# Patient Record
Sex: MALE
[De-identification: names, ages, dates, MRNs, and addresses within clinical notes are randomized per-mention and may not be internally consistent; named-entity substitution may affect disease eponyms.]

---

## 2021-03-24 ENCOUNTER — HOSPITAL ENCOUNTER (EMERGENCY)
Dept: HOSPITAL 97 - ER | Age: 41
Discharge: HOME | End: 2021-03-24
Payer: SELF-PAY

## 2021-03-24 DIAGNOSIS — L03.115: Primary | ICD-10-CM

## 2021-03-24 DIAGNOSIS — Z79.84: ICD-10-CM

## 2021-03-24 DIAGNOSIS — E11.9: ICD-10-CM

## 2021-03-24 PROCEDURE — 99282 EMERGENCY DEPT VISIT SF MDM: CPT

## 2021-03-24 NOTE — EDPHYS
Physician Documentation                                                                           

 [facility]                                                                 

Name: Isrrael Bernal                                                                               

Age: 40 yrs                                                                                       

Sex: Male                                                                                         

: 1980                                                                                   

MRN: [id number]                                                                                   

Arrival Date: 2021                                                                          

Time: 19:26                                                                                       

Account#: [id number]                                                                            

Bed 20                                                                                            

Private MD:                                                                                       

[facility] Physician Damion Ogden                                                                      

HPI:                                                                                              

                                                                                             

22:27 This 40 yrs old  Male presents to ER via Ambulatory with complaints of Foot     jr8 

      Pain.                                                                                       

22:27 The patient presents with pain, tenderness, erythema. The complaints affect the lateral jr8 

      aspect of right foot. Onset: The symptoms/episode began/occurred gradually, 2 day(s)        

      ago. Modifying factors: The symptoms are alleviated by nothing. the symptoms are            

      aggravated by movement. Associated signs and symptoms: The patient has no apparent          

      associated signs or symptoms. Severity of symptoms: At their worst the symptoms were        

      mild, in the emergency department the symptoms are unchanged. The patient has not           

      experienced similar symptoms in the past. The patient has not recently seen a               

      physician. Patient stated that he bought some new shoes for work. Was rubbing on foot       

      and was painful. Got another pair since then but now has redness, tenderness to right       

      5th digit with extension of redness to dorsum and lateral foot. Known history of            

      diabetes .                                                                                  

                                                                                                  

Historical:                                                                                       

- Allergies:                                                                                      

20:00 No Known Allergies;                                                                     ca1 

- Home Meds:                                                                                      

20:00 Metformin Oral [Active];                                                                ca1 

- PMHx:                                                                                           

20:00 Diabetes - NIDDM;                                                                       ca1 

- PSHx:                                                                                           

20:00 None;                                                                                   ca1 

                                                                                                  

- Immunization history:: Flu vaccine is up to date.                                               

- Social history:: Smoking status: Patient denies any tobacco usage or history of.                

                                                                                                  

                                                                                                  

ROS:                                                                                              

22:27 Eyes: Negative for injury, pain, redness, and discharge, ENT: Negative for injury,      jr8 

      pain, and discharge, Neck: Negative for injury, pain, and swelling, Cardiovascular:         

      Negative for chest pain, palpitations, and edema, Respiratory: Negative for shortness       

      of breath, cough, wheezing, and pleuritic chest pain, Abdomen/GI: Negative for              

      abdominal pain, nausea, vomiting, diarrhea, and constipation, Back: Negative for injury     

      and pain, MS/Extremity: Negative for injury and deformity. Positive for pain, redness,      

      tenderness to right foot Neuro: Negative for headache, weakness, numbness, tingling,        

      and seizure.                                                                                

22:27 Skin: Positive for erythema, of the right foot.                                             

                                                                                                  

Exam:                                                                                             

22:27 Constitutional:  This is a well developed, well nourished patient who is awake, alert,  jr8 

      and in no acute distress. Cardiovascular:  Regular rate and rhythm with a normal S1 and     

      S2.  No gallops, murmurs, or rubs.  Normal PMI, no JVD.  No pulse deficits.                 

      Respiratory:  Lungs have equal breath sounds bilaterally, clear to auscultation and         

      percussion.  No rales, rhonchi or wheezes noted.  No increased work of breathing, no        

      retractions or nasal flaring. Skin:  Warm, dry with normal turgor.  Normal color with       

      no rashes, no lesions, and no evidence of cellulitis. Neuro:  Awake and alert, GCS 15,      

      oriented to person, place, time, and situation.  Cranial nerves II-XII grossly intact.      

      Motor strength 5/5 in all extremities.  Sensory grossly intact.  Cerebellar exam            

      normal.  Normal gait.                                                                       

22:27 Musculoskeletal/extremity: Extremities: grossly normal except: noted in the lateral         

      aspect of right foot: Patient has redness to right 5th digit with extension of erythema     

      to right dorsum and lateral foot. Tender to palpation. No ulcerations or open wounds        

      noted , ROM: intact in all extremities, Circulation is intact in all extremities.           

      Sensation intact.                                                                           

                                                                                                  

Vital Signs:                                                                                      

19:57  / 120 LA; Pulse 107; Resp 16 S; Temp 99.1(TE); Pulse Ox 100% on R/A; Weight      ca1 

      83.91 kg (R); Height 5 ft. 6 in. (167.64 cm) (R); Pain 9/10;                                

20:03  / 109 RA;                                                                        ca1 

22:38  / 98; Pulse 84; Resp 18; Pulse Ox 99% ;                                          ea  

19:57 Body Mass Index 29.86 (83.91 kg, 167.64 cm)                                             ca1 

                                                                                                  

MDM:                                                                                              

22:01 Patient medically screened.                                                             [facility] 

22:27 Data reviewed: vital signs, nurses notes, and as a result, I will discharge patient.    jr8 

      Data interpreted: Pulse oximetry: on room air is 100 %. Interpretation: normal.             

      Counseling: I had a detailed discussion with the patient and/or guardian regarding: the     

      historical points, exam findings, and any diagnostic results supporting the                 

      discharge/admit diagnosis, the need for outpatient follow up, a family practitioner, to     

      return to the emergency department if symptoms worsen or persist or if there are any        

      questions or concerns that arise at home.                                                   

                                                                                                  

Administered Medications:                                                                         

No medications were administered                                                                  

                                                                                                  

                                                                                                  

Disposition:                                                                                      

                                                                                             

07:55 Co-signature as Attending Physician, Damion Ogden MD I agree with the assessment and  lila 

      plan of care.                                                                               

                                                                                                  

Disposition:                                                                                      

21 22:31 Discharged to Home. Impression: Cellulitis of right lower limb.                    

- Condition is Stable.                                                                            

- Discharge Instructions: Cellulitis, Adult.                                                      

- Prescriptions for Bactrim - 160 mg Oral Tablet - take 1 tablet by ORAL route              

  every 12 hours for 10 days; 20 tablet.                                                          

- Medication Reconciliation Form, Thank You Letter, Antibiotic Education, Prescription            

  Opioid Use form.                                                                                

- Follow up: Private Physician; When: 2 - 3 days; Reason: Recheck today's complaints,             

  Continuance of care, Re-evaluation by your physician.                                           

- Problem is new.                                                                                 

- Symptoms have improved.                                                                         

                                                                                                  

                                                                                                  

                                                                                                  

Signatures:                                                                                       

Damion Ogden MD MD cha Roszak, Josh, PA PA   jr8                                                  

Amber Silva RN RN ea Acob, Cheryl, RN RN   ca1                                                  

                                                                                                  

Corrections: (The following items were deleted from the chart)                                    

                                                                                             

22:43 22:31 2021 22:31 Discharged to Home. Impression: Cellulitis of right lower limb.  ea  

      Condition is Stable. Forms are Medication Reconciliation Form, Thank You Letter,            

      Antibiotic Education, Prescription Opioid Use. Follow up: Private Physician; When: 2 -      

      3 days; Reason: Recheck today's complaints, Continuance of care, Re-evaluation by your      

      physician. Problem is new. Symptoms have improved. jr8                                      

                                                                                                  

**************************************************************************************************

## 2021-03-25 VITALS — SYSTOLIC BLOOD PRESSURE: 172 MMHG | OXYGEN SATURATION: 99 % | DIASTOLIC BLOOD PRESSURE: 98 MMHG

## 2021-03-25 VITALS — TEMPERATURE: 99.1 F

## 2021-04-19 NOTE — ER
Nurse's Notes                                                                                     

 [facility]                                                                 

Name: Isrrael Bernal                                                                               

Age: 40 yrs                                                                                       

Sex: Male                                                                                         

: 1980                                                                                   

MRN: [id number]                                                                                   

Arrival Date: 2021                                                                          

Time: 19:26                                                                                       

Account#: [id number]                                                                            

Bed 20                                                                                            

Private MD:                                                                                       

Diagnosis: Cellulitis of right lower limb                                                         

                                                                                                  

Presentation:                                                                                     

                                                                                             

19:57 Chief complaint: Patient states: R pinky toe pain, red and swollen x 1 week. Now it     ca1 

      hurts to walk on it. Had new pair of shoes 2 weeks ago and then when it started. Denies     

      injury to the toe. Coronavirus screen: Client denies travel out of the [place] in the last     

      14 days. At this time, the client does not indicate any symptoms associated with            

      coronavirus-19. Ebola Screen: Patient negative for fever greater than or equal to 101.5     

      degrees Fahrenheit, and additional compatible Ebola Virus Disease symptoms Patient          

      denies exposure to infectious person. Patient denies travel to an Ebola-affected area       

      in the 21 days before illness onset. No symptoms or risks identified at this time.          

      Initial Sepsis Screen: Does the patient meet any 2 criteria? No. Patient's initial          

      sepsis screen is negative. Does the patient have a suspected source of infection? No.       

      Patient's initial sepsis screen is negative. Risk Assessment: Do you want to hurt           

      yourself or someone else? Patient reports no desire to harm self or others. Onset of        

      symptoms was 2021.                                                                

19:57 Method Of Arrival: Ambulatory                                                           ca1 

19:57 Acuity: HEMANT 3                                                                           ca1 

                                                                                                  

Historical:                                                                                       

- Allergies:                                                                                      

20:00 No Known Allergies;                                                                     ca1 

- Home Meds:                                                                                      

20:00 Metformin Oral [Active];                                                                ca1 

- PMHx:                                                                                           

20:00 Diabetes - NIDDM;                                                                       ca1 

- PSHx:                                                                                           

20:00 None;                                                                                   ca1 

                                                                                                  

- Immunization history:: Flu vaccine is up to date.                                               

- Social history:: Smoking status: Patient denies any tobacco usage or history of.                

                                                                                                  

                                                                                                  

Screenin:32 Abuse screen: Denies threats or abuse. Nutritional screening: No deficits noted.        ea  

      Tuberculosis screening: No symptoms or risk factors identified. Fall Risk None              

      identified.                                                                                 

                                                                                                  

Assessment:                                                                                       

22:37 General: Appears in no apparent distress. Behavior is calm, cooperative, appropriate    ea  

      for age. Pain: Complains of pain in lateral aspect of right foot. Neuro: Level of           

      Consciousness is awake, alert, obeys commands, Oriented to person, place, time.             

      Cardiovascular: Patient's skin is warm and dry. Respiratory: Airway is patent               

      Respiratory effort is even, unlabored, Respiratory pattern is regular, symmetrical.         

      Derm: Skin is pink, warm \T\ dry.                                                           

22:42 Reassessment: Patient and/or family updated on plan of care and expected duration. Pain ea  

      level reassessed. Patient is alert, oriented x 3, equal unlabored respirations, skin        

      warm/dry/pink. Discharge instruction given to patient, verbalized the understanding of      

      instruction.                                                                                

                                                                                                  

Vital Signs:                                                                                      

19:57  / 120 LA; Pulse 107; Resp 16 S; Temp 99.1(TE); Pulse Ox 100% on R/A; Weight      ca1 

      83.91 kg (R); Height 5 ft. 6 in. (167.64 cm) (R); Pain 9/10;                                

20:03  / 109 RA;                                                                        ca1 

22:38  / 98; Pulse 84; Resp 18; Pulse Ox 99% ;                                          ea  

19:57 Body Mass Index 29.86 (83.91 kg, 167.64 cm)                                             ca1 

                                                                                                  

ED Course:                                                                                        

19:26 Patient arrived in ED.                                                                  bp1 

20:00 Triage completed.                                                                       ca1 

20:00 Arm band placed on right wrist.                                                         ca1 

21:58 Elio Stovall PA is PHCP.                                                               jr8 

21:58 Damion Ogden MD is Attending Physician.                                             jr8 

22:29 Amber Silva, MATT is Primary Nurse.                                                    ea  

22:32 Patient has correct armband on for positive identification. Bed in low position. Call   ea  

      light in reach. Side rails up X2.                                                           

22:38 No provider procedures requiring assistance completed. Patient did not have IV access   ea  

      during this emergency room visit.                                                           

                                                                                                  

Administered Medications:                                                                         

No medications were administered                                                                  

                                                                                                  

                                                                                                  

Outcome:                                                                                          

22:31 Discharge ordered by MD.                                                                jr8 

22:42 Discharged to home ambulatory.                                                          ea  

22:42 Condition: stable                                                                           

22:42 Discharge instructions given to patient, Instructed on discharge instructions, follow       

      up and referral plans. medication usage, Demonstrated understanding of instructions,        

      follow-up care, medications, Prescriptions given X 1.                                       

22:43 Patient left the ED.                                                                    ea  

                                                                                                  

Signatures:                                                                                       

Eilo Stovall PA PA   jrAmber Mcduffie RN                      RN   Kalyn Dejesus RN                        RN   ca1                                                  

Deysi Roger                           bp1                                                  

                                                                                                  

Corrections: (The following items were deleted from the chart)                                    

20:01 19:57  / 120; Pulse 107bpm; Resp 16bpm; Spontaneous; Pulse Ox 100% RA; Temp 99.1F ca1 

      Temporal; 83.91 kg Reported; Height 5 ft. 6 in. Reported; BMI: 29.8; Pain 9/10; ca1         

20:03 19:57 Chief complaint: Patient states: R pinky toe pain, red and swollen x 1 week. Now  ca1 

      it hurts to walk on it ca1                                                                  

                                                                                                  

**************************************************************************************************
no st elevation, no st depression

## 2022-06-20 ENCOUNTER — HOSPITAL ENCOUNTER (INPATIENT)
Dept: HOSPITAL 97 - ER | Age: 42
LOS: 3 days | Discharge: HOME | DRG: 683 | End: 2022-06-23
Attending: INTERNAL MEDICINE | Admitting: INTERNAL MEDICINE
Payer: SELF-PAY

## 2022-06-20 VITALS — BODY MASS INDEX: 30 KG/M2

## 2022-06-20 DIAGNOSIS — E83.51: ICD-10-CM

## 2022-06-20 DIAGNOSIS — I12.9: ICD-10-CM

## 2022-06-20 DIAGNOSIS — I16.1: ICD-10-CM

## 2022-06-20 DIAGNOSIS — N18.9: ICD-10-CM

## 2022-06-20 DIAGNOSIS — N17.9: Primary | ICD-10-CM

## 2022-06-20 DIAGNOSIS — Z89.411: ICD-10-CM

## 2022-06-20 DIAGNOSIS — Z91.14: ICD-10-CM

## 2022-06-20 DIAGNOSIS — Z20.822: ICD-10-CM

## 2022-06-20 DIAGNOSIS — I67.4: ICD-10-CM

## 2022-06-20 LAB
ALBUMIN SERPL BCP-MCNC: 2.4 G/DL (ref 3.4–5)
ALP SERPL-CCNC: 109 U/L (ref 45–117)
ALT SERPL W P-5'-P-CCNC: 31 U/L (ref 12–78)
AST SERPL W P-5'-P-CCNC: 32 U/L (ref 15–37)
BUN BLD-MCNC: 50 MG/DL (ref 7–18)
GLUCOSE SERPLBLD-MCNC: 159 MG/DL (ref 74–106)
HCT VFR BLD CALC: 36.7 % (ref 39.6–49)
INR BLD: 1
LIPASE SERPL-CCNC: 268 U/L (ref 73–393)
LYMPHOCYTES # SPEC AUTO: 1.8 K/UL (ref 0.7–4.9)
MAGNESIUM SERPL-MCNC: 1.9 MG/DL (ref 1.8–2.4)
MAGNESIUM SERPL-MCNC: 2 MG/DL (ref 1.8–2.4)
MCV RBC: 87.6 FL (ref 80–100)
NT-PROBNP SERPL-MCNC: 2956 PG/ML (ref ?–125)
PMV BLD: 7.8 FL (ref 7.6–11.3)
POTASSIUM SERPL-SCNC: 3.8 MMOL/L (ref 3.5–5.1)
RBC # BLD: 4.19 M/UL (ref 4.33–5.43)
TROPONIN I SERPL HS-MCNC: 355.9 PG/ML (ref ?–58.9)
TSH SERPL DL<=0.05 MIU/L-ACNC: 2.28 UIU/ML (ref 0.36–3.74)

## 2022-06-20 PROCEDURE — 93017 CV STRESS TEST TRACING ONLY: CPT

## 2022-06-20 PROCEDURE — 80061 LIPID PANEL: CPT

## 2022-06-20 PROCEDURE — 82570 ASSAY OF URINE CREATININE: CPT

## 2022-06-20 PROCEDURE — 93306 TTE W/DOPPLER COMPLETE: CPT

## 2022-06-20 PROCEDURE — 96365 THER/PROPH/DIAG IV INF INIT: CPT

## 2022-06-20 PROCEDURE — 76770 US EXAM ABDO BACK WALL COMP: CPT

## 2022-06-20 PROCEDURE — 85025 COMPLETE CBC W/AUTO DIFF WBC: CPT

## 2022-06-20 PROCEDURE — 84156 ASSAY OF PROTEIN URINE: CPT

## 2022-06-20 PROCEDURE — 84443 ASSAY THYROID STIM HORMONE: CPT

## 2022-06-20 PROCEDURE — 83735 ASSAY OF MAGNESIUM: CPT

## 2022-06-20 PROCEDURE — 82947 ASSAY GLUCOSE BLOOD QUANT: CPT

## 2022-06-20 PROCEDURE — 86706 HEP B SURFACE ANTIBODY: CPT

## 2022-06-20 PROCEDURE — 87522 HEPATITIS C REVRS TRNSCRPJ: CPT

## 2022-06-20 PROCEDURE — 78452 HT MUSCLE IMAGE SPECT MULT: CPT

## 2022-06-20 PROCEDURE — 83880 ASSAY OF NATRIURETIC PEPTIDE: CPT

## 2022-06-20 PROCEDURE — 80053 COMPREHEN METABOLIC PANEL: CPT

## 2022-06-20 PROCEDURE — 80048 BASIC METABOLIC PNL TOTAL CA: CPT

## 2022-06-20 PROCEDURE — 86705 HEP B CORE ANTIBODY IGM: CPT

## 2022-06-20 PROCEDURE — 83036 HEMOGLOBIN GLYCOSYLATED A1C: CPT

## 2022-06-20 PROCEDURE — 99285 EMERGENCY DEPT VISIT HI MDM: CPT

## 2022-06-20 PROCEDURE — 71045 X-RAY EXAM CHEST 1 VIEW: CPT

## 2022-06-20 PROCEDURE — 36415 COLL VENOUS BLD VENIPUNCTURE: CPT

## 2022-06-20 PROCEDURE — 85610 PROTHROMBIN TIME: CPT

## 2022-06-20 PROCEDURE — 80076 HEPATIC FUNCTION PANEL: CPT

## 2022-06-20 PROCEDURE — 83690 ASSAY OF LIPASE: CPT

## 2022-06-20 PROCEDURE — 84300 ASSAY OF URINE SODIUM: CPT

## 2022-06-20 PROCEDURE — 74176 CT ABD & PELVIS W/O CONTRAST: CPT

## 2022-06-20 PROCEDURE — 76377 3D RENDER W/INTRP POSTPROCES: CPT

## 2022-06-20 PROCEDURE — 82550 ASSAY OF CK (CPK): CPT

## 2022-06-20 PROCEDURE — 96361 HYDRATE IV INFUSION ADD-ON: CPT

## 2022-06-20 PROCEDURE — 86317 IMMUNOASSAY INFECTIOUS AGENT: CPT

## 2022-06-20 PROCEDURE — 93005 ELECTROCARDIOGRAM TRACING: CPT

## 2022-06-20 PROCEDURE — 84484 ASSAY OF TROPONIN QUANT: CPT

## 2022-06-20 RX ADMIN — HUMAN INSULIN SCH: 100 INJECTION, SOLUTION SUBCUTANEOUS at 16:30

## 2022-06-20 RX ADMIN — HEPARIN SODIUM SCH UNIT: 5000 INJECTION, SOLUTION INTRAVENOUS; SUBCUTANEOUS at 21:42

## 2022-06-20 RX ADMIN — SODIUM CHLORIDE SCH MLS: 0.9 INJECTION, SOLUTION INTRAVENOUS at 17:30

## 2022-06-20 RX ADMIN — HUMAN INSULIN SCH: 100 INJECTION, SOLUTION SUBCUTANEOUS at 21:00

## 2022-06-20 RX ADMIN — Medication SCH: at 21:00

## 2022-06-20 RX ADMIN — ATORVASTATIN CALCIUM SCH MG: 40 TABLET, FILM COATED ORAL at 21:42

## 2022-06-20 RX ADMIN — ASPIRIN SCH: 81 TABLET, COATED ORAL at 17:36

## 2022-06-20 RX ADMIN — FAMOTIDINE SCH: 20 TABLET, FILM COATED ORAL at 21:00

## 2022-06-20 RX ADMIN — CLOPIDOGREL BISULFATE SCH MG: 75 TABLET, FILM COATED ORAL at 17:30

## 2022-06-20 NOTE — EDPHYS
Physician Documentation                                                                           

 [facility]                                                                 

Name: Isrrael Bernal                                                                               

Age: 41 yrs                                                                                       

Sex: Male                                                                                         

: 1980                                                                                   

MRN: [id number]                                                                                   

Arrival Date: 2022                                                                          

Time: 12:33                                                                                       

Account#: [id number]                                                                            

Bed 3                                                                                             

Private MD:                                                                                       

ED Physician Damion Ogden                                                                      

HPI:                                                                                              

                                                                                             

14:29 This 41 yrs old  Male presents to ER via EMS with complaints of Dizziness, High lila 

      Blood Pressure.                                                                             

14:29 The patient presents with dizziness, generalized weakness. Onset: The symptoms/episode  lila 

      began/occurred just prior to arrival. Context: occurred while the patient was working.      

      Modifying factors: The symptoms are alleviated by nothing, the symptoms are aggravated      

      by nothing. Associated signs and symptoms: Pertinent positives: nausea, shortness of        

      breath. Severity of symptoms: At their worst the symptoms were mild moderate in the         

      emergency department the symptoms have improved mildly. Patient's baseline: Neuro:          

      alert and fully oriented. The patient has experienced similar episodes in the past,         

      several times.                                                                              

                                                                                                  

Historical:                                                                                       

- Allergies:                                                                                      

12:37 No Known Allergies;                                                                     jd3 

- Home Meds:                                                                                      

12:37 Metformin Oral [Active];                                                                jd3 

- PMHx:                                                                                           

12:37 Diabetes - NIDDM; Hypertensive disorder;                                                jd3 

- PSHx:                                                                                           

12:37 right foot;                                                                             jd3 

                                                                                                  

- Immunization history:: Adult Immunizations up to date, Client reports having NOT                

  received the Covid vaccine. Flu vaccine is not up to date.                                      

- Social history:: Smoking status: Patient denies any tobacco usage or history of.                

- Family history:: not pertinent.                                                                 

                                                                                                  

                                                                                                  

ROS:                                                                                              

14:29 Constitutional: Negative for fever, chills, and weight loss, Eyes: Negative for injury, lila 

      pain, redness, and discharge, ENT: Negative for injury, pain, and discharge, Neck:          

      Negative for injury, pain, and swelling, Cardiovascular: Negative for chest pain,           

      palpitations, and edema, Respiratory: Negative for shortness of breath, cough,              

      wheezing, and pleuritic chest pain, Abdomen/GI: Negative for abdominal pain, nausea,        

      vomiting, diarrhea, and constipation, Back: Negative for injury and pain, : Negative      

      for injury, bleeding, discharge, and swelling, MS/Extremity: Negative for injury and        

      deformity, Skin: Negative for injury, rash, and discoloration, Psych: Negative for          

      depression, anxiety, suicide ideation, homicidal ideation, and hallucinations,              

      Allergy/Immunology: Negative for hives, rash, and allergies, Endocrine: Negative for        

      neck swelling, polydipsia, polyuria, polyphagia, and marked weight changes,                 

      Hematologic/Lymphatic: Negative for swollen nodes, abnormal bleeding, and unusual           

      bruising.                                                                                   

14:29 Neuro: Positive for dizziness, weakness.                                                    

                                                                                                  

Exam:                                                                                             

14:29 Constitutional:  This is a well developed, well nourished patient who is awake, alert,  lila 

      and in no acute distress. Head/Face:  Normocephalic, atraumatic. Eyes:  Pupils equal        

      round and reactive to light, extra-ocular motions intact.  Lids and lashes normal.          

      Conjunctiva and sclera are non-icteric and not injected.  Cornea within normal limits.      

      Periorbital areas with no swelling, redness, or edema. ENT:  Nares patent. No nasal         

      discharge, no septal abnormalities noted.  Tympanic membranes are normal and external       

      auditory canals are clear.  Oropharynx with no redness, swelling, or masses, exudates,      

      or evidence of obstruction, uvula midline.  Mucous membranes moist. Neck:  Trachea          

      midline, no thyromegaly or masses palpated, and no cervical lymphadenopathy.  Supple,       

      full range of motion without nuchal rigidity, or vertebral point tenderness.  No            

      Meningismus. Chest/axilla:  Normal chest wall appearance and motion.  Nontender with no     

      deformity.  No lesions are appreciated. Cardiovascular:  Regular rate and rhythm with a     

      normal S1 and S2.  No gallops, murmurs, or rubs.  Normal PMI, no JVD.  No pulse             

      deficits. Respiratory:  Lungs have equal breath sounds bilaterally, clear to                

      auscultation and percussion.  No rales, rhonchi or wheezes noted.  No increased work of     

      breathing, no retractions or nasal flaring. Abdomen/GI:  Soft, non-tender, with normal      

      bowel sounds.  No distension or tympany.  No guarding or rebound.  No evidence of           

      tenderness throughout. Back:  No spinal tenderness.  No costovertebral tenderness.          

      Full range of motion. Male :  Normal genitalia with no discharge or lesions. Skin:        

      Warm, dry with normal turgor.  Normal color with no rashes, no lesions, and no evidence     

      of cellulitis. MS/ Extremity:  Pulses equal, no cyanosis.  Neurovascular intact.  Full,     

      normal range of motion. Neuro:  Awake and alert, GCS 15, oriented to person, place,         

      time, and situation.  Cranial nerves II-XII grossly intact.  Motor strength 5/5 in all      

      extremities.  Sensory grossly intact.  Cerebellar exam normal.  Normal gait. Psych:         

      Awake, alert, with orientation to person, place and time.  Behavior, mood, and affect       

      are within normal limits.                                                                   

14:36 ECG was reviewed by the Attending Physician.                                            [facility] 

                                                                                                  

Vital Signs:                                                                                      

12:38  / 113; Pulse 101; Resp 17 S; Temp 98.0(O); Pulse Ox 98% on R/A; Weight 77.11 kg  jd3 

      (R); Height 5 ft. 6 in. (167.64 cm) (R); Pain 3/10;                                         

13:31  / 107; Pulse 83; Resp 16 S; Pulse Ox 99% on R/A;                                 jd3 

14:30  / 117; Pulse 85; Resp 13; Pulse Ox 100% ;                                        bp  

15:30  / 108; Pulse 87; Resp 19; Pulse Ox 99% ;                                         bp  

16:05  / 101; Pulse 69; Resp 18 S; Pulse Ox 99% on R/A;                                 jd3 

17:32  / 105; Pulse 65; Resp 18 S; Pulse Ox 100% on R/A;                                jd3 

12:38 Body Mass Index 27.44 (77.11 kg, 167.64 cm)                                             jd3 

13:31 provider notified of high blood pressure                                                jd3 

                                                                                                  

MDM:                                                                                              

12:34 Patient medically screened.                                                             lila 

14:31 Differential diagnosis: cardiac arrhythmia, generalized weakness, hypovolemia,          lila 

      idiopathic dizziness. Data reviewed: vital signs, nurses notes, lab test result(s),         

      EKG, radiologic studies, CT scan, plain films. Data interpreted: Cardiac monitor: rate      

      is 83 beats/min, rhythm is regular, Pulse oximetry: on room air is 99 %. Test               

      interpretation: by ED physician or midlevel provider: ECG, plain radiologic studies.        

      Counseling: I had a detailed discussion with the patient and/or guardian regarding: the     

      historical points, exam findings, and any diagnostic results supporting the                 

      discharge/admit diagnosis, lab results, radiology results, the need for further work-up     

      and treatment in the hospital.                                                              

                                                                                                  

                                                                                             

12:35 Order name: Basic Metabolic Panel; Complete Time: 14:16                                 lila 

                                                                                             

12:35 Order name: CBC with Diff; Complete Time: 14:16                                         [facility] 

                                                                                             

12:35 Order name: LFT's; Complete Time: 14:16                                                 [facility] 

                                                                                             

12:35 Order name: Magnesium; Complete Time: 14:16                                             [facility] 

                                                                                             

12:35 Order name: NT PRO-BNP; Complete Time: 14:16                                            [facility] 

                                                                                             

12:35 Order name: PT-INR; Complete Time: 14:16                                                [facility] 

                                                                                             

12:35 Order name: Troponin HS; Complete Time: 14:16                                           [facility] 

                                                                                             

12:35 Order name: Lipase; Complete Time: 14:16                                                [facility] 

                                                                                             

14:30 Order name: COVID-19 SARS RT PCR (Document "Date of Onset" if Symptomatic); Complete    bd  

      Time: 17:                                                                                             

14:52 Order name: Creatine Phosphokinase; Complete Time: 17:25                                [facility]

                                                                                             

14:52 Order name: Thyroid Stimulating Hormone; Complete Time: 17:25                           [facility]

                                                                                             

14:52 Order name: UR CREAT                                                                    [facility]

                                                                                             

14:52 Order name: UR SODIUM                                                                   [facility]

                                                                                             

14:52 Order name: CBC with Automated Diff                                                     [facility]

                                                                                             

14:52 Order name: CBC with Automated Diff                                                     [facility]

                                                                                             

14:52 Order name: Comprehensive Metabolic Panel                                               [facility]

                                                                                             

14:52 Order name: Comprehensive Metabolic Panel                                               ED[facility]

                                                                                             

14:52 Order name: Comprehensive Metabolic Panel                                               ED[facility]

                                                                                             

14:52 Order name: Comprehensive Metabolic Panel                                               [facility]

                                                                                             

14:52 Order name: Lipid Profile                                                               [facility]

                                                                                             

14:52 Order name: Lipid Profile                                                               EDMS

                                                                                             

14:52 Order name: Magnesium                                                                   [facility]

                                                                                             

14:52 Order name: Magnesium; Complete Time: 17:25                                             [facility]

                                                                                             

14:52 Order name: Magnesium                                                                   [facility]

                                                                                             

14:52 Order name: Magnesium                                                                   EDMS

                                                                                             

14:54 Order name: Hemoglobin A1c                                                              EDMS

                                                                                             

14:54 Order name: Hepatitis B Core IgM Antibody                                               EDMS

                                                                                             

14:54 Order name: Hepatitis B Surface Ab,Quant                                                EDMS

                                                                                             

14:54 Order name: Hepatitis B Surface Antibody                                                EDMS

                                                                                             

14:54 Order name: Hepatitis C RNA, Quant (PCR)                                                [facility]

                                                                                             

12:35 Order name: XRAY Chest (1 view); Complete Time: 14:16                                   [facility] 

                                                                                             

12:35 Order name: EKG; Complete Time: 12:36                                                   [facility] 

                                                                                             

12:35 Order name: Cardiac monitoring; Complete Time: 12:44                                    lila 

                                                                                             

12:35 Order name: EKG - Nurse/Tech; Complete Time: 12:44                                      lila 

                                                                                             

12:35 Order name: IV Saline Lock; Complete Time: 12:44                                        lila 

                                                                                             

12:35 Order name: Labs collected and sent; Complete Time: 12:44                               lila 

                                                                                             

12:35 Order name: O2 Per Protocol; Complete Time: 12:44                                       lila 

                                                                                             

12:35 Order name: O2 Sat Monitoring; Complete Time: 12:44                                     [facility] 

                                                                                             

13:51 Order name: Stone Protocol CT; Complete Time: 17:25                                     bd  

                                                                                             

14:52 Order name: Renal Ultrasound-Complete; Complete Time: 17:25                             [facility]

                                                                                             

14:54 Order name: Troponin High Sensitivity                                                   EDMS

                                                                                             

14:54 Order name: Troponin High Sensitivity; Complete Time: 17:25                             [facility]

                                                                                             

14:54 Order name: Troponin High Sensitivity                                                   EDMS

                                                                                             

15:07 Order name: CONS Physician Consult                                                      EDMS

                                                                                             

19:30 Order name: Miscellaneous Test Lab                                                      EDMS

                                                                                                  

EC:36 Rate is 95 beats/min. Rhythm is regular. QRS Axis is Normal. ID interval is normal. QRS lila 

      interval is normal. QT interval is normal. No Q waves. T waves are Normal. No ST            

      changes noted. Clinical impression: NSR w/ Non-specific ST/T Changes and No evidence of     

      ischemia. Interpreted by me. Reviewed by me.                                                

                                                                                                  

Administered Medications:                                                                         

12:44 Drug: Aspirin 81 mg Route: PO;                                                          bp  

13:40 Follow up: Response: No adverse reaction                                                jd3 

12:44 Drug: NS 0.9% 1000 ml Route: IV; Rate: 1 bolus; Site: left antecubital;                 bp  

13:40 Follow up: Response: No adverse reaction; IV Status: Completed infusion                 jd3 

14:37 Drug: Norvasc (amlodipine) 10 mg Route: PO;                                             jd3 

15:30 Follow up: Response: No adverse reaction                                                jd3 

14:37 Drug: Lopressor (metoprolol TARTRATE) 50 mg Route: PO;                                  jd3 

15:30 Follow up: Response: No adverse reaction                                                jd3 

15:40 Drug: Labetalol 20 mg Route: IV; Rate: bolus; Infused Over: 2 mins; Site: left          bp  

      antecubital;                                                                                

16:40 Follow up: Response: No adverse reaction; IV Status: Completed infusion                 jd3 

                                                                                                  

                                                                                                  

Disposition Summary:                                                                              

22 14:34                                                                                    

Hospitalization Ordered                                                                           

      Hospitalization Status: Observation                                                     lila 

      Provider: Justen Cody cha 

      Condition: Stable                                                                       lila 

      Problem: new                                                                            lila 

      Symptoms: have improved                                                                 lila 

      Bed/Room Type: Standard                                                                 lila 

      Location: Intensive Care Unit(22 18:32)                                           bd  

      Room Assignment: 2-(22 18:32)                                                     bd  

      Diagnosis                                                                                   

        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 

      chronic kidney disease, or end stage renal disease                                          

        - Dizziness and giddiness                                                             lila 

        - Type 2 diabetes mellitus with hyperglycemia                                         lila 

      Forms:                                                                                      

        - Medication Reconciliation Form                                                      lila 

        - SBAR form                                                                           lila 

Signatures:                                                                                       

Dispatcher [employer]                           EDAshwini Galloway Corey, MD MD cha Davies, Jonathon, RN                    RN   jWilliam Nogueira RN                      RN   bp                                                   

                                                                                                  

Corrections: (The following items were deleted from the chart)                                    

18:32 14:34 Telemetry/MedSurg (Inpatient) lila                                                 bd  

18:32 14:34 lila                                                                               bd  

                                                                                                  

**************************************************************************************************

## 2022-06-20 NOTE — RAD REPORT
EXAM DESCRIPTION:  US - Renal Ultrasound-Complete - 6/20/2022 3:50 pm

 

CLINICAL HISTORY:  ARF

 

COMPARISON:  [facility] Protocol dated 6/20/2022

 

FINDINGS:  Both kidneys are normal in size, shape and echotexture.

 

The right kidney measures 9.7 cm. No hydronephrosis, focal mass or perinephric fluid.

 

The left kidney measures 10.4 cm. No hydronephrosis, focal mass or perinephric fluid.

 

The urinary bladder is incompletely distended without gross abnormality seen.

 

IMPRESSION:  No evidence of hydronephrosis or other significant abnormality.

## 2022-06-20 NOTE — RAD REPORT
EXAM DESCRIPTION:  RAD - Chest Single View - 6/20/2022 1:06 pm

 

CLINICAL HISTORY:  CHEST PAIN

 

COMPARISON:  No comparisons

 

FINDINGS:  Lines: None.

Lungs: No evidence of edema or pneumonia.

Pleural: No significant pleural effusions or pneumothorax.

Cardiac: The heart size is within normal limits.

Bones: No acute fractures.

Other:

 

IMPRESSION:  No acute cardiopulmonary disease.

## 2022-06-20 NOTE — XMS REPORT
Continuity of Care Document

                            Created on:2022



Patient:GIULIANO LANDERS

Sex:Male

:1980

External Reference #:[id number]





Demographics







                          Address                   [address] [address]

 

                          Home Phone                [phone]

 

                          Work Phone                [phone]

 

                          Email Address             NONE

 

                          Preferred Language        Unknown

 

                          Marital Status            Unknown

 

                          Sikhism Affiliation     Unknown

 

                          Race                      Unknown

 

                          Additional Race(s)        Unavailable

 

                          Ethnic Group              Unknown









Author







                          Organization              [facility]

t

 

                          Address                   [address]



                                                    [place] [address]

 

                          Phone                     [phone]









Care Team Providers







                    Name                Role                Phone

 

                    SONG                Attending Clinician Unavailable

 

                    Doctor Unassigned,  Name Attending Clinician Unavailable

 

                    Kemar               Attending Clinician [phone]

 

                    DEBBIE Nova MD       Attending Clinician [phone]

 

                    Olesya HUYNH          Attending Clinician [phone]

 

                    Gaurav JADE           Attending Clinician [phone]

 

                    Magdiel ESTRADA,  E       Attending Clinician [phone]

 

                    Olesya HUYNH          Admitting Clinician [phone]









Payers







           Payer Name Policy Type Policy Number Effective Date Expiration Date S

ource







Problems







       Condition Condition Condition Status Onset  Resolution Last   Treating Co

mments 

Source



       Name   Details Category        Date   Date   Treatment Clinician        



                                                 Date                 

 

       Type 2 Type 2 Disease Active                              [facility]



       diabetes diabetes               4-10                               ity of



       mellitus mellitus               00:00:                             [place]



       without without               00                                 Medical



       complicati complicati                                                  Br

anch



       on,    on,                                                     



       without without                                                  



       long-term long-term                                                  



       current current                                                  



       use of use of                                                  



       insulin insulin                                                  

 

       Essential Essential Disease Active                              Uni

vers



       hypertensi hypertensi               4-10                               it

y of



       on     on                   00:00:                             [address]



                                                                      Branch

 

       ANY (acute ANY (acute Disease Active                              U

nivers



       kidney kidney               4-10                               ity of



       injury) injury)               00:00:                             [address]



                                                                      Branch

 

       Elevated Elevated Disease Active                              Unive

rs



       brain  brain                4-10                               ity of



       natriureti natriureti               00:00:                             [facility]

[place]



       c peptide c peptide               00                                 Medi

hussain



       (BNP)  (BNP)                                                   Branch



       level  level                                                   

 

       Hypertensi Hypertensi Disease Active                              U

nivers



       ve urgency ve urgency               4-10                               it

y of



                                   00:00:                             [address]



                                                                      Branch

 

       Preop  Preop  Disease Active                              [facility]



       cardiovasc cardiovasc               4-10                               it

y of



       ular exam ular exam               00:00:                             [place]

[place]



                                                                    [facility]



                                                                      Branch

 

       Osteomyeli Osteomyeli Disease Active                              U

franca



       tis    tis                  4-09                               ity of



                                   00:00:                             [place]



                                                                    Medical



                                                                      Branch

 

       Septic Septic Disease Active                              [facility]



       arthritis arthritis               5-31                               ity 

of



                                   00:00:                             [place]



                                   00                                 Medical



                                                                      Branch

 

       Obesity Obesity Disease Active                              [facility]



       (BMI   (BMI                 5-31                               ity of



       30-39.9) 30-39.9)               00:00:                             [place]



                                   00                                 Medical



                                                                      Branch

 

       Cellulitis Cellulitis Disease Active                              U

nivers



                                   5-31                               ity of



                                   00:00:                             [place]



                                   00                                 Medical



                                                                      [place]







Allergies, Adverse Reactions, Alerts







       Allergy Allergy Status Severity Reaction(s) Onset  Inactive Treating Comm

ents 

Source



       Name   Type                        Date   Date   Clinician        

 

       NO KNOWN Drug   Active                                           [facility]



       ALLERGIE Class                                                   ity of



       S                                                              [facility]







Social History







           Social Habit Start Date Stop Date  Quantity   Comments   Source

 

           Exposure to                       Not sure              [facility]



           SARS-CoV-2                                             [facility]



           (event)                                                [place]

 

           Tobacco use and 2021 Never used            Universit

y of



           exposure   00:00:00   00:00:00                         [facility]

 

           Alcohol intake 2021 Current drinker of            Un

iversity of



                      00:00:00   00:00:00   alcohol (finding)            [facility]

edical



                                                                  [place]

 

           Alcohol Comment 2019 occasional drinks            Un

iversity of



                      00:00:00   00:00:00                         [facility]

 

           Sex Assigned At 1980                       [facility]it

y of



           Birth      00:00:00   00:00:00                         [facility]









                Smoking Status  Start Date      Stop Date       Source

 

                Former smoker   2021 00:00:00 2021 00:00:00 [facility]

ty of [facility]







Medications







       Ordered Filled Start  Stop   Current Ordering Indication Dosage Frequency

 Signature

                    Comments            Components          Source



     Medication Medication Date Date Medication? Clinician                (SIG) 

          



     Name Name                                                   

 

     amLODIPine      2021- No        [id number] 10mg      Take 1          

 [facility]



     10 mg      4-15 05-16                          tablet by           ity of



     tablet      00:00: 04:59                          mouth           [place]



               00   :00                           daily for           Medical



                                                  30 days.           Branch

 

     glipiZIDE 5      2021- No        [id number] 5mg       Take 1         

  [facility]



     mg tablet      4-15 05-16                          tablet by           ity 

of



               00:00: 04:59                          mouth           [place]



               00   :00                           daily for           Medical



                                                  30 days.           Branch

 

     amLODIPine      2021- No        [id number] 10mg      Take 1          

 Univers



     10 mg      4-15 05-16                          tablet by           ity of



     tablet      00:00: 04:59                          mouth           [place]



               00   :00                           daily for           Medical



                                                  30 days.           Branch

 

     glipiZIDE 5      2021- No        [id number] 5mg       Take 1         

  [facility]



     mg tablet      4-15 05-16                          tablet by           ity 

of



               00:00: 04:59                          mouth           [place]



               00   :00                           daily for           Medical



                                                  30 days.           Branch

 

     aspirin 81            Yes            81mg      Take 81 mg           U

nivers



     mg chewable      4-14                               by mouth           ity 

of



     tablet      23:26:                               daily.           [address]

 

     metFORMIN            Yes            500mg      Take 500           Uni

vers



     500 mg      4-14                               mg by           ity of



     tablet      23:26:                               mouth 2           [place]



               16                                 (two)           [facility]



                                                  daily with           



                                                  meals.           

 

     aspirin 81            Yes            81mg      Take 81 mg           U

nivers



     mg chewable      4-14                               by mouth           ity 

of



     tablet      23:26:                               daily.           [address]

 

     metFORMIN            Yes            500mg      Take 500           Uni

vers



     500 mg      4-14                               mg by           ity of



     tablet      23:26:                               mouth 2           [place]



               16                                 (two)           [facility]



                                                  daily with           



                                                  meals.           

 

     aspirin 81            Yes            81mg      Take 81 mg           U

nivers



     mg chewable      4-14                               by mouth           ity 

of



     tablet      23:26:                               daily.           [address]

 

     metFORMIN            Yes            500mg      Take 500           Uni

vers



     500 mg      4-14                               mg by           ity of



     tablet      23:26:                               mouth 2           [place]



               16                                 (two)           [facility]



                                                  daily with           



                                                  meals.           

 

     doxycycline            Yes            100mg      100 mg,           Un

anna



     hyclate      4-14                               Oral,           ity of



     (Vibramycin      23:00:                               Q12HA2,           [place]

as



     ) capsule      00                                 First dose           Medi

hussain



     100 mg                                         on Wed           [place]



                                                  21 at           



                                                  1800,           



                                                  Until           



                                                  Discontinu           



                                                  ed,            



                                                  ASAP<br>Re           



                                                  ason for           



                                                  Anti-Infec           



                                                  tive:           



                                                  Documented           



                                                  Infection<           



                                                  br>Documen           



                                                  ronnie            



                                                  Infection           



                                                  Site: Skin           



                                                  / Soft           



                                                  Tissue<br&           



                                                  gt;Duratio           



                                                  n of           



                                                  Therapy:           



                                                  Other (see           



                                                  Comments)           

 

     vancomycin            Yes            1250mg      1,250 mg,           

Univers



     1250 mg in      4-14                               IV             ity of



      mL      02:00:                               Infusion,           [place]

as



     RTU IV      00                                 Q24H ABX,           Medical



     Piggyback                                         First dose           [facility]

ch



     1,250 mg                                         on 21 at           



                                                  2100,           



                                                  Until           



                                                  Discontinu           



                                                  ed<br>Reas           



                                                  on for           



                                                  Anti-Infec           



                                                  tive:           



                                                  Documented           



                                                  Infection<           



                                                  br>Documen           



                                                  ronnie            



                                                  Infection           



                                                  Site: Skin           



                                                  / Soft           



                                                  Tissue<br>           



                                                  Duration           



                                                  of             



                                                  Therapy:           



                                                  Other (see           



                                                  Comments)           

 

     vancomycin      2021- No             1250mg      1,250 mg,          

 [facility]



     1250 mg in      -14                          IV             ity of



      mL      02:00: 17:16                          Infusion,           Te

[facility]



     RTU IV      00   :39                           Q24H ABX,           Medical



     Piggyback                                         First dose           [facility]

ch



     1,250 mg                                         on 21 at           



                                                  2100,           



                                                  Until           



                                                  Discontinu           



                                                  ed<br>Reas           



                                                  on for           



                                                  Anti-Infec           



                                                  tive:           



                                                  Documented           



                                                  Infection<           



                                                  br>Documen           



                                                  ronnie            



                                                  Infection           



                                                  Site: Skin           



                                                  / Soft           



                                                  Tissue<br>           



                                                  Duration           



                                                  of             



                                                  Therapy:           



                                                  Other (see           



                                                  Comments)           

 

     atorvastati      2021- No        [id number] 20mg      Take 1         

  [facility]



     n 20 mg      -15                          tablet by           ity of



     tablet      00:00: 04:59                          mouth at           [place]



               00   :00                           bedtime           Medical



                                                  for 30           Branch



                                                  days.           

 

     carvediloL      2021- No        [id number] 12.5mg      Take 1        

   [facility]



     12.5 mg      -15                          tablet by           ity of



     tablet      00:00: 04:59                          mouth 2           [place]



               00   :00                           (two)           Medical



                                                  times           Branch



                                                  daily with           



                                                  meals for           



                                                  30 days.           

 

     ferrous      2021- No        [id number] 325mg      Take 1           U

nivers



     sulfate 325      -15                          tablet by           it

y of



     mg (65 mg      00:00: 04:59                          mouth 2           [place]

[place]



     iron)      00   :00                           (two)           Medical



     tablet                                         times           Branch



                                                  daily for           



                                                  30 days.           

 

     atorvastati      2021- No        [id number] 20mg      Take 1         

  [facility]



     n 20 mg      -15                          tablet by           ity of



     tablet      00:00: 04:59                          mouth at           [place]



               00   :00                           bedtime           Medical



                                                  for 30           Branch



                                                  days.           

 

     carvediloL      2021- No        [id number] 12.5mg      Take 1        

   [facility]



     12.5 mg      4-14 05-15                          tablet by           ity of



     tablet      00:00: 04:59                          mouth 2           [place]



               00   :00                           (two)           Medical



                                                  times           Branch



                                                  daily with           



                                                  meals for           



                                                  30 days.           

 

     ferrous      2021- No        [id number] 325mg      Take 1           U

nivers



     sulfate 325      4-14 05-15                          tablet by           it

y of



     mg (65 mg      00:00: 04:59                          mouth 2           [place]

[place]



     iron)      00   :00                           (two)           Medical



     tablet                                         times           Branch



                                                  daily for           



                                                  30 days.           

 

     lactobacill      2021- No        [id number] 1{tbl}      Take 1       

    Univers



     us        -25                          tablet by           ity of



     acidophilus      00:00: 04:59                          mouth 2           Te

[facility]



     25 million      00   :00                           (two)           Medical



     cell -100                                         times           Branch



     mg captab                                         daily for           



                                                  10 days.           

 

     lactobacill      2021- No        [id number] 1{tbl}      Take 1       

    [facility]



     us        -25                          tablet by           ity of



     acidophilus      00:00: 04:59                          mouth 2           Te

x[facility]



     25 million      00   :00                           (two)           Medical



     cell -100                                         times           Branch



     mg captab                                         daily for           



                                                  10 days.           

 

     doxycycline      2021- No        [id number] 100mg      Take 1        

   Univers



     hyclate 100      -20                          capsule by           i

ty of



     mg capsule      00:00: 04:59                          mouth           [place]



               00   :00                           every 12           Medical



                                                  (twelve)           Branch



                                                  hours for           



                                                  5 days.           

 

     doxycycline      2021- No        [id number] 100mg      Take 1        

   Univers



     hyclate 100      -20                          capsule by           i

ty of



     mg capsule      00:00: 04:59                          mouth           [place]



               00   :00                           every 12           Medical



                                                  (twelve)           Branch



                                                  hours for           



                                                  5 days.           

 

     sulfamethox      2021- No        [id number] 1{tbl}      Take 1       

    [facility]



     azole-trime                                tablet by           it

y of



     thoprim      00:00: 00:00                          mouth 2           [place]



     800-160 mg      00   :00                           (two)           Medical



     per tablet                                         times           [facility]



                                                  daily for           



                                                  5 days.           

 

     HYDROcodone            Yes            1{tbl}      1 tablet,          

 [facility]



     -acetaminop                                     Oral,           ity of



     hen (NORCO)      17:56:                               Q6HPRN,           [place]

as



      mg      59                                 Starting           Medica

l



     tablet 1                                         Tue            [facility]



     tablet                                         21 at           



                                                  1256,           



                                                  Until           



                                                  Discontinu           



                                                  ed,            



                                                  Routine,           



                                                  Pain           



                                                  (scale           



                                                  4-6), pain           

 

     HYDROcodone      -0      Yes            1{tbl}      1 tablet,          

 [facility]



     -acetaminop      4-13                               Oral,           ity of



     hen (NORCO)      17:56:                               Q6HPRN,           [place]

as



      mg      59                                 Starting           [facility]

l



     tablet 1                                         Tue            [facility]



     tablet                                         21 at           



                                                  1256,           



                                                  Until           



                                                  Discontinu           



                                                  ed,            



                                                  Routine,           



                                                  Pain           



                                                  (scale           



                                                  4-6), pain           

 

     amLODIPine      -0      Yes            10mg      10 mg,           [facility]

rs



     (NORVASC)      4-13                               Oral,           ity of



     tablet 10      13:30:                               DAILY,           [place]



     mg        00                                 First dose           Medical



                                                  (after           [facility]



                                                  last           



                                                  modificati           



                                                  on) on 21 at           



                                                  0830,           



                                                  Until           



                                                  Discontinu           



                                                  ed,            



                                                  Routine           

 

     amLODIPine      -0      Yes            10mg      10 mg,           Unive

rs



     (NORVASC)      4-13                               Oral,           ity of



     tablet 10      13:30:                               DAILY,           [place]



     mg        00                                 First dose           Medical



                                                  (after           [facility]



                                                  last           



                                                  modificati           



                                                  on) on 21 at           



                                                  0830,           



                                                  Until           



                                                  Discontinu           



                                                  ed,            



                                                  Routine           

 

     metFORMIN      -0      Yes            500mg      Take 500           Uni

vers



     500 mg      4-12                               mg by           ity of



     tablet      16:58:                               mouth 2           [place]



               30                                 (two)           Medical



                                                  times           [facility]



                                                  daily with           



                                                  meals.           

 

     metFORMIN      -0      Yes            500mg      Take 500           Un[facility]

vers



     500 mg      4-12                               mg by           ity of



     tablet      16:58:                               mouth 2           [place]



               30                                 (two)           Medical



                                                  times           [facility]



                                                  daily with           



                                                  meals.           

 

     bupivacaine      2021-0 2021- No                       PRN,           Un[facility]

rs



     (preserv       04-12                          Starting           ity of



     free) 0.5%      16:06: 16:58                          [facility]



     (SENSORCAIN      00   :30                           21 at           Me

dical



     E MPF) 0.5                                         1106,           [facility]



     % (5 mg/mL)                                         Intra-op           



     10 mL,                                                        



     lidocaine                                                        



     1% (PF)                                                        



     (XYLOCAINE)                                                        



     10 mL                                                        

 

     glipiZIDE      -0      Yes            5mg       5 mg,           [facility]



     (GLUCOTROL)      4-12                               Oral,           ity of



     tablet 5 mg      14:00:                               DAILY,           [place]

[facility]



               00                                 First dose           Medical



                                                  (after           [facility]



                                                  last           



                                                  modificati           



                                                  on) on 21 at           



                                                  0900,           



                                                  Until           



                                                  Discontinu           



                                                  ed,            



                                                  Routine           

 

     glipiZIDE      -0      Yes            5mg       5 mg,           [facility]



     (GLUCOTROL)      4-12                               Oral,           ity of



     tablet 5 mg      14:00:                               DAILY,           [place]

[facility]



               00                                 First dose           Medical



                                                  (after           [facility]



                                                  last           



                                                  modificati           



                                                  on) on 21 at           



                                                  0900,           



                                                  Until           



                                                  Discontinu           



                                                  ed,            



                                                  Routine           

 

     glipiZIDE      -0      Yes            5mg       5 mg,           [facility]



     (GLUCOTROL)      4-12                               Oral,           ity of



     tablet 5 mg      14:00:                               DAILY,           [place]

[facility]



               00                                 First dose           Medical



                                                  (after           [facility]



                                                  last           



                                                  modificati           



                                                  on) on 21 at           



                                                  0900,           



                                                  Until           



                                                  Discontinu           



                                                  ed,            



                                                  Routine           

 

     NaCl 0.45%      -0      Yes            1000mL      at 100           Uni

vers



     (1/2NS) IV      4-11                               mL/hr,           ity of



     infusion      23:45:                               1,000 mL,           [place]

[facility]



     1,000 mL      00                                 IV             Medical



                                                  Infusion,           [facility]



                                                  CONTINUOUS           



                                                  , Starting           



                                                  Sun            



                                                  21 at           



                                                  1845,           



                                                  Until           



                                                  Discontinu           



                                                  ed,            



                                                  Routine           

 

     NaCl 0.45%      1-0      Yes            1000mL      at 100           Uni

vers



     (1/2NS) IV      4-11                               mL/hr,           ity of



     infusion      23:45:                               1,000 mL,           [place]

[facility]



     1,000 mL      00                                 IV             Medical



                                                  Infusion,           [facility]



                                                  CONTINUOUS           



                                                  , Starting           



                                                  Sun            



                                                  21 at           



                                                  1845,           



                                                  Until           



                                                  Discontinu           



                                                  ed,            



                                                  Routine           

 

     NaCl 0.45%      2021-0      Yes            1000mL      at 100           Uni

vers



     (1/2NS) IV      4-11                               mL/hr,           ity of



     infusion      23:45:                               1,000 mL,           [place]

[facility]



     1,000 mL      00                                 IV             Medical



                                                  Infusion,           [facility]



                                                  CONTINUOUS           



                                                  , Starting           



                                                  Sun            



                                                  21 at           



                                                  1845,           



                                                  Until           



                                                  Discontinu           



                                                  ed,            



                                                  Routine           

 

     hydralAZINE      -0      Yes            10mg      10 mg,           Univ

ers



     (APRESOLINE      4-11                               Slow IV           ity o

f



     ) injection      22:15:                               Push,           [place]



     10 mg      07                                 Q4HPRN,           Medical



                                                  Starting           [facility]            



                                                  21 at           



                                                  1715,           



                                                  Until           



                                                  Discontinu           



                                                  ed, STAT,           



                                                  DBP=>100;           



                                                  SBP=>180<b           



                                                  r>Indicati           



                                                  on:            



                                                  Hypertensi           



                                                  ve             



                                                  Emergency           

 

     hydralAZINE      -0      Yes            10mg      10 mg,           Univ

ers



     (APRESOLINE      4-11                               Slow IV           ity o

f



     ) injection      22:15:                               Push,           [place]



     10 mg      07                                 Q4HPRN,           Medical



                                                  Starting           [facility]            



                                                  21 at           



                                                  1715,           



                                                  Until           



                                                  Discontinu           



                                                  ed, STAT,           



                                                  DBP=>100;           



                                                  SBP=>180<b           



                                                  r>Indicati           



                                                  on:            



                                                  Hypertensi           



                                                  ve             



                                                  Emergency           

 

     hydralAZINE      -0      Yes            10mg      10 mg,           Univ

ers



     (APRESOLINE                                     Slow IV           ity o

f



     ) injection      22:15:                               Push,           [place]



     10 mg      07                                 Q4HPRN,           Medical



                                                  Starting           [facility]



                                                  Sun            



                                                  21 at           



                                                  1715,           



                                                  Until           



                                                  Discontinu           



                                                  ed, STAT,           



                                                  DBP=>100;           



                                                  SBP=>180<b           



                                                  r>Indicati           



                                                  on:            



                                                  Hypertensi           



                                                  ve             



                                                  Emergency           

 

     carvediloL      -0      Yes            12.5mg      12.5 mg,           U

nivers



     (COREG)      4-11                               Oral, BID           ity of



     tablet 12.5      22:15:                               MEALS,           [place]

[facility]



     mg        00                                 First dose           Medical



                                                  (after           [facility]



                                                  last           



                                                  modificati           



                                                  on) on Sun           



                                                  21 at           



                                                  1715,           



                                                  Until           



                                                  Discontinu           



                                                  ed,            



                                                  Routine           

 

     carvediloL      0      Yes            12.5mg      12.5 mg,           U

nivers



     (COREG)      4-11                               Oral, BID           ity of



     tablet 12.5      22:15:                               MEALS,           [place]

[facility]



     mg        00                                 First dose           Medical



                                                  (after           [facility]



                                                  last           



                                                  modificati           



                                                  on) on Sun           



                                                  21 at           



                                                  1715,           



                                                  Until           



                                                  Discontinu           



                                                  ed,            



                                                  Routine           

 

     carvediloL      -0      Yes            12.5mg      12.5 mg,           U

nivers



     (COREG)      4-11                               Oral, BID           ity of



     tablet 12.5      22:15:                               [place]ALS,           [place]

[facility]



     mg        00                                 First dose           Medical



                                                  (after           [facility]



                                                  last           



                                                  modificati           



                                                  on) on Sun           



                                                  21 at           



                                                  1715,           



                                                  Until           



                                                  Discontinu           



                                                  ed,            



                                                  Routine           

 

     calcitrioL      0      Yes            .5ug      0.5 mcg,           Uni

vers



     (ROCALTROL)                                     Oral,           ity of



     capsule 0.5      14:00:                               DAILY,           [place]

[facility]



     mcg       00                                 First dose           Medical



                                                  on Sun           [facility]



                                                  21 at           



                                                  0900,           



                                                  Until           



                                                  Discontinu           



                                                  ed,            



                                                  Routine           

 

     calcitrioL      0      Yes            .5ug      0.5 mcg,           Uni

vers



     (ROCALTROL)      11                               Oral,           ity of



     capsule 0.5      14:00:                               DAILY,           [place]

[facility]



     mcg       00                                 First dose           Medical



                                                  on Sun           [facility]



                                                  21 at           



                                                  0900,           



                                                  Until           



                                                  Discontinu           



                                                  ed,            



                                                  Routine           

 

     calcitrioL      -0      Yes            .5ug      0.5 mcg,           Uni

vers



     (ROCALTROL)      4-11                               Oral,           ity of



     capsule 0.5      14:00:                               DAILY,           [place]

[facility]



     mcg                                        First dose           [facility]



                                                  [facility]



                                                  21 at           



                                                  0900,           



                                                  Until           



                                                  Discontinu           



                                                  ed,            



                                                  Routine           

 

     ferrous            Yes            325mg      325 mg,           Univer

s



     sulfate                                     Oral, BID,           ity of



     tablet 325      13:00:                               First dose           [facility]

[facility]



     mg                                         on [facility]



                                                  21 at           [facility]anch



                                                  0800,           



                                                  Until           



                                                  Discontinu           



                                                  ed,            



                                                  Routine           

 

     ferrous            Yes            325mg      325 mg,           Univer

s



     sulfate                                     Oral, BID,           ity of



     tablet 325      13:00:                               First dose           T

[facility]



     mg        00                                 on [facility]



                                                  21 at           Branch



                                                  0800,           



                                                  Until           



                                                  Discontinu           



                                                  ed,            



                                                  Routine           

 

     ferrous            Yes            325mg      325 mg,           Univer

s



     sulfate                                     Oral, BID,           ity of



     tablet 325      13:00:                               First dose           T

exas



     mg        00                                 on [facility]



                                                  21 at           Branch



                                                  0800,           



                                                  Until           



                                                  Discontinu           



                                                  ed,            



                                                  Routine           

 

     glipiZIDE      2021- No             2.5mg      2.5 mg,           Uni

vers



     (GLUCOTROL)       04-12                          Oral, QAM           it

y of



     tablet 2.5      13:00: 12:50                          WITH           [place]



     mg        00   :36                           BREAKFAST,           Medical



                                                  First dose           [facility]



                                                  on Sun           



                                                  21 at           



                                                  0800,           



                                                  Until           



                                                  Discontinu           



                                                  ed,            



                                                  Routine           

 

     insulin NPH            Yes            3U        3 Units,           Un

anna



     and regular                                     Subcutaneo           it

y of



     human 70-30      12:30:                               , [place], [place]



     (HUMULIN      00                                 First dose           Medic

al



     70-30 U-100                                         [facility]



     INSULIN)                                         21 at           



     100 unit/mL                                         0730,           



     (70-30)                                         Until           



     injection 3                                         Discontinu           



     Units                                         ed,            



                                                  Routine           

 

     insulin NPH            Yes            3U        3 Units,           Un

anna



     and regular                                     Subcutaneo           it

y of



     human 70-30      12:30:                               , [place], [place]



     (HUMULIN      00                                 First dose           Medic

al



     70-30 U-100                                         [facility]



     INSULIN)                                         21 at           



     100 unit/mL                                         0730,           



     (70-30)                                         Until           



     injection 3                                         Discontinu           



     Units                                         ed,            



                                                  Routine           

 

     insulin NPH            Yes            3U        3 Units,           Un

anna



     and regular                                     Subcutaneo           it

y of



     human 70-30      12:30:                               us, BIDAC,           

[place]



     (HUMULIN      00                                 First dose           Medic

al



     70-30 U-100                                         on [facility]



     INSULIN)                                         21 at           



     100 unit/mL                                         0730,           



     (70-30)                                         Until           



     injection 3                                         Discontinu           



     Units                                         ed,            



                                                  Routine           

 

     magnesium      2021-0      Yes            400mg      400 mg,           Univ

ers



     oxide      4-11                               Oral, BID,           ity of



     (MAG-OX      11:30:                               First dose           [place]

[place]



     400) tablet      00                                 on Sun           [facility]

l



     400 mg                                         21 at           [place]



                                                  0630,           



                                                  Until           



                                                  Discontinu           



                                                  ed,            



                                                  Routine           

 

     magnesium      -0      Yes            400mg      400 mg,           Univ

ers



     oxide      4-11                               Oral, BID,           ity of



     (MAG-OX      11:30:                               First dose           [place]

[place]



     400) tablet      00                                 on Sun           [facility]

l



     400 mg                                         21 at           [place]



                                                  0630,           



                                                  Until           



                                                  Discontinu           



                                                  ed,            



                                                  Routine           

 

     magnesium      -0      Yes            400mg      400 mg,           Univ

ers



     oxide      4-11                               Oral, BID,           ity of



     (MAG-OX      11:30:                               First dose           [place]

[place]



     400) tablet      00                                 on Sun           [facility]

l



     400 mg                                         21 at           [facility]



                                                  0630,           



                                                  Until           



                                                  Discontinu           



                                                  ed,            



                                                  Routine           

 

     atorvastati      -0      Yes            20mg      20 mg,           Univ

ers



     n (LIPITOR)      4-11                               Oral, QHS,           it

y of



     tablet 20      02:00:                               First dose           [place]

[place]



     mg        00                                 on [facility]



                                                  4/10/21 at           [facility]



                                                  2100,           



                                                  Until           



                                                  Discontinu           



                                                  ed,            



                                                  Routine           

 

     atorvastati      -0      Yes            20mg      20 mg,           Univ

ers



     n (LIPITOR)      4-11                               Oral, QHS,           it

y of



     tablet 20      02:00:                               First dose           [facility]

[place]



     mg        00                                 on [facility]



                                                  4/10/21 at           [facility]



                                                  2100,           



                                                  Until           



                                                  Discontinu           



                                                  ed,            



                                                  Routine           

 

     vancomycin      -0      Yes            1000mg      1,000 mg,           

[facility]



     (VANCOCIN)      4-11                               IV             ity of



     1,000 mg in      02:00:                               [place]



     NaCl 0.9%      00                                 Q24H,           Medical



     (NS) 250 mL                                         First dose           Br

anch



     VIAL-MATE                                         (after           



     IV                                           last           



     piggyback                                         modificati           



                                                  on) on Sat           



                                                  4/10/21 at           



                                                  2100,           



                                                  Until           



                                                  Discontinu           



                                                  ed, 250           



                                                  mL<br>Reas           



                                                  on for           



                                                  Anti-Infec           



                                                  tive:           



                                                  Documented           



                                                  Infection<           



                                                  br>Documen           



                                                  ronnie            



                                                  Infection           



                                                  Site: Skin           



                                                  / Soft           



                                                  Tissue<br>           



                                                  Duration           



                                                  of             



                                                  Therapy:           



                                                  Other (see           



                                                  Comments)           

 

     atorvastati            Yes            20mg      20 mg,           Univ

ers



     n (LIPITOR)      4-11                               Oral, QHS,           it

y of



     tablet 20      02:00:                               First dose           [facility]

[place]



     mg        00                                 on [facility]



                                                  4/10/21 at           [facility]



                                                  2100,           



                                                  Until           



                                                  Discontinu           



                                                  ed,            



                                                  Routine           

 

     vancomycin      -0 - No             1000mg      1,000 mg,          

 [facility]



     (VANCOCIN)       04-13                          IV             ity of



     1,000 mg in      02:00: 13:00                          [place]



     NaCl 0.9%      00   :56                           Q24H,           Medical



     (NS) 250 mL                                         First dose           Br

anch



     VIAL-MATE                                         (after           



     IV                                           last           



     piggyback                                         modificati           



                                                  on) on Sat           



                                                  4/10/21 at           



                                                  2100,           



                                                  Until           



                                                  Discontinu           



                                                  ed, 250           



                                                  mL<br>Reas           



                                                  on for           



                                                  Anti-Infec           



                                                  tive:           



                                                  Documented           



                                                  Infection<           



                                                  br>Documen           



                                                  ronnie            



                                                  Infection           



                                                  Site: Skin           



                                                  / Soft           



                                                  Tissue<br>           



                                                  Duration           



                                                  of             



                                                  Therapy:           



                                                  Other (see           



                                                  Comments)           

 

     NaCl 0.9%      2021- No             1000mL      at 100           Uni

vers



     (NS) IV      4-10 04-11                          mL/hr, IV           ity of



     infusion      18:15: 22:38                          Infusion,           [place]

as



     1,000 mL      00   :44                           CONTINUOUS           Medic

al



                                                  , Starting           [facility]



                                                  Sat            



                                                  4/10/21 at           



                                                  1315,           



                                                  Until Sun           



                                                  21 at           



                                                  1738,           



                                                  Routine           

 

     morpHINE            Yes            2mg       2 mg, Slow           Uni

vers



     injection 2      4-10                               IV Push,           ity 

of



     mg        17:07:                               Q4HPRN,           [place]



               55                                 Starting           [facility]



                                                  4/10/21 at           



                                                  1207,           



                                                  Until           



                                                  Discontinu           



                                                  ed,            



                                                  Routine,           



                                                  Pain           



                                                  (scale           



                                                  7-10)           

 

     morpHINE      -0      Yes            2mg       2 mg, Slow           Uni

vers



     injection 2      4-10                               IV Push,           ity 

of



     mg        17:07:                               Q4HPRN,           [place]



               55                                 Starting           [facility]



                                                  4/10/21 at           



                                                  1207,           



                                                  Until           



                                                  Discontinu           



                                                  ed,            



                                                  Routine,           



                                                  Pain           



                                                  (scale           



                                                  7-10)           

 

     morpHINE      -0      Yes            2mg       2 mg, Slow           Uni

vers



     injection 2      4-10                               IV Push,           ity 

of



     mg        17:07:                               Q4HPRN,           [place]



               55                                 Starting           [facility]



                                                  4/10/21 at           



                                                  1207,           



                                                  Until           



                                                  Discontinu           



                                                  ed,            



                                                  Routine,           



                                                  Pain           



                                                  (scale           



                                                  7-10)           

 

     HYDROcodone      -0      Yes            1{tbl}      1 tablet,          

 [facility]



     -acetaminop      4-10                               Oral,           ity of



     hen (NORCO      17:07:                               Q4HPRN,           [place]

[place]



     5) 5-325 mg      24                                 Starting           Medi

[place]



     tablet 1                                         Sat            [facility]



     tablet                                         4/10/21 at           



                                                  1207,           



                                                  Until           



                                                  Discontinu           



                                                  ed,            



                                                  Routine,           



                                                  Pain           



                                                  (scale           



                                                  4-6)           

 

     HYDROcodone      -0 202- No             1{tbl}      1 tablet,         

  [facility]



     -acetaminop      4-10 -                          Oral,           ity of



     hen (NORCO      17:07: 17:58                          Q4HPRN,           [place]

as



     5) 5-325 mg      24   :40                           Starting           Medi

hussain



     tablet 1                                         Sat            [facility]



     tablet                                         4/10/21 at           



                                                  1207,           



                                                  Until Tue           



                                                  21 at           



                                                  1258,           



                                                  Routine,           



                                                  Pain           



                                                  (scale           



                                                  4-6)           

 

     acetaminoph      -0      Yes            650mg      650 mg,           Un

anna



     en        4-10                               Oral,           ity of



     (TYLENOL)      17:07:                               Q4HPRN,           [place]



     tablet 650      07                                 Starting           Medic

al



     mg                                           Sat            [facility]



                                                  4/10/21 at           



                                                  1207,           



                                                  Until           



                                                  Discontinu           



                                                  ed,            



                                                  Routine,           



                                                  Pain           



                                                  (scale           



                                                  1-3)           

 

     acetaminoph      -0      Yes            650mg      650 mg,           Un

anna



     en        4-10                               Oral,           ity of



     (TYLENOL)      17:07:                               Q4HPRN,           [place]



     tablet 650      07                                 Starting           Medic

al



     mg                                           Sat            [facility]



                                                  4/10/21 at           



                                                  1207,           



                                                  Until           



                                                  Discontinu           



                                                  ed,            



                                                  Routine,           



                                                  Pain           



                                                  (scale           



                                                  1-3)           

 

     acetaminoph      -0      Yes            650mg      650 mg,           Un

anna



     en        4-10                               Oral,           ity of



     (TYLENOL)      17:07:                               Q4HPRN,           [place]



     tablet 650      07                                 Starting           Medic

al



     mg                                           Sat            [facility]



                                                  4/10/21 at           



                                                  1207,           



                                                  Until           



                                                  Discontinu           



                                                  ed,            



                                                  Routine,           



                                                  Pain           



                                                  (scale           



                                                  1-3)           

 

     bupivacaine      -0 - No                       PRN,           Unive

rs



     (preserv      4-10 04-12                          Starting           ity of



     free) 0.5%      15:40: 16:58                          Sat            [place]



     (SENSORCAIN      00   :30                           4/10/21 at           Me

dical



     E MPF) 0.5                                         1040,           [facility]



     % (5 mg/mL)                                         Intra-op           



     7.5 mL,                                                        



     lidocaine                                                        



     1% (PF)                                                        



     (XYLOCAINE)                                                        



     7.5 mL                                                        

 

     enoxaparin      -0      Yes            30mg      30 mg,           Unive

rs



     (LOVENOX)      4-10                               Subcutaneo           ity 

of



     injection      14:00:                               us, DAILY,           [facility]

[facility]



     30 mg      00                                 First dose           Medical



                                                  on Sat           [facility]



                                                  4/10/21 at           



                                                  0900,           



                                                  Until           



                                                  Discontinu           



                                                  ed,            



                                                  Routine           

 

     enoxaparin      -0      Yes            30mg      30 mg,           Unive

rs



     (LOVENOX)      4-10                               Subcutaneo           ity 

of



     injection      14:00:                               us, DAILY,           [facility]

[place]



     30 mg      00                                 First dose           Medical



                                                  on Sat           [facility]



                                                  4/10/21 at           



                                                  0900,           



                                                  Until           



                                                  Discontinu           



                                                  ed,            



                                                  Routine           

 

     enoxaparin      -0      Yes            30mg      30 mg,           Un[facility]

rs



     (LOVENOX)      4-10                               Subcutaneo           ity 

of



     injection      14:00:                               us, DAILY,           [facility]

[place]



     30 mg      00                                 First dose           Medical



                                                  on Sat           [facility]



                                                  4/10/21 at           



                                                  0900,           



                                                  Until           



                                                  Discontinu           



                                                  ed,            



                                                  Routine           

 

     NaCl 0.9%      2021- No             1000mL      at 100           Uni

vers



     (NS) IV      410 04-10                          mL/hr, IV           ity of



     infusion      13:15: 17:06                          Infusion,           [facility]

as



     1,000 mL      00   :58                           CONTINUOUS           Medic

al



                                                  , Starting           [facility]



                                                  Sat            



                                                  4/10/21 at           



                                                  0815,           



                                                  Until Sat           



                                                  4/10/21 at           



                                                  1206,           



                                                  Routine           

 

     docusate      -0      Yes            100mg      100 mg,           Un[facility]

rs



     (COLACE)      4-10                               Oral, BID,           ity o

f



     capsule 100      13:00:                               First dose           

[place]



     mg        00                                 on [facility]



                                                  4/10/21 at           Branch



                                                  0800,           



                                                  Until           



                                                  Discontinu           



                                                  ed,            



                                                  Routine           

 

     docusate      -0      Yes            100mg      100 mg,           Unive

rs



     (COLACE)      4-10                               Oral, BID,           ity o

f



     capsule 100      13:00:                               First dose           

[place]



     mg        00                                 on [facility]



                                                  4/10/21 at           Branch



                                                  0800,           



                                                  Until           



                                                  Discontinu           



                                                  ed,            



                                                  Routine           

 

     docusate      -0      Yes            100mg      100 mg,           Unive

rs



     (COLACE)      4-10                               Oral, BID,           ity o

f



     capsule 100      13:00:                               First dose           

[place]



     mg        00                                 on [facility]



                                                  4/10/21 at           Branch



                                                  0800,           



                                                  Until           



                                                  Discontinu           



                                                  ed,            



                                                  Routine           

 

     traMADoL      -0 - No             50mg      50 mg,           Univer

s



     (ULTRAM)      4-10 04-10                          Oral,           ity of



     tablet 50      10:30: 09:25                          ONCE, 1           [place]

[place]



     mg        00   :00                           dose, Sat           Medical



                                                  4/10/21 at           [facility]



                                                  0530,           



                                                  Routine           

 

     piperacilli       No             2.25g      2.25 g, IV         

  [facility]



     n-tazobacta      4-10 04-12                          Piggyback,           i

ty of



     m (ZOSYN)      07:30: 16:13                          Q8H ABX,           [facility]

as



     2.25 g in      00   :42                           First dose           Medi

hussain



     NaCl 0.9%                                         (after           [facility]



     (NS) 100 mL                                         last           



     MINI-BAG                                         reorder)           



                                                  on Sat           



                                                  4/10/21 at           



                                                  0230,           



                                                  Until           



                                                  Discontinu           



                                                  ed, 100           



                                                  mL<br>Reas           



                                                  on for           



                                                  Anti-Infec           



                                                  tive:           



                                                  Documented           



                                                  Infection<           



                                                  br>Documen           



                                                  ronnie            



                                                  Infection           



                                                  Site: Skin           



                                                  / Soft           



                                                  Tissue<br>           



                                                  Duration           



                                                  of             



                                                  Therapy:           



                                                  Other (see           



                                                  Comments)           

 

     Sliding            Yes                      Subcutaneo           Univ

ers



     Scale      4-10                               us, TID           ity of



     Insulin -      06:45:                               MEALS,           [place]



     Lispro      00                                 First dose           Medical



     (HumaLOG) +                                         on Sat           [facility]



     Fsbg                                         4/10/21 at           



     Testing                                         0145,           



                                                  Until           



                                                  Discontinu           



                                                  ed,            



                                                  Routine           

 

     aspirin            Yes            81mg      81 mg,           [facility]



     chewable      4-10                               Oral, QAM           ity of



     tablet 81      06:45:                               WITH           [place]



     mg        00                                 BREAKFAST,           Medical



                                                  First dose           [facility]



                                                  on Sat           



                                                  4/10/21 at           



                                                  0145,           



                                                  Until           



                                                  Discontinu           



                                                  ed,            



                                                  Routine           

 

     lactobacill            Yes            1{tbl}      1 tablet,          

 [facility]



     us        4-10                               Oral, BID,           ity of



     acidophilus      06:45:                               First dose           

[place]



     (ACIDOPHILL      00                                 on Sat           Medica

l



     US) 25                                         4/10/21 at           [facility]anch



     million                                         0145,           



     cell -100                                         Until           



     mg captab 1                                         Discontinu           



     tablet                                         ed,            



                                                  Routine           

 

     Sliding            Yes                      Subcutaneo           Univ

ers



     Scale      4-10                               us, TID           ity of



     Insulin -      06:45:                               MEALS,           [place]



     Lispro      00                                 First dose           Medical



     (HumaLOG) +                                         on Sat           [facility]



     Fsbg                                         4/10/21 at           



     Testing                                         0145,           



                                                  Until           



                                                  Discontinu           



                                                  ed,            



                                                  Routine           

 

     aspirin      -0      Yes            81mg      81 mg,           [facility]



     chewable      4-10                               Oral, QAM           ity of



     tablet 81      06:45:                               WITH           [place]



     mg        00                                 BREAKFAST,           Medical



                                                  First dose           [facility]



                                                  on Sat           



                                                  4/10/21 at           



                                                  0145,           



                                                  Until           



                                                  Discontinu           



                                                  ed,            



                                                  Routine           

 

     lactobacill            Yes            1{tbl}      1 tablet,          

 [facility]



     us        4-10                               Oral, BID,           ity of



     acidophilus      06:45:                               First dose           

[place]



     (ACIDOPHILL      00                                 on Sat           Med[facility]

l



     ) 25                                         4/10/21 at           [facility]



     million                                         0145,           



     cell -100                                         Until           



     mg captab 1                                         Discontinu           



     tablet                                         ed,            



                                                  Routine           

 

     amLODIPine            Yes            5mg       5 mg,           Univer

s



     (NORVASC)      4-10                               Oral,           ity of



     tablet 5 mg      06:45:                               DAILY,           [place]

[place]



               00                                 First dose           Medical



                                                  on [facility]



                                                  4/10/21 at           



                                                  0145,           



                                                  Until           



                                                  Discontinu           



                                                  ed,            



                                                  Routine           

 

     Sliding            Yes                      Subcutaneo           Univ

ers



     Scale      4-10                               us, TID           ity of



     Insulin -      06:45:                               MEALS,           [place]



     Lispro      00                                 First dose           Medical



     (HumaLOG) +                                         on [facility]



     Fsbg                                         4/10/21 at           



     Testing                                         0145,           



                                                  Until           



                                                  Discontinu           



                                                  ed,            



                                                  Routine           

 

     aspirin            Yes            81mg      81 mg,           [facility]



     chewable      4-10                               Oral, QAM           ity of



     tablet 81      06:45:                               WITH           [place]



     mg        00                                 BREAKFAST,           Medical



                                                  First dose           [facility]



                                                  on Sat           



                                                  4/10/21 at           



                                                  0145,           



                                                  Until           



                                                  Discontinu           



                                                  ed,            



                                                  Routine           

 

     lactobacill            Yes            1{tbl}      1 tablet,          

 [facility]



     us        4-10                               Oral, BID,           ity of



     acidophilus      06:45:                               First dose           

[place]



     (ACIDOPHILL      00                                 on [facility]) 25                                         4/10/21 at           Branch



     million                                         0145,           



     cell -100                                         Until           



     mg captab 1                                         Discontinu           



     tablet                                         ed,            



                                                  Routine           

 

     amLODIPine      2021- No             5mg       5 mg,           Unive

rs



     (NORVASC)      4-10 04-13                          Oral,           ity of



     tablet 5 mg      06:45: 13:16                          DAILY,           [facility]

as



               00   :33                           First dose           Medical



                                                  on [facility]



                                                  4/10/21 at           



                                                  0145,           



                                                  Until           



                                                  Discontinu           



                                                  ed,            



                                                  Routine           

 

     carvediloL      2021- No             6.25mg      6.25 mg,           

[facility]



     (COREG)      4-10 04-11                          Oral, BID           ity of



     tablet 6.25      06:45: 22:14                          MEALS,           [facility]

as



     mg        00   :43                           First dose           Medical



                                                  on [facility]



                                                  4/10/21 at           



                                                  0145,           



                                                  Until           



                                                  Discontinu           



                                                  ed,            



                                                  Routine           

 

     NaCl 0.9%      2021- No             1000mL      at 125           Uni

vers



     (NS) IV      4-10 04-10                          mL/hr, IV           ity of



     infusion      06:45: 13:10                          Infusion,           [facility]

as



     1,000 mL      00   :52                           CONTINUOUS           Medic

al



                                                  , Starting           [facility]



                                                  Sat            



                                                  4/10/21 at           



                                                  0145,           



                                                  Until Sat           



                                                  4/10/21 at           



                                                  0810,           



                                                  Routine           

 

     aspirin 81      1-0      Yes            81mg      Take 81 mg           U

nivers



     mg chewable      4-10                               by mouth           ity 

of



     tablet      06:38:                               daily.           [address]

 

     aspirin 81      1-0      Yes            81mg      Take 81 mg           U

nivers



     mg chewable      4-10                               by mouth           ity 

of



     tablet      06:38:                               daily.           [address]

 

     ondansetron      1-0      Yes            4mg       4 mg, Slow           

[facility]



     (ZOFRAN      4-10                               IV Push,           ity of



     (PF))      06:37:                               Q6HPRN,           [place]



     injection 4      47                                 Starting           Medi

hussain



     mg                                           Sat            [facility]



                                                  4/10/21 at           



                                                  0137,           



                                                  Until           



                                                  Discontinu           



                                                  ed,            



                                                  Routine,           



                                                  Nausea and           



                                                  Vomiting           



                                                  (N/V)           

 

     ondansetron      1-0      Yes            4mg       4 mg, Slow           

[facility]



     (ZOFRAN      4-10                               IV Push,           ity of



     (PF))      06:37:                               Q6HPRN,           [place]



     injection 4      47                                 Starting           Medi

hussain



     mg                                           Sat            [facility]



                                                  4/10/21 at           



                                                  0137,           



                                                  Until           



                                                  Discontinu           



                                                  ed,            



                                                  Routine,           



                                                  Nausea and           



                                                  Vomiting           



                                                  (N/V)           

 

     ondansetron      1-0      Yes            4mg       4 mg, Slow           

[facility]



     (ZOFRAN      4-10                               IV Push,           ity of



     (PF))      06:37:                               Q6HPRN,           [place]



     injection 4      47                                 Starting           Medi

hussain



     mg                                           Sat            [facility]



                                                  4/10/21 at           



                                                  0137,           



                                                  Until           



                                                  Discontinu           



                                                  ed,            



                                                  Routine,           



                                                  Nausea and           



                                                  Vomiting           



                                                  (N/V)           

 

     glucagon      -0      Yes            1mg       1 mg,           [facility]



     (GLUCAGEN      4-10                               Intramuscu           ity 

of



     DIAGNOSTIC      06:35:                               lar, PRN,           [facility]

[place]



     KIT)      31                                 Starting           Medical



     injection 1                                         Sat            [facility]anch



     mg                                           4/10/21 at           



                                                  0135,           



                                                  Until           



                                                  Discontinu           



                                                  ed, ASAP,           



                                                  Blood           



                                                  Glucose           



                                                  &lt; or =           



                                                  70 mg/dL           



                                                  and            



                                                  patient is           



                                                  unable to           



                                                  swallow or           



                                                  has mental           



                                                  changes.           

 

     dextrose 50      1-0      Yes            25mL      25 mL,           Univ

ers



     % in water      4-10                               Slow IV           ity of



     (D50W)      06:35:                               Push, PRN,           [place]



     injection      31                                 Starting           Medica

l



     25 mL                                         Sat            [facility]



                                                  4/10/21 at           



                                                  0135,           



                                                  Until           



                                                  Discontinu           



                                                  ed, ASAP,           



                                                  Blood           



                                                  Glucose           



                                                  &lt; or =           



                                                  70 mg/dL           



                                                  and            



                                                  patient is           



                                                  unable to           



                                                  swallow or           



                                                  has mental           



                                                  status           



                                                  changes.           

 

     glucagon            Yes            1mg       1 mg,           [facility]



     (GLUCAGEN      4-10                               Intramuscu           ity 

of



     DIAGNOSTIC      06:35:                               lar, PRN,           [facility]

[place]



     KIT)      31                                 Starting           Medical



     injection 1                                         Sat            [facility]



     mg                                           4/10/21 at           



                                                  0135,           



                                                  Until           



                                                  Discontinu           



                                                  ed, ASAP,           



                                                  Blood           



                                                  Glucose           



                                                  &lt; or =           



                                                  70 mg/dL           



                                                  and            



                                                  patient is           



                                                  unable to           



                                                  swallow or           



                                                  has mental           



                                                  changes.           

 

     dextrose 50            Yes            25mL      25 mL,           [facility]

ers



     % in water      4-10                               Slow IV           ity of



     (D50W)      06:35:                               Push, PRN,           [place]



     injection      31                                 Starting           Med[facility]

l



     25 mL                                         Sat            [facility]



                                                  4/10/21 at           



                                                  0135,           



                                                  Until           



                                                  Discontinu           



                                                  ed, ASAP,           



                                                  Blood           



                                                  Glucose           



                                                  &lt; or =           



                                                  70 mg/dL           



                                                  and            



                                                  patient is           



                                                  unable to           



                                                  swallow or           



                                                  has mental           



                                                  status           



                                                  changes.           

 

     glucagon            Yes            1mg       1 mg,           [facility]



     (GLUCAGEN      4-10                               Intramuscu           ity 

of



     DIAGNOSTIC      06:35:                               lar, PRN,           [facility]

[place]



     KIT)      31                                 Starting           Medical



     injection 1                                         Sat            [facility]



     mg                                           4/10/21 at           



                                                  0135,           



                                                  Until           



                                                  Discontinu           



                                                  ed, ASAP,           



                                                  Blood           



                                                  Glucose           



                                                  &lt; or =           



                                                  70 mg/dL           



                                                  and            



                                                  patient is           



                                                  unable to           



                                                  swallow or           



                                                  has mental           



                                                  changes.           

 

     dextrose 50      0      Yes            25mL      25 mL,           [facility]

ers



     % in water      4-10                               Slow IV           ity of



     (D50W)      06:35:                               Push, PRN,           [place]



     injection      31                                 Starting           [facility]

l



     25 mL                                         Sat            [facility]



                                                  4/10/21 at           



                                                  0135,           



                                                  Until           



                                                  Discontinu           



                                                  ed, ASAP,           



                                                  Blood           



                                                  Glucose           



                                                  &lt; or =           



                                                  70 mg/dL           



                                                  and            



                                                  patient is           



                                                  unable to           



                                                  swallow or           



                                                  has mental           



                                                  status           



                                                  changes.           

 

     FENTanyl PF      2021- No             50ug      50 mcg,           [facility]

anna



     (SUBLIMAZE      4-10 04-10                          Slow IV           ity o

f



     (PF))      02:45: 01:51                          Push,           [place]



     injection      00   :00                           ONCE, 1           Medical



     50 mcg                                         dose, Fri           [facility]



                                                  21 at           



                                                  2145,           



                                                  Routine           

 

     piperacilli      -2021- No             3.375g      3.375 g,          

 [facility]



     n-tazobacta      4-10 04-10                          IV             ity of



     m (ZOSYN)      02:45: 02:21                          Piggyback,           [facility]

[place]



     3.375 g in      00   :00                           ONCE, 1           [facility]

l



     NaCl 0.9%                                         dose, Fri           [facility]

h



     (NS) 100 mL                                         21 at           



     MINI-BAG                                         2145, 100           



                                                  mL<br>Reas           



                                                  on for           



                                                  Anti-Infec           



                                                  tive:           



                                                  Documented           



                                                  Infection<           



                                                  br>Documen           



                                                  ronnie            



                                                  Infection           



                                                  Site: Skin           



                                                  / Soft           



                                                  Tissue<br>           



                                                  Duration           



                                                  of             



                                                  Therapy:           



                                                  Other (see           



                                                  Comments)           

 

     vancomycin      2021- No             1000mg      1,000 mg,          

 [facility]



     (VANCOCIN)      4-10 04-10                          IV             ity of



     1,000 mg in      02:45: 06:33                          [place], [place]



     NaCl 0.9%      00   :16                           Q12H ABX,           Medic

al



     (NS) 250 mL                                         First dose           Br

anch



     VIAL-MATE                                         on Fri           



     IV                                           21 at           



     piggyback                                         2145,           



                                                  Until           



                                                  Discontinu           



                                                  ed, 250           



                                                  mL<br&gt;R           



                                                  carolann for           



                                                  Anti-Infec           



                                                  tive:           



                                                  Documented           



                                                  Infection<           



                                                  br>Documen           



                                                  ronnie            



                                                  Infection           



                                                  Site: Skin           



                                                  / Soft           



                                                  Tissue<br>           



                                                  Duration           



                                                  of             



                                                  Therapy:           



                                                  Other (see           



                                                  Comments)           

 

     lisinopril            Yes       [id number] 10mg      Take 1         

  [facility]



     10 mg      6-05                [id number]           tablet by           ity of



     tablet      00:00:                               mouth           [place]



               00                                 daily.           Medical



                                                                 [facility]

 

     lisinopril            Yes       [id number] 10mg      Take 1         

  [facility]



     10 mg      6-05                [id number]           tablet by           ity of



     tablet      00:00:                               mouth           [place]



               00                                 daily.           Medical



                                                                 [facility]

 

     lisinopril      2021- No        [id number] 10mg      Take 1        

   [facility]



     10 mg      6-05 -14           [id number]           tablet by           ity of



     tablet      00:00: 00:00                          mouth           [place]



               00   :00                           daily.           Medical



                                                                 [facility]

 

     Insulin            Yes       [id number]           Use as           U

nivers



     Syringe-Nee      6-04                [id number]           directed           it

y of



     dle U-100      00:00:                                              [place]



     0.3 mL 30      00                                                Medical



     gauge x                                                        [facility]



      Syrg                                                        

 

     acetaminoph      -      Yes       [id number] 1{tbl}      Take 1         

  [facility]



     en-codeine      6-04                               tablet by           ity 

of



     (TYLENOL-CO      00:00:                               mouth           [place]



     DEINE #3)      00                                 every 4           Medical



     300-30 mg                                         (four)           [facility]



     tablet                                         hours as           



                                                  needed for           



                                                  Pain           



                                                  (scale           



                                                  4-6).           

 

     acetaminoph            Yes       [id number] 1{tbl}      Take 1         

  [facility]



     en-codeine      6-04                               tablet by           ity 

of



     (TYLENOL-CO      00:00:                               mouth           [place]



     DEINE #3)      00                                 every 4           Medical



     300-30 mg                                         (four)           [facility]



     tablet                                         hours as           



                                                  needed for           



                                                  Pain           



                                                  (scale           



                                                  4-6).           

 

     Insulin            Yes       [id number]           Use as           U

nivers



     Syringe-Nee      -                [id number]           directed           it

y of



     dle U-100      00:00:                                              [place]



     0.3 mL 30      00                                                Medical



     gauge x                                                        [facility]



     5/16 Syrg                                                        

 

     acetaminoph            Yes       [id number] 1{tbl}      Take 1         

  [facility]



     en-codeine      6-04                               tablet by           ity 

of



     (TYLENOL-CO      00:00:                               mouth           [place]



     DEINE #3)      00                                 every 4           Medical



     300-30 mg                                         (four)           [facility]



     tablet                                         hours as           



                                                  needed for           



                                                  Pain           



                                                  (scale           



                                                  4-6).           

 

     acetaminoph            Yes       [id number] 1{tbl}      Take 1         

  [facility]



     en-codeine      6-04                               tablet by           ity 

of



     (TYLENOL-CO      00:00:                               mouth           [place]



     DEINE #3)      00                                 every 4           Medical



     300-30 mg                                         (four)           [facility]



     tablet                                         hours as           



                                                  needed for           



                                                  Pain           



                                                  (scale           



                                                  4-6).           

 

     Insulin            Yes       [id number]           Use as           U

nivers



     Syringe-Nee                      [id number]           directed           it

y of



     dle U-100      00:00:                                              [place]



     0.3 mL 30      00                                                Medical



     gauge x                                                        [facility]



     5/16 Syrg                                                        

 

     acetaminoph            Yes       [id number] 1{tbl}      Take 1         

  [facility]



     en-codeine      6-04                               tablet by           ity 

of



     (TYLENOL-CO      00:00:                               mouth           [place]



     DEINE #3)      00                                 every 4           Medical



     300-30 mg                                         (four)           [facility]



     tablet                                         hours as           



                                                  needed for           



                                                  Pain           



                                                  (scale           



                                                  4-6).           

 

     acetaminoph            Yes       [id number] 1{tbl}      Take 1         

  [facility]



     en-codeine      6-04                               tablet by           ity 

of



     (TYLENOL-CO      00:00:                               mouth           [place]



     DEINE #3)      00                                 every 4           Medical



     300-30 mg                                         (four)           [facility]



     tablet                                         hours as           



                                                  needed for           



                                                  Pain           



                                                  (scale           



                                                  4-6).           

 

     Insulin            Yes       [id number]           Use as           U

nivers



     Syringe-Nee      -                [id number]           directed           it

y of



     dle U-100      00:00:                                              [place]



     0.3 mL 30      00                                                Medical



     gauge x                                                        [facility]



     5/16 Syrg                                                        

 

     acetaminoph            Yes       [id number] 1{tbl}      Take 1         

  [facility]



     en-codeine      6-04                               tablet by           ity 

of



     (TYLENOL-CO      00:00:                               mouth           [place]



     DEINE #3)      00                                 every 4           Medical



     300-30 mg                                         (four)           [facility]



     tablet                                         hours as           



                                                  needed for           



                                                  Pain           



                                                  (scale           



                                                  4-6).           

 

     acetaminoph            Yes       [id number] 1{tbl}      Take 1         

  [facility]



     en-codeine      6-04                               tablet by           ity 

of



     (TYLENOL-CO      00:00:                               mouth           [place]



     DEINE #3)      00                                 every 4           Medical



     300-30 mg                                         (four)           [facility]



     tablet                                         hours as           



                                                  needed for           



                                                  Pain           



                                                  (scale           



                                                  4-6).           

 

     Insulin            Yes       [id number]           Use as           U

nivers



     Syringe-Nee                      [id number]           directed           it

y of



     dle U-100      00:00:                                              [place]



     0.3 mL 30      00                                                Medical



     gauge x                                                        [facility]



     5/16 Syrg                                                        

 

     acetaminoph            Yes       [id number] 1{tbl}      Take 1         

  [facility]



     en-codeine      6-04                               tablet by           ity 

of



     (TYLENOL-CO      00:00:                               mouth           [place]



     DEINE #3)      00                                 every 4           Medical



     300-30 mg                                         (four)           [facility]



     tablet                                         hours as           



                                                  needed for           



                                                  Pain           



                                                  (scale           



                                                  4-6).           

 

     acetaminoph            Yes       [id number] 1{tbl}      Take 1         

  [facility]



     en-codeine      6-04                               tablet by           ity 

of



     (TYLENOL-CO      00:00:                               mouth           [place]



     DEINE #3)      00                                 every 4           Medical



     300-30 mg                                         (four)           [facility]



     tablet                                         hours as           



                                                  needed for           



                                                  Pain           



                                                  (scale           



                                                  4-6).           







Immunizations







           Ordered    Filled Immunization Date       Status     Comments   [facility]

e



           Immunization Name Name                                        

 

           Pneumococcal            2019 Completed             [place] [facility]

f



           Polysaccharide,            00:00:00                         [place] [facility]

ical



           PPSV23 (PNEUMOVAX)                                             Br[facility]

 

           Pneumococcal            2019 Completed             [place] [facility]

f



           Polysaccharide,            00:00:00                         [place] [facility]

ical



           PPSV23 (PNEUMOVAX)                                             [facility]

 

           Pneumococcal            2019 Completed             [place] [facility]

f



           Polysaccharide,            00:00:00                         [place] [facility]

ical



           PPSV23 (PNEUMOVAX)                                             [facility]

 

           Pneumococcal            2019 Completed             [place] [facility]

f



           Polysaccharide,            00:00:00                         [place] [facility]

ical



           PPSV23 (PNEUMOVAX)                                             [facility]

 

           Pneumococcal            2019 Completed             [facility]

[facility]



           Polysaccharide,            00:00:00                         [place] [facility]

ical



           PPSV23 (PNEUMOVAX)                                             Br[facility]







Vital Signs







             Vital Name   Observation Time Observation Value Comments     Source

 

             Systolic blood 2021 21:30:00 144 mm[Hg]                Univer

sity of



             pressure                                            [facility]



                                                                 [place]

 

             Diastolic blood 2021 21:30:00 88 mm[Hg]                 Unive

rsity of



             pressure                                            [facility]

 

             Heart rate   2021 21:30:00 68 /min                   Universi

ty of



                                                                 [facility]



                                                                 Branch

 

             Body temperature 2021 21:30:00 36.39 Kavitha                 Univ

ersity of



                                                                 [facility]



                                                                 Branch

 

             Respiratory rate 2021 21:30:00 18 /min                   Univ

ersity of



                                                                 [facility]



                                                                 Branch

 

             Oxygen saturation in 2021 21:30:00 96 /min                   

[facility]



             Arterial blood by                                        [facility]

hussain



             Pulse oximetry                                        Branch

 

             Body weight  2021-04-10 09:44:00 78.971 kg                 Universi

ty of



                                                                 [facility]



                                                                 Branch

 

             BMI          2021-04-10 09:44:00 28.10 kg/m2               Universi

ty of



                                                                 [facility]



                                                                 Branch

 

             Body height  2021-04-10 04:54:00 167.6 cm                  Universi

ty of



                                                                 [facility]



                                                                 Branch

 

             Systolic blood 2021 17:28:00 177 mm[Hg]                Univer

sity of



             pressure                                            [facility]



                                                                 Branch

 

             Diastolic blood 2021 17:28:00 99 mm[Hg]                 Unive

rsity of



             pressure                                            [facility]



                                                                 Branch

 

             Heart rate   2021 17:28:00 73 /min                   Universi

ty of



                                                                 [facility]



                                                                 Branch

 

             Body temperature 2021 17:28:00 36.61 Kavitha                 Univ

ersity of



                                                                 [facility]



                                                                 Branch

 

             Respiratory rate 2021 17:28:00 18 /min                   Univ

ersity of



                                                                 [facility]



                                                                 Branch

 

             Oxygen saturation in 2021 17:28:00 98 /min                   

[facility]



             Arterial blood by                                        [facility]

[facility]uss[facility]



             Pulse oximetry                                        Branch

 

             Body weight  2021-04-10 09:44:00 78.971 kg                 Universi

ty of



                                                                 [facility]



                                                                 Branch

 

             BMI          2021-04-10 09:44:00 28.10 kg/m2               Universi

ty of



                                                                 [facility]



                                                                 Branch

 

             Body height  2021-04-10 04:54:00 167.6 cm                  Universi

ty of



                                                                 [facility]



                                                                 Branch

 

             Systolic blood 2021-04-10 16:14:00 165 mm[Hg]                Univer

sity of



             pressure                                            [facility]

 

             Diastolic blood 2021-04-10 16:14:00 97 mm[Hg]                 Unive

rsity of



             [facility]

 

             Heart rate   2021-04-10 16:14:00 97 /min                   [facility]

 

             Body temperature 2021-04-10 16:14:00 36.94 Kavitha                 [facility]

 

             Respiratory rate 2021-04-10 16:14:00 15 /min                   [facility]

 

             Oxygen saturation in 2021-04-10 16:14:00 100 /min                  

[facility]



             Arterial blood by                                        [facility]

hussain



             Pulse oximetry                                        [place]

 

             Body weight  2021-04-10 09:44:00 78.971 kg                 [facility]

 

             BMI          2021-04-10 09:44:00 28.10 kg/m2               [facility]

 

             Body height  2021-04-10 04:54:00 167.6 cm                  [facility]







Procedures







                Procedure       Date / Time     Performing Clinician Source



                                Performed                       

 

                AUTHORIZATION FOR RELEASE 2021 05:01:00 Doctor Unassigned,

 [facility]



                OF [facility]                          [place]         [facility]

 

                POCT GLUCOSE (AUTOMATED) 2021 21:51:00 Edd Nova Un

iversity of 

[facility]

 

                POCT GLUCOSE (AUTOMATED) 2021 16:23:00 Edd Nova Un

iversity of 

[facility]

 

                POCT GLUCOSE (AUTOMATED) 2021 12:34:00 Edd Nova Un

iversity of 

[facility]

 

                COMP. METABOLIC PANEL 2021 08:41:00 Heriberto Dacosta  [facility]



                ([id number])                                         [facility]

 

                CBC WITH DIFF   2021 08:41:00 Juliane Lucas [place] o

f [facility]

 

                POCT GLUCOSE (AUTOMATED) 2021 00:38:00 Edd Nova Un

iversity of 

[facility]

 

                POCT GLUCOSE (AUTOMATED) 2021 21:08:00 Edd Nova Un

iversity of 

[facility]

 

                POCT GLUCOSE (AUTOMATED) 2021 16:39:00 Edd Nova Un

iversity of 

[facility]

 

                POCT GLUCOSE (AUTOMATED) 2021 16:39:00 Edd Nova Un

iversity of 

[facility]

 

                TRANSTHORACIC ECHO (TTE) 2021 13:25:00 Elder Cuevas   [facility]

 

                POCT GLUCOSE (AUTOMATED) 2021 12:32:00 Edd Nova Un

iversity of 

[facility]

 

                POCT GLUCOSE (AUTOMATED) 2021 12:32:00 Edd Nova Un

iversity of 

[facility]

 

                COMP. METABOLIC PANEL 2021 08:30:00 Olesya jeanie  [facility]



                ([id number])                                         [facility]

 

                CBC WITH DIFF   2021 08:30:00 Olesya jeanie  [facility]

 

                COMP. METABOLIC PANEL 2021 08:30:00 Heriberto Dacosta  [facility]



                ([id number])                                         [facility]

 

                CBC WITH DIFF   2021 08:30:00 Olesya jeanie  [facility]

 

                POCT GLUCOSE (AUTOMATED) 2021 08:11:00 Edd Nova Un

iversity of 

[facility]

 

                POCT GLUCOSE (AUTOMATED) 2021 08:11:00 Edd Nova Un

iversity of 

[facility]

 

                VANCOMYCIN TROUGH 2021 01:34:00 Olesya [facility]

 

                VANCOMYCIN TROUGH 2021 01:34:00 Heriberto Dacosta  [facility]

 

                POCT GLUCOSE (AUTOMATED) 2021 21:40:00 Edd Nova Un

iversity of 

[facility]

 

                POCT GLUCOSE (AUTOMATED) 2021 21:40:00 Edd Nova Un

iversity of 

[facility]

 

                POCT GLUCOSE (AUTOMATED) 2021 17:28:00 Edd Nova Un

iversity of 

[facility]

 

                POCT GLUCOSE (AUTOMATED) 2021 17:28:00 Edd Nova Un

iversity of 

[facility]



                                                                [facility]

 

                POCT GLUCOSE (AUTOMATED) 2021 17:28:00 Edd Nova Un

iversity of 

[facility]



                                                                [facility]

 

                CLOSURE SURGICAL WOUND 2021 15:36:00 [facility]

[facility]



                LOWER EXTREMITY                                 [facility]

 

                INCISION AND DRAINAGE 2021 15:36:00 Skip Veloz [facility]

[facility]ity [facility] [place]



                LOWER EXTREMITY                                 [facility]

 

                CLOSURE SURGICAL WOUND 2021 15:36:00 [facility]

[facility] [place]



                LOWER EXTREMITY                                 [facility]

 

                INCISION AND DRAINAGE 2021 15:36:00 Skip Veloz [facility]



                LOWER EXTREMITY                                 Medical Branch

 

                US RETROPERITONEAL 2021 13:43:05 Olesya jeanie  [facility]



                COMPLETE                                        Medical Branch

 

                US RETROPERITONEAL 2021 13:43:05 Olesya [facility]



                COMPLETE                                        Medical Branch

 

                US RETROPERITONEAL 2021 13:43:05 Olesya [facility]                                        Medical Branch

 

                POCT GLUCOSE (AUTOMATED) 2021 12:55:00 Edd Nova Un

iversity of 

[facility]

 

                POCT GLUCOSE (AUTOMATED) 2021 12:55:00 Edd Nova Un

iversity of 

[place]



                                                                [facility]

 

                POCT GLUCOSE (AUTOMATED) 2021 12:55:00 Edd Nova Un

iversity of 

[place]



                                                                [facility]

 

                POCT GLUCOSE (AUTOMATED) 2021 08:26:00 Edd Nova Un

iversity of 

[place]



                                                                [facility]

 

                POCT GLUCOSE (AUTOMATED) 2021 08:26:00 Edd Nova Un

iversity of 

[place]



                                                                [facility]

 

                POCT GLUCOSE (AUTOMATED) 2021 08:26:00 Edd Nova Un

iversity of 

[place]



                                                                [facility]

 

                COMP. METABOLIC PANEL 2021 08:21:00 Heriberto Dacosta  [facility]



                ([id number])                                         Medical Branch

 

                CBC WITH DIFF   2021 08:21:00 Heriberto Dacosta  [facility]

 

                COMP. METABOLIC PANEL 2021 08:21:00 Olesya jeanie  [facility]



                ([id number])                                         Medical Branch

 

                CBC WITH DIFF   2021 08:21:00 Olesya [facility]

 

                COMP. METABOLIC PANEL 2021 08:21:00 Heriberto Dacosta  [facility]



                ([id number])                                         Medical Branch

 

                CBC WITH DIFF   2021 08:21:00 Olesya [facility]

 

                POCT GLUCOSE (AUTOMATED) 2021 21:30:00 Edd Nova Un

iversity of 

[place]



                                                                [facility]

 

                POCT GLUCOSE (AUTOMATED) 2021 21:30:00 Edd Nova Un

iversity of 

[place]



                                                                [facility]

 

                POCT GLUCOSE (AUTOMATED) 2021 21:30:00 Edd Nova Un

iversity of 

[place]



                                                                [facility]

 

                POCT GLUCOSE (AUTOMATED) 2021 16:26:00 Edd Nova Un

iversity of 

[place]



                                                                [facility]

 

                POCT GLUCOSE (AUTOMATED) 2021 16:26:00 Edd Nova Un

iversity of 

[place]



                                                                [facility]

 

                POCT GLUCOSE (AUTOMATED) 2021 16:26:00 Edd Nova Un

iversity of 

[place]



                                                                [facility]

 

                POCT GLUCOSE (AUTOMATED) 2021 12:32:00 Edd Nova Un

iversity of 

[place]



                                                                [facility]

 

                POCT GLUCOSE (AUTOMATED) 2021 12:32:00 Edd Nova Un

iversity of 

[place]



                                                                [facility]

 

                POCT GLUCOSE (AUTOMATED) 2021 12:32:00 Edd Nova Un

iversity of 

[place]



                                                                [facility]

 

                POCT GLUCOSE (AUTOMATED) 2021 08:55:00 Edd Nova Un

iversity of 

[place]



                                                                [facility]

 

                POCT GLUCOSE (AUTOMATED) 2021 08:55:00 Edd Nova Un

iversity of 

[place]



                                                                [facility]

 

                POCT GLUCOSE (AUTOMATED) 2021 08:55:00 Edd Nova

[facility]

 

                PHOSPHORUS      2021 08:51:00 Olesya [facility]

 

                CREATINE KINASE 2021 08:51:00 Olesya [facility]

 

                URIC ACID       2021 08:51:00 Olesya [facility]

 

                MAGNESIUM       2021 08:51:00 Olesya [facility]

 

                COMP. METABOLIC PANEL 2021 08:51:00 Olesya [facility]



                ([id number])                                         [facility]

 

                CBC WITH DIFF   2021 08:51:00 Olesya [facility]

 

                N-TERMINAL PRO-BNP 2021 08:51:00 [facility]

 

                PHOSPHORUS      2021 08:51:00 Olesya [facility]

 

                CREATINE KINASE 2021 08:51:00 Olesya [facility]

 

                URIC ACID       2021 08:51:00 Olesya [facility]

 

                MAGNESIUM       2021 08:51:00 Olesya [facility]

 

                COMP. METABOLIC PANEL 2021 08:51:00 Olesya jeanie  [facility]



                ([id number])                                         [facility]

 

                CBC WITH DIFF   2021 08:51:00 Olesya [facility]

 

                N-TERMINAL PRO-BNP 2021 08:51:00 [facility]

 

                PHOSPHORUS      2021 08:51:00 Olesya [facility]

 

                CREATINE KINASE 2021 08:51:00 Olesya [facility]

 

                URIC ACID       2021 08:51:00 Olesya [facility]

 

                MAGNESIUM       2021 08:51:00 Olesya [facility]

 

                COMP. METABOLIC PANEL 2021 08:51:00 Heriberto Dacosta  [facility]



                ([id number])                                         Medical Branch

 

                CBC WITH DIFF   2021 08:51:00 Heriberto Dacosta  [facility]

 

                N-TERMINAL PRO-BNP 2021 08:51:00 Heriberto Dacosta  [facility]

 

                POCT GLUCOSE (AUTOMATED) 2021-04-10 21:39:00 Edd Nova Un

iversity of 

[facility]

 

                POCT GLUCOSE (AUTOMATED) 2021-04-10 21:39:00 Edd Nova Un

iversity of 

[facility]

 

                POCT GLUCOSE (AUTOMATED) 2021-04-10 21:39:00 Edd Nova Un

iversity of 

[facility]

 

                POCT GLUCOSE (AUTOMATED) 2021-04-10 17:03:00 Edd Nova Un

iversity of 

[facility]

 

                POCT GLUCOSE (AUTOMATED) 2021-04-10 17:03:00 Edd Nova Un

iversity of 

[facility]

 

                POCT GLUCOSE (AUTOMATED) 2021-04-10 17:03:00 Edd Nova Un

iversity of 

[facility]

 

                ASPIRATE OR ABSCESS 2021-04-10 15:58:00 Skip Veloz [facility]



                CULTURE(AEROBIC/ANAEROBIC)                                 [facility]

 

                AFB CULTURE     2021-04-10 15:58:00 Skip Veloz [facility]

 

                FUNGUS (ROUTINE) CULTURE 2021-04-10 15:58:00 Skip Veloz Uni

versity of [facility]

 

                ASPIRATE OR ABSCESS 2021-04-10 15:58:00 [facility]

[facility]



                CULTURE(AEROBIC/ANAEROBIC)                                 [facility]

 

                AFB CULTURE     2021-04-10 15:58:00 Skip Veloz [facility]

 

                FUNGUS (ROUTINE) CULTURE 2021-04-10 15:58:00 Skip Veloz Uni

versity of [facility]

 

                ASPIRATE OR ABSCESS 2021-04-10 15:58:00 [facility]

[facility]



                CULTURE(AEROBIC/ANAEROBIC)                                 [facility]

 

                AFB CULTURE     2021-04-10 15:58:00 Skip Veloz [facility]

 

                FUNGUS (ROUTINE) CULTURE 2021-04-10 15:58:00 Skip Veloz [facility]

 

                SURGICAL PATHOLOGY EXAM 2021-04-10 15:56:00 Skip Veloz [facility]

 

                TOE AMPUTATION  2021-04-10 15:18:00 Skip Veloz [facility]

 

                TOE AMPUTATION  2021-04-10 15:18:00 Skip Veloz [facility]

 

                URINALYSIS      2021-04-10 14:10:00 Heriberto Dacosta  [facility]

 

                SODIUM, URINE RANDOM 2021-04-10 14:10:00 Heriberto Dacosta  [facility]

 

                PROTEIN CREAT RATIO URINE 2021-04-10 14:10:00 Heriberto Dacosta

iversity of [facility]

 

                URINALYSIS      2021-04-10 14:10:00 Heriberto Dacosta  [facility]

 

                SODIUM, URINE RANDOM 2021-04-10 14:10:00 Heriberto Dacosta  [facility]

 

                PROTEIN CREAT RATIO URINE 2021-04-10 14:10:00 Heriberto Dacosta  Un

iversity of [facility]

 

                URINALYSIS      2021-04-10 14:10:00 Heriberto Dacosta  [facility]

 

                SODIUM, URINE RANDOM 2021-04-10 14:10:00 Heriberto Dacosta  [facility]

 

                PROTEIN CREAT RATIO URINE 2021-04-10 14:10:00 Heriberto Dacosta

iversity of [facility]

 

                POCT GLUCOSE (AUTOMATED) 2021-04-10 13:03:00 Edd Nova Un

iversity [facility]

 

                POCT GLUCOSE (AUTOMATED) 2021-04-10 13:03:00 Edd Nova Un

iversity [facility]

 

                POCT GLUCOSE (AUTOMATED) 2021-04-10 13:03:00 Edd Nova Un

[facility]

 

                HB ECG ROUTINE & RHYTHM 2021-04-10 09:07:57 Heriberto Dacosta  [facility]

 

                HB ECG ROUTINE & RHYTHM 2021-04-10 09:07:57 Olesya [facility]

 

                HB ECG ROUTINE & RHYTHM 2021-04-10 09:07:57 [facility]

 

                PHOSPHORUS      2021-04-10 08:45:00 Olesya [facility]

 

                CREATINE KINASE 2021-04-10 08:45:00 Olesya [facility]

 

                URIC ACID       2021-04-10 08:45:00 Olesya [facility]

 

                MAGNESIUM       2021-04-10 08:45:00 Olesya [facility]

 

                FERRITIN SERUM  2021-04-10 08:45:00 Olesya [facility]

 

                VITAMIN B12, LEVEL 2021-04-10 08:45:00 [facility]

 

                C-REACTIVE PROTEIN 2021-04-10 08:45:00 [facility]

 

                THYROID STIMULATING 2021-04-10 08:45:00 Olesya jeanie  [facility]



                HORMONE                                         [facility]

 

                COMP. METABOLIC PANEL 2021-04-10 08:45:00 Heriberto Dacosta  [facility]



                ([id number])                                         Medical [place]

 

                LIPID PANEL ([id number])(TOTAL 2021-04-10 08:45:00 Heriberto Dacosta  [facility]



                [facility]



                TRIGLYCERIDES, HDL)                                 

 

                IRON PANEL      2021-04-10 08:45:00 Olesya [facility]

 

                CBC WITH DIFF   2021-04-10 08:45:00 Olesya [facility]

 

                PROTHROMBIN TIME / INR 2021-04-10 08:45:00 Olesya jeanie  [facility]

 

                N-TERMINAL PRO-BNP 2021-04-10 08:45:00 [facility]

 

                LACTIC ACID WHOLE BLOOD 2021-04-10 08:45:00 Olesya [facility]

 

                PROCALCITONIN   2021-04-10 08:45:00 Olesya [facility]

 

                PHOSPHORUS      2021-04-10 08:45:00 Olesya [facility]

 

                CREATINE KINASE 2021-04-10 08:45:00 Olesya [facility]

 

                URIC ACID       2021-04-10 08:45:00 Olesya [facility]

 

                MAGNESIUM       2021-04-10 08:45:00 Olesya [facility]

 

                FERRITIN SERUM  2021-04-10 08:45:00 Olesya [facility]

 

                VITAMIN B12, LEVEL 2021-04-10 08:45:00 [facility]

 

                C-REACTIVE PROTEIN 2021-04-10 08:45:00 [facility]

 

                THYROID STIMULATING 2021-04-10 08:45:00 Olesya jeanie  [facility]



                HORMONE                                         [facility]

 

                COMP. METABOLIC PANEL 2021-04-10 08:45:00 Heriberto Dacosta  [facility]



                ([id number])                                         [facility] [place]

 

                LIPID PANEL ([id number])(TOTAL 2021-04-10 08:45:00 Heriberto Dacosta  [facility]



                [facility]



                TRIGLYCERIDES, HDL)                                 

 

                IRON PANEL      2021-04-10 08:45:00 Olesya [facility]

 

                CBC WITH DIFF   2021-04-10 08:45:00 Olesya [facility] [facility]

 

                PROTHROMBIN TIME / INR 2021-04-10 08:45:00 Heriberto Dacosta  [facility]

 

                N-TERMINAL PRO-BNP 2021-04-10 08:45:00 Olesya jeanie  [facility]

 

                VITAMIN D, 25-OH 2021-04-10 08:45:00 Olesya [facility]

 

                LACTIC ACID WHOLE BLOOD 2021-04-10 08:45:00 Heriberto Dacosta  [facility]

 

                PROCALCITONIN   2021-04-10 08:45:00 [facility]ya [facility]

 

                PHOSPHORUS      2021-04-10 08:45:00 [facility]

 

                CREATINE KINASE 2021-04-10 08:45:00 [facility]

 

                URIC ACID       2021-04-10 08:45:00 Olesya jeanie  [facility]

 

                MAGNESIUM       2021-04-10 08:45:00 [facility]ya [facility]

 

                FERRITIN SERUM  2021-04-10 08:45:00 [facility]

 

                VITAMIN B12, LEVEL 2021-04-10 08:45:00 Olesya jeanie  [facility]

 

                C-REACTIVE PROTEIN 2021-04-10 08:45:00 Olesya jeanie  [facility]

 

                THYROID STIMULATING 2021-04-10 08:45:00 Heriberto Dacosta  [facility]



                HORMONE                                         [facility]

 

                COMP. METABOLIC PANEL 2021-04-10 08:45:00 Heriberto Dacosta  [facility]



                ([id number])                                         Medical Branch

 

                LIPID PANEL ([id number])(TOTAL 2021-04-10 08:45:00 Heriberto Dacosta  [facility]



                CHOLESTEROL,                                    [facility]



                TRIGLYCERIDES, HDL)                                 

 

                IRON PANEL      2021-04-10 08:45:00 Olesya jeanie  [facility]

 

                CBC WITH DIFF   2021-04-10 08:45:00 [facility]

 

                PROTHROMBIN TIME / INR 2021-04-10 08:45:00 Heriberto Dacosta  [facility]

 

                N-TERMINAL PRO-BNP 2021-04-10 08:45:00 Heriberto Dacosta  [facility]

 

                VITAMIN D, 25-OH 2021-04-10 08:45:00 Olesya jeanie  [facility]

 

                LACTIC ACID WHOLE BLOOD 2021-04-10 08:45:00 Heriberto Dacosta  [facility]

 

                PROCALCITONIN   2021-04-10 08:45:00 Olesya jeanie  [facility]

 

                SEDIMENTATION RATE 2021-04-10 08:38:00 [facility]

 

                SEDIMENTATION RATE 2021-04-10 08:38:00 [facility]

 

                SEDIMENTATION RATE 2021-04-10 08:38:00 Heriberto Dacosta  [facility]

 

                CT FOOT RIGHT WO CONTRAST 2021-04-10 07:09:41 [facility]osta  [facility]

 

                [facility] FOOT RIGHT WO CONTRAST 2021-04-10 07:09:41 Heriberto Dacosta  [facility]

 

                CT FOOT RIGHT WO CONTRAST 2021-04-10 07:09:41 Heriberto Dacosta  [facility]

 

                XR CHEST 1 VW   2021-04-10 07:09:23 Heriberto Dacosta  [facility]

 

                XR CHEST 1 VW   2021-04-10 07:09:23 Olesya jeanie  [facility]

 

                XR CHEST 1 VW   2021-04-10 07:09:23 Olesya jeanie  [facility]

 

                XR FOOT  3+ VW RIGHT 2021-04-10 01:44:18 Edd [facility]

 

                XR FOOT  3+ VW RIGHT 2021-04-10 01:44:18 Edd [facility]

 

                XR FOOT  3+ VW RIGHT 2021-04-10 01:44:18 Edd [facility]

 

                BLOOD CULTURE SCREEN 2021-04-10 01:37:00 Edd [facility]

 

                COMP. METABOLIC PANEL 2021-04-10 01:37:00 Edd Nova [facility]



                ([id number])                                         [facility]

 

                CBC WITH DIFF   2021-04-10 01:37:00 Edd Nova [facility]

 

                GLYCOSYLATED HEMOGLOBIN 2021-04-10 01:37:00 Heriberto Dacosta  [facility]

[facility] [place]



                (A1C)                                           [facility]

 

                EXTRA TUBE LT. BLUE 2021-04-10 01:37:00 Edd Nova [facility]

 

                EXTRA TUBE LT. GREEN 2021-04-10 01:37:00 Edd [facility]

 

                COVID-19 (ID NOW RAPID 2021-04-10 01:37:00 Edd Nova [facility]



                TESTING)                                        Medical Branch

 

                BLOOD CULTURE SCREEN 2021-04-10 01:37:00 Edd Nova [facility]

 

                COMP. METABOLIC PANEL 2021-04-10 01:37:00 Edd Nova [facility]



                ([id number])                                         Medical Branch

 

                CBC WITH DIFF   2021-04-10 01:37:00 Edd Nova [facility]

 

                GLYCOSYLATED HEMOGLOBIN 2021-04-10 01:37:00 Heriberto Dacosta  Univ

[facility] [place]



                (A1C)                                           Medical Branch

 

                EXTRA TUBE LT. BLUE 2021-04-10 01:37:00 Edd [facility]

 

                EXTRA TUBE LT. GREEN 2021-04-10 01:37:00 Edd Nova [facility]

 

                COVID-19 (ID NOW RAPID 2021-04-10 01:37:00 Edd Nova [facility]



                TESTING)                                        Medical Branch

 

                BLOOD CULTURE SCREEN 2021-04-10 01:37:00 Edd Nova [facility]

 

                COMP. METABOLIC PANEL 2021-04-10 01:37:00 Edd Nova [facility]



                ([id number])                                         Medical Branch

 

                CBC WITH DIFF   2021-04-10 01:37:00 Edd Nova [facility]

 

                GLYCOSYLATED HEMOGLOBIN 2021-04-10 01:37:00 Heriberto Dacosta  Univ

[facility] [place]



                (A1C)                                           [facility] [place]

 

                EXTRA TUBE LT. BLUE 2021-04-10 01:37:00 Edd [facility]

 

                EXTRA TUBE LT. GREEN 2021-04-10 01:37:00 Edd Nova [facility]

 

                COVID-19 (ID NOW RAPID 2021-04-10 01:37:00 Edd Nova [facility]



                TESTING)                                        Medical Branch

 

                BLOOD CULTURE SCREEN 2021-04-10 01:22:00 Edd Nova [facility]

 

                BLOOD CULTURE SCREEN 2021-04-10 01:22:00 Edd Nova [facility]

 

                BLOOD CULTURE SCREEN 2021-04-10 01:22:00 Edd Nova [facility]

 

                NOTICE OF PRIVACY 2021-04-10 00:41:02 Doctor Unassfelix, [facility]                       [place]         [facility] [place]

 

                NOTICE OF PRIVACY 2021-04-10 00:41:02 Doctor Unassigned, [facility]                       [place]         [facility] [place]

 

                NOTICE OF PRIVACY 2021-04-10 00:41:02 Doctor Unassigned, [facility]                       [place]         Medical Branch

 

                CONSENT/REFUSAL FOR 2021-04-10 00:40:11 Doctor Champ, [facility]

[facility]



                DIAGNOSIS AND TREATMENT                 [place]         Medical 

Branch

 

                CONSENT/REFUSAL FOR 2021-04-10 00:40:11 Doctor Unassigned, [facility]

[facility]



                DIAGNOSIS AND TREATMENT                 [place]         Medical 

Branch

 

                CONSENT/REFUSAL FOR 2021-04-10 00:40:11 Doctor [facility]assfelix, [facility]

[facility]



                DIAGNOSIS AND TREATMENT                 [place]         [facility] 

[place]







Encounters







        Start   End     Encounter Admission Attending Care    Care    Encounter 

Source



        Date/Time Date/Time Type    Type    Clinicians Facility Department ID   

   

 

        2021 Outpatient R       CYNTHIA   [facility]    [id number]

831 Univers



        19:00:00 19:00:00                 DMITRI                          [facility]

 

        2021 Orders          Doctor HOWARD    [id number]

04 Univers



        00:00:00 00:00:00 Only            Unassigned, LUIS   [id number]       

  ity of



                                        [place] [facility] [id number]         [place]

as



                                                        [phone]         Medi

hussain



                                                        009             Branch

 

        2021-04-15 2021-04-15 Transition         Suzan Reinoso  [id number]

[id number] Univers



        00:00:00 00:00:00 of Care         Belem Coleman   [id number]        

 ity of



                                                [place]   [id number]         [place]

[place]



                                                        [phone]         Medi

hussain



                                                        403             Branch

 

        2021 [facility]         Edd Nova [facility]    1.2.840.

114 [id number] 

Univers



        19:49:00 18:26:00 Encounter         Heriberto Dacosta Shon [id number] 

        ity of



                                        [place] [id number]       

  [facility]  [phone]         Medi

hussain



                                                        081             Branch

 

        2021 Surgery         [facility]    [id number]

60 Univers



        10:50:00 12:42:00                 Skip Menendez [id number]         

ity of



                                                [place] [id number]         [place]

s



                                                Surgical [phone]         [facility]  020             Branch

 

        2021-04-10 2021-04-10 Surgery         [facility]    [id number]

06 Univers



        10:10:00 11:17:00                 Skip FRIEND Shon 350.1.13.10         

ity of



                                                [place] [id number]         [place]

s



                                                Surgical [phone]         [facility]  020             Branch

 

        2021 Emergency X               [facility]    ERT     [id number]

78 Univers



        19:43:00 19:43:00                                                 [facility]







Results







           Test Description Test Time  Test Comments Results    Result Comments 

Source









                    POCT GLUCOSE (AUTOMATED) 2021 22:18:06 









                      Test Item  Value      Reference Range Interpretation Comme

nts









             POCT GLU (test code = 7159813454) 108 mg/dL                  

      

 

             Lab Interpretation (test code = 02114-2) Normal                    

             



[facility]





             Test Item    Value        Reference Range Interpretation Comments

 

             POCT GLU (test code = 7527841904) 93 mg/dL                   

      

 

             Lab Interpretation (test code = Normal                             

    



             99311-2)                                            



[facility] [id number] 14:39:00





             Test Item    Value        Reference Range Interpretation Comments

 

             Case Report (test code Surgical Pathology ? ?                      

     



             = [id number]) ? ? ? ? ? ? ? ? ? ? ? ?                           



                          ? ?Case: [id number] ? ?                           



                          ? ? ? ? ? ? ? ? ? ? ? ?                           



                          ? ? Authorizing                           



                          Provider: ?Skip Veloz Jr., DPM ?                           



                          Collected: ? ? ? ? ?                           



                          04/10/2021 1056 ? ? ? ?                           



                          ? ?Ordering Location: ?                           



                          ? [facility]                           



                          ? ? ?Received: ? ? ? ?                           



                          ? ?04/10/2021 1131 ? ?                           



                          ? ? ? ? ? ? ? ? ? ? ? ?                           



                          ? ? ? Surgical Center ?                           



                          ? ? ? ? ? ? ? ? ? ? ? ?                           



                          ? ? ? ? ? ? ? ? ? ? ? ?                           



                          ? ? ? ? ?Pathologist: ?                           



                          ? ? ? ? Monica, Jennyfer, MD                           



                          PHD ? ? ? ? ? ? ? ? ? ?                           



                          ? ? ? ? ? ? ? ? ? ? ? ?                           



                          ? ? ? ? ? ?Specimens: ?                           



                          A) - TOE, FIFTH                           



                          METATARSAL/TOE, RIGHT ?                           



                          ? ? ? ? ? ? ? ? ? ? ? ?                           



                          ? ? ? ? ? ? ? ? ? ? ? ?                           



                          ? ? ? ? ? B) - BONE,                           



                          FIFTH METATARSAL BONE                           



                          BIOPSY, RIGHT ? ? ? ? ?                           



                          ? ? ? ? ? ? ? ? ? ? ? ?                           



                          ? ?                                    

 

             Final Diagnosis (test o3chqIUjVPOnp8bsSTNhaST                      

     



             code = [id number]) uZzEwMzNcZnRuYmpcdWMxIH                         

  



                          tccnRmMVxlcGljOTQwMlxhb                           



                          oZcQIThaICjJ7BpsnyoTTvc                           



                          KO2eSA0hsJsydXVmfOAjAOD                           



                          zVfWpa8fkw863xKYdm3wnHY                           



                          CUjklvtCm9xWieL87al8G9C                           



                          hsgQ50gsDHiSZJ8QNJiSNWd                           



                          iALsFTPbTML3OCFgtTJxY3k                           



                          fXQKvFF3xaxotZLotSIiaET                           



                          YqoBU7ZBQokXMfS2IsNMRjT                           



                          RrqWYZyzfd7CiQtFr1scQLe                           



                          eTcyMFxwYXJkXHBsYWluXGZ                           



                          jCuIoaADuKDXtRKFZZ1qCWH                           



                          BSSUdIVCBGSUZUSCBNRVRBV                           



                          ZYEU5WPBCGHDPIXA2CdVRAT                           



                          CLEQPTITC993IYMqtqGpCSH                           



                          cJANHA8MCXKJZD3UVP68HVJ                           



                          cERGwBRHwRMVmkN8JAWtuNS                           



                          R0NYTPSL1QPMTFDX09uhIJm                           



                          ICAgICAtIEFSVElDVUxBUiB                           



                          TVVJGQUNFIFdJVEggQUNVVE                           



                          UcR2HOGJ7UCUTLWWMYAyDYF                           



                          CDGSWFKP3NEZ9MmH9yIXNlH                           



                          HNGXTaZCGV3UAVBLRlQvXHV                           



                          hciAgICAgICBUSVNTVUVccG                           



                          OzXSWkDRUxAYEMCP6eCH5FA                           



                          GAPMzYkFCrIA1HRIS5MSbaZ                           



                          VsCQKFPTXFPKD4HXO7PpDFU                           



                          tsemlSXOxOi2xWq4WRLreDm                           



                          lHSFQgRklGVEggTUVUQVRBU                           



                          lNBTCwgQklPUFNZOlxwYXIg                           



                          IZBfWG2zSe8wPOEDLRCPIF5                           



                          TVEVPTVlFTElUSVMgSURFTl                           



                          RJRklFRFxwYXJccGFyIEFyd                           



                          8kdRVH9da1cdmmhNR8oYBWU                           



                          RFZzksprPEG0t6oatUVlDWE                           



                          zlGKkPaWbLUUcBMRid0lmFM                           



                          VmbGFuZzEwMzNcZnRuYmpcd                           



                          BMnVOLyXzQjq1uhi194xXXc                           



                          w2rkFGFeQkD6wPReYEYnfTd                           



                          eojt4xYaxKgBvUBVxa4jnwr                           



                          BcZmNoYXJzZXQwIEFyaWFsO                           



                          727EIJgUQzir5zkd8ZcPNNd                           



                          eCSyh9G5ETZMMNqzIoVwT41                           



                          9p0skd6yjjzWuuKN3MSEjOF                           



                          P9JVndmzEujzH3UJlbtZVjW                           



                          bC6MTxkxsMvCQtfthYyhtFv                           



                          Nht1YBTnJ340SGR3fCzmz4h                           



                          jHUE6XOIqOXZtPcoqOm3ukQ                           



                          QxK763LRDiZSDHUHZnqYy3X                           



                          QEfjuRpcuVexLJJe034N099                           



                          j8yoSAQezhQuzWyZadnao4r                           



                          iK816RRHdbVYrzqJxLfEuUH                           



                          CzcUKzuYB5VJIySC4czxulL                           



                          QhdUBlqDDEgfdS0IIRybUXp                           



                          G1AiQUZjWS3amlmlGWP8QLj                           



                          qCUDvHLS1NcNpPSRkv1Paxs                           



                          i1WuZmhf7kfq89YMQ4h9Moq                           



                          JxhZVK0VWH6RuOePl3jkVUn                           



                          NNBjQQ8vUeEwpRRpSDWldz4                           



                          5eTejMYsfffIudZ7wKgTiOP                           



                          YyfAZcSKFzSQ1wvGJbMWJip                           



                          B9cmxhxPVBvByZyuudxCYLr                           



                          dVukwpOpWm1caEmpWJM8UMz                           



                          mW8zqpG3sXqW0EQsnH2qytW                           



                          0kOLo1DFhuqQB0JWFhdG8oW                           



                          S0ixrxzw0idHUqbHGffSWSn                           



                          meN7wpJ3JYFtcHDvJ1ZqlX4                           



                          lTOGzDO1pvsley5dfWAM8RW                           



                          qnZRBtHIJ7JhSlRFZwb0Xil                           



                          bq8AxPyc6VufDIgMEtcV38a                           



                          w477DCJnfyVtR6jayTNqnxa                           



                          maOFjwiaoVIrfptX7UJGyTO                           



                          BsYWluXGYxXGZzMjBcbGFuZ                           



                          zEwMzNcaGljaFxmMVxkYmNo                           



                          JNBzMFatS1seRyPkS4TwMHO                           



                          iNvOjtACuCIwbaSQ8MDGwGE                           



                          Mna27xgRj8IPIyrhxxj5PcR                           



                          VBatLIhjNXavS5jsyNcg7hg                           



                          WYTlKBQmZKRnG6YmJMK1qKP                           



                          bTTVsuAXcsTT5BZ4amrLjFM                           



                          1hZGUgYnkgcmVzaWRlbnRzL                           



                          UNnRErve5pvYE4kLQPoxMny                           



                          oP8jlQN6ZUWjm5bitPRhnHN                           



                          ow4yut1TqanPnEGmmWYJhCV                           



                          vgHOWoPSDsZA1uNXLfvRMei                           



                          iEtk4L7EducfGZznomdBtdp                           



                          zmH9QTjehskdAAMsYNjhT8f                           



                          gWtZxHXGzeDqdVxzcy3ByXP                           



                          YyXGZzMjhccGFyfX0=                           

 

             Clinical Information RIGHT FIFTH DIGIT                           



             (test code = OSTEOMYELITIS WITH FOOT                           



             [id number])  ABSCESS                                

 

             Gross Description b3rthWZtWFLtbBFEVGXiVgw                          

 



             (test code = mxoTmZRHzdPJdJ4JmqjtnUJ                           



             [id number])  ytQG9gEI1eqXwuhFSgvDJuE                           



                          N6TAESkApNmRMWkkMHgozHn                           



                          UwFvJGSnvJKhhBY1ASDjVD1                           



                          hgeckFOsoVLlsABVnaeJ5NA                           



                          HmqZLmY3PhXWVdFR6vxlwcC                           



                          TE6AHbxrL8busMSJwfpFa5y                           



                          dHRibHtcZjFcZmNoYXJzZXQ                           



                          xZAPjcLxrVAOxRAt1jO0RSz                           



                          yuNKK4EBLQZvnnLPCbKL8Bs                           



                          0jhIKCvcPJhAZM2KJbarPWe                           



                          JQLnQAQjMId2HCCxMZzqhZT                           



                          sCP5mpTxfKvwjaIvar6NebR                           



                          BcXGlkIDUxMDAyIFxcZGIgI                           



                          Y4FLdQrLEz4XnA6AuIgULy4                           



                          VCg9MH6HQvXkPXWsXAu0QsF                           



                          8QUDqRZn7ARjtPJ3PQSRuEX                           



                          V7IEj6RBXmZJC3WVEbFOb5A                           



                          DIgXFxmIEFyaWFsIFxcZnMg                           



                          OZKbMHyleLLyTY0wwHipvCI                           



                          pblxmczIwXHBhciANClxwbG                           



                          FpblxlcGljTmVzdERvYzEgD                           



                          QpcbHRycGFyXGxpbjBccmlu                           



                          MCANClxsdHJjaFxmczIyIFN                           



                          wZWNpbWVuIEEgaXMgcmVjZW                           



                          m5ANBoxR5zNa6wvHXaoL8te                           



                          YJwKNxwSJC6rLGsTBRhYGJi                           



                          DIRmWV54V6UdshIsDSkgZAv                           



                          aslFfEkLoXYDwtOK2bAKaDY                           



                          DoaPLpy0RiA4MoUNvepmtrg                           



                          SWzVNShqkBeJ46ho0ibhACf                           



                          g4UiBDJwoEF2uRDrqUsxrZV                           



                          bRM3qMHcaVl8kRZgyUC31AK                           



                          PxCWIrmBUgyaElX8XkOFIeX                           



                          LCumVS7kSHedML1TUYpvkEb                           



                          nIvtwTCdF3MhnNRmu2cmfS0                           



                          nPYVmOZIxp6ppOTzcPECmqz                           



                          0leO3yRYIxBCRbx4U0pNLlX                           



                          DddeLseNIM4IS5fvHClxC03                           



                          FRY9xOskt7CfBSBosaEodU0                           



                          pFEwlEPBuP2q5BFvpCDQiD4                           



                          Zlq50cPVC2kzAwFUOqRKthW                           



                          QP6BO9giAJwhB54IFPgr6N5                           



                          NU8pQHIwcfUzGVqnLlIysBZ                           



                          wAhLpjWGgXahcC94vFXJqHK                           



                          VxNPY3vDMsbRlzFENovaPxq                           



                          25icFwaXXKuUP76QQFax6he                           



                          BHYkbaRwN2Glk1QxuBNrgrP                           



                          jdSBsxeK9jKLer8lnvtGmyy                           



                          Qrz09hsZC6fICajTVuqwZbN                           



                          XX7rY5qBT9pewlxxfOcbuAd                           



                          cWMge0IxJBDeFE14SGMdFYZ                           



                          xk37zaJseOUUoqtUiVOfeMU                           



                          N5vFV4uQZ6RPQmx0IdGoUdV                           



                          I5pNLwzGCD0rJYzqLYzWGDw                           



                          isTkGpA6jXTfVo6pKHKvhdA                           



                          btEncrs27ARwok9coQG9xNE                           



                          Izbg8wRERHWIHsYFJeihFwe                           



                          Mr6JZLzFGA0vS1ggjLrbeDz                           



                          w6DdiOs5bRGeXUtfDCVsFAE                           



                          kOGZwe6vbm0tllzchHZGvQC                           



                          vtyGIwG7A0bA1jFL7pPEOgF                           



                          BSmXxoxJEVjKKdhiBHwNR6F                           



                          X5OinGdkndXgf3FkQhfhTPY                           



                          jPIxWGZvbS7vmmlMhsmMrz7                           



                          9izZO5yUXnzSHhkiUhSUI8j                           



                          I5xNM1ktjikoyhbLX32tWLa                           



                          qHawECAsCWRyY5NxbEVzJH3                           



                          ZUXI7XNInjdYcRVcmSEB7eC                           



                          K0cHZ8EGSbl0PtDaBrZXdjX                           



                          L36mUZdzDvqRXTuJFBqA8Mw                           



                          OCHjpLxcl1smVgIzCNQtnPT                           



                          rLxgfNVOfa05njZQcOF0XBW                           



                          U0UKQscSZax7BjgRE6rTFpC                           



                          WGmm1MgHSAyS8Cjb72qo8Fe                           



                          IUhgbXHtTRBnxEvdu8kyYmU                           



                          gYOGidJFsTnumVFQtx98PKs                           



                          xwbGFpblxlcGljTmVzdERvY                           



                          yShwEkvaU99KMImfRMjTLA1                           



                          NL6qYSIcacnwLERiIHCxWRJ                           



                          4DRlxnZ28vITnPKWbDLTauB                           



                          WmfO6Vg6vtKQIqzZKhEGH3B                           



                          FxcaWQgNTEwMDIgXFxkYiAg                           



                          T9SPSRIfSVuhYDr9XmFvZPa                           



                          5HZxcO2TGRBUkEZY4VCy0WJ                           



                          FcMxP2HWj5LBDFKg8dWLW4N                           



                          DZ6CMA2YnD8XVD0ZUUdSSRc                           



                          MiBcXGYgQXJpYWwgXFxmcyA                           



                          xBZEoRBVgEOziooN9HZBdXc                           



                          VjpFZqUM6TDLXhEPlkPQGwp                           



                          NVZPWC3MQ6yDFYFPhtohNWq                           



                          JQLgrYwtADgwbM6oYR1EUBi                           



                          4ugOxOOVzXtJrZ1KdY9hySO                           



                          4gQiBpcyByZWNpZXZlZCBpb                           



                          qIlo5KaGZwhmwKrVMUfeCDe                           



                          IHdpdGggdGhlIHBhdGllbnQ                           



                          dlePaNY3mVQPOHBOkcA6wYC                           



                          FoJgLzgPK9yDLuSBWhgQJmr                           



                          7PwZUIrurTbNuxqqZR2TOKc                           



                          vBxykYNtYJ5yEPZhtqEix5S                           



                          hPW7vKFNqz1cjS6ijSXHwyg                           



                          1ijB4wSDNjpbEkExQgY26hy                           



                          xShVKZcZrV6RYHeTpL9PVOm                           



                          MiBjbSkuIFRoZSBzcGVjaW1                           



                          mtuRcojJtlSx9ESQwYMT8sH                           



                          WjlVktMQTpUlnjgTR0KQAaM                           



                          hSdrwGqt9LukLu2uHQhNMzn                           



                          BGAffA0otQ2oYaMaONHjpHw                           



                          ye8ggSqDrZNRidGWiSyqgSS                           



                          Kei29cIKMcwbFNQsekNRTiJ                           



                          GoCYQS0FJTiqyNznMbgFBAR                           



                          MQOcXGIpC4O3EZOkxCmkLLJ                           



                          dVCyBdIiwFLVPM5khfSYbRN                           



                          asRWHEXHnKU8VAEZ9OEIWcH                           



                          KcrJKQyaHWLRKK7ZJ8zGOrb                           



                          qJOzjvfoTGMaK0GxL5ZdfmV                           



                          jzTHmSTShoiRdt1uyBMG4NN                           



                          IzfLErhPEzKzTrSxytDWW1J                           



                          Pjdr3soVJM1BVYslHGknTWk                           



                          GUtfKcIxAfhbZGAjO3BmM8B                           



                          iopD7BFu2                              

 

             Embedded Images (test                                        



             code = 6646145426)                                        



[facility] GLUCOSE (AUTOMATED)2021 12:40:09





             Test Item    Value        Reference Range Interpretation Comments

 

             POCT GLU (test code = 1574709866) 179 mg/dL           H      

      

 

             Lab Interpretation (test code = Abnormal                           

    



             20416-3)                                            



[facility] METABOLIC PANEL (20631)2021 
09:53:25





             Test Item    Value        Reference Range Interpretation Comments

 

             NA (test code = 137 mmol/L   135-145                   



             1105865271)                                         

 

             K (test code = 3.9 mmol/L   3.5-5.0                   



             6645920940)                                         

 

             CL (test code = 107 mmol/L                       



             8103496759)                                         

 

             CO2 TOTAL (test code = 24 mmol/L    23-31                     



             8464635844)                                         

 

             AGAP (test code =              2-16                      



             8801103452)                                         

 

             BUN (test code = 25 mg/dL     7-23         H            



             4763188997)                                         

 

             GLUCOSE (test code = 161 mg/dL           H            



             6102371086)                                         

 

             CREATININE (test code = 2.03 mg/dL   0.60-1.25    H            



             3921593195)                                         

 

             TOTAL BILI (test code = 0.3 mg/dL    0.1-1.1                   



             4506602299)                                         

 

             CALCIUM (test code = 8.4 mg/dL    8.6-10.6     L            



             3026737879)                                         

 

             T PROTEIN (test code = 5.7 g/dL     6.3-8.2      L            



             1284721814)                                         

 

             ALBUMIN (test code = 2.6 g/dL     3.5-5.0      L            



             2320262517)                                         

 

             ALK PHOS (test code = 74 U/L                           



             1198930603)                                         

 

             ALTv (test code = 23 U/L       5-50                      



             1742-6)                                             

 

             AST(SGOT) (test code = 38 U/L       13-40                     



             6056096708)                                         

 

             eGFR (test code =              mL/min/1.73m2              



             1662388821)                                         

 

             ELIJAH (test code = ELIJAH) Association of                           



                          Glomerular Filtration                           



                          Rate (GFR) and Staging                           



                          of Kidney Disease*                           



                          +---------------------                           



                          --+-------------------                           



                          --+-------------------                           



                          ------+| GFR                           



                          (mL/min/1.73 m2) ?|                           



                          With Kidney Damage ?|                           



                          ?Without Kidney                           



                          Damage+---------------                           



                          --------+-------------                           



                          --------+-------------                           



                          ------------+| ?>90 ?                           



                          ? ? ? ? ? ? ? ?|                           



                          ?Stage one ? ? ? ? ?|                           



                          ? Normal ? ? ? ? ? ? ?                           



                          ?+--------------------                           



                          ---+------------------                           



                          ---+------------------                           



                          -------+| ?60-89 ? ? ?                           



                          ? ? ? ? ?| ?Stage two                           



                          ? ? ? ? ?| ? Decreased                           



                          GFR ? ? ? ?                            



                          +---------------------                           



                          --+-------------------                           



                          --+-------------------                           



                          ------+| ?30-59 ? ? ?                           



                          ? ? ? ? ?| ?Stage                           



                          three ? ? ? ?| ? Stage                           



                          three ? ? ? ? ?                           



                          +---------------------                           



                          --+-------------------                           



                          --+-------------------                           



                          ------+| ?15-29 ? ? ?                           



                          ? ? ? ? ?| ?Stage four                           



                          ? ? ? ? | ? Stage four                           



                          ? ? ? ? ?                              



                          ?+--------------------                           



                          ---+------------------                           



                          ---+------------------                           



                          -------+| ?<15 (or                           



                          dialysis) ? ?| ?Stage                           



                          five ? ? ? ? | ? Stage                           



                          five ? ? ? ? ?                           



                          ?+--------------------                           



                          ---+------------------                           



                          ---+------------------                           



                          -------+ *Each stage                           



                          assumes the associated                           



                          GFR level has been in                           



                          effect for at least                           



                          three months. ?Stages                           



                          1 to 5, with or                           



                          without kidney                           



                          disease, indicate                           



                          chronic kidney                           



                          disease. Notes:                           



                          Determination of                           



                          stages one and two                           



                          (with eGFR                             



                          >59mL/min/1.73 m2)                           



                          requires estimation of                           



                          kidney damage for at                           



                          least three months as                           



                          defined by structural                           



                          or functional                           



                          abnormalities of the                           



                          kidney, manifested by                           



                          either:Pathological                           



                          abnormalities or                           



                          Markers of kidney                           



                          damage (including                           



                          abnormalities in the                           



                          composition of the                           



                          blood or urine or                           



                          abnormalities in                           



                          imaging tests).                           

 

             Lab Interpretation Abnormal                               



             (test code = 55058-1)                                        



[facility] WITH [id number] 09:10:57





             Test Item    Value        Reference Range Interpretation Comments

 

             WBC (test code =              See_Comment                [Automated



             6690-2)                                             message] The sy

stem



                                                                 which generated



                                                                 this result



                                                                 transmitted



                                                                 reference range

:



                                                                 4.20 - 10.70



                                                                 10*3/?L. The



                                                                 reference range

 was



                                                                 not used to



                                                                 interpret this



                                                                 result as



                                                                 normal/abnormal

.

 

             RBC (test code =              See_Comment  L             [Automated



             789-8)                                              message] The sy

stem



                                                                 which generated



                                                                 this result



                                                                 transmitted



                                                                 reference range

:



                                                                 4.26 - 5.52



                                                                 10*6/?L. The



                                                                 reference range

 was



                                                                 not used to



                                                                 interpret this



                                                                 result as



                                                                 normal/abnormal

.

 

             HGB (test code = 10.8 g/dL    12.2-16.4    L            



             718-7)                                              

 

             HCT (test code = 31.8 %       38.4-49.3    L            



             4544-3)                                             

 

             MCV (test code = 85.0 fL      81.7-95.6                 



             787-2)                                              

 

             MCH (test code = 28.9 pg      26.1-32.7                 



             785-6)                                              

 

             MCHC (test code = 34.0 g/dL    31.2-35.0                 



             786-4)                                              

 

             RDW-SD (test code = 36.2 fL      38.5-51.6    L            



             44606-8)                                            

 

             RDW-CV (test code = 11.7 %       12.1-15.4    L            



             788-0)                                              

 

             PLT (test code =              See_Comment  H             [Automated



             777-3)                                              message] The sy

stem



                                                                 which generated



                                                                 this result



                                                                 transmitted



                                                                 reference range

:



                                                                 150 - 328 10*3/

?L.



                                                                 The reference r

pa



                                                                 was not used to



                                                                 interpret this



                                                                 result as



                                                                 normal/abnormal

.

 

             MPV (test code = 9.7 fL       9.8-13.0     L            



             59013-3)                                            

 

             NRBC/100 WBC (test              See_Comment                [Automat

ed



             code = 9076240750)                                        message] 

The system



                                                                 which generated



                                                                 this result



                                                                 transmitted



                                                                 reference range

:



                                                                 0.0 - 10.0 /100



                                                                 WBCs. The refer

ence



                                                                 range was not u

sed



                                                                 to interpret th

is



                                                                 result as



                                                                 normal/abnormal

.

 

             NRBC x10^3 (test code <0.01        See_Comment                [Auto

mated



             = [id number])                                        message] The s

ystem



                                                                 which generated



                                                                 this result



                                                                 transmitted



                                                                 reference range

:



                                                                 10*3/?L. The



                                                                 reference range

 was



                                                                 not used to



                                                                 interpret this



                                                                 result as



                                                                 normal/abnormal

.

 

             GRAN MAT (NEUT) % 68.8 %                                 



             (test code = 770-8)                                        

 

             IMM GRAN % (test code 0.50 %                                 



             = 0931466770)                                        

 

             LYMPH % (test code = 17.8 %                                 



             736-9)                                              

 

             MONO % (test code = 7.2 %                                  



             5905-5)                                             

 

             EOS % (test code = 5.0 %                                  



             713-8)                                              

 

             BASO % (test code = 0.7 %                                  



             706-2)                                              

 

             GRAN MAT x10^3(ANC) 7.09 10*3/uL 1.99-6.95    H            



             (test code =                                        



             3449900374)                                         

 

             IMM GRAN x10^3 (test 0.05 10*3/uL 0.00-0.06                 



             code = 7929698552)                                        

 

             LYMPH x10^3 (test code 1.83 10*3/uL 1.09-3.23                 



             = 731-0)                                            

 

             MONO x10^3 (test code 0.74 10*3/uL 0.36-1.02                 



             = 742-7)                                            

 

             EOS x10^3 (test code = 0.51 10*3/uL 0.06-0.53                 



             711-2)                                              

 

             BASO x10^3 (test code 0.07 10*3/uL 0.01-0.09                 



             = 704-7)                                            

 

             Lab Interpretation Abnormal                               



             (test code = 41521-8)                                        



[facility] GLUCOSE (AUTOMATED)2021 01:41:59





             Test Item    Value        Reference Range Interpretation Comments

 

             POCT GLU (test code = 1751471464) 101 mg/dL                  

      

 

             Lab Interpretation (test code = Normal                             

    



             63242-7)                                            



[facility] GLUCOSE (AUTOMATED)2021 21:10:22





             Test Item    Value        Reference Range Interpretation Comments

 

             POCT GLU (test code = 4651611454) 100 mg/dL                  

      

 

             Lab Interpretation (test code = Normal                             

    



             29413-1)                                            



[facility] GLUCOSE (AUTOMATED)2021 16:46:17





             Test Item    Value        Reference Range Interpretation Comments

 

             POCT GLU (test code = 8094861544) 164 mg/dL           H      

      

 

             Lab Interpretation (test code = Abnormal                           

    



             28874-6)                                            



[facility] GLUCOSE (AUTOMATED)2021 16:46:17





             Test Item    Value        Reference Range Interpretation Comments

 

             POCT GLU (test code = 3373979353) 164 mg/dL           H      

      

 

             Lab Interpretation (test code = Abnormal                           

    



             91351-8)                                            



[facility] 14:19:34





             Test Item    Value        Reference Range Interpretation Comments

 

             Aspirate or Abscess No aerobic/anaerobic                           



             Culture (test code = organisms isolated                           



             35893-6)                                            

 

             Gram stain (test code No PMNs or Mononuclear                       

    



             = 664-3)     cells observed                           



[facility]
2021 14:19:34





             Test Item    Value        Reference Range Interpretation Comments

 

             Aspirate or Abscess No aerobic/anaerobic                           



             Culture (test code = organisms isolated                           



             85939-2)                                            

 

             Gram stain (test code No PMNs or Mononuclear                       

    



             = 664-3)     cells observed                           



[facility] GLUCOSE (AUTOMATED)2021 12:59:29





             Test Item    Value        Reference Range Interpretation Comments

 

             POCT GLU (test code = 0293821626) 162 mg/dL           H      

      

 

             Lab Interpretation (test code = Abnormal                           

    



             39775-3)                                            



[facility] GLUCOSE (AUTOMATED)2021 12:59:29





             Test Item    Value        Reference Range Interpretation Comments

 

             POCT GLU (test code = 9166379326) 162 mg/dL           H      

      

 

             Lab Interpretation (test code = Abnormal                           

    



             56713-8)                                            



[facility] METABOLIC PANEL ([id number])2021 
09:57:17





             Test Item    Value        Reference Range Interpretation Comments

 

             NA (test code = 137 mmol/L   135-145                   



             5899042645)                                         

 

             K (test code = 3.9 mmol/L   3.5-5.0                   



             3776074136)                                         

 

             CL (test code = 109 mmol/L          H            



             0090154537)                                         

 

             CO2 TOTAL (test code = 24 mmol/L    23-31                     



             1660993140)                                         

 

             AGAP (test code =              2-16                      



             8025555590)                                         

 

             BUN (test code = 23 mg/dL     7-23                      



             1936934032)                                         

 

             GLUCOSE (test code = 162 mg/dL           H            



             5999259599)                                         

 

             CREATININE (test code = 1.98 mg/dL   0.60-1.25    H            



             9948793659)                                         

 

             TOTAL BILI (test code = 0.3 mg/dL    0.1-1.1                   



             0393830116)                                         

 

             CALCIUM (test code = 8.0 mg/dL    8.6-10.6     L            



             6341194413)                                         

 

             T PROTEIN (test code = 5.3 g/dL     6.3-8.2      L            



             4084894002)                                         

 

             ALBUMIN (test code = 2.4 g/dL     3.5-5.0      L            



             0573857141)                                         

 

             ALK PHOS (test code = 63 U/L                           



             6553755046)                                         

 

             ALTv (test code = 10 U/L       5-50                      



             1742-6)                                             

 

             AST(SGOT) (test code = 19 U/L       13-40                     



             7951434340)                                         

 

             eGFR (test code =              mL/min/1.73m2              



             4640597951)                                         

 

             ELIJAH (test code = ELIJAH) Association of                           



                          Glomerular Filtration                           



                          Rate (GFR) and Staging                           



                          of Kidney Disease*                           



                          +---------------------                           



                          --+-------------------                           



                          --+-------------------                           



                          ------+| GFR                           



                          (mL/min/1.73 m2) ?|                           



                          With Kidney Damage ?|                           



                          ?Without Kidney                           



                          Damage+---------------                           



                          --------+-------------                           



                          --------+-------------                           



                          ------------+| ?>90 ?                           



                          ? ? ? ? ? ? ? ?|                           



                          ?Stage one ? ? ? ? ?|                           



                          ? Normal ? ? ? ? ? ? ?                           



                          ?+--------------------                           



                          ---+------------------                           



                          ---+------------------                           



                          -------+| ?60-89 ? ? ?                           



                          ? ? ? ? ?| ?Stage two                           



                          ? ? ? ? ?| ? Decreased                           



                          GFR ? ? ? ?                            



                          +---------------------                           



                          --+-------------------                           



                          --+-------------------                           



                          ------+| ?30-59 ? ? ?                           



                          ? ? ? ? ?| ?Stage                           



                          three ? ? ? ?| ? Stage                           



                          three ? ? ? ? ?                           



                          +---------------------                           



                          --+-------------------                           



                          --+-------------------                           



                          ------+| ?15-29 ? ? ?                           



                          ? ? ? ? ?| ?Stage four                           



                          ? ? ? ? | ? Stage four                           



                          ? ? ? ? ?                              



                          ?+--------------------                           



                          ---+------------------                           



                          ---+------------------                           



                          -------+| ?<15 (or                           



                          dialysis) ? ?| ?Stage                           



                          five ? ? ? ? | ? Stage                           



                          five ? ? ? ? ?                           



                          ?+--------------------                           



                          ---+------------------                           



                          ---+------------------                           



                          -------+ *Each stage                           



                          assumes the associated                           



                          GFR level has been in                           



                          effect for at least                           



                          three months. ?Stages                           



                          1 to 5, with or                           



                          without kidney                           



                          disease, indicate                           



                          chronic kidney                           



                          disease. Notes:                           



                          Determination of                           



                          stages one and two                           



                          (with eGFR                             



                          >59mL/min/1.73 m2)                           



                          requires estimation of                           



                          kidney damage for at                           



                          least three months as                           



                          defined by structural                           



                          or functional                           



                          abnormalities of the                           



                          kidney, manifested by                           



                          either:Pathological                           



                          abnormalities or                           



                          Markers of kidney                           



                          damage (including                           



                          abnormalities in the                           



                          composition of the                           



                          blood or urine or                           



                          abnormalities in                           



                          imaging tests).                           

 

             Lab Interpretation Abnormal                               



             (test code = 31748-1)                                        



[facility]. METABOLIC PANEL ([id number])2021 
09:57:17





             Test Item    Value        Reference Range Interpretation Comments

 

             NA (test code = 137 mmol/L   135-145                   



             1108976591)                                         

 

             K (test code = 3.9 mmol/L   3.5-5.0                   



             8774219326)                                         

 

             CL (test code = 109 mmol/L          H            



             1401872624)                                         

 

             CO2 TOTAL (test code = 24 mmol/L    23-31                     



             6909449942)                                         

 

             AGAP (test code =              2-16                      



             2707229949)                                         

 

             BUN (test code = 23 mg/dL     7-23                      



             6735224080)                                         

 

             GLUCOSE (test code = 162 mg/dL           H            



             6735625129)                                         

 

             CREATININE (test code = 1.98 mg/dL   0.60-1.25    H            



             6398643850)                                         

 

             TOTAL BILI (test code = 0.3 mg/dL    0.1-1.1                   



             8272253467)                                         

 

             CALCIUM (test code = 8.0 mg/dL    8.6-10.6     L            



             5444041698)                                         

 

             T PROTEIN (test code = 5.3 g/dL     6.3-8.2      L            



             6402055623)                                         

 

             ALBUMIN (test code = 2.4 g/dL     3.5-5.0      L            



             0369283743)                                         

 

             ALK PHOS (test code = 63 U/L                           



             9441977155)                                         

 

             ALTv (test code = 10 U/L       5-50                      



             1742-6)                                             

 

             AST(SGOT) (test code = 19 U/L       13-40                     



             6658736532)                                         

 

             eGFR (test code =              mL/min/1.73m2              



             4591991624)                                         

 

             ELIJAH (test code = ELIJAH) Association of                           



                          Glomerular Filtration                           



                          Rate (GFR) and Staging                           



                          of Kidney Disease*                           



                          +---------------------                           



                          --+-------------------                           



                          --+-------------------                           



                          ------+| GFR                           



                          (mL/min/1.73 m2) ?|                           



                          With Kidney Damage ?|                           



                          ?Without Kidney                           



                          Damage+---------------                           



                          --------+-------------                           



                          --------+-------------                           



                          ------------+| ?>90 ?                           



                          ? ? ? ? ? ? ? ?|                           



                          ?Stage one ? ? ? ? ?|                           



                          ? Normal ? ? ? ? ? ? ?                           



                          ?+--------------------                           



                          ---+------------------                           



                          ---+------------------                           



                          -------+| ?60-89 ? ? ?                           



                          ? ? ? ? ?| ?Stage two                           



                          ? ? ? ? ?| ? Decreased                           



                          GFR ? ? ? ?                            



                          +---------------------                           



                          --+-------------------                           



                          --+-------------------                           



                          ------+| ?30-59 ? ? ?                           



                          ? ? ? ? ?| ?Stage                           



                          three ? ? ? ?| ? Stage                           



                          three ? ? ? ? ?                           



                          +---------------------                           



                          --+-------------------                           



                          --+-------------------                           



                          ------+| ?15-29 ? ? ?                           



                          ? ? ? ? ?| ?Stage four                           



                          ? ? ? ? | ? Stage four                           



                          ? ? ? ? ?                              



                          ?+--------------------                           



                          ---+------------------                           



                          ---+------------------                           



                          -------+| ?<15 (or                           



                          dialysis) ? ?| ?Stage                           



                          five ? ? ? ? | ? Stage                           



                          five ? ? ? ? ?                           



                          ?+--------------------                           



                          ---+------------------                           



                          ---+------------------                           



                          -------+ *Each stage                           



                          assumes the associated                           



                          GFR level has been in                           



                          effect for at least                           



                          three months. ?Stages                           



                          1 to 5, with or                           



                          without kidney                           



                          disease, indicate                           



                          chronic kidney                           



                          disease. Notes:                           



                          Determination of                           



                          stages one and two                           



                          (with eGFR                             



                          >59mL/min/1.73 m2)                           



                          requires estimation of                           



                          kidney damage for at                           



                          least three months as                           



                          defined by structural                           



                          or functional                           



                          abnormalities of the                           



                          kidney, manifested by                           



                          either:Pathological                           



                          abnormalities or                           



                          Markers of kidney                           



                          damage (including                           



                          abnormalities in the                           



                          composition of the                           



                          blood or urine or                           



                          abnormalities in                           



                          imaging tests).                           

 

             Lab Interpretation Abnormal                               



             (test code = 98163-5)                                        



[facility] WITH [id number] 09:20:14





             Test Item    Value        Reference Range Interpretation Comments

 

             WBC (test code =              See_Comment  H             [Automated



             3090-2)                                             message] The sy

stem



                                                                 which generated



                                                                 this result



                                                                 transmitted



                                                                 reference range

:



                                                                 4.20 - 10.70



                                                                 10*3/?L. The



                                                                 reference range

 was



                                                                 not used to



                                                                 interpret this



                                                                 result as



                                                                 normal/abnormal

.

 

             RBC (test code =              See_Comment  L             [Automated



             449-8)                                              message] The sy

stem



                                                                 which generated



                                                                 this result



                                                                 transmitted



                                                                 reference range

:



                                                                 4.26 - 5.52



                                                                 10*6/?L. The



                                                                 reference range

 was



                                                                 not used to



                                                                 interpret this



                                                                 result as



                                                                 normal/abnormal

.

 

             HGB (test code = 10.4 g/dL    12.2-16.4    L            



             718-7)                                              

 

             HCT (test code = 31.2 %       38.4-49.3    L            



             4544-3)                                             

 

             MCV (test code = 86.7 fL      81.7-95.6                 



             787-2)                                              

 

             MCH (test code = 28.9 pg      26.1-32.7                 



             785-6)                                              

 

             MCHC (test code = 33.3 g/dL    31.2-35.0                 



             786-4)                                              

 

             RDW-SD (test code = 37.5 fL      38.5-51.6    L            



             62199-6)                                            

 

             RDW-CV (test code = 11.9 %       12.1-15.4    L            



             788-0)                                              

 

             PLT (test code =              See_Comment  H             [Automated



             777-3)                                              message] The sy

stem



                                                                 which generated



                                                                 this result



                                                                 transmitted



                                                                 reference range

:



                                                                 150 - 328 10*3/

?L.



                                                                 The reference r

pa



                                                                 was not used to



                                                                 interpret this



                                                                 result as



                                                                 normal/abnormal

.

 

             MPV (test code = 9.8 fL       9.8-13.0                  



             97630-3)                                            

 

             NRBC/100 WBC (test              See_Comment                [Automat

ed



             code = 4783723932)                                        message] 

The system



                                                                 which generated



                                                                 this result



                                                                 transmitted



                                                                 reference range

:



                                                                 0.0 - 10.0 /100



                                                                 WBCs. The refer

ence



                                                                 range was not u

sed



                                                                 to interpret th

is



                                                                 result as



                                                                 normal/abnormal

.

 

             NRBC x10^3 (test code <0.01        See_Comment                [Auto

mated



             = 7802787060)                                        message] The s

ystem



                                                                 which generated



                                                                 this result



                                                                 transmitted



                                                                 reference range

:



                                                                 10*3/?L. The



                                                                 reference range

 was



                                                                 not used to



                                                                 interpret this



                                                                 result as



                                                                 normal/abnormal

.

 

             GRAN MAT (NEUT) % 69.2 %                                 



             (test code = 770-8)                                        

 

             IMM GRAN % (test code 0.20 %                                 



             = 3681201458)                                        

 

             LYMPH % (test code = 18.2 %                                 



             736-9)                                              

 

             MONO % (test code = 8.3 %                                  



             5905-5)                                             

 

             EOS % (test code = 3.4 %                                  



             713-8)                                              

 

             BASO % (test code = 0.7 %                                  



             706-2)                                              

 

             GRAN MAT x10^3(ANC) 7.42 10*3/uL 1.99-6.95    H            



             (test code =                                        



             8419331402)                                         

 

             IMM GRAN x10^3 (test <0.03        0.00-0.06                 



             code = 0650242639)                                        

 

             LYMPH x10^3 (test code 1.95 10*3/uL 1.09-3.23                 



             = 731-0)                                            

 

             MONO x10^3 (test code 0.89 10*3/uL 0.36-1.02                 



             = 742-7)                                            

 

             EOS x10^3 (test code = 0.36 10*3/uL 0.06-0.53                 



             711-2)                                              

 

             BASO x10^3 (test code 0.07 10*3/uL 0.01-0.09                 



             = 704-7)                                            

 

             Lab Interpretation Abnormal                               



             (test code = 88868-4)                                        



[facility] WITH [id number] 09:20:14





             Test Item    Value        Reference Range Interpretation Comments

 

             WBC (test code =              See_Comment  H             [Automated



             6690-2)                                             message] The sy

stem



                                                                 which generated



                                                                 this result



                                                                 transmitted



                                                                 reference range

:



                                                                 4.20 - 10.70



                                                                 10*3/?L. The



                                                                 reference range

 was



                                                                 not used to



                                                                 interpret this



                                                                 result as



                                                                 normal/abnormal

.

 

             RBC (test code =              See_Comment  L             [Automated



             789-8)                                              message] The sy

stem



                                                                 which generated



                                                                 this result



                                                                 transmitted



                                                                 reference range

:



                                                                 4.26 - 5.52



                                                                 10*6/?L. The



                                                                 reference range

 was



                                                                 not used to



                                                                 interpret this



                                                                 result as



                                                                 normal/abnormal

.

 

             HGB (test code = 10.4 g/dL    12.2-16.4    L            



             718-7)                                              

 

             HCT (test code = 31.2 %       38.4-49.3    L            



             4544-3)                                             

 

             MCV (test code = 86.7 fL      81.7-95.6                 



             787-2)                                              

 

             MCH (test code = 28.9 pg      26.1-32.7                 



             785-6)                                              

 

             MCHC (test code = 33.3 g/dL    31.2-35.0                 



             786-4)                                              

 

             RDW-SD (test code = 37.5 fL      38.5-51.6    L            



             56109-7)                                            

 

             RDW-CV (test code = 11.9 %       12.1-15.4    L            



             788-0)                                              

 

             PLT (test code =              See_Comment  H             [Automated



             777-3)                                              message] The sy

stem



                                                                 which generated



                                                                 this result



                                                                 transmitted



                                                                 reference range

:



                                                                 150 - 328 10*3/

?L.



                                                                 The reference r

pa



                                                                 was not used to



                                                                 interpret this



                                                                 result as



                                                                 normal/abnormal

.

 

             MPV (test code = 9.8 fL       9.8-13.0                  



             77590-9)                                            

 

             NRBC/100 WBC (test              See_Comment                [Automat

ed



             code = 2335530943)                                        message] 

The system



                                                                 which generated



                                                                 this result



                                                                 transmitted



                                                                 reference range

:



                                                                 0.0 - 10.0 /100



                                                                 WBCs. The refer

ence



                                                                 range was not u

sed



                                                                 to interpret th

is



                                                                 result as



                                                                 normal/abnormal

.

 

             NRBC x10^3 (test code <0.01        See_Comment                [Auto

mated



             = 7008807322)                                        message] The s

ystem



                                                                 which generated



                                                                 this result



                                                                 transmitted



                                                                 reference range

:



                                                                 10*3/?L. The



                                                                 reference range

 was



                                                                 not used to



                                                                 interpret this



                                                                 result as



                                                                 normal/abnormal

.

 

             GRAN MAT (NEUT) % 69.2 %                                 



             (test code = 770-8)                                        

 

             IMM GRAN % (test code 0.20 %                                 



             = 4202384737)                                        

 

             LYMPH % (test code = 18.2 %                                 



             736-9)                                              

 

             MONO % (test code = 8.3 %                                  



             5905-5)                                             

 

             EOS % (test code = 3.4 %                                  



             713-8)                                              

 

             BASO % (test code = 0.7 %                                  



             706-2)                                              

 

             GRAN MAT x10^3(ANC) 7.42 10*3/uL 1.99-6.95    H            



             (test code =                                        



             1858955840)                                         

 

             IMM GRAN x10^3 (test <0.03        0.00-0.06                 



             code = 7858870656)                                        

 

             LYMPH x10^3 (test code 1.95 10*3/uL 1.09-3.23                 



             = 731-0)                                            

 

             MONO x10^3 (test code 0.89 10*3/uL 0.36-1.02                 



             = 742-7)                                            

 

             EOS x10^3 (test code = 0.36 10*3/uL 0.06-0.53                 



             711-2)                                              

 

             BASO x10^3 (test code 0.07 10*3/uL 0.01-0.09                 



             = 704-7)                                            

 

             Lab Interpretation Abnormal                               



             (test code = 35867-3)                                        



[facility] GLUCOSE (AUTOMATED)2021 08:17:07





             Test Item    Value        Reference Range Interpretation Comments

 

             POCT GLU (test code = 7326514121) 187 mg/dL           H      

      

 

             Lab Interpretation (test code = Abnormal                           

    



             69262-7)                                            



[facility] GLUCOSE (AUTOMATED)2021 08:17:07





             Test Item    Value        Reference Range Interpretation Comments

 

             POCT GLU (test code = 4641943048) 187 mg/dL           H      

      

 

             Lab Interpretation (test code = Abnormal                           

    



             09926-8)                                            



[facility] immediately 
prior to the 2100 dose, but, no more than 60 minutes [id number] 03:04:05





             Test Item    Value        Reference Range Interpretation Comments

 

             VANCO TROUGH (test code 8.8 ug/mL    10.0-20.0    L            



             = 4893453970)                                        

 

             ELIJAH (test code = ELIJAH) Toxic Range: ? ? ?                           



                          ?>20 ug/mL 15-20                           



                          ug/mL is recommended                           



                          for severe infection                           



                          or when Vancomycin                           



                          J LUIS is greater than                           



                          or equal to 2.                           

 

             Lab Interpretation (test Abnormal                               



             code = 09765-5)                                        



[facility] Draw immediately 
prior to the 2100 dose, but, no more than 60 minutes [id number] 03:04:05





             Test Item    Value        Reference Range Interpretation Comments

 

             VANCO TROUGH (test code 8.8 ug/mL    10.0-20.0    L            



             = 6250442979)                                        

 

             ELIJAH (test code = ELIJAH) Toxic Range: ? ? ?                           



                          ?>20 ug/mL 15-20                           



                          ug/mL is recommended                           



                          for severe infection                           



                          or when Vancomycin                           



                          J LUIS is greater than                           



                          or equal to 2.                           

 

             Lab Interpretation (test Abnormal                               



             code = 24785-4)                                        



[facility] GLUCOSE (AUTOMATED)2021 21:55:02





             Test Item    Value        Reference Range Interpretation Comments

 

             POCT GLU (test code = 8800647837) 218 mg/dL           H      

      

 

             Lab Interpretation (test code = Abnormal                           

    



             94098-5)                                            



[facility] GLUCOSE (AUTOMATED)2021 21:55:02





             Test Item    Value        Reference Range Interpretation Comments

 

             POCT GLU (test code = 8985574352) 218 mg/dL           H      

      

 

             Lab Interpretation (test code = Abnormal                           

    



             93606-2)                                            



[id number] 21:08:00





             Test Item    Value        Reference Range Interpretation Comments

 

             VIT D 25OH (test code = <13          25-80        L            



             82508-1)                                            

 

             ELIJAH (test code = ELIJAH) Deficiency: <20                           



                          ng/mLInsufficiency:                           



                          20-24 ng/mLOptimal:                           



                          25-80 ng/mL                            

 

             Lab Interpretation (test Abnormal                               



             code = 64380-9)                                        



[id number] 21:08:00





             Test Item    Value        Reference Range Interpretation Comments

 

             VIT D 25OH (test code = <13          25-80        L            



             76994-4)                                            

 

             ELIJAH (test code = ELIJAH) Deficiency: <20                           



                          ng/mLInsufficiency:                           



                          20-24 ng/mLOptimal:                           



                          25-80 ng/mL                            

 

             Lab Interpretation (test Abnormal                               



             code = 78669-5)                                        



[facility] GLUCOSE (AUTOMATED)2021 17:35:51





             Test Item    Value        Reference Range Interpretation Comments

 

             POCT GLU (test code = 2233705781) 161 mg/dL           H      

      

 

             Lab Interpretation (test code = Abnormal                           

    



             55450-8)                                            



[facility] GLUCOSE (AUTOMATED)2021 17:35:51





             Test Item    Value        Reference Range Interpretation Comments

 

             POCT GLU (test code = 2394655836) 161 mg/dL           H      

      

 

             Lab Interpretation (test code = Abnormal                           

    



             80088-7)                                            



[facility] GLUCOSE (AUTOMATED)2021 17:35:51





             Test Item    Value        Reference Range Interpretation Comments

 

             POCT GLU (test code = 3342370547) 161 mg/dL           H      

      

 

             Lab Interpretation (test code = Abnormal                           

    



             97019-9)                                            



[facility] GLUCOSE (AUTOMATED)2021 17:19:43





             Test Item    Value        Reference Range Interpretation Comments

 

             POCT GLU (test code = 1829588054) 174 mg/dL           H      

      

 

             Lab Interpretation (test code = Abnormal                           

    



             33317-0)                                            



[facility] GLUCOSE (AUTOMATED)2021 17:19:43





             Test Item    Value        Reference Range Interpretation Comments

 

             POCT GLU (test code = 2481082874) 174 mg/dL           H      

      

 

             Lab Interpretation (test code = Abnormal                           

    



             54390-8)                                            



[facility] GLUCOSE (AUTOMATED)2021 17:19:43





             Test Item    Value        Reference Range Interpretation Comments

 

             POCT GLU (test code = 8529363206) 174 mg/dL           H      

      

 

             Lab Interpretation (test code = Abnormal                           

    



             41613-7)                                            



[facility] [id number] 14:06:58
Unremarkable sonographic appearance of the kidneys. No hydronephrosis 
ornephrolithiasis seen. Hepatomegaly.  Preliminary Report Dictated by Resident: 
Jorge Luis Elizabeth. I, Eriberto Evans MD., have reviewed this study and 
agree with theabove report.EXAM: US RETROPERITONEAL COMPLETE HISTORY: 40 years-
old Male with ANY. TECHNIQUE: Survey ultrasound of the kidneys and bladder was 
performed withgrayscale and selected color Doppler imaging. Representative 
images wereobtained for the record. COMPARISON: Ultrasound kidneys 2019. 
FINDINGS:  RIGHT KIDNEY:Size: The right kidney is normal in size, and measures 
11.2 x 5.9 x 5.6.Parenchyma: The renal parenchyma exhibits normal cortical 
echogenicity andthickness. No focal solid or cystic renal lesion is 
detected.Collecting System: No hydronephrosis is seen. LEFT KIDNEY:Size: The 
left kidney is normal in size, and measures 1.1 x 6.0 x 5.0.Parenchyma: The 
renal parenchyma exhibits normal cortical echogenicity andthickness. No focal 
solid or cystic renal lesion is detected.Collecting System: No hydronephrosis is
seen. Bladder: The urinary bladder isunremarkable.  OTHER: The liver is enlarged
measuring 18.4 cm in length. [facility], Radiant Results Inft User - 2021  9:08 
AM CDTEXAM: US RETROPERITONEAL COMPLETEHISTORY: 40 years-old Male with ANY.SAVANNAH
HNIQUE: Survey ultrasound of the kidneys and bladder was performed withgrayscale
and selected color Doppler imaging. Representative images wereobtained for the 
record.COMPARISON: Ultrasound kidneys 2019.FINDINGS: RIGHT KIDNEY:Size: The
right kidney is normal in size, and measures 11.2 x 5.9 x 5.6.Parenchyma: The 
renal parenchyma exhibits normal cortical echogenicity andthickness. No focal 
solid or cystic renal lesion is detected.Collecting System: No hydronephrosis is
seen.LEFT KIDNEY:Size: Theleft kidney is normal in size, and measures 1.1 x 6.0 
x 5.0.Parenchyma: The renal parenchyma exhibits normal cortical echogenicity 
andthickness. No focal solid or cystic renal lesion is detected.Collecting 
System: No hydronephrosis is seen.Bladder: The urinary bladder is unremarkable. 
OTHER: The liveris enlarged measuring 18.4 cm in length.IMPRESSIONUnremarkable 
sonographic appearance of the kidneys. No hydronephrosis ornephrolithiasis 
seen.Hepatomegaly.Preliminary Report Dictated by Resident: Jorge Luis Elizabeth.I, 
Eriberto Martinez MD., have reviewed this study and agree with theabove 
report.[facility] RETROPERITONEAL [id number] 
14:06:58 Unremarkable sonographic appearance of the kidneys. No hydronephrosis 
ornephrolithiasis seen. Hepatomegaly.  Preliminary Report Dictated by Resident: 
Jorge Luis Elizabeth. Eriberto BURNS MD., have reviewed this study and 
agree with theabove report.EXAM: US RETROPERITONEAL COMPLETE HISTORY: 40 years-
old Male with ANY. TECHNIQUE: Survey ultrasound of the kidneys and bladder was 
performed withgrayscale and selected color Doppler imaging. Representative 
images wereobtained for the record. COMPARISON: Ultrasound kidneys 2019. 
FINDINGS:  RIGHT KIDNEY:Size: The right kidney is normal in size, and measures 
11.2 x 5.9 x 5.6.Parenchyma: The renal parenchyma exhibits normal cortical 
echogenicity andthickness. No focal solid or cystic renal lesion is 
detected.Collecting System: No hydronephrosis is seen. LEFT KIDNEY:Size: The 
left kidney is normal in size, and measures 1.1 x 6.0 x 5.0.Parenchyma: The 
renal parenchyma exhibits normal cortical echogenicity andthickness. No focal 
solid or cystic renal lesion is detected.Collecting System: No hydronephrosis is
seen. Bladder: The urinary bladder isunremarkable.  OTHER: The liver is enlarged
measuring 18.4 cm in length. [facility], Radiant Results Inft User - 2021  9:08 
AM CDTEXAM: US RETROPERITONEAL COMPLETEHISTORY: 40 years-old Male with ANY.SAVANNAH
HNIQUE: Survey ultrasound of the kidneys and bladder was performed withgrayscale
and selected color Doppler imaging. Representative images wereobtained for the 
record.COMPARISON: Ultrasound kidneys 2019.FINDINGS: RIGHT KIDNEY:Size: The
right kidney is normal in size, and measures 11.2 x 5.9 x 5.6.Parenchyma: The 
renal parenchyma exhibits normal cortical echogenicity andthickness. No focal 
solid or cystic renal lesion is detected.Collecting System: No hydronephrosis is
seen.LEFT KIDNEY:Size: Theleft kidney is normal in size, and measures 1.1 x 6.0 
x 5.0.Parenchyma: The renal parenchyma exhibits normal cortical echogenicity 
andthickness. No focal solid or cystic renal lesion is detected.Collecting 
System: No hydronephrosis is seen.Bladder: The urinary bladder is unremarkable. 
OTHER: The liveris enlarged measuring 18.4 cm in length.IMPRESSIONUnremarkable 
sonographic appearance of the kidneys. No hydronephrosis ornephrolithiasis 
seen.Hepatomegaly.Preliminary Report Dictated by Resident: Eriberto Cooper MD., have reviewed this study and agree with theabove 
report.[facility] RETROPERITONEAL [id number] 
14:06:58 Unremarkable sonographic appearance of the kidneys. No hydronephrosis 
ornephrolithiasis seen. Hepatomegaly.  Preliminary Report Dictated by Resident: 
Eriberto Contreras MD., have reviewed this study and 
agree with theabove report.EXAM: US RETROPERITONEAL COMPLETE HISTORY: 40 years-
old Male with ANY. TECHNIQUE: Survey ultrasound of the kidneys and bladder was 
performed withgrayscale and selected color Doppler imaging. Representative 
images wereobtained for the record. COMPARISON: Ultrasound kidneys 2019. 
FINDINGS:  RIGHT KIDNEY:Size: The right kidney is normal in size, and measures 
11.2 x 5.9 x 5.6.Parenchyma: The renal parenchyma exhibits normal cortical 
echogenicity andthickness. No focal solid or cystic renal lesion is 
detected.Collecting System: No hydronephrosis is seen. LEFT KIDNEY:Size: The 
left kidney is normal in size, and measures 1.1 x 6.0 x 5.0.Parenchyma: The 
renal parenchyma exhibits normal cortical echogenicity andthickness. No focal 
solid or cystic renal lesion is detected.Collecting System: No hydronephrosis is
seen. Bladder: The urinary bladder isunremarkable.  OTHER: The liver is enlarged
measuring 18.4 cm in length. [facility], Radiant Results Inft User - 2021  9:08 
AM CDTEXAM: US RETROPERITONEAL COMPLETEHISTORY: 40 years-old Male with ANY.SAVANNAH
HNIQUE: Survey ultrasound of the kidneys and bladder was performed withgrayscale
and selected color Doppler imaging. Representative images wereobtained for the 
record.COMPARISON: Ultrasound kidneys 2019.FINDINGS: RIGHT KIDNEY:Size: The
right kidney is normal in size, and measures 11.2 x 5.9 x 5.6.Parenchyma: The 
renal parenchyma exhibits normal cortical echogenicity andthickness. No focal 
solid or cystic renal lesion is detected.Collecting System: No hydronephrosis is
seen.LEFT KIDNEY:Size: Theleft kidney is normal in size, and measures 1.1 x 6.0 
x 5.0.Parenchyma: The renal parenchyma exhibits normal cortical echogenicity 
andthickness. No focal solid or cystic renal lesion is detected.Collecting 
System: No hydronephrosis is seen.Bladder: The urinary bladder is unremarkable. 
OTHER: The liveris enlarged measuring 18.4 cm in length.IMPRESSIONUnremarkable 
sonographic appearance of the kidneys. No hydronephrosis ornephrolithiasis 
seen.Hepatomegaly.Preliminary Report Dictated by Resident: Jorge Luis Elizabeth.I, 
Eriberto Martinez MD., have reviewed this study and agree with theabove 
report.[facility] GLUCOSE (AUTOMATED)2021 
11:07:45





             Test Item    Value        Reference Range Interpretation Comments

 

             POCT GLU (test code = 4462199409) 175 mg/dL           H      

      

 

             Lab Interpretation (test code = Abnormal                           

    



             52606-3)                                            



[facility] GLUCOSE (AUTOMATED)2021 11:07:45





             Test Item    Value        Reference Range Interpretation Comments

 

             POCT GLU (test code = 9560441999) 175 mg/dL           H      

      

 

             Lab Interpretation (test code = Abnormal                           

    



             72700-9)                                            



[facility] GLUCOSE (AUTOMATED)2021 11:07:45





             Test Item    Value        Reference Range Interpretation Comments

 

             POCT GLU (test code = 1115500772) 175 mg/dL           H      

      

 

             Lab Interpretation (test code = Abnormal                           

    



             78777-9)                                            



[facility] WITH [id number] 11:00:52





             Test Item    Value        Reference Range Interpretation Comments

 

             WBC (test code =              See_Comment                [Automated



             6290-2)                                             message] The sy

stem



                                                                 which generated



                                                                 this result



                                                                 transmitted



                                                                 reference range

:



                                                                 4.20 - 10.70



                                                                 10*3/?L. The



                                                                 reference range

 was



                                                                 not used to



                                                                 interpret this



                                                                 result as



                                                                 normal/abnormal

.

 

             RBC (test code =              See_Comment  L             [Automated



             209-8)                                              message] The sy

stem



                                                                 which generated



                                                                 this result



                                                                 transmitted



                                                                 reference range

:



                                                                 4.26 - 5.52



                                                                 10*6/?L. The



                                                                 reference range

 was



                                                                 not used to



                                                                 interpret this



                                                                 result as



                                                                 normal/abnormal

.

 

             HGB (test code = 10.8 g/dL    12.2-16.4    L            



             718-7)                                              

 

             HCT (test code = 32.9 %       38.4-49.3    L            



             4544-3)                                             

 

             MCV (test code = 86.8 fL      81.7-95.6                 



             787-2)                                              

 

             MCH (test code = 28.5 pg      26.1-32.7                 



             785-6)                                              

 

             MCHC (test code = 32.8 g/dL    31.2-35.0                 



             786-4)                                              

 

             RDW-SD (test code = 37.6 fL      38.5-51.6    L            



             70526-8)                                            

 

             RDW-CV (test code = 11.9 %       12.1-15.4    L            



             788-0)                                              

 

             PLT (test code =              See_Comment  H             [Automated



             777-3)                                              message] The sy

stem



                                                                 which generated



                                                                 this result



                                                                 transmitted



                                                                 reference range

:



                                                                 150 - 328 10*3/

?L.



                                                                 The reference r

pa



                                                                 was not used to



                                                                 interpret this



                                                                 result as



                                                                 normal/abnormal

.

 

             MPV (test code = 10.0 fL      9.8-13.0                  



             80379-3)                                            

 

             NRBC/100 WBC (test              See_Comment                [Automat

ed



             code = 7228429402)                                        message] 

The system



                                                                 which generated



                                                                 this result



                                                                 transmitted



                                                                 reference range

:



                                                                 0.0 - 10.0 /100



                                                                 WBCs. The refer

ence



                                                                 range was not u

sed



                                                                 to interpret th

is



                                                                 result as



                                                                 normal/abnormal

.

 

             NRBC x10^3 (test code <0.01        See_Comment                [Auto

mated



             = [id number])                                        message] The s

ystem



                                                                 which generated



                                                                 this result



                                                                 transmitted



                                                                 reference range

:



                                                                 10*3/?L. The



                                                                 reference range

 was



                                                                 not used to



                                                                 interpret this



                                                                 result as



                                                                 normal/abnormal

.

 

             GRAN MAT (NEUT) % 66.7 %                                 



             (test code = 770-8)                                        

 

             IMM GRAN % (test code 0.40 %                                 



             = [id number])                                        

 

             LYMPH % (test code = 21.0 %                                 



             736-9)                                              

 

             MONO % (test code = 7.7 %                                  



             5905-5)                                             

 

             EOS % (test code = 3.4 %                                  



             713-8)                                              

 

             BASO % (test code = 0.8 %                                  



             706-2)                                              

 

             GRAN MAT x10^3(ANC) 7.10 10*3/uL 1.99-6.95    H            



             (test code =                                        



             9779265697)                                         

 

             IMM GRAN x10^3 (test 0.04 10*3/uL 0.00-0.06                 



             code = 9969411774)                                        

 

             LYMPH x10^3 (test code 2.23 10*3/uL 1.09-3.23                 



             = 731-0)                                            

 

             MONO x10^3 (test code 0.82 10*3/uL 0.36-1.02                 



             = 742-7)                                            

 

             EOS x10^3 (test code = 0.36 10*3/uL 0.06-0.53                 



             711-2)                                              

 

             BASO x10^3 (test code 0.09 10*3/uL 0.01-0.09                 



             = 704-7)                                            

 

             Lab Interpretation Abnormal                               



             (test code = 66969-6)                                        



[facility] WITH [id number] 11:00:52





             Test Item    Value        Reference Range Interpretation Comments

 

             WBC (test code =              See_Comment                [Automated



             6690-2)                                             message] The sy

stem



                                                                 which generated



                                                                 this result



                                                                 transmitted



                                                                 reference range

:



                                                                 4.20 - 10.70



                                                                 10*3/?L. The



                                                                 reference range

 was



                                                                 not used to



                                                                 interpret this



                                                                 result as



                                                                 normal/abnormal

.

 

             RBC (test code =              See_Comment  L             [Automated



             789-8)                                              message] The sy

stem



                                                                 which generated



                                                                 this result



                                                                 transmitted



                                                                 reference range

:



                                                                 4.26 - 5.52



                                                                 10*6/?L. The



                                                                 reference range

 was



                                                                 not used to



                                                                 interpret this



                                                                 result as



                                                                 normal/abnormal

.

 

             HGB (test code = 10.8 g/dL    12.2-16.4    L            



             718-7)                                              

 

             HCT (test code = 32.9 %       38.4-49.3    L            



             4544-3)                                             

 

             MCV (test code = 86.8 fL      81.7-95.6                 



             787-2)                                              

 

             MCH (test code = 28.5 pg      26.1-32.7                 



             785-6)                                              

 

             MCHC (test code = 32.8 g/dL    31.2-35.0                 



             786-4)                                              

 

             RDW-SD (test code = 37.6 fL      38.5-51.6    L            



             77239-2)                                            

 

             RDW-CV (test code = 11.9 %       12.1-15.4    L            



             788-0)                                              

 

             PLT (test code =              See_Comment  H             [Automated



             777-3)                                              message] The sy

stem



                                                                 which generated



                                                                 this result



                                                                 transmitted



                                                                 reference range

:



                                                                 150 - 328 10*3/

?L.



                                                                 The reference r

pa



                                                                 was not used to



                                                                 interpret this



                                                                 result as



                                                                 normal/abnormal

.

 

             MPV (test code = 10.0 fL      9.8-13.0                  



             12400-7)                                            

 

             NRBC/100 WBC (test              See_Comment                [Automat

ed



             code = 6506024148)                                        message] 

The system



                                                                 which generated



                                                                 this result



                                                                 transmitted



                                                                 reference range

:



                                                                 0.0 - 10.0 /100



                                                                 WBCs. The refer

ence



                                                                 range was not u

sed



                                                                 to interpret th

is



                                                                 result as



                                                                 normal/abnormal

.

 

             NRBC x10^3 (test code <0.01        See_Comment                [Auto

mated



             = [id number])                                        message] The s

ystem



                                                                 which generated



                                                                 this result



                                                                 transmitted



                                                                 reference range

:



                                                                 10*3/?L. The



                                                                 reference range

 was



                                                                 not used to



                                                                 interpret this



                                                                 result as



                                                                 normal/abnormal

.

 

             GRAN MAT (NEUT) % 66.7 %                                 



             (test code = 770-8)                                        

 

             IMM GRAN % (test code 0.40 %                                 



             = [id number])                                        

 

             LYMPH % (test code = 21.0 %                                 



             736-9)                                              

 

             MONO % (test code = 7.7 %                                  



             5905-5)                                             

 

             EOS % (test code = 3.4 %                                  



             713-8)                                              

 

             BASO % (test code = 0.8 %                                  



             706-2)                                              

 

             GRAN MAT x10^3(ANC) 7.10 10*3/uL 1.99-6.95    H            



             (test code =                                        



             2421004207)                                         

 

             IMM GRAN x10^3 (test 0.04 10*3/uL 0.00-0.06                 



             code = 4896006619)                                        

 

             LYMPH x10^3 (test code 2.23 10*3/uL 1.09-3.23                 



             = 731-0)                                            

 

             MONO x10^3 (test code 0.82 10*3/uL 0.36-1.02                 



             = 742-7)                                            

 

             EOS x10^3 (test code = 0.36 10*3/uL 0.06-0.53                 



             711-2)                                              

 

             BASO x10^3 (test code 0.09 10*3/uL 0.01-0.09                 



             = 704-7)                                            

 

             Lab Interpretation Abnormal                               



             (test code = 07575-8)                                        



[facility] WITH [id number] 11:00:52





             Test Item    Value        Reference Range Interpretation Comments

 

             WBC (test code =              See_Comment                [Automated



             6690-2)                                             message] The sy

stem



                                                                 which generated



                                                                 this result



                                                                 transmitted



                                                                 reference range

:



                                                                 4.20 - 10.70



                                                                 10*3/?L. The



                                                                 reference range

 was



                                                                 not used to



                                                                 interpret this



                                                                 result as



                                                                 normal/abnormal

.

 

             RBC (test code =              See_Comment  L             [Automated



             789-8)                                              message] The sy

stem



                                                                 which generated



                                                                 this result



                                                                 transmitted



                                                                 reference range

:



                                                                 4.26 - 5.52



                                                                 10*6/?L. The



                                                                 reference range

 was



                                                                 not used to



                                                                 interpret this



                                                                 result as



                                                                 normal/abnormal

.

 

             HGB (test code = 10.8 g/dL    12.2-16.4    L            



             718-7)                                              

 

             HCT (test code = 32.9 %       38.4-49.3    L            



             4544-3)                                             

 

             MCV (test code = 86.8 fL      81.7-95.6                 



             787-2)                                              

 

             MCH (test code = 28.5 pg      26.1-32.7                 



             785-6)                                              

 

             MCHC (test code = 32.8 g/dL    31.2-35.0                 



             786-4)                                              

 

             RDW-SD (test code = 37.6 fL      38.5-51.6    L            



             35697-2)                                            

 

             RDW-CV (test code = 11.9 %       12.1-15.4    L            



             788-0)                                              

 

             PLT (test code =              See_Comment  H             [Automated



             777-3)                                              message] The sy

stem



                                                                 which generated



                                                                 this result



                                                                 transmitted



                                                                 reference range

:



                                                                 150 - 328 10*3/

?L.



                                                                 The reference r

pa



                                                                 was not used to



                                                                 interpret this



                                                                 result as



                                                                 normal/abnormal

.

 

             MPV (test code = 10.0 fL      9.8-13.0                  



             99261-3)                                            

 

             NRBC/100 WBC (test              See_Comment                [Automat

ed



             code = 9083253245)                                        message] 

The system



                                                                 which generated



                                                                 this result



                                                                 transmitted



                                                                 reference range

:



                                                                 0.0 - 10.0 /100



                                                                 WBCs. The refer

ence



                                                                 range was not u

sed



                                                                 to interpret th

is



                                                                 result as



                                                                 normal/abnormal

.

 

             NRBC x10^3 (test code <0.01        See_Comment                [Auto

mated



             = [id number])                                        message] The s

ystem



                                                                 which generated



                                                                 this result



                                                                 transmitted



                                                                 reference range

:



                                                                 10*3/?L. The



                                                                 reference range

 was



                                                                 not used to



                                                                 interpret this



                                                                 result as



                                                                 normal/abnormal

.

 

             GRAN MAT (NEUT) % 66.7 %                                 



             (test code = 770-8)                                        

 

             IMM GRAN % (test code 0.40 %                                 



             = 5770287649)                                        

 

             LYMPH % (test code = 21.0 %                                 



             736-9)                                              

 

             MONO % (test code = 7.7 %                                  



             5905-5)                                             

 

             EOS % (test code = 3.4 %                                  



             713-8)                                              

 

             BASO % (test code = 0.8 %                                  



             706-2)                                              

 

             GRAN MAT x10^3(ANC) 7.10 10*3/uL 1.99-6.95    H            



             (test code =                                        



             7662809346)                                         

 

             IMM GRAN x10^3 (test 0.04 10*3/uL 0.00-0.06                 



             code = 4186133542)                                        

 

             LYMPH x10^3 (test code 2.23 10*3/uL 1.09-3.23                 



             = 731-0)                                            

 

             MONO x10^3 (test code 0.82 10*3/uL 0.36-1.02                 



             = 742-7)                                            

 

             EOS x10^3 (test code = 0.36 10*3/uL 0.06-0.53                 



             711-2)                                              

 

             BASO x10^3 (test code 0.09 10*3/uL 0.01-0.09                 



             = 704-7)                                            

 

             Lab Interpretation Abnormal                               



             (test code = 02353-0)                                        



[facility]. METABOLIC PANEL ([id number])2021 
09:48:42





             Test Item    Value        Reference Range Interpretation Comments

 

             NA (test code = 137 mmol/L   135-145                   



             2798768903)                                         

 

             K (test code = 4.0 mmol/L   3.5-5.0                   



             2808513253)                                         

 

             CL (test code = 111 mmol/L          H            



             1345299498)                                         

 

             CO2 TOTAL (test code = 22 mmol/L    23-31        L            



             8925781484)                                         

 

             AGAP (test code =              2-16                      



             5071892778)                                         

 

             BUN (test code = 29 mg/dL     7-23         H            



             5433946904)                                         

 

             GLUCOSE (test code = 174 mg/dL           H            



             9235918711)                                         

 

             CREATININE (test code = 2.22 mg/dL   0.60-1.25    H            



             9083960873)                                         

 

             TOTAL BILI (test code = 0.4 mg/dL    0.1-1.1                   



             4983235353)                                         

 

             CALCIUM (test code = 8.0 mg/dL    8.6-10.6     L            



             5299121073)                                         

 

             T PROTEIN (test code = 5.3 g/dL     6.3-8.2      L            



             5912522831)                                         

 

             ALBUMIN (test code = 2.4 g/dL     3.5-5.0      L            



             0053862389)                                         

 

             ALK PHOS (test code = 59 U/L                           



             9523472343)                                         

 

             ALTv (test code = 11 U/L       5-50                      



             1742-6)                                             

 

             AST(SGOT) (test code = 20 U/L       13-40                     



             7387890553)                                         

 

             eGFR (test code =              mL/min/1.73m2              



             7476389283)                                         

 

             ELIJAH (test code = ELIJAH) Association of                           



                          Glomerular Filtration                           



                          Rate (GFR) and Staging                           



                          of Kidney Disease*                           



                          +---------------------                           



                          --+-------------------                           



                          --+-------------------                           



                          ------+| GFR                           



                          (mL/min/1.73 m2) ?|                           



                          With Kidney Damage ?|                           



                          ?Without Kidney                           



                          Damage+---------------                           



                          --------+-------------                           



                          --------+-------------                           



                          ------------+| ?>90 ?                           



                          ? ? ? ? ? ? ? ?|                           



                          ?Stage one ? ? ? ? ?|                           



                          ? Normal ? ? ? ? ? ? ?                           



                          ?+--------------------                           



                          ---+------------------                           



                          ---+------------------                           



                          -------+| ?60-89 ? ? ?                           



                          ? ? ? ? ?| ?Stage two                           



                          ? ? ? ? ?| ? Decreased                           



                          GFR ? ? ? ?                            



                          +---------------------                           



                          --+-------------------                           



                          --+-------------------                           



                          ------+| ?30-59 ? ? ?                           



                          ? ? ? ? ?| ?Stage                           



                          three ? ? ? ?| ? Stage                           



                          three ? ? ? ? ?                           



                          +---------------------                           



                          --+-------------------                           



                          --+-------------------                           



                          ------+| ?15-29 ? ? ?                           



                          ? ? ? ? ?| ?Stage four                           



                          ? ? ? ? | ? Stage four                           



                          ? ? ? ? ?                              



                          ?+--------------------                           



                          ---+------------------                           



                          ---+------------------                           



                          -------+| ?<15 (or                           



                          dialysis) ? ?| ?Stage                           



                          five ? ? ? ? | ? Stage                           



                          five ? ? ? ? ?                           



                          ?+--------------------                           



                          ---+------------------                           



                          ---+------------------                           



                          -------+ *Each stage                           



                          assumes the associated                           



                          GFR level has been in                           



                          effect for at least                           



                          three months. ?Stages                           



                          1 to 5, with or                           



                          without kidney                           



                          disease, indicate                           



                          chronic kidney                           



                          disease. Notes:                           



                          Determination of                           



                          stages one and two                           



                          (with eGFR                             



                          >59mL/min/1.73 m2)                           



                          requires estimation of                           



                          kidney damage for at                           



                          least three months as                           



                          defined by structural                           



                          or functional                           



                          abnormalities of the                           



                          kidney, manifested by                           



                          either:Pathological                           



                          abnormalities or                           



                          Markers of kidney                           



                          damage (including                           



                          abnormalities in the                           



                          composition of the                           



                          blood or urine or                           



                          abnormalities in                           



                          imaging tests).                           

 

             Lab Interpretation Abnormal                               



             (test code = 34931-9)                                        



[facility]. METABOLIC PANEL ([id number])2021 
09:48:42





             Test Item    Value        Reference Range Interpretation Comments

 

             NA (test code = 137 mmol/L   135-145                   



             1833941629)                                         

 

             K (test code = 4.0 mmol/L   3.5-5.0                   



             0953897543)                                         

 

             CL (test code = 111 mmol/L          H            



             3713467380)                                         

 

             CO2 TOTAL (test code = 22 mmol/L    23-31        L            



             3953491912)                                         

 

             AGAP (test code =              2-16                      



             4305958066)                                         

 

             BUN (test code = 29 mg/dL     7-23         H            



             1525848763)                                         

 

             GLUCOSE (test code = 174 mg/dL           H            



             5132604222)                                         

 

             CREATININE (test code = 2.22 mg/dL   0.60-1.25    H            



             0815720935)                                         

 

             TOTAL BILI (test code = 0.4 mg/dL    0.1-1.1                   



             9724885846)                                         

 

             CALCIUM (test code = 8.0 mg/dL    8.6-10.6     L            



             7358213197)                                         

 

             T PROTEIN (test code = 5.3 g/dL     6.3-8.2      L            



             7860688372)                                         

 

             ALBUMIN (test code = 2.4 g/dL     3.5-5.0      L            



             1427632374)                                         

 

             ALK PHOS (test code = 59 U/L                           



             1610738586)                                         

 

             ALTv (test code = 11 U/L       5-50                      



             1742-6)                                             

 

             AST(SGOT) (test code = 20 U/L       13-40                     



             5351486900)                                         

 

             eGFR (test code =              mL/min/1.73m2              



             5077249115)                                         

 

             ELIJAH (test code = ELIJAH) Association of                           



                          Glomerular Filtration                           



                          Rate (GFR) and Staging                           



                          of Kidney Disease*                           



                          +---------------------                           



                          --+-------------------                           



                          --+-------------------                           



                          ------+| GFR                           



                          (mL/min/1.73 m2) ?|                           



                          With Kidney Damage ?|                           



                          ?Without Kidney                           



                          Damage+---------------                           



                          --------+-------------                           



                          --------+-------------                           



                          ------------+| ?>90 ?                           



                          ? ? ? ? ? ? ? ?|                           



                          ?Stage one ? ? ? ? ?|                           



                          ? Normal ? ? ? ? ? ? ?                           



                          ?+--------------------                           



                          ---+------------------                           



                          ---+------------------                           



                          -------+| ?60-89 ? ? ?                           



                          ? ? ? ? ?| ?Stage two                           



                          ? ? ? ? ?| ? Decreased                           



                          GFR ? ? ? ?                            



                          +---------------------                           



                          --+-------------------                           



                          --+-------------------                           



                          ------+| ?30-59 ? ? ?                           



                          ? ? ? ? ?| ?Stage                           



                          three ? ? ? ?| ? Stage                           



                          three ? ? ? ? ?                           



                          +---------------------                           



                          --+-------------------                           



                          --+-------------------                           



                          ------+| ?15-29 ? ? ?                           



                          ? ? ? ? ?| ?Stage four                           



                          ? ? ? ? | ? Stage four                           



                          ? ? ? ? ?                              



                          ?+--------------------                           



                          ---+------------------                           



                          ---+------------------                           



                          -------+| ?<15 (or                           



                          dialysis) ? ?| ?Stage                           



                          five ? ? ? ? | ? Stage                           



                          five ? ? ? ? ?                           



                          ?+--------------------                           



                          ---+------------------                           



                          ---+------------------                           



                          -------+ *Each stage                           



                          assumes the associated                           



                          GFR level has been in                           



                          effect for at least                           



                          three months. ?Stages                           



                          1 to 5, with or                           



                          without kidney                           



                          disease, indicate                           



                          chronic kidney                           



                          disease. Notes:                           



                          Determination of                           



                          stages one and two                           



                          (with eGFR                             



                          >59mL/min/1.73 m2)                           



                          requires estimation of                           



                          kidney damage for at                           



                          least three months as                           



                          defined by structural                           



                          or functional                           



                          abnormalities of the                           



                          kidney, manifested by                           



                          either:Pathological                           



                          abnormalities or                           



                          Markers of kidney                           



                          damage (including                           



                          abnormalities in the                           



                          composition of the                           



                          blood or urine or                           



                          abnormalities in                           



                          imaging tests).                           

 

             Lab Interpretation Abnormal                               



             (test code = 43071-9)                                        



[facility]. METABOLIC PANEL ([id number])2021 
09:48:42





             Test Item    Value        Reference Range Interpretation Comments

 

             NA (test code = 137 mmol/L   135-145                   



             0749419222)                                         

 

             K (test code = 4.0 mmol/L   3.5-5.0                   



             7319476486)                                         

 

             CL (test code = 111 mmol/L          H            



             4825120848)                                         

 

             CO2 TOTAL (test code = 22 mmol/L    23-31        L            



             1860410651)                                         

 

             AGAP (test code =              2-16                      



             8845827466)                                         

 

             BUN (test code = 29 mg/dL     7-23         H            



             5102872351)                                         

 

             GLUCOSE (test code = 174 mg/dL           H            



             7024210296)                                         

 

             CREATININE (test code = 2.22 mg/dL   0.60-1.25    H            



             1168503029)                                         

 

             TOTAL BILI (test code = 0.4 mg/dL    0.1-1.1                   



             4692885710)                                         

 

             CALCIUM (test code = 8.0 mg/dL    8.6-10.6     L            



             7070480180)                                         

 

             T PROTEIN (test code = 5.3 g/dL     6.3-8.2      L            



             5044329576)                                         

 

             ALBUMIN (test code = 2.4 g/dL     3.5-5.0      L            



             5667685344)                                         

 

             ALK PHOS (test code = 59 U/L                           



             4616161190)                                         

 

             ALTv (test code = 11 U/L       5-50                      



             1742-6)                                             

 

             AST(SGOT) (test code = 20 U/L       13-40                     



             3038148137)                                         

 

             eGFR (test code =              mL/min/1.73m2              



             8825058263)                                         

 

             ELIJAH (test code = ELIJAH) Association of                           



                          Glomerular Filtration                           



                          Rate (GFR) and Staging                           



                          of Kidney Disease*                           



                          +---------------------                           



                          --+-------------------                           



                          --+-------------------                           



                          ------+| GFR                           



                          (mL/min/1.73 m2) ?|                           



                          With Kidney Damage ?|                           



                          ?Without Kidney                           



                          Damage+---------------                           



                          --------+-------------                           



                          --------+-------------                           



                          ------------+| ?>90 ?                           



                          ? ? ? ? ? ? ? ?|                           



                          ?Stage one ? ? ? ? ?|                           



                          ? Normal ? ? ? ? ? ? ?                           



                          ?+--------------------                           



                          ---+------------------                           



                          ---+------------------                           



                          -------+| ?60-89 ? ? ?                           



                          ? ? ? ? ?| ?Stage two                           



                          ? ? ? ? ?| ? Decreased                           



                          GFR ? ? ? ?                            



                          +---------------------                           



                          --+-------------------                           



                          --+-------------------                           



                          ------+| ?30-59 ? ? ?                           



                          ? ? ? ? ?| ?Stage                           



                          three ? ? ? ?| ? Stage                           



                          three ? ? ? ? ?                           



                          +---------------------                           



                          --+-------------------                           



                          --+-------------------                           



                          ------+| ?15-29 ? ? ?                           



                          ? ? ? ? ?| ?Stage four                           



                          ? ? ? ? | ? Stage four                           



                          ? ? ? ? ?                              



                          ?+--------------------                           



                          ---+------------------                           



                          ---+------------------                           



                          -------+| ?<15 (or                           



                          dialysis) ? ?| ?Stage                           



                          five ? ? ? ? | ? Stage                           



                          five ? ? ? ? ?                           



                          ?+--------------------                           



                          ---+------------------                           



                          ---+------------------                           



                          -------+ *Each stage                           



                          assumes the associated                           



                          GFR level has been in                           



                          effect for at least                           



                          three months. ?Stages                           



                          1 to 5, with or                           



                          without kidney                           



                          disease, indicate                           



                          chronic kidney                           



                          disease. Notes:                           



                          Determination of                           



                          stages one and two                           



                          (with eGFR                             



                          >59mL/min/1.73 m2)                           



                          requires estimation of                           



                          kidney damage for at                           



                          least three months as                           



                          defined by structural                           



                          or functional                           



                          abnormalities of the                           



                          kidney, manifested by                           



                          either:Pathological                           



                          abnormalities or                           



                          Markers of kidney                           



                          damage (including                           



                          abnormalities in the                           



                          composition of the                           



                          blood or urine or                           



                          abnormalities in                           



                          imaging tests).                           

 

             Lab Interpretation Abnormal                               



             (test code = 26316-3)                                        



[facility] GLUCOSE (AUTOMATED)2021 21:54:23





             Test Item    Value        Reference Range Interpretation Comments

 

             POCT GLU (test code = 0751119951) 204 mg/dL           H      

      

 

             Lab Interpretation (test code = Abnormal                           

    



             31610-8)                                            



[facility] GLUCOSE (AUTOMATED)2021 21:54:23





             Test Item    Value        Reference Range Interpretation Comments

 

             POCT GLU (test code = 0937878586) 204 mg/dL           H      

      

 

             Lab Interpretation (test code = Abnormal                           

    



             89364-5)                                            



[facility] GLUCOSE (AUTOMATED)2021 21:54:23





             Test Item    Value        Reference Range Interpretation Comments

 

             POCT GLU (test code = 4982796420) 204 mg/dL           H      

      

 

             Lab Interpretation (test code = Abnormal                           

    



             28245-0)                                            



[facility] GLUCOSE (AUTOMATED)2021 17:00:50





             Test Item    Value        Reference Range Interpretation Comments

 

             POCT GLU (test code = 5758354063) 222 mg/dL           H      

      

 

             Lab Interpretation (test code = Abnormal                           

    



             01195-6)                                            



[facility] GLUCOSE (AUTOMATED)2021 17:00:50





             Test Item    Value        Reference Range Interpretation Comments

 

             POCT GLU (test code = 2326781409) 222 mg/dL           H      

      

 

             Lab Interpretation (test code = Abnormal                           

    



             89735-3)                                            



[facility] GLUCOSE (AUTOMATED)2021 17:00:50





             Test Item    Value        Reference Range Interpretation Comments

 

             POCT GLU (test code = 4303261936) 222 mg/dL           H      

      

 

             Lab Interpretation (test code = Abnormal                           

    



             55128-0)                                            



[facility] GLUCOSE (AUTOMATED)2021 12:56:35





             Test Item    Value        Reference Range Interpretation Comments

 

             POCT GLU (test code = 0918828554) 182 mg/dL           H      

      

 

             Lab Interpretation (test code = Abnormal                           

    



             99656-8)                                            



[facility] GLUCOSE (AUTOMATED)2021 12:56:35





             Test Item    Value        Reference Range Interpretation Comments

 

             POCT GLU (test code = 1897975319) 182 mg/dL           H      

      

 

             Lab Interpretation (test code = Abnormal                           

    



             21336-8)                                            



[facility] GLUCOSE (AUTOMATED)2021 12:56:35





             Test Item    Value        Reference Range Interpretation Comments

 

             POCT GLU (test code = 5671260974) 182 mg/dL           H      

      

 

             Lab Interpretation (test code = Abnormal                           

    



             63570-7)                                            



[id number]





             Test Item    Value        Reference Range Interpretation Comments

 

             NT-proBNP (test code 495 pg/mL    See_Comment  H             [Autom

ated



             = 5089326673)                                        message] The



                                                                 system which



                                                                 generated this



                                                                 result



                                                                 transmitted



                                                                 reference range

:



                                                                 <=125. The



                                                                 reference range



                                                                 was not used to



                                                                 interpret this



                                                                 result as



                                                                 normal/abnormal

.

 

             ELIJAH (test code = ELIJAH) Biotin has been                           



                          reported to                            



                          cause a negative                           



                          bias, interpret                           



                          results relative                           



                          to patient's use                           



                          of biotin.                             

 

             Lab Interpretation Abnormal                               



             (test code = 24871-6)                                        



[id number]52





             Test Item    Value        Reference Range Interpretation Comments

 

             NT-proBNP (test code 495 pg/mL    See_Comment  H             [Autom

ated



             = 5698145344)                                        message] The



                                                                 system which



                                                                 generated this



                                                                 result



                                                                 transmitted



                                                                 reference range

:



                                                                 <=125. The



                                                                 reference range



                                                                 was not used to



                                                                 interpret this



                                                                 result as



                                                                 normal/abnormal

.

 

             ELIJAH (test code = ELIJAH) Biotin has been                           



                          reported to                            



                          cause a negative                           



                          bias, interpret                           



                          results relative                           



                          to patient's use                           



                          of biotin.                             

 

             Lab Interpretation Abnormal                               



             (test code = 17790-7)                                        



[id number]:53:52





             Test Item    Value        Reference Range Interpretation Comments

 

             NT-proBNP (test code 495 pg/mL    See_Comment  H             [Autom

ated



             = 4263481233)                                        message] The



                                                                 system which



                                                                 generated this



                                                                 result



                                                                 transmitted



                                                                 reference range

:



                                                                 <=125. The



                                                                 reference range



                                                                 was not used to



                                                                 interpret this



                                                                 result as



                                                                 normal/abnormal

.

 

             ELIJAH (test code = ELIJAH) Biotin has been                           



                          reported to                            



                          cause a negative                           



                          bias, interpret                           



                          results relative                           



                          to patient's use                           



                          of biotin.                             

 

             Lab Interpretation Abnormal                               



             (test code = 41471-7)                                        



[facility]





             Test Item    Value        Reference Range Interpretation Comments

 

             MAGNESIUM (test code = 0969328743) 1.8 mg/dL    1.7-2.4            

       

 

             Lab Interpretation (test code = Normal                             

    



             45698-8)                                            



[facility]





             Test Item    Value        Reference Range Interpretation Comments

 

             MAGNESIUM (test code = 5974508488) 1.8 mg/dL    1.7-2.4            

       

 

             Lab Interpretation (test code = Normal                             

    



             33320-7)                                            



[facility]





             Test Item    Value        Reference Range Interpretation Comments

 

             MAGNESIUM (test code = 1265344424) 1.8 mg/dL    1.7-2.4            

       

 

             Lab Interpretation (test code = Normal                             

    



             31616-9)                                            



[facility] 
[facility]:48:31





             Test Item    Value        Reference Range Interpretation Comments

 

             NA (test code = 140 mmol/L   135-145                   



             5423112909)                                         

 

             K (test code = 4.1 mmol/L   3.5-5.0                   



             3578922138)                                         

 

             CL (test code = 113 mmol/L          H            



             2263586698)                                         

 

             CO2 TOTAL (test code = 24 mmol/L    23-31                     



             3283221354)                                         

 

             AGAP (test code =              2-16                      



             5045022664)                                         

 

             BUN (test code = 34 mg/dL     7-23         H            



             1250662578)                                         

 

             GLUCOSE (test code = 209 mg/dL           H            



             6792075570)                                         

 

             CREATININE (test code = 2.54 mg/dL   0.60-1.25    H            



             5745323600)                                         

 

             TOTAL BILI (test code = 0.3 mg/dL    0.1-1.1                   



             9782694446)                                         

 

             CALCIUM (test code = 8.0 mg/dL    8.6-10.6     L            



             6560134693)                                         

 

             T PROTEIN (test code = 6.1 g/dL     6.3-8.2      L            



             8996153860)                                         

 

             ALBUMIN (test code = 2.9 g/dL     3.5-5.0      L            



             7311331590)                                         

 

             ALK PHOS (test code = 67 U/L                           



             9834570802)                                         

 

             ALTv (test code = 14 U/L       5-50                      



             1742-6)                                             

 

             AST(SGOT) (test code = 22 U/L       13-40                     



             9898571071)                                         

 

             eGFR (test code =              mL/min/1.73m2              



             6642297350)                                         

 

             ELIJAH (test code = ELIJAH) Association of                           



                          Glomerular Filtration                           



                          Rate (GFR) and Staging                           



                          of Kidney Disease*                           



                          +---------------------                           



                          --+-------------------                           



                          --+-------------------                           



                          ------+| GFR                           



                          (mL/min/1.73 m2) ?|                           



                          With Kidney Damage ?|                           



                          ?Without Kidney                           



                          Damage+---------------                           



                          --------+-------------                           



                          --------+-------------                           



                          ------------+| ?>90 ?                           



                          ? ? ? ? ? ? ? ?|                           



                          ?Stage one ? ? ? ? ?|                           



                          ? Normal ? ? ? ? ? ? ?                           



                          ?+--------------------                           



                          ---+------------------                           



                          ---+------------------                           



                          -------+| ?60-89 ? ? ?                           



                          ? ? ? ? ?| ?Stage two                           



                          ? ? ? ? ?| ? Decreased                           



                          GFR ? ? ? ?                            



                          +---------------------                           



                          --+-------------------                           



                          --+-------------------                           



                          ------+| ?30-59 ? ? ?                           



                          ? ? ? ? ?| ?Stage                           



                          three ? ? ? ?| ? Stage                           



                          three ? ? ? ? ?                           



                          +---------------------                           



                          --+-------------------                           



                          --+-------------------                           



                          ------+| ?15-29 ? ? ?                           



                          ? ? ? ? ?| ?Stage four                           



                          ? ? ? ? | ? Stage four                           



                          ? ? ? ? ?                              



                          ?+--------------------                           



                          ---+------------------                           



                          ---+------------------                           



                          -------+| ?<15 (or                           



                          dialysis) ? ?| ?Stage                           



                          five ? ? ? ? | ? Stage                           



                          five ? ? ? ? ?                           



                          ?+--------------------                           



                          ---+------------------                           



                          ---+------------------                           



                          -------+ *Each stage                           



                          assumes the associated                           



                          GFR level has been in                           



                          effect for at least                           



                          three months. ?Stages                           



                          1 to 5, with or                           



                          without kidney                           



                          disease, indicate                           



                          chronic kidney                           



                          disease. Notes:                           



                          Determination of                           



                          stages one and two                           



                          (with eGFR                             



                          >59mL/min/1.73 m2)                           



                          requires estimation of                           



                          kidney damage for at                           



                          least three months as                           



                          defined by structural                           



                          or functional                           



                          abnormalities of the                           



                          kidney, manifested by                           



                          either:Pathological                           



                          abnormalities or                           



                          Markers of kidney                           



                          damage (including                           



                          abnormalities in the                           



                          composition of the                           



                          blood or urine or                           



                          abnormalities in                           



                          imaging tests).                           

 

             Lab Interpretation Abnormal                               



             (test code = 37463-5)                                        



[facility]. METABOLIC PANEL ([id number] 
09:48:31





             Test Item    Value        Reference Range Interpretation Comments

 

             NA (test code = 140 mmol/L   135-145                   



             [id number])                                         

 

             K (test code = 4.1 mmol/L   3.5-5.0                   



             6143849043)                                         

 

             CL (test code = 113 mmol/L          H            



             1275538906)                                         

 

             CO2 TOTAL (test code = 24 mmol/L    23-31                     



             2232079167)                                         

 

             AGAP (test code =              2-16                      



             9946776774)                                         

 

             BUN (test code = 34 mg/dL     7-23         H            



             7328877529)                                         

 

             GLUCOSE (test code = 209 mg/dL           H            



             3267458820)                                         

 

             CREATININE (test code = 2.54 mg/dL   0.60-1.25    H            



             1965451674)                                         

 

             TOTAL BILI (test code = 0.3 mg/dL    0.1-1.1                   



             0692135600)                                         

 

             CALCIUM (test code = 8.0 mg/dL    8.6-10.6     L            



             5232456167)                                         

 

             T PROTEIN (test code = 6.1 g/dL     6.3-8.2      L            



             1924404589)                                         

 

             ALBUMIN (test code = 2.9 g/dL     3.5-5.0      L            



             4571552131)                                         

 

             ALK PHOS (test code = 67 U/L                           



             1863929196)                                         

 

             ALTv (test code = 14 U/L       5-50                      



             1742-6)                                             

 

             AST(SGOT) (test code = 22 U/L       13-40                     



             4586214839)                                         

 

             eGFR (test code =              mL/min/1.73m2              



             9462890225)                                         

 

             ELIJAH (test code = ELIJAH) Association of                           



                          Glomerular Filtration                           



                          Rate (GFR) and Staging                           



                          of Kidney Disease*                           



                          +---------------------                           



                          --+-------------------                           



                          --+-------------------                           



                          ------+| GFR                           



                          (mL/min/1.73 m2) ?|                           



                          With Kidney Damage ?|                           



                          ?Without Kidney                           



                          Damage+---------------                           



                          --------+-------------                           



                          --------+-------------                           



                          ------------+| ?>90 ?                           



                          ? ? ? ? ? ? ? ?|                           



                          ?Stage one ? ? ? ? ?|                           



                          ? Normal ? ? ? ? ? ? ?                           



                          ?+--------------------                           



                          ---+------------------                           



                          ---+------------------                           



                          -------+| ?60-89 ? ? ?                           



                          ? ? ? ? ?| ?Stage two                           



                          ? ? ? ? ?| ? Decreased                           



                          GFR ? ? ? ?                            



                          +---------------------                           



                          --+-------------------                           



                          --+-------------------                           



                          ------+| ?30-59 ? ? ?                           



                          ? ? ? ? ?| ?Stage                           



                          three ? ? ? ?| ? Stage                           



                          three ? ? ? ? ?                           



                          +---------------------                           



                          --+-------------------                           



                          --+-------------------                           



                          ------+| ?15-29 ? ? ?                           



                          ? ? ? ? ?| ?Stage four                           



                          ? ? ? ? | ? Stage four                           



                          ? ? ? ? ?                              



                          ?+--------------------                           



                          ---+------------------                           



                          ---+------------------                           



                          -------+| ?<15 (or                           



                          dialysis) ? ?| ?Stage                           



                          five ? ? ? ? | ? Stage                           



                          five ? ? ? ? ?                           



                          ?+--------------------                           



                          ---+------------------                           



                          ---+------------------                           



                          -------+ *Each stage                           



                          assumes the associated                           



                          GFR level has been in                           



                          effect for at least                           



                          three months. ?Stages                           



                          1 to 5, with or                           



                          without kidney                           



                          disease, indicate                           



                          chronic kidney                           



                          disease. Notes:                           



                          Determination of                           



                          stages one and two                           



                          (with eGFR                             



                          >59mL/min/1.73 m2)                           



                          requires estimation of                           



                          kidney damage for at                           



                          least three months as                           



                          defined by structural                           



                          or functional                           



                          abnormalities of the                           



                          kidney, manifested by                           



                          either:Pathological                           



                          abnormalities or                           



                          Markers of kidney                           



                          damage (including                           



                          abnormalities in the                           



                          composition of the                           



                          blood or urine or                           



                          abnormalities in                           



                          imaging tests).                           

 

             Lab Interpretation Abnormal                               



             (test code = 33566-4)                                        



[facility]. METABOLIC PANEL ([id number])2021 
09:48:31





             Test Item    Value        Reference Range Interpretation Comments

 

             NA (test code = 140 mmol/L   135-145                   



             5135698697)                                         

 

             K (test code = 4.1 mmol/L   3.5-5.0                   



             3656112738)                                         

 

             CL (test code = 113 mmol/L          H            



             5714375049)                                         

 

             CO2 TOTAL (test code = 24 mmol/L    23-31                     



             3992614882)                                         

 

             AGAP (test code =              2-16                      



             2886215663)                                         

 

             BUN (test code = 34 mg/dL     7-23         H            



             9654055457)                                         

 

             GLUCOSE (test code = 209 mg/dL           H            



             4334407698)                                         

 

             CREATININE (test code = 2.54 mg/dL   0.60-1.25    H            



             8045954558)                                         

 

             TOTAL BILI (test code = 0.3 mg/dL    0.1-1.1                   



             7892443732)                                         

 

             CALCIUM (test code = 8.0 mg/dL    8.6-10.6     L            



             5851066858)                                         

 

             T PROTEIN (test code = 6.1 g/dL     6.3-8.2      L            



             9695719398)                                         

 

             ALBUMIN (test code = 2.9 g/dL     3.5-5.0      L            



             8923090495)                                         

 

             ALK PHOS (test code = 67 U/L                           



             5417646862)                                         

 

             ALTv (test code = 14 U/L       5-50                      



             1742-6)                                             

 

             AST(SGOT) (test code = 22 U/L       13-40                     



             3334338199)                                         

 

             eGFR (test code =              mL/min/1.73m2              



             6834335967)                                         

 

             ELIJAH (test code = ELIJAH) Association of                           



                          Glomerular Filtration                           



                          Rate (GFR) and Staging                           



                          of Kidney Disease*                           



                          +---------------------                           



                          --+-------------------                           



                          --+-------------------                           



                          ------+| GFR                           



                          (mL/min/1.73 m2) ?|                           



                          With Kidney Damage ?|                           



                          ?Without Kidney                           



                          Damage+---------------                           



                          --------+-------------                           



                          --------+-------------                           



                          ------------+| ?>90 ?                           



                          ? ? ? ? ? ? ? ?|                           



                          ?Stage one ? ? ? ? ?|                           



                          ? Normal ? ? ? ? ? ? ?                           



                          ?+--------------------                           



                          ---+------------------                           



                          ---+------------------                           



                          -------+| ?60-89 ? ? ?                           



                          ? ? ? ? ?| ?Stage two                           



                          ? ? ? ? ?| ? Decreased                           



                          GFR ? ? ? ?                            



                          +---------------------                           



                          --+-------------------                           



                          --+-------------------                           



                          ------+| ?30-59 ? ? ?                           



                          ? ? ? ? ?| ?Stage                           



                          three ? ? ? ?| ? Stage                           



                          three ? ? ? ? ?                           



                          +---------------------                           



                          --+-------------------                           



                          --+-------------------                           



                          ------+| ?15-29 ? ? ?                           



                          ? ? ? ? ?| ?Stage four                           



                          ? ? ? ? | ? Stage four                           



                          ? ? ? ? ?                              



                          ?+--------------------                           



                          ---+------------------                           



                          ---+------------------                           



                          -------+| ?<15 (or                           



                          dialysis) ? ?| ?Stage                           



                          five ? ? ? ? | ? Stage                           



                          five ? ? ? ? ?                           



                          ?+--------------------                           



                          ---+------------------                           



                          ---+------------------                           



                          -------+ *Each stage                           



                          assumes the associated                           



                          GFR level has been in                           



                          effect for at least                           



                          three months. ?Stages                           



                          1 to 5, with or                           



                          without kidney                           



                          disease, indicate                           



                          chronic kidney                           



                          disease. Notes:                           



                          Determination of                           



                          stages one and two                           



                          (with eGFR                             



                          >59mL/min/1.73 m2)                           



                          requires estimation of                           



                          kidney damage for at                           



                          least three months as                           



                          defined by structural                           



                          or functional                           



                          abnormalities of the                           



                          kidney, manifested by                           



                          either:Pathological                           



                          abnormalities or                           



                          Markers of kidney                           



                          damage (including                           



                          abnormalities in the                           



                          composition of the                           



                          blood or urine or                           



                          abnormalities in                           



                          imaging tests).                           

 

             Lab Interpretation Abnormal                               



             (test code = 70828-4)                                        



[facility]





             Test Item    Value        Reference Range Interpretation Comments

 

             PHOSPHORUS (test code = 6242980999) 4.5 mg/dL    2.5-5.0           

        

 

             Lab Interpretation (test code = Normal                             

    



             58566-5)                                            



[facility]





             Test Item    Value        Reference Range Interpretation Comments

 

             PHOSPHORUS (test code = 6228258583) 4.5 mg/dL    2.5-5.0           

        

 

             Lab Interpretation (test code = Normal                             

    



             88774-6)                                            



[facility]





             Test Item    Value        Reference Range Interpretation Comments

 

             PHOSPHORUS (test code = 1178229163) 4.5 mg/dL    2.5-5.0           

        

 

             Lab Interpretation (test code = Normal                             

    



             14988-4)                                            



[id number] 09[facility]47[facility]51





             Test Item    Value        Reference Range Interpretation Comments

 

             URIC ACID (test code = 3555159613) 5.6 mg/dL    3.6-8.0            

       

 

             Lab Interpretation (test code = Normal                             

    



             96274-3)                                            



[id number] 09[facility]47:51





             Test Item    Value        Reference Range Interpretation Comments

 

             URIC ACID (test code = 2773997301) 5.6 mg/dL    3.6-8.0            

       

 

             Lab Interpretation (test code = Normal                             

    



             89640-5)                                            



[id number] 09:47:51





             Test Item    Value        Reference Range Interpretation Comments

 

             URIC ACID (test code = 1083047312) 5.6 mg/dL    3.6-8.0            

       

 

             Lab Interpretation (test code = Normal                             

    



             42159-3)                                            



[id number] 09[facility]47:50





             Test Item    Value        Reference Range Interpretation Comments

 

             CK (test code = 7080166132) 121 U/L                          

 

             Lab Interpretation (test code = Normal                             

    



             57707-7)                                            



[id number] 09:47:50





             Test Item    Value        Reference Range Interpretation Comments

 

             CK (test code = 6533205030) 121 U/L                          

 

             Lab Interpretation (test code = Normal                             

    



             91193-7)                                            



[id number] 09:47:50





             Test Item    Value        Reference Range Interpretation Comments

 

             CK (test code = 0298246188) 121 U/L                          

 

             Lab Interpretation (test code = Normal                             

    



             22026-4)                                            



[facility] WITH [id number] 09:30:30





             Test Item    Value        Reference Range Interpretation Comments

 

             WBC (test code =              See_Comment  H             [Automated



             6690-2)                                             message] The sy

stem



                                                                 which generated



                                                                 this result



                                                                 transmitted



                                                                 reference range

:



                                                                 4.20 - 10.70



                                                                 10*3/?L. The



                                                                 reference range

 was



                                                                 not used to



                                                                 interpret this



                                                                 result as



                                                                 normal/abnormal

.

 

             RBC (test code =              See_Comment  L             [Automated



             789-8)                                              message] The sy

stem



                                                                 which generated



                                                                 this result



                                                                 transmitted



                                                                 reference range

:



                                                                 4.26 - 5.52



                                                                 10*6/?L. The



                                                                 reference range

 was



                                                                 not used to



                                                                 interpret this



                                                                 result as



                                                                 normal/abnormal

.

 

             HGB (test code = 10.5 g/dL    12.2-16.4    L            



             718-7)                                              

 

             HCT (test code = 31.6 %       38.4-49.3    L            



             4544-3)                                             

 

             MCV (test code = 88.0 fL      81.7-95.6                 



             787-2)                                              

 

             MCH (test code = 29.2 pg      26.1-32.7                 



             785-6)                                              

 

             MCHC (test code = 33.2 g/dL    31.2-35.0                 



             786-4)                                              

 

             RDW-SD (test code = 38.5 fL      38.5-51.6                 



             25556-4)                                            

 

             RDW-CV (test code = 11.9 %       12.1-15.4    L            



             788-0)                                              

 

             PLT (test code =              See_Comment  H             [Automated



             777-3)                                              message] The sy

stem



                                                                 which generated



                                                                 this result



                                                                 transmitted



                                                                 reference range

:



                                                                 150 - 328 10*3/

?L.



                                                                 The reference r

pa



                                                                 was not used to



                                                                 interpret this



                                                                 result as



                                                                 normal/abnormal

.

 

             MPV (test code = 9.8 fL       9.8-13.0                  



             32108-8)                                            

 

             NRBC/100 WBC (test              See_Comment                [Automat

ed



             code = [id number])                                        message] 

The system



                                                                 which generated



                                                                 this result



                                                                 transmitted



                                                                 reference range

:



                                                                 0.0 - 10.0 /100



                                                                 WBCs. The refer

ence



                                                                 range was not u

sed



                                                                 to interpret th

is



                                                                 result as



                                                                 normal/abnormal

.

 

             NRBC x10^3 (test code <0.01        See_Comment                [Auto

mated



             = [id number])                                        message] The s

ystem



                                                                 which generated



                                                                 this result



                                                                 transmitted



                                                                 reference range

:



                                                                 10*3/?L. The



                                                                 reference range

 was



                                                                 not used to



                                                                 interpret this



                                                                 result as



                                                                 normal/abnormal

.

 

             GRAN MAT (NEUT) % 71.0 %                                 



             (test code = 770-8)                                        

 

             IMM GRAN % (test code 0.40 %                                 



             = [id number])                                        

 

             LYMPH % (test code = 17.4 %                                 



             736-9)                                              

 

             MONO % (test code = 7.9 %                                  



             5905-5)                                             

 

             EOS % (test code = 2.7 %                                  



             713-8)                                              

 

             BASO % (test code = 0.6 %                                  



             706-2)                                              

 

             GRAN MAT x10^3(ANC) 8.76 10*3/uL 1.99-6.95    H            



             (test code =                                        



             2809925969)                                         

 

             IMM GRAN x10^3 (test 0.05 10*3/uL 0.00-0.06                 



             code = 8432279638)                                        

 

             LYMPH x10^3 (test code 2.15 10*3/uL 1.09-3.23                 



             = 731-0)                                            

 

             MONO x10^3 (test code 0.97 10*3/uL 0.36-1.02                 



             = 742-7)                                            

 

             EOS x10^3 (test code = 0.33 10*3/uL 0.06-0.53                 



             711-2)                                              

 

             BASO x10^3 (test code 0.07 10*3/uL 0.01-0.09                 



             = 704-7)                                            

 

             Lab Interpretation Abnormal                               



             (test code = 16547-8)                                        



[facility] WITH [id number] 09:30:30





             Test Item    Value        Reference Range Interpretation Comments

 

             WBC (test code =              See_Comment  H             [Automated



             6690-2)                                             message] The sy

stem



                                                                 which generated



                                                                 this result



                                                                 transmitted



                                                                 reference range

:



                                                                 4.20 - 10.70



                                                                 10*3/?L. The



                                                                 reference range

 was



                                                                 not used to



                                                                 interpret this



                                                                 result as



                                                                 normal/abnormal

.

 

             RBC (test code =              See_Comment  L             [Automated



             789-8)                                              message] The sy

stem



                                                                 which generated



                                                                 this result



                                                                 transmitted



                                                                 reference range

:



                                                                 4.26 - 5.52



                                                                 10*6/?L. The



                                                                 reference range

 was



                                                                 not used to



                                                                 interpret this



                                                                 result as



                                                                 normal/abnormal

.

 

             HGB (test code = 10.5 g/dL    12.2-16.4    L            



             718-7)                                              

 

             HCT (test code = 31.6 %       38.4-49.3    L            



             4544-3)                                             

 

             MCV (test code = 88.0 fL      81.7-95.6                 



             787-2)                                              

 

             MCH (test code = 29.2 pg      26.1-32.7                 



             785-6)                                              

 

             MCHC (test code = 33.2 g/dL    31.2-35.0                 



             786-4)                                              

 

             RDW-SD (test code = 38.5 fL      38.5-51.6                 



             41333-7)                                            

 

             RDW-CV (test code = 11.9 %       12.1-15.4    L            



             788-0)                                              

 

             PLT (test code =              See_Comment  H             [Automated



             777-3)                                              message] The sy

stem



                                                                 which generated



                                                                 this result



                                                                 transmitted



                                                                 reference range

:



                                                                 150 - 328 10*3/

?L.



                                                                 The reference r

pa



                                                                 was not used to



                                                                 interpret this



                                                                 result as



                                                                 normal/abnormal

.

 

             MPV (test code = 9.8 fL       9.8-13.0                  



             38635-6)                                            

 

             NRBC/100 WBC (test              See_Comment                [Automat

ed



             code = 2507105240)                                        message] 

The system



                                                                 which generated



                                                                 this result



                                                                 transmitted



                                                                 reference range

:



                                                                 0.0 - 10.0 /100



                                                                 WBCs. The refer

ence



                                                                 range was not u

sed



                                                                 to interpret th

is



                                                                 result as



                                                                 normal/abnormal

.

 

             NRBC x10^3 (test code <0.01        See_Comment                [Auto

mated



             = 4535831205)                                        message] The s

ystem



                                                                 which generated



                                                                 this result



                                                                 transmitted



                                                                 reference range

:



                                                                 10*3/?L. The



                                                                 reference range

 was



                                                                 not used to



                                                                 interpret this



                                                                 result as



                                                                 normal/abnormal

.

 

             GRAN MAT (NEUT) % 71.0 %                                 



             (test code = 770-8)                                        

 

             IMM GRAN % (test code 0.40 %                                 



             = 4199258178)                                        

 

             LYMPH % (test code = 17.4 %                                 



             736-9)                                              

 

             MONO % (test code = 7.9 %                                  



             5905-5)                                             

 

             EOS % (test code = 2.7 %                                  



             713-8)                                              

 

             BASO % (test code = 0.6 %                                  



             706-2)                                              

 

             GRAN MAT x10^3(ANC) 8.76 10*3/uL 1.99-6.95    H            



             (test code =                                        



             0497239923)                                         

 

             IMM GRAN x10^3 (test 0.05 10*3/uL 0.00-0.06                 



             code = 9736614702)                                        

 

             LYMPH x10^3 (test code 2.15 10*3/uL 1.09-3.23                 



             = 731-0)                                            

 

             MONO x10^3 (test code 0.97 10*3/uL 0.36-1.02                 



             = 742-7)                                            

 

             EOS x10^3 (test code = 0.33 10*3/uL 0.06-0.53                 



             711-2)                                              

 

             BASO x10^3 (test code 0.07 10*3/uL 0.01-0.09                 



             = 704-7)                                            

 

             Lab Interpretation Abnormal                               



             (test code = 61196-4)                                        



[facility] WITH [id number] 09:30:30





             Test Item    Value        Reference Range Interpretation Comments

 

             WBC (test code =              See_Comment  H             [Automated



             6690-2)                                             message] The sy

stem



                                                                 which generated



                                                                 this result



                                                                 transmitted



                                                                 reference range

:



                                                                 4.20 - 10.70



                                                                 10*3/?L. The



                                                                 reference range

 was



                                                                 not used to



                                                                 interpret this



                                                                 result as



                                                                 normal/abnormal

.

 

             RBC (test code =              See_Comment  L             [Automated



             789-8)                                              message] The sy

stem



                                                                 which generated



                                                                 this result



                                                                 transmitted



                                                                 reference range

:



                                                                 4.26 - 5.52



                                                                 10*6/?L. The



                                                                 reference range

 was



                                                                 not used to



                                                                 interpret this



                                                                 result as



                                                                 normal/abnormal

.

 

             HGB (test code = 10.5 g/dL    12.2-16.4    L            



             718-7)                                              

 

             HCT (test code = 31.6 %       38.4-49.3    L            



             4544-3)                                             

 

             MCV (test code = 88.0 fL      81.7-95.6                 



             787-2)                                              

 

             MCH (test code = 29.2 pg      26.1-32.7                 



             785-6)                                              

 

             MCHC (test code = 33.2 g/dL    31.2-35.0                 



             786-4)                                              

 

             RDW-SD (test code = 38.5 fL      38.5-51.6                 



             61565-7)                                            

 

             RDW-CV (test code = 11.9 %       12.1-15.4    L            



             788-0)                                              

 

             PLT (test code =              See_Comment  H             [Automated



             777-3)                                              message] The sy

stem



                                                                 which generated



                                                                 this result



                                                                 transmitted



                                                                 reference range

:



                                                                 150 - 328 10*3/

?L.



                                                                 The reference r

pa



                                                                 was not used to



                                                                 interpret this



                                                                 result as



                                                                 normal/abnormal

.

 

             MPV (test code = 9.8 fL       9.8-13.0                  



             87363-6)                                            

 

             NRBC/100 WBC (test              See_Comment                [Automat

ed



             code = 2553901761)                                        message] 

The system



                                                                 which generated



                                                                 this result



                                                                 transmitted



                                                                 reference range

:



                                                                 0.0 - 10.0 /100



                                                                 WBCs. The refer

ence



                                                                 range was not u

sed



                                                                 to interpret th

is



                                                                 result as



                                                                 normal/abnormal

.

 

             NRBC x10^3 (test code <0.01        See_Comment                [Auto

mated



             = [id number])                                        message] The s

ystem



                                                                 which generated



                                                                 this result



                                                                 transmitted



                                                                 reference range

:



                                                                 10*3/?L. The



                                                                 reference range

 was



                                                                 not used to



                                                                 interpret this



                                                                 result as



                                                                 normal/abnormal

.

 

             GRAN MAT (NEUT) % 71.0 %                                 



             (test code = 770-8)                                        

 

             IMM GRAN % (test code 0.40 %                                 



             = 6085936089)                                        

 

             LYMPH % (test code = 17.4 %                                 



             736-9)                                              

 

             MONO % (test code = 7.9 %                                  



             5905-5)                                             

 

             EOS % (test code = 2.7 %                                  



             713-8)                                              

 

             BASO % (test code = 0.6 %                                  



             706-2)                                              

 

             GRAN MAT x10^3(ANC) 8.76 10*3/uL 1.99-6.95    H            



             (test code =                                        



             5313231124)                                         

 

             IMM GRAN x10^3 (test 0.05 10*3/uL 0.00-0.06                 



             code = 5250744898)                                        

 

             LYMPH x10^3 (test code 2.15 10*3/uL 1.09-3.23                 



             = 731-0)                                            

 

             MONO x10^3 (test code 0.97 10*3/uL 0.36-1.02                 



             = 742-7)                                            

 

             EOS x10^3 (test code = 0.33 10*3/uL 0.06-0.53                 



             711-2)                                              

 

             BASO x10^3 (test code 0.07 10*3/uL 0.01-0.09                 



             = 704-7)                                            

 

             Lab Interpretation Abnormal                               



             (test code = 02723-8)                                        



[facility] GLUCOSE (AUTOMATED)2021 08:58:39





             Test Item    Value        Reference Range Interpretation Comments

 

             POCT GLU (test code = 6759660893) 203 mg/dL           H      

      

 

             Lab Interpretation (test code = Abnormal                           

    



             13765-2)                                            



[facility] GLUCOSE (AUTOMATED)2021 08:58:39





             Test Item    Value        Reference Range Interpretation Comments

 

             POCT GLU (test code = 5697857430) 203 mg/dL           H      

      

 

             Lab Interpretation (test code = Abnormal                           

    



             22621-3)                                            



[facility] GLUCOSE (AUTOMATED)2021 08:58:39





             Test Item    Value        Reference Range Interpretation Comments

 

             POCT GLU (test code = 9078107281) 203 mg/dL           H      

      

 

             Lab Interpretation (test code = Abnormal                           

    



             22591-9)                                            



[facility] GLUCOSE (AUTOMATED)2021-04-10 21:41:59





             Test Item    Value        Reference Range Interpretation Comments

 

             POCT GLU (test code = 7945647710) 228 mg/dL           H      

      

 

             Lab Interpretation (test code = Abnormal                           

    



             48087-9)                                            



[facility] GLUCOSE (AUTOMATED)2021-04-10 21:41:59





             Test Item    Value        Reference Range Interpretation Comments

 

             POCT GLU (test code = 4515482997) 228 mg/dL           H      

      

 

             Lab Interpretation (test code = Abnormal                           

    



             72314-8)                                            



[facility] GLUCOSE (AUTOMATED)2021-04-10 21:41:59





             Test Item    Value        Reference Range Interpretation Comments

 

             POCT GLU (test code = 1572323571) 228 mg/dL           H      

      

 

             Lab Interpretation (test code = Abnormal                           

    



             34383-8)                                            



[facility]





             Test Item    Value        Reference Range Interpretation Comments

 

             VIT B12 (test code = 686 pg/mL    240-930                   



             1211204269)                                         

 

             ELIJAH (test code = ELIJAH) Biotin has been                           



                          reported to cause a                           



                          positive bias,                           



                          interpret results                           



                          relative to                            



                          patient's use of                           



                          biotin.                                

 

             Lab Interpretation (test Normal                                 



             code = 76777-4)                                        



[facility]





             Test Item    Value        Reference Range Interpretation Comments

 

             VIT B12 (test code = 686 pg/mL    240-930                   



             4740072343)                                         

 

             ELIJAH (test code = ELIJAH) Biotin has been                           



                          reported to cause a                           



                          positive bias,                           



                          interpret results                           



                          relative to                            



                          patient's use of                           



                          biotin.                                

 

             Lab Interpretation (test Normal                                 



             code = 42188-2)                                        



[facility]58





             Test Item    Value        Reference Range Interpretation Comments

 

             VIT B12 (test code = 686 pg/mL    240-930                   



             5448165070)                                         

 

             ELIJAH (test code = ELIJAH) Biotin has been                           



                          reported to cause a                           



                          positive bias,                           



                          interpret results                           



                          relative to                            



                          patient's use of                           



                          biotin.                                

 

             Lab Interpretation (test Normal                                 



             code = 98538-5)                                        



[facility]





             Test Item    Value        Reference Range Interpretation Comments

 

             CRP (test code = 8667106230) 7.6 mg/dL    <0.8         H           

 

 

             Lab Interpretation (test code = Abnormal                           

    



             63366-1)                                            



[facility]:36





             Test Item    Value        Reference Range Interpretation Comments

 

             CRP (test code = 5240002297) 7.6 mg/dL    <0.8         H           

 

 

             Lab Interpretation (test code = Abnormal                           

    



             19850-8)                                            



[facility]41:36





             Test Item    Value        Reference Range Interpretation Comments

 

             CRP (test code = 2961919842) 7.6 mg/dL    <0.8         H           

 

 

             Lab Interpretation (test code = Abnormal                           

    



             84323-9)                                            



[facility] GLUCOSE (AUTOMATED)2021-04-10 17:10:56





             Test Item    Value        Reference Range Interpretation Comments

 

             POCT GLU (test code = 3961441237) 164 mg/dL           H      

      

 

             Lab Interpretation (test code = Abnormal                           

    



             82202-2)                                            



[facility] GLUCOSE (AUTOMATED)2021-04-10 17:10:56





             Test Item    Value        Reference Range Interpretation Comments

 

             POCT GLU (test code = 5500999289) 164 mg/dL           H      

      

 

             Lab Interpretation (test code = Abnormal                           

    



             91369-2)                                            



[facility] GLUCOSE (AUTOMATED)2021-04-10 17:10:56





             Test Item    Value        Reference Range Interpretation Comments

 

             POCT GLU (test code = 7671347332) 164 mg/dL           H      

      

 

             Lab Interpretation (test code = Abnormal                           

    



             72307-8)                                            



[facility]





             Test Item    Value        Reference Range Interpretation Comments

 

             Procalcitonin (test 0.09 ng/mL   <0.07        H            



             code = 9323298113)                                        

 

             ELIJAH (test code = ELIJAH) INTERPRETATION OF                           



                          PROCALCITONIN RESULTS IN                           



                          ADULTS >= 18 YEARS OF                           



                          AGE Initiation and                           



                          discontinuation of                           



                          antibiotics on patients                           



                          with suspected or                           



                          confirmed Lower                           



                          Respiratory Tract                           



                          Infection in Adults >=                           



                          18 years of age.                           



                          +--------------+--------                           



                          --------+---------------                           



                          +-----------------------                           



                          -----+|Procalcitonin                           



                          |Interpretation                           



                          ?|Antibiotic ? ?                           



                          |Considerations ? ? ? ?                           



                          ? ? ? |ng/mL ? ? ? ? | ?                           



                          ? ? ? ? ? ?                            



                          ?|recommendation | ? ? ?                           



                          ? ? ? ? ? ? ? ? ? ? ?                           



                          +--------------+--------                           



                          --------+---------------                           



                          +-----------------------                           



                          -----+| <0.1 ? ? ? ? |                           



                          Bacterial ? ? ?|                           



                          Strongly ? ? ?| ? ? ? ?                           



                          ? ? ? ? ? ? ? ? ? ? | ?                           



                          ? ? ? ? ? ?| infection                           



                          very | discouraged ? |                           



                          Overruling: ? ? ? ? ? ?                           



                          ? ? | ? ? ? ? ? ? ?|                           



                          unlikely ? ? ? | ? ? ? ?                           



                          ? ? ? | ? Clinically                           



                          unstable ? ? ?                           



                          +--------------+--------                           



                          --------+---------------                           



                          + ? High risk for                           



                          adverse ? ? | <0.25 ? ?                           



                          ? ?| Bacterial ? ? ?|                           



                          Discouraged ? | ?                           



                          outcome ? ? ? ? ? ? ? ?                           



                          ? | ? ? ? ? ? ? ?|                           



                          infection ? ? ?| ? ? ? ?                           



                          ? ? ? | ? SEE IMPORTANT                           



                          NOTE ? ? ? ?| ? ? ? ? ?                           



                          ? ?| unlikely ? ? ? | ?                           



                          ? ? ? ? ? ? | ? ? ? ? ?                           



                          ? ? ? ? ? ? ? ? ?                           



                          +--------------+--------                           



                          --------+---------------                           



                          +-----------------------                           



                          -----+| >=0.25 ? ? ? |                           



                          Bacterial ? ? ?|                           



                          Encouraged ? ?| ? ? ? ?                           



                          ? ? ? ? ? ? ? ? ? ? | ?                           



                          ? ? ? ? ? ?| infection ?                           



                          ? ?| ? ? ? ? ? ? ? | ? ?                           



                          ? ? ? ? ? ? ? ? ? ? ? ?                           



                          | ? ? ? ? ? ? ?| likely                           



                          ? ? ? ? | ? ? ? ? ? ? ?                           



                          | Consider treatment                           



                          failure                                



                          ?+--------------+-------                           



                          ---------+--------------                           



                          -+ if levels does not                           



                          decrease | >0.5 ? ? ? ?                           



                          | Bacterial ? ? ?|                           



                          Strongly ? ? ?|                           



                          appropriately ? ? ? ? ?                           



                          ? ? | ? ? ? ? ? ? ?|                           



                          infection very |                           



                          encouraged ? ?| ? ? ? ?                           



                          ? ? ? ? ? ? ? ? ? ? | ?                           



                          ? ? ? ? ? ?| likely ? ?                           



                          ? ? | ? ? ? ? ? ? ? | ?                           



                          ? ? ? ? ? ? ? ? ? ? ? ?                           



                          ?                                      



                          +--------------+--------                           



                          --------+---------------                           



                          +-----------------------                           



                          -----+ Discontinuation                           



                          of antibiotics in                           



                          high-acuity patients                           



                          with suspected or                           



                          confirmed sepsis in                           



                          Adults >= 18 years of                           



                          age.                                   



                          +--------------+--------                           



                          --------+---------------                           



                          +-----------------------                           



                          -----+|Procalcitonin                           



                          |Interpretation                           



                          ?|Antibiotic ? ?                           



                          |Considerations ? ? ? ?                           



                          ? ? ? |ng/mL ? ? ? ? | ?                           



                          ? ? ? ? ? ?                            



                          ?|recommendation | ? ? ?                           



                          ? ? ? ? ? ? ? ? ? ? ?                           



                          +--------------+--------                           



                          --------+---------------                           



                          +-----------------------                           



                          -----+| <0.25 ? ? ? ?|                           



                          Bacterial ? ? ?|                           



                          Strongly ? ? ?| ? ? ? ?                           



                          ? ? ? ? ? ? ? ? ? ? | ?                           



                          ? ? ? ? ? ?| infection                           



                          very | discouraged ? |                           



                          Overruling: ? ? ? ? ? ?                           



                          ? ? | ? ? ? ? ? ? ?|                           



                          unlikely ? ? ? | ? ? ? ?                           



                          ? ? ? | ? Clinically                           



                          unstable ? ? ?                           



                          +--------------+--------                           



                          --------+---------------                           



                          + ? High risk for                           



                          adverse ? ? | <0.5 or                           



                          drop | Bacterial ? ? ?|                           



                          Discouraged ? | ?                           



                          outcome ? ? ? ? ? ? ? ?                           



                          ? | >80% from ? ?|                           



                          infection ? ? ?| ? ? ? ?                           



                          ? ? ? | ? SEE IMPORTANT                           



                          NOTE ? ? ? ?| highest                           



                          PCT ?| unlikely ? ? ? |                           



                          ? ? ? ? ? ? ? | ? ? ? ?                           



                          ? ? ? ? ? ? ? ? ? ? |                           



                          level ? ? ? ?| ? ? ? ? ?                           



                          ? ? ?| ? ? ? ? ? ? ? | ?                           



                          ? ? ? ? ? ? ? ? ? ? ? ?                           



                          ?                                      



                          +--------------+--------                           



                          --------+---------------                           



                          +-----------------------                           



                          -----+| >=0.5 ? ? ? ?|                           



                          Bacterial ? ? ?|                           



                          Encouraged ? ?| ? ? ? ?                           



                          ? ? ? ? ? ? ? ? ? ? | ?                           



                          ? ? ? ? ? ?| infection ?                           



                          ? ?| ? ? ? ? ? ? ? | ? ?                           



                          ? ? ? ? ? ? ? ? ? ? ? ?                           



                          | ? ? ? ? ? ? ?| likely                           



                          ? ? ? ? | ? ? ? ? ? ? ?                           



                          | Consider treatment                           



                          failure                                



                          ?+--------------+-------                           



                          ---------+--------------                           



                          -+ if levels does not                           



                          decrease | >1.0 ? ? ? ?                           



                          | Bacterial ? ? ?|                           



                          Strongly ? ? ?|                           



                          appropriately ? ? ? ? ?                           



                          ? ? | ? ? ? ? ? ? ?|                           



                          infection very |                           



                          encouraged ? ?| ? ? ? ?                           



                          ? ? ? ? ? ? ? ? ? ? | ?                           



                          ? ? ? ? ? ?| likely ? ?                           



                          ? ? | ? ? ? ? ? ? ? | ?                           



                          ? ? ? ? ? ? ? ? ? ? ? ?                           



                          ?                                      



                          +--------------+--------                           



                          --------+---------------                           



                          +-----------------------                           



                          -----+ Percentage of                           



                          drop of Procalcitonin                           



                          calculation for                           



                          Discontinuation of                           



                          antibiotics in                           



                          high-acuity patients                           



                          with suspected or                           



                          confirmed sepsis in                           



                          Adults >= 18 years of                           



                          age.  ? ? ? ? ? ? ? ? ?                           



                          ? ? Procalcitonin                           



                          highest{}-Procalcitonin                           



                          current{}Delta                           



                          Procalcitonin =                           



                          ________________________                           



                          _______________________                           



                          x100%  ? ? ? ? ? ? ? ? ?                           



                          ? ? ? ? ? ? ?                           



                          Procalcitonin current {}                           



                          IMPORTANT NOTE:                           



                          Procalcitonin may be                           



                          elevated without                           



                          bacterial infection by                           



                          physiologic stress                           



                          related to trauma,                           



                          burns, chronic dialysis,                           



                          metastatic cancer,                           



                          surgery in the past                           



                          seven days, malaria,                           



                          some fungal infections,                           



                          and some forms of                           



                          vasculitis. The                           



                          interpretation algorithm                           



                          may not apply to                           



                          patients with                           



                          immunosuppression                           



                          (equivalent of >10 mg of                           



                          prednisone daily), HIV                           



                          with CD4 cell count <                           



                          350 cells/mm3, active                           



                          malignancy on systemic                           



                          chemotherapy, solid                           



                          organ transplant or                           



                          hematopoietic stem cell                           



                          transplantation, or                           



                          hospital acquired                           



                          pneumonia. Additionally,                           



                          some clinical trials of                           



                          procalcitonin have                           



                          excluded patients with                           



                          shock requiring                           



                          vasopressor use, acute                           



                          respiratory failure                           



                          requiring mechanical                           



                          ventilation, or those                           



                          with known lung                           



                          abscess/empyema. For                           



                          further information                           



                          please refer                           



                          to:[email or web address]                           



                          [email or web address]                           



                          [email or web address]                           

 

             Lab Interpretation Abnormal                               



             (test code = 94914-9)                                        



[facility]





             Test Item    Value        Reference Range Interpretation Comments

 

             Procalcitonin (test 0.09 ng/mL   <0.07        H            



             code = [id number])                                        

 

             ELIJAH (test code = ELIJAH) INTERPRETATION OF                           



                          PROCALCITONIN RESULTS IN                           



                          ADULTS >= 18 YEARS OF                           



                          AGE Initiation and                           



                          discontinuation of                           



                          antibiotics on patients                           



                          with suspected or                           



                          confirmed Lower                           



                          Respiratory Tract                           



                          Infection in Adults >=                           



                          18 years of age.                           



                          +--------------+--------                           



                          --------+---------------                           



                          +-----------------------                           



                          -----+|Procalcitonin                           



                          |Interpretation                           



                          ?|Antibiotic ? ?                           



                          |Considerations ? ? ? ?                           



                          ? ? ? |ng/mL ? ? ? ? | ?                           



                          ? ? ? ? ? ?                            



                          ?|recommendation | ? ? ?                           



                          ? ? ? ? ? ? ? ? ? ? ?                           



                          +--------------+--------                           



                          --------+---------------                           



                          +-----------------------                           



                          -----+| <0.1 ? ? ? ? |                           



                          Bacterial ? ? ?|                           



                          Strongly ? ? ?| ? ? ? ?                           



                          ? ? ? ? ? ? ? ? ? ? | ?                           



                          ? ? ? ? ? ?| infection                           



                          very | discouraged ? |                           



                          Overruling: ? ? ? ? ? ?                           



                          ? ? | ? ? ? ? ? ? ?|                           



                          unlikely ? ? ? | ? ? ? ?                           



                          ? ? ? | ? Clinically                           



                          unstable ? ? ?                           



                          +--------------+--------                           



                          --------+---------------                           



                          + ? High risk for                           



                          adverse ? ? | <0.25 ? ?                           



                          ? ?| Bacterial ? ? ?|                           



                          Discouraged ? | ?                           



                          outcome ? ? ? ? ? ? ? ?                           



                          ? | ? ? ? ? ? ? ?|                           



                          infection ? ? ?| ? ? ? ?                           



                          ? ? ? | ? SEE IMPORTANT                           



                          NOTE ? ? ? ?| ? ? ? ? ?                           



                          ? ?| unlikely ? ? ? | ?                           



                          ? ? ? ? ? ? | ? ? ? ? ?                           



                          ? ? ? ? ? ? ? ? ?                           



                          +--------------+--------                           



                          --------+---------------                           



                          +-----------------------                           



                          -----+| >=0.25 ? ? ? |                           



                          Bacterial ? ? ?|                           



                          Encouraged ? ?| ? ? ? ?                           



                          ? ? ? ? ? ? ? ? ? ? | ?                           



                          ? ? ? ? ? ?| infection ?                           



                          ? ?| ? ? ? ? ? ? ? | ? ?                           



                          ? ? ? ? ? ? ? ? ? ? ? ?                           



                          | ? ? ? ? ? ? ?| likely                           



                          ? ? ? ? | ? ? ? ? ? ? ?                           



                          | Consider treatment                           



                          failure                                



                          ?+--------------+-------                           



                          ---------+--------------                           



                          -+ if levels does not                           



                          decrease | >0.5 ? ? ? ?                           



                          | Bacterial ? ? ?|                           



                          Strongly ? ? ?|                           



                          appropriately ? ? ? ? ?                           



                          ? ? | ? ? ? ? ? ? ?|                           



                          infection very |                           



                          encouraged ? ?| ? ? ? ?                           



                          ? ? ? ? ? ? ? ? ? ? | ?                           



                          ? ? ? ? ? ?| likely ? ?                           



                          ? ? | ? ? ? ? ? ? ? | ?                           



                          ? ? ? ? ? ? ? ? ? ? ? ?                           



                          ?                                      



                          +--------------+--------                           



                          --------+---------------                           



                          +-----------------------                           



                          -----+ Discontinuation                           



                          of antibiotics in                           



                          high-acuity patients                           



                          with suspected or                           



                          confirmed sepsis in                           



                          Adults >= 18 years of                           



                          age.                                   



                          +--------------+--------                           



                          --------+---------------                           



                          +-----------------------                           



                          -----+|Procalcitonin                           



                          |Interpretation                           



                          ?|Antibiotic ? ?                           



                          |Considerations ? ? ? ?                           



                          ? ? ? |ng/mL ? ? ? ? | ?                           



                          ? ? ? ? ? ?                            



                          ?|recommendation | ? ? ?                           



                          ? ? ? ? ? ? ? ? ? ? ?                           



                          +--------------+--------                           



                          --------+---------------                           



                          +-----------------------                           



                          -----+| <0.25 ? ? ? ?|                           



                          Bacterial ? ? ?|                           



                          Strongly ? ? ?| ? ? ? ?                           



                          ? ? ? ? ? ? ? ? ? ? | ?                           



                          ? ? ? ? ? ?| infection                           



                          very | discouraged ? |                           



                          Overruling: ? ? ? ? ? ?                           



                          ? ? | ? ? ? ? ? ? ?|                           



                          unlikely ? ? ? | ? ? ? ?                           



                          ? ? ? | ? Clinically                           



                          unstable ? ? ?                           



                          +--------------+--------                           



                          --------+---------------                           



                          + ? High risk for                           



                          adverse ? ? | <0.5 or                           



                          drop | Bacterial ? ? ?|                           



                          Discouraged ? | ?                           



                          outcome ? ? ? ? ? ? ? ?                           



                          ? | >80% from ? ?|                           



                          infection ? ? ?| ? ? ? ?                           



                          ? ? ? | ? SEE IMPORTANT                           



                          NOTE ? ? ? ?| highest                           



                          PCT ?| unlikely ? ? ? |                           



                          ? ? ? ? ? ? ? | ? ? ? ?                           



                          ? ? ? ? ? ? ? ? ? ? |                           



                          level ? ? ? ?| ? ? ? ? ?                           



                          ? ? ?| ? ? ? ? ? ? ? | ?                           



                          ? ? ? ? ? ? ? ? ? ? ? ?                           



                          ?                                      



                          +--------------+--------                           



                          --------+---------------                           



                          +-----------------------                           



                          -----+| >=0.5 ? ? ? ?|                           



                          Bacterial ? ? ?|                           



                          Encouraged ? ?| ? ? ? ?                           



                          ? ? ? ? ? ? ? ? ? ? | ?                           



                          ? ? ? ? ? ?| infection ?                           



                          ? ?| ? ? ? ? ? ? ? | ? ?                           



                          ? ? ? ? ? ? ? ? ? ? ? ?                           



                          | ? ? ? ? ? ? ?| likely                           



                          ? ? ? ? | ? ? ? ? ? ? ?                           



                          | Consider treatment                           



                          failure                                



                          ?+--------------+-------                           



                          ---------+--------------                           



                          -+ if levels does not                           



                          decrease | >1.0 ? ? ? ?                           



                          | Bacterial ? ? ?|                           



                          Strongly ? ? ?|                           



                          appropriately ? ? ? ? ?                           



                          ? ? | ? ? ? ? ? ? ?|                           



                          infection very |                           



                          encouraged ? ?| ? ? ? ?                           



                          ? ? ? ? ? ? ? ? ? ? | ?                           



                          ? ? ? ? ? ?| likely ? ?                           



                          ? ? | ? ? ? ? ? ? ? | ?                           



                          ? ? ? ? ? ? ? ? ? ? ? ?                           



                          ?                                      



                          +--------------+--------                           



                          --------+---------------                           



                          +-----------------------                           



                          -----+ Percentage of                           



                          drop of Procalcitonin                           



                          calculation for                           



                          Discontinuation of                           



                          antibiotics in                           



                          high-acuity patients                           



                          with suspected or                           



                          confirmed sepsis in                           



                          Adults >= 18 years of                           



                          age.  ? ? ? ? ? ? ? ? ?                           



                          ? ? Procalcitonin                           



                          highest{}-Procalcitonin                           



                          current{}Delta                           



                          Procalcitonin =                           



                          ________________________                           



                          _______________________                           



                          x100%  ? ? ? ? ? ? ? ? ?                           



                          ? ? ? ? ? ? ?                           



                          Procalcitonin current {}                           



                          IMPORTANT NOTE:                           



                          Procalcitonin may be                           



                          elevated without                           



                          bacterial infection by                           



                          physiologic stress                           



                          related to trauma,                           



                          burns, chronic dialysis,                           



                          metastatic cancer,                           



                          surgery in the past                           



                          seven days, malaria,                           



                          some fungal infections,                           



                          and some forms of                           



                          vasculitis. The                           



                          interpretation algorithm                           



                          may not apply to                           



                          patients with                           



                          immunosuppression                           



                          (equivalent of >10 mg of                           



                          prednisone daily), HIV                           



                          with CD4 cell count <                           



                          350 cells/mm3, active                           



                          malignancy on systemic                           



                          chemotherapy, solid                           



                          organ transplant or                           



                          hematopoietic stem cell                           



                          transplantation, or                           



                          hospital acquired                           



                          pneumonia. Additionally,                           



                          some clinical trials of                           



                          procalcitonin have                           



                          excluded patients with                           



                          shock requiring                           



                          vasopressor use, acute                           



                          respiratory failure                           



                          requiring mechanical                           



                          ventilation, or those                           



                          with known lung                           



                          abscess/empyema. For                           



                          further information                           



                          please refer                           



                          to:[email or web address]                           



                          [email or web address]                           

 

             Lab Interpretation Abnormal                               



             (test code = 88458-8)                                        



[facility]





             Test Item    Value        Reference Range Interpretation Comments

 

             Procalcitonin (test 0.09 ng/mL   <0.07        H            



             code = [id number])                                        

 

             ELIJAH (test code = ELIJAH) INTERPRETATION OF                           



                          PROCALCITONIN RESULTS IN                           



                          ADULTS >= 18 YEARS OF                           



                          AGE Initiation and                           



                          discontinuation of                           



                          antibiotics on patients                           



                          with suspected or                           



                          confirmed Lower                           



                          Respiratory Tract                           



                          Infection in Adults >=                           



                          18 years of age.                           



                          +--------------+--------                           



                          --------+---------------                           



                          +-----------------------                           



                          -----+|Procalcitonin                           



                          |Interpretation                           



                          ?|Antibiotic ? ?                           



                          |Considerations ? ? ? ?                           



                          ? ? ? |ng/mL ? ? ? ? | ?                           



                          ? ? ? ? ? ?                            



                          ?|recommendation | ? ? ?                           



                          ? ? ? ? ? ? ? ? ? ? ?                           



                          +--------------+--------                           



                          --------+---------------                           



                          +-----------------------                           



                          -----+| <0.1 ? ? ? ? |                           



                          Bacterial ? ? ?|                           



                          Strongly ? ? ?| ? ? ? ?                           



                          ? ? ? ? ? ? ? ? ? ? | ?                           



                          ? ? ? ? ? ?| infection                           



                          very | discouraged ? |                           



                          Overruling: ? ? ? ? ? ?                           



                          ? ? | ? ? ? ? ? ? ?|                           



                          unlikely ? ? ? | ? ? ? ?                           



                          ? ? ? | ? Clinically                           



                          unstable ? ? ?                           



                          +--------------+--------                           



                          --------+---------------                           



                          + ? High risk for                           



                          adverse ? ? | <0.25 ? ?                           



                          ? ?| Bacterial ? ? ?|                           



                          Discouraged ? | ?                           



                          outcome ? ? ? ? ? ? ? ?                           



                          ? | ? ? ? ? ? ? ?|                           



                          infection ? ? ?| ? ? ? ?                           



                          ? ? ? | ? SEE IMPORTANT                           



                          NOTE ? ? ? ?| ? ? ? ? ?                           



                          ? ?| unlikely ? ? ? | ?                           



                          ? ? ? ? ? ? | ? ? ? ? ?                           



                          ? ? ? ? ? ? ? ? ?                           



                          +--------------+--------                           



                          --------+---------------                           



                          +-----------------------                           



                          -----+| >=0.25 ? ? ? |                           



                          Bacterial ? ? ?|                           



                          Encouraged ? ?| ? ? ? ?                           



                          ? ? ? ? ? ? ? ? ? ? | ?                           



                          ? ? ? ? ? ?| infection ?                           



                          ? ?| ? ? ? ? ? ? ? | ? ?                           



                          ? ? ? ? ? ? ? ? ? ? ? ?                           



                          | ? ? ? ? ? ? ?| likely                           



                          ? ? ? ? | ? ? ? ? ? ? ?                           



                          | Consider treatment                           



                          failure                                



                          ?+--------------+-------                           



                          ---------+--------------                           



                          -+ if levels does not                           



                          decrease | >0.5 ? ? ? ?                           



                          | Bacterial ? ? ?|                           



                          Strongly ? ? ?|                           



                          appropriately ? ? ? ? ?                           



                          ? ? | ? ? ? ? ? ? ?|                           



                          infection very |                           



                          encouraged ? ?| ? ? ? ?                           



                          ? ? ? ? ? ? ? ? ? ? | ?                           



                          ? ? ? ? ? ?| likely ? ?                           



                          ? ? | ? ? ? ? ? ? ? | ?                           



                          ? ? ? ? ? ? ? ? ? ? ? ?                           



                          ?                                      



                          +--------------+--------                           



                          --------+---------------                           



                          +-----------------------                           



                          -----+ Discontinuation                           



                          of antibiotics in                           



                          high-acuity patients                           



                          with suspected or                           



                          confirmed sepsis in                           



                          Adults >= 18 years of                           



                          age.                                   



                          +--------------+--------                           



                          --------+---------------                           



                          +-----------------------                           



                          -----+|Procalcitonin                           



                          |Interpretation                           



                          ?|Antibiotic ? ?                           



                          |Considerations ? ? ? ?                           



                          ? ? ? |ng/mL ? ? ? ? | ?                           



                          ? ? ? ? ? ?                            



                          ?|recommendation | ? ? ?                           



                          ? ? ? ? ? ? ? ? ? ? ?                           



                          +--------------+--------                           



                          --------+---------------                           



                          +-----------------------                           



                          -----+| <0.25 ? ? ? ?|                           



                          Bacterial ? ? ?|                           



                          Strongly ? ? ?| ? ? ? ?                           



                          ? ? ? ? ? ? ? ? ? ? | ?                           



                          ? ? ? ? ? ?| infection                           



                          very | discouraged ? |                           



                          Overruling: ? ? ? ? ? ?                           



                          ? ? | ? ? ? ? ? ? ?|                           



                          unlikely ? ? ? | ? ? ? ?                           



                          ? ? ? | ? Clinically                           



                          unstable ? ? ?                           



                          +--------------+--------                           



                          --------+---------------                           



                          + ? High risk for                           



                          adverse ? ? | <0.5 or                           



                          drop | Bacterial ? ? ?|                           



                          Discouraged ? | ?                           



                          outcome ? ? ? ? ? ? ? ?                           



                          ? | >80% from ? ?|                           



                          infection ? ? ?| ? ? ? ?                           



                          ? ? ? | ? SEE IMPORTANT                           



                          NOTE ? ? ? ?| highest                           



                          PCT ?| unlikely ? ? ? |                           



                          ? ? ? ? ? ? ? | ? ? ? ?                           



                          ? ? ? ? ? ? ? ? ? ? |                           



                          level ? ? ? ?| ? ? ? ? ?                           



                          ? ? ?| ? ? ? ? ? ? ? | ?                           



                          ? ? ? ? ? ? ? ? ? ? ? ?                           



                          ?                                      



                          +--------------+--------                           



                          --------+---------------                           



                          +-----------------------                           



                          -----+| >=0.5 ? ? ? ?|                           



                          Bacterial ? ? ?|                           



                          Encouraged ? ?| ? ? ? ?                           



                          ? ? ? ? ? ? ? ? ? ? | ?                           



                          ? ? ? ? ? ?| infection ?                           



                          ? ?| ? ? ? ? ? ? ? | ? ?                           



                          ? ? ? ? ? ? ? ? ? ? ? ?                           



                          | ? ? ? ? ? ? ?| likely                           



                          ? ? ? ? | ? ? ? ? ? ? ?                           



                          | Consider treatment                           



                          failure                                



                          ?+--------------+-------                           



                          ---------+--------------                           



                          -+ if levels does not                           



                          decrease | >1.0 ? ? ? ?                           



                          | Bacterial ? ? ?|                           



                          Strongly ? ? ?|                           



                          appropriately ? ? ? ? ?                           



                          ? ? | ? ? ? ? ? ? ?|                           



                          infection very |                           



                          encouraged ? ?| ? ? ? ?                           



                          ? ? ? ? ? ? ? ? ? ? | ?                           



                          ? ? ? ? ? ?| likely ? ?                           



                          ? ? | ? ? ? ? ? ? ? | ?                           



                          ? ? ? ? ? ? ? ? ? ? ? ?                           



                          ?                                      



                          +--------------+--------                           



                          --------+---------------                           



                          +-----------------------                           



                          -----+ Percentage of                           



                          drop of Procalcitonin                           



                          calculation for                           



                          Discontinuation of                           



                          antibiotics in                           



                          high-acuity patients                           



                          with suspected or                           



                          confirmed sepsis in                           



                          Adults >= 18 years of                           



                          age.  ? ? ? ? ? ? ? ? ?                           



                          ? ? Procalcitonin                           



                          highest{}-Procalcitonin                           



                          current{}Delta                           



                          Procalcitonin =                           



                          ________________________                           



                          _______________________                           



                          x100%  ? ? ? ? ? ? ? ? ?                           



                          ? ? ? ? ? ? ?                           



                          Procalcitonin current {}                           



                          IMPORTANT NOTE:                           



                          Procalcitonin may be                           



                          elevated without                           



                          bacterial infection by                           



                          physiologic stress                           



                          related to trauma,                           



                          burns, chronic dialysis,                           



                          metastatic cancer,                           



                          surgery in the past                           



                          seven days, malaria,                           



                          some fungal infections,                           



                          and some forms of                           



                          vasculitis. The                           



                          interpretation algorithm                           



                          may not apply to                           



                          patients with                           



                          immunosuppression                           



                          (equivalent of >10 mg of                           



                          prednisone daily), HIV                           



                          with CD4 cell count <                           



                          350 cells/mm3, active                           



                          malignancy on systemic                           



                          chemotherapy, solid                           



                          organ transplant or                           



                          hematopoietic stem cell                           



                          transplantation, or                           



                          hospital acquired                           



                          pneumonia. Additionally,                           



                          some clinical trials of                           



                          procalcitonin have                           



                          excluded patients with                           



                          shock requiring                           



                          vasopressor use, acute                           



                          respiratory failure                           



                          requiring mechanical                           



                          ventilation, or those                           



                          with known lung                           



                          abscess/empyema. For                           



                          further information                           



                          please refer                           



                          to:[email or web address]                           



                          [email or web address]                           

 

             Lab Interpretation Abnormal                               



             (test code = 22613-5)                                        



[facility] [id number] 
16:33:00





             Test Item    Value        Reference Range Interpretation Comments

 

             T. PROT U (test code = 2888-6) 793 mg/dL                           

   

 

             CREAT U (test code = 7239711191) 85.4 mg/dL                        

     

 

             Protein/Creatinine Ratio Urine              0.0-2.0      H         

   



             (test code = 9749446431)                                        

 

             Lab Interpretation (test code = Abnormal                           

    



             45915-5)                                            



[facility] [id number] 
16:33:00





             Test Item    Value        Reference Range Interpretation Comments

 

             T. PROT U (test code = 2888-6) 793 mg/dL                           

   

 

             CREAT U (test code = 1820051092) 85.4 mg/dL                        

     

 

             Protein/Creatinine Ratio Urine              0.0-2.0      H         

   



             (test code = 8228063537)                                        

 

             Lab Interpretation (test code = Abnormal                           

    



             36254-9)                                            



[facility] [id number] 
16:33:00





             Test Item    Value        Reference Range Interpretation Comments

 

             T. PROT U (test code = 2888-6) 793 mg/dL                           

   

 

             CREAT U (test code = 4560449697) 85.4 mg/dL                        

     

 

             Protein/Creatinine Ratio Urine              0.0-2.0      H         

   



             (test code = 8859068797)                                        

 

             Lab Interpretation (test code = Abnormal                           

    



             04782-8)                                            



[facility]39





             Test Item    Value        Reference Range Interpretation Comments

 

             APPEARANCE (test code = Clear        Clear                     



             3477107341)                                         

 

             COLOR (test code = Yellow       Yellow                    



             7974284839)                                         

 

             PH (test code =              4.8-8.0                   



             2033509601)                                         

 

             SP GRAVITY (test code =              1.003-1.030               



             6705419870)                                         

 

             GLU U QUAL (test code = 500 mg/dL    Normal       A            



             6986450835)                                         

 

             BLOOD (test code = Negative     Negative                  



             1616705742)                                         

 

             KETONES (test code = Negative     Negative                  



             9406893400)                                         

 

             PROTEIN (test code = 100 mg/dL    Negative     A            



             2887-8)                                             

 

             UROBILIN (test code = Normal       Normal                    



             0154954133)                                         

 

             BILIRUBIN (test code = Negative     Negative                  



             0642848757)                                         

 

             NITRITE (test code = Negative     Negative                  



             4030783564)                                         

 

             LEUK JUAN LUIS (test code = Negative     Negative                  



             7888038293)                                         

 

             RBC/HPF (test code =              See_Comment  H             [Autom

ated message]



             1990622540)                                         The system [employer]



                                                                 generated this



                                                                 result transmit

ronnie



                                                                 reference range

: 0 -



                                                                 3 HPF. The refe

rence



                                                                 range was not u

sed



                                                                 to interpret th

is



                                                                 result as



                                                                 normal/abnormal

.

 

             WBC/HPF (test code =              See_Comment                [Autom

ated message]



             0931604329)                                         The system [employer]



                                                                 generated this



                                                                 result transmit

ronnie



                                                                 reference range

: 0 -



                                                                 5 HPF. The refe

rence



                                                                 range was not u

sed



                                                                 to interpret th

is



                                                                 result as



                                                                 normal/abnormal

.

 

             BACTERIA (test code = Negative     Negative                  



             1659935423)                                         

 

             MUCOUS (test code = Slight       Negative LPF A            



             8272354583)                                         

 

             AMORPHOUS (test code = Rare         Rare HPF                  



             1148063011)                                         

 

             SQ EPITH (test code = <1           HPF                       



             1183168491)                                         

 

             Lab Interpretation (test Abnormal                               



             code = 45807-5)                                        



[facility]





             Test Item    Value        Reference Range Interpretation Comments

 

             APPEARANCE (test code = Clear        Clear                     



             4219491293)                                         

 

             COLOR (test code = Yellow       Yellow                    



             3840645203)                                         

 

             PH (test code =              4.8-8.0                   



             3245201663)                                         

 

             SP GRAVITY (test code =              1.003-1.030               



             3885524632)                                         

 

             GLU U QUAL (test code = 500 mg/dL    Normal       A            



             1567598469)                                         

 

             BLOOD (test code = Negative     Negative                  



             7308410581)                                         

 

             KETONES (test code = Negative     Negative                  



             7058628824)                                         

 

             PROTEIN (test code = 100 mg/dL    Negative     A            



             2887-8)                                             

 

             UROBILIN (test code = Normal       Normal                    



             0873258225)                                         

 

             BILIRUBIN (test code = Negative     Negative                  



             4306924382)                                         

 

             NITRITE (test code = Negative     Negative                  



             1227797704)                                         

 

             LEUK JUAN LUIS (test code = Negative     Negative                  



             2707823858)                                         

 

             RBC/HPF (test code =              See_Comment  H             [Autom

ated message]



             5938619908)                                         The system [employer]



                                                                 generated this



                                                                 result transmit

ronnie



                                                                 reference range

: 0 -



                                                                 3 HPF. The refe

rence



                                                                 range was not u

sed



                                                                 to interpret th

is



                                                                 result as



                                                                 normal/abnormal

.

 

             WBC/HPF (test code =              See_Comment                [Autom

ated message]



             1028826283)                                         The system [employer]



                                                                 generated this



                                                                 result transmit

ronnie



                                                                 reference range

: 0 -



                                                                 5 HPF. The refe

rence



                                                                 range was not u

sed



                                                                 to interpret th

is



                                                                 result as



                                                                 normal/abnormal

.

 

             BACTERIA (test code = Negative     Negative                  



             3631652487)                                         

 

             MUCOUS (test code = Slight       Negative LPF A            



             0959622802)                                         

 

             AMORPHOUS (test code = Rare         Rare HPF                  



             7490451549)                                         

 

             SQ EPITH (test code = <1           HPF                       



             5530799658)                                         

 

             Lab Interpretation (test Abnormal                               



             code = 60629-7)                                        



[facility]





             Test Item    Value        Reference Range Interpretation Comments

 

             APPEARANCE (test code = Clear        Clear                     



             [id number])                                         

 

             COLOR (test code = Yellow       Yellow                    



             3692900983)                                         

 

             PH (test code =              4.8-8.0                   



             4637208417)                                         

 

             SP GRAVITY (test code =              1.003-1.030               



             5498021397)                                         

 

             GLU U QUAL (test code = 500 mg/dL    Normal       A            



             0659030686)                                         

 

             BLOOD (test code = Negative     Negative                  



             7836720118)                                         

 

             KETONES (test code = Negative     Negative                  



             2565178926)                                         

 

             PROTEIN (test code = 100 mg/dL    Negative     A            



             2887-8)                                             

 

             UROBILIN (test code = Normal       Normal                    



             4324337280)                                         

 

             BILIRUBIN (test code = Negative     Negative                  



             3780689142)                                         

 

             NITRITE (test code = Negative     Negative                  



             3167389100)                                         

 

             LEUK JUAN LUIS (test code = Negative     Negative                  



             5625293762)                                         

 

             RBC/HPF (test code =              See_Comment  H             [Autom

ated message]



             2948017126)                                         The system [employer]



                                                                 generated this



                                                                 result transmit

ronnie



                                                                 reference range

: 0 -



                                                                 3 HPF. The refe

rence



                                                                 range was not u

sed



                                                                 to interpret th

is



                                                                 result as



                                                                 normal/abnormal

.

 

             WBC/HPF (test code =              See_Comment                [Autom

ated message]



             5063014231)                                         The system [employer]



                                                                 generated this



                                                                 result transmit

ronnie



                                                                 reference range

: 0 -



                                                                 5 HPF. The refe

rence



                                                                 range was not u

sed



                                                                 to interpret th

is



                                                                 result as



                                                                 normal/abnormal

.

 

             BACTERIA (test code = Negative     Negative                  



             2263429565)                                         

 

             MUCOUS (test code = Slight       Negative LPF A            



             6947938510)                                         

 

             AMORPHOUS (test code = Rare         Rare HPF                  



             5706422978)                                         

 

             SQ EPITH (test code = <1           HPF                       



             7831979523)                                         

 

             Lab Interpretation (test Abnormal                               



             code = 57780-8)                                        



[facility], URINE [id number] 15:29:18





             Test Item    Value        Reference Range Interpretation Comments

 

             NA URINE (test code = 8002711845) 83 mmol/L                        

      



[facility], URINE [id number] 15:29:18





             Test Item    Value        Reference Range Interpretation Comments

 

             NA URINE (test code = 7805758388) 83 mmol/L                        

      



[facility], URINE [id number] 15:29:18





             Test Item    Value        Reference Range Interpretation Comments

 

             NA URINE (test code = 8772535547) 83 mmol/L                        

      



[facility] GLUCOSE (AUTOMATED)2021-04-10 13:14:12





             Test Item    Value        Reference Range Interpretation Comments

 

             POCT GLU (test code = 0450334293) 186 mg/dL           H      

      

 

             Lab Interpretation (test code = Abnormal                           

    



             25864-5)                                            



[facility] GLUCOSE (AUTOMATED)2021-04-10 13:14:12





             Test Item    Value        Reference Range Interpretation Comments

 

             POCT GLU (test code = 9635047889) 186 mg/dL           H      

      

 

             Lab Interpretation (test code = Abnormal                           

    



             93689-7)                                            



[facility] GLUCOSE (AUTOMATED)2021-04-10 13:14:12





             Test Item    Value        Reference Range Interpretation Comments

 

             POCT GLU (test code = 9615005885) 186 mg/dL           H      

      

 

             Lab Interpretation (test code = Abnormal                           

    



             81802-3)                                            



[facility]





             Test Item    Value        Reference Range Interpretation Comments

 

             FERRITIN (test code = 129.0 ng/mL  18.0-464.0                



             8065536921)                                         

 

             ELIJAH (test code = ELIJAH) Biotin has been                           



                          reported to cause a                           



                          negative bias,                           



                          interpret results                           



                          relative to                            



                          patient's use of                           



                          biotin.                                

 

             Lab Interpretation (test Normal                                 



             code = 20781-3)                                        



[facility]





             Test Item    Value        Reference Range Interpretation Comments

 

             FERRITIN (test code = 129.0 ng/mL  18.0-464.0                



             4405728464)                                         

 

             ELIJAH (test code = ELIJAH) Biotin has been                           



                          reported to cause a                           



                          negative bias,                           



                          interpret results                           



                          relative to                            



                          patient's use of                           



                          biotin.                                

 

             Lab Interpretation (test Normal                                 



             code = 31585-0)                                        



[facility]





             Test Item    Value        Reference Range Interpretation Comments

 

             FERRITIN (test code = 129.0 ng/mL  18.0-464.0                



             3067642001)                                         

 

             ELIJAH (test code = ELIJAH) Biotin has been                           



                          reported to cause a                           



                          negative bias,                           



                          interpret results                           



                          relative to                            



                          patient's use of                           



                          biotin.                                

 

             Lab Interpretation (test Normal                                 



             code = 81434-0)                                        



[facility] 11:15:00 Small 
toe distal phalanx osteomyelitis. Preliminary Report Dictated by Resident: William Peres I, Fabian Hernandez MD., have reviewed this study and agree with the 
abovereport.EXAM: XR FOOT 3+ VW RIGHT HISTORY: Right Fifth toe pain, erthema, 
swelling. R/O Osteo  COMPARISON: None. FINDINGS:  Radiographs of the right foot 
demonstrate cystic resorption of the smalltoe distal phalanx. Prominent talar 
beaking is noted. An os peroneum isnoted. Joint spaces are preserved. Alignment 
is within normal limits. Softtissue swelling over the lateral forefoot. 
Extensive diabetic type vascularcalcifications are visualized. [facility], Radiant 
Results Inft User - 04/10/2021  6:16 AM CDTEXAM: XR FOOT 3+ VW RIGHTHISTORY: 
Right Fifth toe pain, erthema, swelling. R/O Osteo COMPARISON: None.FINDINGS: 
Radiographs of the right foot demonstrate cystic resorption of the smalltoe 
distal phalanx. Prominent talar beaking is noted. An os peroneum isnoted. Joint 
spaces are preserved. Alignment is within normal limits. Softtissue swelling 
over the lateral forefoot. Extensive diabetic type vascularcalcifications are 
visualized.IMPRESSIONSmall toe distal phalanx osteomyelitis.Preliminary Report 
Dictated by Resident: Jarett Bernardo MD., have reviewed this study 
and agree with the abovereport.[facility]21-[facility]-10 11:15:00 Small toe distal phalanx osteomyelitis. Preliminary 
Report Dictated by Resident: Fabian Mccartney MD., have reviewed 
this study and agree with the abovereport.EXAM: XR FOOT 3+ VW RIGHT HISTORY: 
Right Fifth toe pain, erthema, swelling. R/O Osteo  COMPARISON: None. FINDINGS: 
Radiographs of the right foot demonstrate cystic resorption of the smalltoe 
distal phalanx. Prominent talar beaking is noted. An os peroneum isnoted. Joint 
spaces are preserved. Alignment is within normal limits. Softtissue swelling 
over the lateral forefoot. Extensive diabetic type vascularcalcifications are 
visualized. [facility], Radiant Results Inft User - 04/10/2021  6:16 AM CDTEXAM: XR 
FOOT 3+ VW RIGHTHISTORY: Right Fifth toe pain, erthema, swelling. R/O Osteo 
COMPARISON: None.FINDINGS: Radiographs of the right foot demonstrate cystic 
resorption of the smalltoe distal phalanx. Prominent talar beaking is noted. An 
os peroneum isnoted. Joint spaces are preserved. Alignment is within normal 
limits. Softtissue swelling over the lateral forefoot. Extensive diabetic type 
vascularcalcifications are visualized.IMPRESSIONSmall toe distal phalanx 
osteomyelitis.Preliminary Report Dictated by Resident: Jarett Bernardo MD., have reviewed this study and agree with the abovereport.[facility] 11:15:00 Small toe distal 
phalanx osteomyelitis. Preliminary Report Dictated by Resident: William Peres I, 
Fabian M David, MD., have reviewed this study and agree with the 
abovereport.EXAM: XR FOOT 3+ VW RIGHT HISTORY: Right Fifth toe pain, erthema, 
swelling. R/O Osteo  COMPARISON: None. FINDINGS:  Radiographs of the right foot 
demonstrate cystic resorption of the smalltoe distal phalanx. Prominent talar 
beaking is noted. An os peroneum isnoted. Joint spaces are preserved. Alignment 
is within normal limits. Softtissue swelling over the lateral forefoot. 
Extensive diabetic type vascularcalcifications are visualized. [facility], Radiant 
Results Inft User - 04/10/2021  6:16 AM CDTEXAM: XR FOOT 3+ VW RIGHTHISTORY: 
Right Fifth toe pain, erthema, swelling. R/O Osteo COMPARISON: None.FINDINGS: 
Radiographs of the right foot demonstrate cystic resorption of the smalltoe 
distal phalanx. Prominent talar beaking is noted. An os peroneum isnoted. Joint 
spaces are preserved. Alignment is within normal limits. Softtissue swelling 
over the lateral forefoot. Extensive diabetic type vascularcalcifications are 
visualized.IMPRESSIONSmall toe distal phalanx osteomyelitis.Preliminary Report 
Dictated by Resident: William Browne, Jarett Hernandez MD., have reviewed this study 
and agree with the abovereport.[facility] 11:11:36





             Test Item    Value        Reference Range Interpretation Comments

 

             TSH (test code =              See_Comment                [Automated

 message]



             1763501951)                                         The system [employer]



                                                                 generated this 

result



                                                                 transmitted ref

erence



                                                                 range: 0.45 - 4

.70



                                                                 mIU/L. The refe

rence



                                                                 range was not u

sed to



                                                                 interpret this 

result



                                                                 as normal/abnor

mal.

 

             Lab Interpretation (test Normal                                 



             code = 37047-0)                                        



[facility] 11:11:36





             Test Item    Value        Reference Range Interpretation Comments

 

             TSH (test code =              See_Comment                [Automated

 message]



             5967135989)                                         The system [employer]



                                                                 generated this 

result



                                                                 transmitted ref

erence



                                                                 range: 0.45 - 4

.70



                                                                 mIU/L. The refe

rence



                                                                 range was not u

sed to



                                                                 interpret this 

result



                                                                 as normal/abnor

mal.

 

             Lab Interpretation (test Normal                                 



             code = 28271-2)                                        



[facility] 11:11:36





             Test Item    Value        Reference Range Interpretation Comments

 

             TSH (test code =              See_Comment                [Automated

 message]



             7197137244)                                         The system [employer]



                                                                 generated this 

result



                                                                 transmitted ref

erence



                                                                 range: 0.45 - 4

.70



                                                                 mIU/L. The refe

rence



                                                                 range was not u

sed to



                                                                 interpret this 

result



                                                                 as normal/abnor

mal.

 

             Lab Interpretation (test Normal                                 



             code = 76861-3)                                        



[facility] [id number] 11:02:44





             Test Item    Value        Reference Range Interpretation Comments

 

             ESR (test code =              See_Comment                [Automated

 message]



             5097306797)                                         The system [employer]



                                                                 generated this 

result



                                                                 transmitted ref

erence



                                                                 range: 0 - 10 m

m/HR.



                                                                 The reference r

pa



                                                                 was not used to



                                                                 interpret this 

result



                                                                 as normal/abnor

mal.

 

             Lab Interpretation (test Normal                                 



             code = 37932-6)                                        



[facility] [id number] 11:02:44





             Test Item    Value        Reference Range Interpretation Comments

 

             ESR (test code =              See_Comment                [Automated

 message]



             3807689559)                                         The system [employer]



                                                                 generated this 

result



                                                                 transmitted ref

erence



                                                                 range: 0 - 10 m

m/HR.



                                                                 The reference r

pa



                                                                 was not used to



                                                                 interpret this 

result



                                                                 as normal/abnor

mal.

 

             Lab Interpretation (test Normal                                 



             code = 72064-4)                                        



[facility] [id number] 11:02:44





             Test Item    Value        Reference Range Interpretation Comments

 

             ESR (test code =              See_Comment                [Automated

 message]



             4502548006)                                         The system [employer]



                                                                 generated this 

result



                                                                 transmitted ref

erence



                                                                 range: 0 - 10 m

m/HR.



                                                                 The reference r

pa



                                                                 was not used to



                                                                 interpret this 

result



                                                                 as normal/abnor

mal.

 

             Lab Interpretation (test Normal                                 



             code = 51420-5)                                        



[facility] WITH DIFF2021-04-10 10:55:36





             Test Item    Value        Reference Range Interpretation Comments

 

             WBC (test code =              See_Comment  H             [Automated



             6690-2)                                             message] The



                                                                 system which



                                                                 generated this



                                                                 result transmit

ronnie



                                                                 reference range

:



                                                                 4.20 - 10.70



                                                                 10*3/?L. The



                                                                 reference range



                                                                 was not used to



                                                                 interpret this



                                                                 result as



                                                                 normal/abnormal

.

 

             RBC (test code =              See_Comment  L             [Automated



             789-8)                                              message] The



                                                                 system which



                                                                 generated this



                                                                 result transmit

ronnie



                                                                 reference range

:



                                                                 4.26 - 5.52



                                                                 10*6/?L. The



                                                                 reference range



                                                                 was not used to



                                                                 interpret this



                                                                 result as



                                                                 normal/abnormal

.

 

             HGB (test code = 11.6 g/dL    12.2-16.4    L            



             718-7)                                              

 

             HCT (test code = 35.1 %       38.4-49.3    L            



             4544-3)                                             

 

             MCV (test code = 86.5 fL      81.7-95.6                 



             787-2)                                              

 

             MCH (test code = 28.6 pg      26.1-32.7                 



             785-6)                                              

 

             MCHC (test code = 33.0 g/dL    31.2-35.0                 



             786-4)                                              

 

             RDW-SD (test code = 38.0 fL      38.5-51.6    L            



             10534-1)                                            

 

             RDW-CV (test code = 11.9 %       12.1-15.4    L            



             788-0)                                              

 

             PLT (test code =              See_Comment  H             [Automated



             777-3)                                              message] The



                                                                 system which



                                                                 generated this



                                                                 result transmit

ronnie



                                                                 reference range

:



                                                                 150 - 328 10*3/

?L.



                                                                 The reference



                                                                 range was not u

sed



                                                                 to interpret th

is



                                                                 result as



                                                                 normal/abnormal

.

 

             MPV (test code = 9.9 fL       9.8-13.0                  



             98928-1)                                            

 

             NRBC/100 WBC (test              See_Comment                [Automat

ed



             code = 5720703846)                                        message] 

The



                                                                 system which



                                                                 generated this



                                                                 result transmit

ronnie



                                                                 reference range

:



                                                                 0.0 - 10.0 /100



                                                                 WBCs. The



                                                                 reference range



                                                                 was not used to



                                                                 interpret this



                                                                 result as



                                                                 normal/abnormal

.

 

             NRBC x10^3 (test code <0.01        See_Comment                [Auto

mated



             = 4447480775)                                        message] The



                                                                 system which



                                                                 generated this



                                                                 result transmit

ronnie



                                                                 reference range

:



                                                                 10*3/?L. The



                                                                 reference range



                                                                 was not used to



                                                                 interpret this



                                                                 result as



                                                                 normal/abnormal

.

 

             GRAN MAT (NEUT) % 77.8 %                                 



             (test code = 770-8)                                        

 

             IMM GRAN % (test code 0.50 %                                 



             = 3176222311)                                        

 

             LYMPH % (test code = 11.4 %                                 



             736-9)                                              

 

             MONO % (test code = 6.7 %                                  



             5905-5)                                             

 

             EOS % (test code = 3.1 %                                  



             713-8)                                              

 

             BASO % (test code = 0.5 %                                  



             706-2)                                              

 

             GRAN MAT x10^3(ANC) 11.98 10*3/uL 1.99-6.95    H            



             (test code =                                        



             4902146153)                                         

 

             IMM GRAN x10^3 (test 0.08 10*3/uL 0.00-0.06    H            



             code = 3183480498)                                        

 

             LYMPH x10^3 (test code 1.76 10*3/uL 1.09-3.23                 



             = 731-0)                                            

 

             MONO x10^3 (test code 1.03 10*3/uL 0.36-1.02    H            



             = 742-7)                                            

 

             EOS x10^3 (test code = 0.47 10*3/uL 0.06-0.53                 



             711-2)                                              

 

             BASO x10^3 (test code 0.08 10*3/uL 0.01-0.09                 



             = 704-7)                                            

 

             Lab Interpretation Abnormal                               



             (test code = 39523-4)                                        



[facility] WITH DIFF2021-04-10 10:55:36





             Test Item    Value        Reference Range Interpretation Comments

 

             WBC (test code =              See_Comment  H             [Automated



             6690-2)                                             message] The



                                                                 system which



                                                                 generated this



                                                                 result transmit

ronnie



                                                                 reference range

:



                                                                 4.20 - 10.70



                                                                 10*3/?L. The



                                                                 reference range



                                                                 was not used to



                                                                 interpret this



                                                                 result as



                                                                 normal/abnormal

.

 

             RBC (test code =              See_Comment  L             [Automated



             789-8)                                              message] The



                                                                 system which



                                                                 generated this



                                                                 result transmit

ronnie



                                                                 reference range

:



                                                                 4.26 - 5.52



                                                                 10*6/?L. The



                                                                 reference range



                                                                 was not used to



                                                                 interpret this



                                                                 result as



                                                                 normal/abnormal

.

 

             HGB (test code = 11.6 g/dL    12.2-16.4    L            



             718-7)                                              

 

             HCT (test code = 35.1 %       38.4-49.3    L            



             4544-3)                                             

 

             MCV (test code = 86.5 fL      81.7-95.6                 



             787-2)                                              

 

             MCH (test code = 28.6 pg      26.1-32.7                 



             785-6)                                              

 

             MCHC (test code = 33.0 g/dL    31.2-35.0                 



             786-4)                                              

 

             RDW-SD (test code = 38.0 fL      38.5-51.6    L            



             20158-2)                                            

 

             RDW-CV (test code = 11.9 %       12.1-15.4    L            



             788-0)                                              

 

             PLT (test code =              See_Comment  H             [Automated



             777-3)                                              message] The



                                                                 system which



                                                                 generated this



                                                                 result transmit

ronnie



                                                                 reference range

:



                                                                 150 - 328 10*3/

?L.



                                                                 The reference



                                                                 range was not u

sed



                                                                 to interpret th

is



                                                                 result as



                                                                 normal/abnormal

.

 

             MPV (test code = 9.9 fL       9.8-13.0                  



             84978-7)                                            

 

             NRBC/100 WBC (test              See_Comment                [Automat

ed



             code = 6832471869)                                        message] 

The



                                                                 system which



                                                                 generated this



                                                                 result transmit

ronnie



                                                                 reference range

:



                                                                 0.0 - 10.0 /100



                                                                 WBCs. The



                                                                 reference range



                                                                 was not used to



                                                                 interpret this



                                                                 result as



                                                                 normal/abnormal

.

 

             NRBC x10^3 (test code <0.01        See_Comment                [Auto

mated



             = 4513055190)                                        message] The



                                                                 system which



                                                                 generated this



                                                                 result transmit

ronnie



                                                                 reference range

:



                                                                 10*3/?L. The



                                                                 reference range



                                                                 was not used to



                                                                 interpret this



                                                                 result as



                                                                 normal/abnormal

.

 

             GRAN MAT (NEUT) % 77.8 %                                 



             (test code = 770-8)                                        

 

             IMM GRAN % (test code 0.50 %                                 



             = 0477285192)                                        

 

             LYMPH % (test code = 11.4 %                                 



             736-9)                                              

 

             MONO % (test code = 6.7 %                                  



             5905-5)                                             

 

             EOS % (test code = 3.1 %                                  



             713-8)                                              

 

             BASO % (test code = 0.5 %                                  



             706-2)                                              

 

             GRAN MAT x10^3(ANC) 11.98 10*3/uL 1.99-6.95    H            



             (test code =                                        



             5805638811)                                         

 

             IMM GRAN x10^3 (test 0.08 10*3/uL 0.00-0.06    H            



             code = 7901565537)                                        

 

             LYMPH x10^3 (test code 1.76 10*3/uL 1.09-3.23                 



             = 731-0)                                            

 

             MONO x10^3 (test code 1.03 10*3/uL 0.36-1.02    H            



             = 742-7)                                            

 

             EOS x10^3 (test code = 0.47 10*3/uL 0.06-0.53                 



             711-2)                                              

 

             BASO x10^3 (test code 0.08 10*3/uL 0.01-0.09                 



             = 704-7)                                            

 

             Lab Interpretation Abnormal                               



             (test code = 38126-3)                                        



[facility] WITH DIFF2021-04-10 10:55:36





             Test Item    Value        Reference Range Interpretation Comments

 

             WBC (test code =              See_Comment  H             [Automated



             6690-2)                                             message] The



                                                                 system which



                                                                 generated this



                                                                 result transmit

ronnie



                                                                 reference range

:



                                                                 4.20 - 10.70



                                                                 10*3/?L. The



                                                                 reference range



                                                                 was not used to



                                                                 interpret this



                                                                 result as



                                                                 normal/abnormal

.

 

             RBC (test code =              See_Comment  L             [Automated



             789-8)                                              message] The



                                                                 system which



                                                                 generated this



                                                                 result transmit

ronnie



                                                                 reference range

:



                                                                 4.26 - 5.52



                                                                 10*6/?L. The



                                                                 reference range



                                                                 was not used to



                                                                 interpret this



                                                                 result as



                                                                 normal/abnormal

.

 

             HGB (test code = 11.6 g/dL    12.2-16.4    L            



             718-7)                                              

 

             HCT (test code = 35.1 %       38.4-49.3    L            



             4544-3)                                             

 

             MCV (test code = 86.5 fL      81.7-95.6                 



             787-2)                                              

 

             MCH (test code = 28.6 pg      26.1-32.7                 



             785-6)                                              

 

             MCHC (test code = 33.0 g/dL    31.2-35.0                 



             786-4)                                              

 

             RDW-SD (test code = 38.0 fL      38.5-51.6    L            



             39728-1)                                            

 

             RDW-CV (test code = 11.9 %       12.1-15.4    L            



             788-0)                                              

 

             PLT (test code =              See_Comment  H             [Automated



             777-3)                                              message] The



                                                                 system which



                                                                 generated this



                                                                 result transmit

ronnie



                                                                 reference range

:



                                                                 150 - 328 10*3/

?L.



                                                                 The reference



                                                                 range was not u

sed



                                                                 to interpret th

is



                                                                 result as



                                                                 normal/abnormal

.

 

             MPV (test code = 9.9 fL       9.8-13.0                  



             92792-2)                                            

 

             NRBC/100 WBC (test              See_Comment                [Automat

ed



             code = 0845979427)                                        message] 

The



                                                                 system which



                                                                 generated this



                                                                 result transmit

ronnie



                                                                 reference range

:



                                                                 0.0 - 10.0 /100



                                                                 WBCs. The



                                                                 reference range



                                                                 was not used to



                                                                 interpret this



                                                                 result as



                                                                 normal/abnormal

.

 

             NRBC x10^3 (test code <0.01        See_Comment                [Auto

mated



             = 3774726803)                                        message] The



                                                                 system which



                                                                 generated this



                                                                 result transmit

ronnie



                                                                 reference range

:



                                                                 10*3/?L. The



                                                                 reference range



                                                                 was not used to



                                                                 interpret this



                                                                 result as



                                                                 normal/abnormal

.

 

             GRAN MAT (NEUT) % 77.8 %                                 



             (test code = 770-8)                                        

 

             IMM GRAN % (test code 0.50 %                                 



             = 8116093159)                                        

 

             LYMPH % (test code = 11.4 %                                 



             736-9)                                              

 

             MONO % (test code = 6.7 %                                  



             5905-5)                                             

 

             EOS % (test code = 3.1 %                                  



             713-8)                                              

 

             BASO % (test code = 0.5 %                                  



             706-2)                                              

 

             GRAN MAT x10^3(ANC) 11.98 10*3/uL 1.99-6.95    H            



             (test code =                                        



             2129667133)                                         

 

             IMM GRAN x10^3 (test 0.08 10*3/uL 0.00-0.06    H            



             code = 2954781042)                                        

 

             LYMPH x10^3 (test code 1.76 10*3/uL 1.09-3.23                 



             = 731-0)                                            

 

             MONO x10^3 (test code 1.03 10*3/uL 0.36-1.02    H            



             = 742-7)                                            

 

             EOS x10^3 (test code = 0.47 10*3/uL 0.06-0.53                 



             711-2)                                              

 

             BASO x10^3 (test code 0.08 10*3/uL 0.01-0.09                 



             = 704-7)                                            

 

             Lab Interpretation Abnormal                               



             (test code = 29618-4)                                        



[facility] [id number] 10:51:55





             Test Item    Value        Reference Range Interpretation Comments

 

             IRON (test code = 4757936479) 29 ug/dL            L          

  

 

             TIBC (test code = 8818639498) 245 ug/dL    250-410      L          

  

 

             % FE SAT (test code = 0411148441) 12 %         20-50        L      

      

 

             Lab Interpretation (test code = Abnormal                           

    



             46712-9)                                            



[facility] [id number] 10:51:55





             Test Item    Value        Reference Range Interpretation Comments

 

             IRON (test code = 6077547847) 29 ug/dL            L          

  

 

             TIBC (test code = 7334280630) 245 ug/dL    250-410      L          

  

 

             % FE SAT (test code = 0576235660) 12 %         20-50        L      

      

 

             Lab Interpretation (test code = Abnormal                           

    



             96906-1)                                            



[facility] [id number] 10:51:55





             Test Item    Value        Reference Range Interpretation Comments

 

             IRON (test code = 4826806827) 29 ug/dL            L          

  

 

             TIBC (test code = 5853510654) 245 ug/dL    250-410      L          

  

 

             % FE SAT (test code = 0352043795) 12 %         20-50        L      

      

 

             Lab Interpretation (test code = Abnormal                           

    



             46792-4)                                            



[id number]





             Test Item    Value        Reference Range Interpretation Comments

 

             NT-proBNP (test code 439 pg/mL    See_Comment  H             [Autom

ated



             = 5039551790)                                        message] The



                                                                 system which



                                                                 generated this



                                                                 result



                                                                 transmitted



                                                                 reference range

:



                                                                 <=125. The



                                                                 reference range



                                                                 was not used to



                                                                 interpret this



                                                                 result as



                                                                 normal/abnormal

.

 

             ELIJAH (test code = ELIJAH) Biotin has been                           



                          reported to                            



                          cause a negative                           



                          bias, interpret                           



                          results relative                           



                          to patient's use                           



                          of biotin.                             

 

             Lab Interpretation Abnormal                               



             (test code = 80710-2)                                        



[id number]53





             Test Item    Value        Reference Range Interpretation Comments

 

             NT-proBNP (test code 439 pg/mL    See_Comment  H             [Autom

ated



             = 6294279373)                                        message] The



                                                                 system which



                                                                 generated this



                                                                 result



                                                                 transmitted



                                                                 reference range

:



                                                                 <=125. The



                                                                 reference range



                                                                 was not used to



                                                                 interpret this



                                                                 result as



                                                                 normal/abnormal

.

 

             ELIJAH (test code = ELIJAH) Biotin has been                           



                          reported to                            



                          cause a negative                           



                          bias, interpret                           



                          results relative                           



                          to patient's use                           



                          of biotin.                             

 

             Lab Interpretation Abnormal                               



             (test code = 76960-2)                                        



[id number] 10[facility]49:53





             Test Item    Value        Reference Range Interpretation Comments

 

             NT-proBNP (test code 439 pg/mL    See_Comment  H             [Autom

ated



             = [id number])                                        message] The



                                                                 system which



                                                                 generated this



                                                                 result



                                                                 transmitted



                                                                 reference range

:



                                                                 <=125. The



                                                                 reference range



                                                                 was not used to



                                                                 interpret this



                                                                 result as



                                                                 normal/abnormal

.

 

             ELIJAH (test code = ELIJAH) Biotin has been                           



                          reported to                            



                          cause a negative                           



                          bias, interpret                           



                          results relative                           



                          to patient's use                           



                          of biotin.                             

 

             Lab Interpretation Abnormal                               



             (test code = 68191-6)                                        



[facility]





             Test Item    Value        Reference Range Interpretation Comments

 

             MAGNESIUM (test code = 1605580704) 1.8 mg/dL    1.7-2.4            

       

 

             Lab Interpretation (test code = Normal                             

    



             09367-4)                                            



[id number]TOTAL CHOLESTEROL, 
TRIGLYCERIDES, HDL)2021-04-10 10:48:12





             Test Item    Value        Reference Range Interpretation Comments

 

             CHOL (test code = 199 mg/dL    120-200                   



             7174446106)                                         

 

             HDL (test code = 33 mg/dL     >40          L            



             1400011659)                                         

 

             HDLC RATIO (test code =              See_Comment  H             [Au

tomated message]



             0787535370)                                         The system [employer]



                                                                 generated this



                                                                 result transmit

ronnie



                                                                 reference range

:



                                                                 <=5.0. The refe

rence



                                                                 range was not u

sed



                                                                 to interpret th

is



                                                                 result as



                                                                 normal/abnormal

.

 

             TRIG (test code = 219 mg/dL           H            



             5203254276)                                         

 

             LDL CHOL (test code = 122 mg/dL    See_Comment                [Auto

mated message]



             47672-5)                                            The system [employer]



                                                                 generated this



                                                                 result transmit

ronnie



                                                                 reference range

:



                                                                 <=160. The refe

rence



                                                                 range was not u

sed



                                                                 to interpret th

is



                                                                 result as



                                                                 normal/abnormal

.

 

             VLDL (test code = 44 mg/dL     5-60                      



             2969277370)                                         

 

             Lab Interpretation (test Abnormal                               



             code = 03123-6)                                        



[facility]





             Test Item    Value        Reference Range Interpretation Comments

 

             MAGNESIUM (test code = 7582268303) 1.8 mg/dL    1.7-2.4            

       

 

             Lab Interpretation (test code = Normal                             

    



             19001-8)                                            



[facility][id number][facility]TOTAL CHOLESTEROL, 
TRIGLYCERIDES, HDL)2021-04-10 10:48:12





             Test Item    Value        Reference Range Interpretation Comments

 

             CHOL (test code = 199 mg/dL    120-200                   



             5926863628)                                         

 

             HDL (test code = 33 mg/dL     >40          L            



             9126846127)                                         

 

             HDLC RATIO (test code =              See_Comment  H             [Au

tomated message]



             3109405716)                                         The system [employer]



                                                                 generated this



                                                                 result transmit

ronnie



                                                                 reference range

:



                                                                 <=5.0. The refe

rence



                                                                 range was not u

sed



                                                                 to interpret th

is



                                                                 result as



                                                                 normal/abnormal

.

 

             TRIG (test code = 219 mg/dL           H            



             2522191227)                                         

 

             LDL CHOL (test code = 122 mg/dL    See_Comment                [Auto

mated message]



             10392-7)                                            The system [employer]



                                                                 generated this



                                                                 result transmit

ronnie



                                                                 reference range

:



                                                                 <=160. The refe

rence



                                                                 range was not u

sed



                                                                 to interpret th

is



                                                                 result as



                                                                 normal/abnormal

.

 

             VLDL (test code = 44 mg/dL     5-60                      



             8180098608)                                         

 

             Lab Interpretation (test Abnormal                               



             code = 01682-7)                                        



[facility]





             Test Item    Value        Reference Range Interpretation Comments

 

             MAGNESIUM (test code = 5026400317) 1.8 mg/dL    1.7-2.4            

       

 

             Lab Interpretation (test code = Normal                             

    



             10583-7)                                            



[facility][id number][facility]TOTAL CHOLESTEROL, 
TRIGLYCERIDES, HDL)2021-04-10 10:48:12





             Test Item    Value        Reference Range Interpretation Comments

 

             CHOL (test code = 199 mg/dL    120-200                   



             1762004198)                                         

 

             HDL (test code = 33 mg/dL     >40          L            



             4660966400)                                         

 

             HDLC RATIO (test code =              See_Comment  H             [Au

tomated message]



             1865713244)                                         The system [employer]



                                                                 generated this



                                                                 result transmit

ronnie



                                                                 reference range

:



                                                                 <=5.0. The refe

rence



                                                                 range was not u

sed



                                                                 to interpret th

is



                                                                 result as



                                                                 normal/abnormal

.

 

             TRIG (test code = 219 mg/dL           H            



             2970561000)                                         

 

             LDL CHOL (test code = 122 mg/dL    See_Comment                [Auto

mated message]



             85392-6)                                            The system [employer]



                                                                 generated this



                                                                 result transmit

ronnie



                                                                 reference range

:



                                                                 <=160. The refe

rence



                                                                 range was not u

sed



                                                                 to interpret th

is



                                                                 result as



                                                                 normal/abnormal

.

 

             VLDL (test code = 44 mg/dL     5-60                      



             3702430345)                                         

 

             Lab Interpretation (test Abnormal                               



             code = 84416-5)                                        



[id number]47[facility]51





             Test Item    Value        Reference Range Interpretation Comments

 

             URIC ACID (test code = 1898531995) 6.2 mg/dL    3.6-8.0            

       

 

             Lab Interpretation (test code = Normal                             

    



             72351-6)                                            



[facility]51





             Test Item    Value        Reference Range Interpretation Comments

 

             PHOSPHORUS (test code = 6487264206) 5.3 mg/dL    2.5-5.0      H    

        

 

             Lab Interpretation (test code = Abnormal                           

    



             77848-4)                                            



[facility] [id number] 10:47:51





             Test Item    Value        Reference Range Interpretation Comments

 

             URIC ACID (test code = 8671609745) 6.2 mg/dL    3.6-8.0            

       

 

             Lab Interpretation (test code = Normal                             

    



             08477-1)                                            



[facility] 10:47[facility]51





             Test Item    Value        Reference Range Interpretation Comments

 

             PHOSPHORUS (test code = 9565792638) 5.3 mg/dL    2.5-5.0      H    

        

 

             Lab Interpretation (test code = Abnormal                           

    



             86249-4)                                            



[id number] 10:47:51





             Test Item    Value        Reference Range Interpretation Comments

 

             URIC ACID (test code = 2810237431) 6.2 mg/dL    3.6-8.0            

       

 

             Lab Interpretation (test code = Normal                             

    



             93393-8)                                            



[facility] 10:47[facility]51





             Test Item    Value        Reference Range Interpretation Comments

 

             PHOSPHORUS (test code = 9258346818) 5.3 mg/dL    2.5-5.0      H    

        

 

             Lab Interpretation (test code = Abnormal                           

    



             91105-3)                                            



[facility] [id number] 10:47:31





             Test Item    Value        Reference Range Interpretation Comments

 

             CK (test code = 2045098502) 368 U/L             H            

 

             Lab Interpretation (test code = Abnormal                           

    



             67337-8)                                            



[facility] [id number] 10:47:31





             Test Item    Value        Reference Range Interpretation Comments

 

             CK (test code = 9562202042) 368 U/L             H            

 

             Lab Interpretation (test code = Abnormal                           

    



             28312-2)                                            



[facility] [id number] 10:47:31





             Test Item    Value        Reference Range Interpretation Comments

 

             CK (test code = 8813901893) 368 U/L             H            

 

             Lab Interpretation (test code = Abnormal                           

    



             56784-4)                                            



[facility] ([id number][facility]20[facility]-10 
10:42:51





             Test Item    Value        Reference Range Interpretation Comments

 

             NA (test code = 138 mmol/L   135-145                   



             7154654804)                                         

 

             K (test code = 4.3 mmol/L   3.5-5.0                   



             7698262490)                                         

 

             CL (test code = 108 mmol/L                       



             2090534109)                                         

 

             CO2 TOTAL (test code = 22 mmol/L    23-31        L            



             0975665187)                                         

 

             AGAP (test code =              2-16                      



             1103543479)                                         

 

             BUN (test code = 37 mg/dL     7-23         H            



             7077555574)                                         

 

             GLUCOSE (test code = 256 mg/dL           H            



             8997073870)                                         

 

             CREATININE (test code = 2.96 mg/dL   0.60-1.25    H            



             6445753177)                                         

 

             TOTAL BILI (test code = 0.3 mg/dL    0.1-1.1                   



             1872128963)                                         

 

             CALCIUM (test code = 8.0 mg/dL    8.6-10.6     L            



             6137647021)                                         

 

             T PROTEIN (test code = 5.9 g/dL     6.3-8.2      L            



             5661235209)                                         

 

             ALBUMIN (test code = 2.8 g/dL     3.5-5.0      L            



             5637036989)                                         

 

             ALK PHOS (test code = 90 U/L                           



             7816122060)                                         

 

             ALTv (test code = 18 U/L       5-50                      



             1742-6)                                             

 

             AST(SGOT) (test code = 25 U/L       13-40                     



             0768224051)                                         

 

             eGFR (test code =              mL/min/1.73m2              



             7291303002)                                         

 

             ELIJAH (test code = ELIJAH) Association of                           



                          Glomerular Filtration                           



                          Rate (GFR) and Staging                           



                          of Kidney Disease*                           



                          +---------------------                           



                          --+-------------------                           



                          --+-------------------                           



                          ------+| GFR                           



                          (mL/min/1.73 m2) ?|                           



                          With Kidney Damage ?|                           



                          ?Without Kidney                           



                          Damage+---------------                           



                          --------+-------------                           



                          --------+-------------                           



                          ------------+| ?>90 ?                           



                          ? ? ? ? ? ? ? ?|                           



                          ?Stage one ? ? ? ? ?|                           



                          ? Normal ? ? ? ? ? ? ?                           



                          ?+--------------------                           



                          ---+------------------                           



                          ---+------------------                           



                          -------+| ?60-89 ? ? ?                           



                          ? ? ? ? ?| ?Stage two                           



                          ? ? ? ? ?| ? Decreased                           



                          GFR ? ? ? ?                            



                          +---------------------                           



                          --+-------------------                           



                          --+-------------------                           



                          ------+| ?30-59 ? ? ?                           



                          ? ? ? ? ?| ?Stage                           



                          three ? ? ? ?| ? Stage                           



                          three ? ? ? ? ?                           



                          +---------------------                           



                          --+-------------------                           



                          --+-------------------                           



                          ------+| ?15-29 ? ? ?                           



                          ? ? ? ? ?| ?Stage four                           



                          ? ? ? ? | ? Stage four                           



                          ? ? ? ? ?                              



                          ?+--------------------                           



                          ---+------------------                           



                          ---+------------------                           



                          -------+| ?<15 (or                           



                          dialysis) ? ?| ?Stage                           



                          five ? ? ? ? | ? Stage                           



                          five ? ? ? ? ?                           



                          ?+--------------------                           



                          ---+------------------                           



                          ---+------------------                           



                          -------+ *Each stage                           



                          assumes the associated                           



                          GFR level has been in                           



                          effect for at least                           



                          three months. ?Stages                           



                          1 to 5, with or                           



                          without kidney                           



                          disease, indicate                           



                          chronic kidney                           



                          disease. Notes:                           



                          Determination of                           



                          stages one and two                           



                          (with eGFR                             



                          >59mL/min/1.73 m2)                           



                          requires estimation of                           



                          kidney damage for at                           



                          least three months as                           



                          defined by structural                           



                          or functional                           



                          abnormalities of the                           



                          kidney, manifested by                           



                          either:Pathological                           



                          abnormalities or                           



                          Markers of kidney                           



                          damage (including                           



                          abnormalities in the                           



                          composition of the                           



                          blood or urine or                           



                          abnormalities in                           



                          imaging tests).                           

 

             Lab Interpretation Abnormal                               



             (test code = 74776-0)                                        



[facility]. METABOLIC PANEL ([id number])2021-04-10 
10:42:51





             Test Item    Value        Reference Range Interpretation Comments

 

             NA (test code = 138 mmol/L   135-145                   



             [id number])                                         

 

             K (test code = 4.3 mmol/L   3.5-5.0                   



             5389245278)                                         

 

             CL (test code = 108 mmol/L                       



             7241827130)                                         

 

             CO2 TOTAL (test code = 22 mmol/L    23-31        L            



             7024628377)                                         

 

             AGAP (test code =              2-16                      



             0973279234)                                         

 

             BUN (test code = 37 mg/dL     7-23         H            



             6616331088)                                         

 

             GLUCOSE (test code = 256 mg/dL           H            



             0170336171)                                         

 

             CREATININE (test code = 2.96 mg/dL   0.60-1.25    H            



             1546983944)                                         

 

             TOTAL BILI (test code = 0.3 mg/dL    0.1-1.1                   



             3692554771)                                         

 

             CALCIUM (test code = 8.0 mg/dL    8.6-10.6     L            



             3874758518)                                         

 

             T PROTEIN (test code = 5.9 g/dL     6.3-8.2      L            



             5403504668)                                         

 

             ALBUMIN (test code = 2.8 g/dL     3.5-5.0      L            



             1456854328)                                         

 

             ALK PHOS (test code = 90 U/L                           



             7979537989)                                         

 

             ALTv (test code = 18 U/L       5-50                      



             1742-6)                                             

 

             AST(SGOT) (test code = 25 U/L       13-40                     



             7508839918)                                         

 

             eGFR (test code =              mL/min/1.73m2              



             4395386178)                                         

 

             ELIJAH (test code = ELIJAH) Association of                           



                          Glomerular Filtration                           



                          Rate (GFR) and Staging                           



                          of Kidney Disease*                           



                          +---------------------                           



                          --+-------------------                           



                          --+-------------------                           



                          ------+| GFR                           



                          (mL/min/1.73 m2) ?|                           



                          With Kidney Damage ?|                           



                          ?Without Kidney                           



                          Damage+---------------                           



                          --------+-------------                           



                          --------+-------------                           



                          ------------+| ?>90 ?                           



                          ? ? ? ? ? ? ? ?|                           



                          ?Stage one ? ? ? ? ?|                           



                          ? Normal ? ? ? ? ? ? ?                           



                          ?+--------------------                           



                          ---+------------------                           



                          ---+------------------                           



                          -------+| ?60-89 ? ? ?                           



                          ? ? ? ? ?| ?Stage two                           



                          ? ? ? ? ?| ? Decreased                           



                          GFR ? ? ? ?                            



                          +---------------------                           



                          --+-------------------                           



                          --+-------------------                           



                          ------+| ?30-59 ? ? ?                           



                          ? ? ? ? ?| ?Stage                           



                          three ? ? ? ?| ? Stage                           



                          three ? ? ? ? ?                           



                          +---------------------                           



                          --+-------------------                           



                          --+-------------------                           



                          ------+| ?15-29 ? ? ?                           



                          ? ? ? ? ?| ?Stage four                           



                          ? ? ? ? | ? Stage four                           



                          ? ? ? ? ?                              



                          ?+--------------------                           



                          ---+------------------                           



                          ---+------------------                           



                          -------+| ?<15 (or                           



                          dialysis) ? ?| ?Stage                           



                          five ? ? ? ? | ? Stage                           



                          five ? ? ? ? ?                           



                          ?+--------------------                           



                          ---+------------------                           



                          ---+------------------                           



                          -------+ *Each stage                           



                          assumes the associated                           



                          GFR level has been in                           



                          effect for at least                           



                          three months. ?Stages                           



                          1 to 5, with or                           



                          without kidney                           



                          disease, indicate                           



                          chronic kidney                           



                          disease. Notes:                           



                          Determination of                           



                          stages one and two                           



                          (with eGFR                             



                          >59mL/min/1.73 m2)                           



                          requires estimation of                           



                          kidney damage for at                           



                          least three months as                           



                          defined by structural                           



                          or functional                           



                          abnormalities of the                           



                          kidney, manifested by                           



                          either:Pathological                           



                          abnormalities or                           



                          Markers of kidney                           



                          damage (including                           



                          abnormalities in the                           



                          composition of the                           



                          blood or urine or                           



                          abnormalities in                           



                          imaging tests).                           

 

             Lab Interpretation Abnormal                               



             (test code = 65507-7)                                        



[facility]. METABOLIC PANEL ([id number])2021-04-10 
10:42:51





             Test Item    Value        Reference Range Interpretation Comments

 

             NA (test code = 138 mmol/L   135-145                   



             6086453086)                                         

 

             K (test code = 4.3 mmol/L   3.5-5.0                   



             9193559497)                                         

 

             CL (test code = 108 mmol/L                       



             3791652162)                                         

 

             CO2 TOTAL (test code = 22 mmol/L    23-31        L            



             9920655789)                                         

 

             AGAP (test code =              2-16                      



             1794755820)                                         

 

             BUN (test code = 37 mg/dL     7-23         H            



             7738605381)                                         

 

             GLUCOSE (test code = 256 mg/dL           H            



             9294920852)                                         

 

             CREATININE (test code = 2.96 mg/dL   0.60-1.25    H            



             0056677137)                                         

 

             TOTAL BILI (test code = 0.3 mg/dL    0.1-1.1                   



             8448308682)                                         

 

             CALCIUM (test code = 8.0 mg/dL    8.6-10.6     L            



             8396370535)                                         

 

             T PROTEIN (test code = 5.9 g/dL     6.3-8.2      L            



             8638939257)                                         

 

             ALBUMIN (test code = 2.8 g/dL     3.5-5.0      L            



             8347026980)                                         

 

             ALK PHOS (test code = 90 U/L                           



             9045745152)                                         

 

             ALTv (test code = 18 U/L       5-50                      



             1742-6)                                             

 

             AST(SGOT) (test code = 25 U/L       13-40                     



             1676518062)                                         

 

             eGFR (test code =              mL/min/1.73m2              



             9910912305)                                         

 

             ELIJAH (test code = ELIJAH) Association of                           



                          Glomerular Filtration                           



                          Rate (GFR) and Staging                           



                          of Kidney Disease*                           



                          +---------------------                           



                          --+-------------------                           



                          --+-------------------                           



                          ------+| GFR                           



                          (mL/min/1.73 m2) ?|                           



                          With Kidney Damage ?|                           



                          ?Without Kidney                           



                          Damage+---------------                           



                          --------+-------------                           



                          --------+-------------                           



                          ------------+| ?>90 ?                           



                          ? ? ? ? ? ? ? ?|                           



                          ?Stage one ? ? ? ? ?|                           



                          ? Normal ? ? ? ? ? ? ?                           



                          ?+--------------------                           



                          ---+------------------                           



                          ---+------------------                           



                          -------+| ?60-89 ? ? ?                           



                          ? ? ? ? ?| ?Stage two                           



                          ? ? ? ? ?| ? Decreased                           



                          GFR ? ? ? ?                            



                          +---------------------                           



                          --+-------------------                           



                          --+-------------------                           



                          ------+| ?30-59 ? ? ?                           



                          ? ? ? ? ?| ?Stage                           



                          three ? ? ? ?| ? Stage                           



                          three ? ? ? ? ?                           



                          +---------------------                           



                          --+-------------------                           



                          --+-------------------                           



                          ------+| ?15-29 ? ? ?                           



                          ? ? ? ? ?| ?Stage four                           



                          ? ? ? ? | ? Stage four                           



                          ? ? ? ? ?                              



                          ?+--------------------                           



                          ---+------------------                           



                          ---+------------------                           



                          -------+| ?<15 (or                           



                          dialysis) ? ?| ?Stage                           



                          five ? ? ? ? | ? Stage                           



                          five ? ? ? ? ?                           



                          ?+--------------------                           



                          ---+------------------                           



                          ---+------------------                           



                          -------+ *Each stage                           



                          assumes the associated                           



                          GFR level has been in                           



                          effect for at least                           



                          three months. ?Stages                           



                          1 to 5, with or                           



                          without kidney                           



                          disease, indicate                           



                          chronic kidney                           



                          disease. Notes:                           



                          Determination of                           



                          stages one and two                           



                          (with eGFR                             



                          >59mL/min/1.73 m2)                           



                          requires estimation of                           



                          kidney damage for at                           



                          least three months as                           



                          defined by structural                           



                          or functional                           



                          abnormalities of the                           



                          kidney, manifested by                           



                          either:Pathological                           



                          abnormalities or                           



                          Markers of kidney                           



                          damage (including                           



                          abnormalities in the                           



                          composition of the                           



                          blood or urine or                           



                          abnormalities in                           



                          imaging tests).                           

 

             Lab Interpretation Abnormal                               



             (test code = 22684-8)                                        



[facility]





             Test Item    Value        Reference Range Interpretation Comments

 

             PROTIME PATIENT (test              See_Comment                [Auto

mated message]



             code = 5964-2)                                        The system [employer]



                                                                 generated this 

result



                                                                 transmitted ref

erence



                                                                 range: 12.0 - 1

4.7



                                                                 Seconds. The re

ference



                                                                 range was not u

sed to



                                                                 interpret this 

result



                                                                 as normal/abnor

mal.

 

             INR (test code = 6301-6)                                        Nor

mal INR <1.1;



                                                                 Warfarin Therap

eutic



                                                                 range 2.0 to 3.

0 or



                                                                 2.5 to 3.5, dep

ending



                                                                 upon the indica

tions.

 

             Lab Interpretation (test Normal                                 



             code = 16915-4)                                        



[facility] 10:19:48





             Test Item    Value        Reference Range Interpretation Comments

 

             PROTIME PATIENT (test              See_Comment                [Auto

mated message]



             code = 5964-2)                                        The system 

[employer]



                                                                 generated this 

result



                                                                 transmitted ref

erence



                                                                 range: 12.0 - 1

4.7



                                                                 Seconds. The re

ference



                                                                 range was not u

sed to



                                                                 interpret this 

result



                                                                 as normal/abnor

mal.

 

             INR (test code = 6301-6)                                        Nor

mal INR <1.1;



                                                                 Warfarin Therap

eutic



                                                                 range 2.0 to 3.

0 or



                                                                 2.5 to 3.5, dep

ending



                                                                 upon the indica

tions.

 

             Lab Interpretation (test Normal                                 



             code = 75906-6)                                        



[facility] 10:19:48





             Test Item    Value        Reference Range Interpretation Comments

 

             PROTIME PATIENT (test              See_Comment                [Auto

mated message]



             code = 5964-2)                                        The system 

[employer]



                                                                 generated this 

result



                                                                 transmitted ref

erence



                                                                 range: 12.0 - 1

4.7



                                                                 Seconds. The re

ference



                                                                 range was not u

sed to



                                                                 interpret this 

result



                                                                 as normal/abnor

mal.

 

             INR (test code = 6301-6)                                        Nor

mal INR <1.1;



                                                                 Warfarin Therap

eutic



                                                                 range 2.0 to 3.

0 or



                                                                 2.5 to 3.5, dep

ending



                                                                 upon the indica

tions.

 

             Lab Interpretation (test Normal                                 



             code = 18543-3)                                        



[facility] 
09:34:14





             Test Item    Value        Reference Range Interpretation Comments

 

             HGB A1C (test code = 9.7 %        4.0-6.0      H            



             4548-4)                                             

 

             ELIJAH (test code = ELIJAH) %A1C ([facility])                            



                          Interpretation                           



                          (ADA)4.8-5.6 ? ?                           



                          Normal or                              



                          (Non-Diabetic                           



                          Range)5.7-6.4 ? ?                           



                          Increased Risk                           



                          (Pre-Diabetic)>6.5 ? ?                           



                          ? ?Diabetes Indicated                           

 

             Lab Interpretation Abnormal                               



             (test code = 22996-5)                                        



[facility] 
09:34:14





             Test Item    Value        Reference Range Interpretation Comments

 

             HGB A1C (test code = 9.7 %        4.0-6.0      H            



             4548-4)                                             

 

             ELIJAH (test code = ELIJAH) %A1C (NGSP)                            



                          Interpretation                           



                          (ADA)4.8-5.6 ? ?                           



                          Normal or                              



                          (Non-Diabetic                           



                          Range)5.7-6.4 ? ?                           



                          Increased Risk                           



                          (Pre-Diabetic)>6.5 ? ?                           



                          ? ?Diabetes Indicated                           

 

             Lab Interpretation Abnormal                               



             (test code = 10696-4)                                        



[facility] 
09:34:14





             Test Item    Value        Reference Range Interpretation Comments

 

             HGB A1C (test code = 9.7 %        4.0-6.0      H            



             4548-4)                                             

 

             ELIJAH (test code = ELIJAH) %A1C (NGSP)                            



                          Interpretation                           



                          (ADA)4.8-5.6 ? ?                           



                          Normal or                              



                          (Non-Diabetic                           



                          Range)5.7-6.4 ? ?                           



                          Increased Risk                           



                          (Pre-Diabetic)>6.5 ? ?                           



                          ? ?Diabetes Indicated                           

 

             Lab Interpretation Abnormal                               



             (test code = 68680-6)                                        



[facility] 08[facility]57:48





             Test Item    Value        Reference Range Interpretation Comments

 

             LACTIC ACID (test code = 0.71 mmol/L  0.50-2.20                 



             2310301095)                                         

 

             Lab Interpretation (test code = Normal                             

    



             81538-1)                                            



[facility] 08:57:48





             Test Item    Value        Reference Range Interpretation Comments

 

             LACTIC ACID (test code = 0.71 mmol/L  0.50-2.20                 



             2764347873)                                         

 

             Lab Interpretation (test code = Normal                             

    



             22303-1)                                            



[facility] 08:57:48





             Test Item    Value        Reference Range Interpretation Comments

 

             LACTIC ACID (test code = 0.71 mmol/L  0.50-2.20                 



             9183101204)                                         

 

             Lab Interpretation (test code = Normal                             

    



             25789-7)                                            



[facility] 07:41:31 
Destruction of the plantar aspect of the fifth distal phalanx, suggestiveof 
osteomyelitis. No soft tissue air is appreciated. RL: 460 AFC: [id number] 
Electronically signed by Juany Birmingham MD, PhD at 4/10/2021 2:41 AMOrdering
physician: HERIBERTO DACOSTA INDICATION: Right foot wound COMPARISON: Right foot 
radiographs dated 2021 TECHNIQUE: Axial images of the right foot were 
performed without theadministration of intravenous contrast. Images were 
reformatted in thecoronal and sagittal plane. CT scanwas performed according to 
ALARA (aslow as reasonably achievable) policy. FINDINGS: No acute fracture or 
dislocation of the right foot isappreciated. There is destruction of the plantar
aspect of the fifth distalphalanx (series 6, image 11). There is no soft tissue 
air. [facility], Radiant Results Inft User- 04/10/2021  2:42 AM CDTOrdering physician:
HERIBERTO RESTREPONDICATION: Right foot woundCOMPARISON: Right foot radiographs 
dated 2021TECHNIQUE: Axial images of the right foot were performed without 
theadministration of intravenous contrast. Images were reformatted in thecoronal
and sagittal plane. CT scan was performed according to ALARA (aslow as 
reasonably achievable) policy.FINDINGS: No acute fracture or dislocation of the 
right foot isappreciated. There is destruction of the plantar aspect of the 
fifth distalphalanx (series 6, image 11). There is no soft tissue 
air.IMPRESSIONDestruction of the plantar aspect of the fifth distal phalanx, 
suggestiveof osteomyelitis. No soft tissue air is appreciated.RL: 460AFC: 
[id number] signed by Juany Birmingham MD, PhD at 4/10/2021 2:41 AM
[facility] 07:41:31 
Destruction of the plantar aspect of the fifth distal phalanx, suggestiveof 
osteomyelitis. No soft tissue air is appreciated. RL: 460 AFC: [id number] 
Electronically signed by Juany Birmingham MD, PhD at 4/10/2021 2:41 AMOrdering
physician: HERIBERTO DACOSTA INDICATION: Right foot wound COMPARISON: Right foot 
radiographs dated 2021 TECHNIQUE: Axial images of the right foot were 
performed without theadministration of intravenous contrast. Images were 
reformatted in thecoronal and sagittal plane. CT scanwas performed according to 
ALARA (aslow as reasonably achievable) policy. FINDINGS: No acute fracture or 
dislocation of the right foot isappreciated. There is destruction of the plantar
aspect of the fifth distalphalanx (series 6, image 11). There is no soft tissue 
air. [facility], Radiant Results Inft User- 04/10/2021  2:42 AM CDTOrdering physician:
HERIBERTO SCHULZICATION: Right foot woundCOMPARISON: Right foot radiographs 
dated 2021TECHNIQUE: Axial images of the right foot were performed without 
theadministration of intravenous contrast. Images were reformatted in thecoronal
and sagittal plane. CT scan was performed according to ALARA (aslow as 
reasonably achievable) policy.FINDINGS: No acute fracture or dislocation of the 
right foot isappreciated. There is destruction of the plantar aspect of the 
fifth distalphalanx (series 6, image 11). There is no soft tissue 
air.IMPRESSIONDestruction of the plantar aspect of the fifth distal phalanx, 
suggestiveof osteomyelitis. No soft tissue air is appreciated.RL: 460AFC: 
[id number] signed by Juany Birmingham MD, PhD at 4/10/2021 2:41 AM
[facility]21[facility]-10 07:41:31 
Destruction of the plantar aspect of the fifth distal phalanx, suggestiveof 
osteomyelitis. No soft tissue air is appreciated. RL: 460 AFC: [id number] 
Electronically signed by Juany Birmingham MD, PhD at 4/10/2021 2:41 AMOrdering
physician: HERIBERTO DACOSTA INDICATION: Right foot wound COMPARISON: Right foot 
radiographs dated 2021 TECHNIQUE: Axial images of the right foot were 
performed without theadministration of intravenous contrast. Images were 
reformatted in thecoronal and sagittal plane. CT scanwas performed according to 
ALARA (aslow as reasonably achievable) policy. FINDINGS: No acute fracture or 
dislocation of the right foot isappreciated. There is destruction of the plantar
aspect of the fifth distalphalanx (series 6, image 11). There is no soft tissue 
air. [facility], Radiant Results Inft User- 04/10/2021  2:42 AM CDTOrdering physician:
HERIBERTO SCHULZICATION: Right foot woundCOMPARISON: Right foot radiographs 
dated 2021TECHNIQUE: Axial images of the right foot were performed without 
theadministration of intravenous contrast. Images were reformatted in thecoronal
and sagittal plane. CT scan was performed according to ALARA (aslow as 
reasonably achievable) policy.FINDINGS: No acute fracture or dislocation of the 
right foot isappreciated. There is destruction of the plantar aspect of the 
fifth distalphalanx (series 6, image 11). There is no soft tissue 
air.IMPRESSIONDestruction of the plantar aspect of the fifth distal phalanx, 
suggestiveof osteomyelitis. No soft tissue air is appreciated.RL: 460AFC: 
[id number] signed by Juany Birmingham MD, PhD at 4/10/2021 2:41 AM
[facility]20[facility]-10 07:34:361. ?No 
radiographic evidence of acute cardiopulmonary process.   RL: 6214AFC: [id number] End
of Report Electronically signed by Judah River MD at 4/10/2021 2:34 
AMEXAM: XR CHEST 1 VW ORDERING PHYSICIAN: Paul ROSARIO  HISTORY: 
Preoperative exam. COMPARISON: none TECHNICAL QUALITY: Adequate FINDIN
GS:Portable semiupright frontal view of the chest. ?The lungs are clear. 
?Theheart is normal in size. Mediastinal and hilar contours are normal. 
Pulmonary vascularity is normal. Osseous structures are unremarkable.  [facility], 
Radiant Results Inft User - 04/10/2021  2:35 AM CDTEXAM: XR CHEST 1 VWORDERING 
PHYSICIAN:   HERIBERTO DACOSTA HISTORY: Preoperative exam.COMPARISON: noneTECHNICAL
QUALITY: AdequateFINDINGS:Portable semiupright frontal view of the chest.  The 
lungs are clear.  Theheart is normal in size. Mediastinal and hilar contours are
normal. Pulmonary vascularity is normal. Osseous structures are
unremarkable.IMPRESSION1.  No radiographic evidence of acute cardiopulmonary 
process.RL: 6214AFC: [id number] of ReportElectronically signed by Judah River MD at 4/10/2021 2:34 [facility] CenterXR CHEST 1 
VW2021-04-10 07:34:361. ?No radiographic evidence of acute cardiopulmonary 
process.   RL: 6214AFC: [id number] End of Report Electronically signed by Judah River MD at 4/10/2021 2:34 AMEXAM: XR CHEST 1 VW ORDERING PHYSICIAN: Paul ROSARIO  HISTORY: Preoperative exam. COMPARISON: none TECHNICAL QUALITY: 
Adequate FINDINGS:Portable semiupright frontal view of the chest. ?The lungs are
clear. ?Theheart is normal in size. Mediastinal and hilar contours are normal. 
Pulmonary vascularity is normal. Osseous structures are unremarkable.  [facility], 
Radiant Results Inft User - 04/10/2021  2:35 AM CDTEXAM: XR CHEST 1 VWORDERING 
PHYSICIAN:   HERIBERTO DACOSTA HISTORY: Preoperative exam.COMPARISON: noneTECHNICAL
QUALITY: AdequateFINDINGS:Portable semiupright frontal view of the chest.  The 
lungs are clear.  Theheart is normal in size. Mediastinal and hilar contours are
normal. Pulmonary vascularity is normal. Osseous structures are
unremarkable.IMPRESSION1.  No radiographic evidence of acute cardiopulmonary 
process.RL: 6214AFC: [id number] of ReportElectronically signed by Judah River MD at 4/10/2021 2:34 [facility] CenterXR [facility]ST 1 
VW2021-04-10 07:34:361. ?No radiographic evidence of acute cardiopulmonary 
process.   RL: 6214AFC: [id number] End of Report Electronically signed by Judah River MD at 4/10/2021 2:34 AMEXAM: XR CHEST 1 VW ORDERING PHYSICIAN: Paul ROSARIO  HISTORY: Preoperative exam. COMPARISON: none TECHNICAL QUALITY: 
Adequate FINDINGS:Portable semiupright frontal view of the chest. ?The lungs are
clear. ?Theheart is normal in size. Mediastinal and hilar contours are normal. 
Pulmonary vascularity is normal. Osseous structures are unremarkable.  [facility], 
Radiant Results Inft User - 04/10/2021  2:35 AM CDTEXAM: XR CHEST 1 VWORDERING 
PHYSICIAN:   HERIBERTO DACOSTA HISTORY: Preoperative exam.COMPARISON: noneTECHNICAL
QUALITY: AdequateFINDINGS:Portable semiupright frontal view of the chest.  The 
lungs are clear.  Theheart is normal in size. Mediastinal and hilar contours are
normal. Pulmonary vascularity is normal. Osseous structures are
unremarkable.IMPRESSION1.  No radiographic evidence of acute cardiopulmonary 
process.RL: 6214AFC: [id number] of ReportElectronically signed by Judah River MD at 4/10/2021 2:34 [facility]. 
METABOLIC PANEL (03352)2021-04-10 02:53:14





             Test Item    Value        Reference Range Interpretation Comments

 

             NA (test code = 138 mmol/L   135-145                   



             1421791614)                                         

 

             K (test code = 4.2 mmol/L   3.5-5.0                   



             7383148336)                                         

 

             CL (test code = 105 mmol/L                       



             4790456014)                                         

 

             CO2 TOTAL (test code = 23 mmol/L    23-31                     



             3141957195)                                         

 

             AGAP (test code =              2-16                      



             5763151951)                                         

 

             BUN (test code = 39 mg/dL     7-23         H            



             1157599064)                                         

 

             GLUCOSE (test code = 284 mg/dL           H            



             2180776930)                                         

 

             CREATININE (test code = 3.38 mg/dL   0.60-1.25    H            



             9938206139)                                         

 

             TOTAL BILI (test code = 0.3 mg/dL    0.1-1.1                   



             1842691589)                                         

 

             CALCIUM (test code = 8.0 mg/dL    8.6-10.6     L            



             6094872999)                                         

 

             T PROTEIN (test code = 7.3 g/dL     6.3-8.2                   



             0209331731)                                         

 

             ALBUMIN (test code = 3.4 g/dL     3.5-5.0      L            



             5817894399)                                         

 

             ALK PHOS (test code = 105 U/L                          



             0514549275)                                         

 

             ALTv (test code = 21 U/L       5-50                      



             1742-6)                                             

 

             AST(SGOT) (test code = 33 U/L       13-40                     



             7174662531)                                         

 

             eGFR (test code =              mL/min/1.73m2              



             4447456493)                                         

 

             ELIJAH (test code = ELIJAH) Association of                           



                          Glomerular Filtration                           



                          Rate (GFR) and Staging                           



                          of Kidney Disease*                           



                          +---------------------                           



                          --+-------------------                           



                          --+-------------------                           



                          ------+| GFR                           



                          (mL/min/1.73 m2) ?|                           



                          With Kidney Damage ?|                           



                          ?Without Kidney                           



                          Damage+---------------                           



                          --------+-------------                           



                          --------+-------------                           



                          ------------+| ?>90 ?                           



                          ? ? ? ? ? ? ? ?|                           



                          ?Stage one ? ? ? ? ?|                           



                          ? Normal ? ? ? ? ? ? ?                           



                          ?+--------------------                           



                          ---+------------------                           



                          ---+------------------                           



                          -------+| ?60-89 ? ? ?                           



                          ? ? ? ? ?| ?Stage two                           



                          ? ? ? ? ?| ? Decreased                           



                          GFR ? ? ? ?                            



                          +---------------------                           



                          --+-------------------                           



                          --+-------------------                           



                          ------+| ?30-59 ? ? ?                           



                          ? ? ? ? ?| ?Stage                           



                          three ? ? ? ?| ? Stage                           



                          three ? ? ? ? ?                           



                          +---------------------                           



                          --+-------------------                           



                          --+-------------------                           



                          ------+| ?15-29 ? ? ?                           



                          ? ? ? ? ?| ?Stage four                           



                          ? ? ? ? | ? Stage four                           



                          ? ? ? ? ?                              



                          ?+--------------------                           



                          ---+------------------                           



                          ---+------------------                           



                          -------+| ?<15 (or                           



                          dialysis) ? ?| ?Stage                           



                          five ? ? ? ? | ? Stage                           



                          five ? ? ? ? ?                           



                          ?+--------------------                           



                          ---+------------------                           



                          ---+------------------                           



                          -------+ *Each stage                           



                          assumes the associated                           



                          GFR level has been in                           



                          effect for at least                           



                          three months. ?Stages                           



                          1 to 5, with or                           



                          without kidney                           



                          disease, indicate                           



                          chronic kidney                           



                          disease. Notes:                           



                          Determination of                           



                          stages one and two                           



                          (with eGFR                             



                          >59mL/min/1.73 m2)                           



                          requires estimation of                           



                          kidney damage for at                           



                          least three months as                           



                          defined by structural                           



                          or functional                           



                          abnormalities of the                           



                          kidney, manifested by                           



                          either:Pathological                           



                          abnormalities or                           



                          Markers of kidney                           



                          damage (including                           



                          abnormalities in the                           



                          composition of the                           



                          blood or urine or                           



                          abnormalities in                           



                          imaging tests).                           

 

             Lab Interpretation Abnormal                               



             (test code = 86289-3)                                        



[facility]. METABOLIC PANEL (59954)2021-04-10 
02:53:14





             Test Item    Value        Reference Range Interpretation Comments

 

             NA (test code = 138 mmol/L   135-145                   



             1589382827)                                         

 

             K (test code = 4.2 mmol/L   3.5-5.0                   



             3657169035)                                         

 

             CL (test code = 105 mmol/L                       



             9354154623)                                         

 

             CO2 TOTAL (test code = 23 mmol/L    23-31                     



             5299488910)                                         

 

             AGAP (test code =              2-16                      



             7063694805)                                         

 

             BUN (test code = 39 mg/dL     7-23         H            



             9557411851)                                         

 

             GLUCOSE (test code = 284 mg/dL           H            



             4115996492)                                         

 

             CREATININE (test code = 3.38 mg/dL   0.60-1.25    H            



             8132989238)                                         

 

             TOTAL BILI (test code = 0.3 mg/dL    0.1-1.1                   



             1500805763)                                         

 

             CALCIUM (test code = 8.0 mg/dL    8.6-10.6     L            



             7917565900)                                         

 

             T PROTEIN (test code = 7.3 g/dL     6.3-8.2                   



             3067323806)                                         

 

             ALBUMIN (test code = 3.4 g/dL     3.5-5.0      L            



             7503413472)                                         

 

             ALK PHOS (test code = 105 U/L                          



             7936021121)                                         

 

             ALTv (test code = 21 U/L       5-50                      



             1742-6)                                             

 

             AST(SGOT) (test code = 33 U/L       13-40                     



             9973074703)                                         

 

             eGFR (test code =              mL/min/1.73m2              



             4126691962)                                         

 

             ELIJAH (test code = ELIJAH) Association of                           



                          Glomerular Filtration                           



                          Rate (GFR) and Staging                           



                          of Kidney Disease*                           



                          +---------------------                           



                          --+-------------------                           



                          --+-------------------                           



                          ------+| GFR                           



                          (mL/min/1.73 m2) ?|                           



                          With Kidney Damage ?|                           



                          ?Without Kidney                           



                          Damage+---------------                           



                          --------+-------------                           



                          --------+-------------                           



                          ------------+| ?>90 ?                           



                          ? ? ? ? ? ? ? ?|                           



                          ?Stage one ? ? ? ? ?|                           



                          ? Normal ? ? ? ? ? ? ?                           



                          ?+--------------------                           



                          ---+------------------                           



                          ---+------------------                           



                          -------+| ?60-89 ? ? ?                           



                          ? ? ? ? ?| ?Stage two                           



                          ? ? ? ? ?| ? Decreased                           



                          GFR ? ? ? ?                            



                          +---------------------                           



                          --+-------------------                           



                          --+-------------------                           



                          ------+| ?30-59 ? ? ?                           



                          ? ? ? ? ?| ?Stage                           



                          three ? ? ? ?| ? Stage                           



                          three ? ? ? ? ?                           



                          +---------------------                           



                          --+-------------------                           



                          --+-------------------                           



                          ------+| ?15-29 ? ? ?                           



                          ? ? ? ? ?| ?Stage four                           



                          ? ? ? ? | ? Stage four                           



                          ? ? ? ? ?                              



                          ?+--------------------                           



                          ---+------------------                           



                          ---+------------------                           



                          -------+| ?<15 (or                           



                          dialysis) ? ?| ?Stage                           



                          five ? ? ? ? | ? Stage                           



                          five ? ? ? ? ?                           



                          ?+--------------------                           



                          ---+------------------                           



                          ---+------------------                           



                          -------+ *Each stage                           



                          assumes the associated                           



                          GFR level has been in                           



                          effect for at least                           



                          three months. ?Stages                           



                          1 to 5, with or                           



                          without kidney                           



                          disease, indicate                           



                          chronic kidney                           



                          disease. Notes:                           



                          Determination of                           



                          stages one and two                           



                          (with eGFR                             



                          >59mL/min/1.73 m2)                           



                          requires estimation of                           



                          kidney damage for at                           



                          least three months as                           



                          defined by structural                           



                          or functional                           



                          abnormalities of the                           



                          kidney, manifested by                           



                          either:Pathological                           



                          abnormalities or                           



                          Markers of kidney                           



                          damage (including                           



                          abnormalities in the                           



                          composition of the                           



                          blood or urine or                           



                          abnormalities in                           



                          imaging tests).                           

 

             Lab Interpretation Abnormal                               



             (test code = 48373-9)                                        



[facility]. METABOLIC PANEL ([id number])2021-04-10 
02:53:14





             Test Item    Value        Reference Range Interpretation Comments

 

             NA (test code = 138 mmol/L   135-145                   



             [id number])                                         

 

             K (test code = 4.2 mmol/L   3.5-5.0                   



             0396456264)                                         

 

             CL (test code = 105 mmol/L                       



             0078130825)                                         

 

             CO2 TOTAL (test code = 23 mmol/L    23-31                     



             [id number])                                         

 

             AGAP (test code =              2-16                      



             [id number])                                         

 

             BUN (test code = 39 mg/dL     7-23         H            



             7571889935)                                         

 

             GLUCOSE (test code = 284 mg/dL           H            



             8925093639)                                         

 

             CREATININE (test code = 3.38 mg/dL   0.60-1.25    H            



             2123444730)                                         

 

             TOTAL BILI (test code = 0.3 mg/dL    0.1-1.1                   



             2818950286)                                         

 

             CALCIUM (test code = 8.0 mg/dL    8.6-10.6     L            



             1711283649)                                         

 

             T PROTEIN (test code = 7.3 g/dL     6.3-8.2                   



             0924065694)                                         

 

             ALBUMIN (test code = 3.4 g/dL     3.5-5.0      L            



             8792561064)                                         

 

             ALK PHOS (test code = 105 U/L                          



             4770597970)                                         

 

             ALTv (test code = 21 U/L       5-50                      



             1742-6)                                             

 

             AST(SGOT) (test code = 33 U/L       13-40                     



             8012661127)                                         

 

             eGFR (test code =              mL/min/1.73m2              



             1143792892)                                         

 

             ELIJAH (test code = ELIJAH) Association of                           



                          Glomerular Filtration                           



                          Rate (GFR) and Staging                           



                          of Kidney Disease*                           



                          +---------------------                           



                          --+-------------------                           



                          --+-------------------                           



                          ------+| GFR                           



                          (mL/min/1.73 m2) ?|                           



                          With Kidney Damage ?|                           



                          ?Without Kidney                           



                          Damage+---------------                           



                          --------+-------------                           



                          --------+-------------                           



                          ------------+| ?>90 ?                           



                          ? ? ? ? ? ? ? ?|                           



                          ?Stage one ? ? ? ? ?|                           



                          ? Normal ? ? ? ? ? ? ?                           



                          ?+--------------------                           



                          ---+------------------                           



                          ---+------------------                           



                          -------+| ?60-89 ? ? ?                           



                          ? ? ? ? ?| ?Stage two                           



                          ? ? ? ? ?| ? Decreased                           



                          GFR ? ? ? ?                            



                          +---------------------                           



                          --+-------------------                           



                          --+-------------------                           



                          ------+| ?30-59 ? ? ?                           



                          ? ? ? ? ?| ?Stage                           



                          three ? ? ? ?| ? Stage                           



                          three ? ? ? ? ?                           



                          +---------------------                           



                          --+-------------------                           



                          --+-------------------                           



                          ------+| ?15-29 ? ? ?                           



                          ? ? ? ? ?| ?Stage four                           



                          ? ? ? ? | ? Stage four                           



                          ? ? ? ? ?                              



                          ?+--------------------                           



                          ---+------------------                           



                          ---+------------------                           



                          -------+| ?<15 (or                           



                          dialysis) ? ?| ?Stage                           



                          five ? ? ? ? | ? Stage                           



                          five ? ? ? ? ?                           



                          ?+--------------------                           



                          ---+------------------                           



                          ---+------------------                           



                          -------+ *Each stage                           



                          assumes the associated                           



                          GFR level has been in                           



                          effect for at least                           



                          three months. ?Stages                           



                          1 to 5, with or                           



                          without kidney                           



                          disease, indicate                           



                          chronic kidney                           



                          disease. Notes:                           



                          Determination of                           



                          stages one and two                           



                          (with eGFR                             



                          >59mL/min/1.73 m2)                           



                          requires estimation of                           



                          kidney damage for at                           



                          least three months as                           



                          defined by structural                           



                          or functional                           



                          abnormalities of the                           



                          kidney, manifested by                           



                          either:Pathological                           



                          abnormalities or                           



                          Markers of kidney                           



                          damage (including                           



                          abnormalities in the                           



                          composition of the                           



                          blood or urine or                           



                          abnormalities in                           



                          imaging tests).                           

 

             Lab Interpretation Abnormal                               



             (test code = 45206-5)                                        



[facility] 
[facility]03:46





             Test Item    Value        Reference Range Interpretation Comments

 

             SARS-CoV-2 Rapid ID NOW Not Detected Not Detected              



             (test code = 89265-4)                                        

 

             ELIJAH (test code = ELIJAH) ID NOW COVID-19 Assay                        

   



                          is an isothermal                           



                          nucleic acid                           



                          amplification test                           



                          intended for the                           



                          qualitative detection                           



                          of nucleic acid from                           



                          SARS-CoV-2 viral RNA                           



                          in nasopharyngeal (NP)                           



                          specimens. It is used                           



                          under Emergency Use                           



                          Authorization (EUA) by                           



                          FDA. The limit of                           



                          detection (LOD) of the                           



                          assay is 125 Genome                           



                          Equivalents/mL. A                           



                          positive result is                           



                          indicative of the                           



                          presence of SARS-CoV-2                           



                          RNA. ?Clinical                           



                          correlation with                           



                          patient history and                           



                          other diagnostic                           



                          information is                           



                          necessary to determine                           



                          patient infection                           



                          status. A negative                           



                          (Not Detected) result                           



                          does not preclude                           



                          SARS-CoV-2 infection.                           



                          In patients with                           



                          clinical symptoms and                           



                          other tests that are                           



                          consistent with                           



                          SARS-CoV-2 infection,                           



                          negative results                           



                          should be treated as                           



                          presumptive negative                           



                          and a new specimen                           



                          should be tested with                           



                          alternative PCR                           



                          molecular test.                           



                          Invalid: Please                           



                          collect a new specimen                           



                          for repeat patient                           



                          testing if clinically                           



                          indicated.                             

 

             Lab Interpretation Normal                                 



             (test code = 75120-0)                                        



[facility] (ID NOW RAPID TESTING)2021-04-10 
02:03:46





             Test Item    Value        Reference Range Interpretation Comments

 

             SARS-CoV-2 Rapid ID NOW Not Detected Not Detected              



             (test code = 00006-0)                                        

 

             ELIJAH (test code = ELIJAH) ID NOW COVID-19 Assay                        

   



                          is an isothermal                           



                          nucleic acid                           



                          amplification test                           



                          intended for the                           



                          qualitative detection                           



                          of nucleic acid from                           



                          SARS-CoV-2 viral RNA                           



                          in nasopharyngeal (NP)                           



                          specimens. It is used                           



                          under Emergency Use                           



                          Authorization (EUA) by                           



                          FDA. The limit of                           



                          detection (LOD) of the                           



                          assay is 125 Genome                           



                          Equivalents/mL. A                           



                          positive result is                           



                          indicative of the                           



                          presence of SARS-CoV-2                           



                          RNA. ?Clinical                           



                          correlation with                           



                          patient history and                           



                          other diagnostic                           



                          information is                           



                          necessary to determine                           



                          patient infection                           



                          status. A negative                           



                          (Not Detected) result                           



                          does not preclude                           



                          SARS-CoV-2 infection.                           



                          In patients with                           



                          clinical symptoms and                           



                          other tests that are                           



                          consistent with                           



                          SARS-CoV-2 infection,                           



                          negative results                           



                          should be treated as                           



                          presumptive negative                           



                          and a new specimen                           



                          should be tested with                           



                          alternative PCR                           



                          molecular test.                           



                          Invalid: Please                           



                          collect a new specimen                           



                          for repeat patient                           



                          testing if clinically                           



                          indicated.                             

 

             Lab Interpretation Normal                                 



             (test code = 16240-4)                                        



[facility] 
02:03:46





             Test Item    Value        Reference Range Interpretation Comments

 

             SARS-CoV-2 Rapid ID NOW Not Detected Not Detected              



             (test code = 13914-0)                                        

 

             ELIJAH (test code = ELIJAH) ID NOW COVID-19 Assay                        

   



                          is an isothermal                           



                          nucleic acid                           



                          amplification test                           



                          intended for the                           



                          qualitative detection                           



                          of nucleic acid from                           



                          SARS-CoV-2 viral RNA                           



                          in nasopharyngeal (NP)                           



                          specimens. It is used                           



                          under Emergency Use                           



                          Authorization (EUA) by                           



                          FDA. The limit of                           



                          detection (LOD) of the                           



                          assay is 125 Genome                           



                          Equivalents/mL. A                           



                          positive result is                           



                          indicative of the                           



                          presence of SARS-CoV-2                           



                          RNA. ?Clinical                           



                          correlation with                           



                          patient history and                           



                          other diagnostic                           



                          information is                           



                          necessary to determine                           



                          patient infection                           



                          status. A negative                           



                          (Not Detected) result                           



                          does not preclude                           



                          SARS-CoV-2 infection.                           



                          In patients with                           



                          clinical symptoms and                           



                          other tests that are                           



                          consistent with                           



                          SARS-CoV-2 infection,                           



                          negative results                           



                          should be treated as                           



                          presumptive negative                           



                          and a new specimen                           



                          should be tested with                           



                          alternative PCR                           



                          molecular test.                           



                          Invalid: Please                           



                          collect a new specimen                           



                          for repeat patient                           



                          testing if clinically                           



                          indicated.                             

 

             Lab Interpretation Normal                                 



             (test code = 25599-0)                                        



[facility] WITH DIFF2021-04-10 01:49:45





             Test Item    Value        Reference Range Interpretation Comments

 

             WBC (test code =              See_Comment  H             [Automated



             5941-2)                                             message] The



                                                                 system which



                                                                 generated this



                                                                 result transmit

ronnie



                                                                 reference range

:



                                                                 4.20 - 10.70



                                                                 10*3/?L. The



                                                                 reference range



                                                                 was not used to



                                                                 interpret this



                                                                 result as



                                                                 normal/abnormal

.

 

             RBC (test code =              See_Comment  L             [Automated



             449-8)                                              message] The



                                                                 system which



                                                                 generated this



                                                                 result transmit

ronnie



                                                                 reference range

:



                                                                 4.26 - 5.52



                                                                 10*6/?L. The



                                                                 reference range



                                                                 was not used to



                                                                 interpret this



                                                                 result as



                                                                 normal/abnormal

.

 

             HGB (test code = 12.1 g/dL    12.2-16.4    L            



             718-7)                                              

 

             HCT (test code = 35.9 %       38.4-49.3    L            



             4544-3)                                             

 

             MCV (test code = 85.5 fL      81.7-95.6                 



             787-2)                                              

 

             MCH (test code = 28.8 pg      26.1-32.7                 



             785-6)                                              

 

             MCHC (test code = 33.7 g/dL    31.2-35.0                 



             786-4)                                              

 

             RDW-SD (test code = 36.7 fL      38.5-51.6    L            



             32090-9)                                            

 

             RDW-CV (test code = 11.9 %       12.1-15.4    L            



             788-0)                                              

 

             PLT (test code =              See_Comment  H             [Automated



             777-3)                                              message] The



                                                                 system which



                                                                 generated this



                                                                 result transmit

ronnie



                                                                 reference range

:



                                                                 150 - 328 10*3/

?L.



                                                                 The reference



                                                                 range was not u

sed



                                                                 to interpret th

is



                                                                 result as



                                                                 normal/abnormal

.

 

             MPV (test code = 9.3 fL       9.8-13.0     L            



             26133-1)                                            

 

             NRBC/100 WBC (test              See_Comment                [Automat

ed



             code = [id number])                                        message] 

The



                                                                 system which



                                                                 generated this



                                                                 result transmit

ronnie



                                                                 reference range

:



                                                                 0.0 - 10.0 /100



                                                                 WBCs. The



                                                                 reference range



                                                                 was not used to



                                                                 interpret this



                                                                 result as



                                                                 normal/abnormal

.

 

             NRBC x10^3 (test code <0.01        See_Comment                [Auto

mated



             = [id number])                                        message] The



                                                                 system which



                                                                 generated this



                                                                 result transmit

ronnie



                                                                 reference range

:



                                                                 10*3/?L. The



                                                                 reference range



                                                                 was not used to



                                                                 interpret this



                                                                 result as



                                                                 normal/abnormal

.

 

             GRAN MAT (NEUT) % 73.8 %                                 



             (test code = 770-8)                                        

 

             IMM GRAN % (test code 0.60 %                                 



             = [id number])                                        

 

             LYMPH % (test code = 14.3 %                                 



             736-9)                                              

 

             MONO % (test code = 7.9 %                                  



             5905-5)                                             

 

             EOS % (test code = 2.8 %                                  



             713-8)                                              

 

             BASO % (test code = 0.6 %                                  



             706-2)                                              

 

             GRAN MAT x10^3(ANC) 11.28 10*3/uL 1.99-6.95    H            



             (test code =                                        



             [id number])                                         

 

             IMM GRAN x10^3 (test 0.09 10*3/uL 0.00-0.06    H            



             code = [id number])                                        

 

             LYMPH x10^3 (test code 2.18 10*3/uL 1.09-3.23                 



             = 731-0)                                            

 

             MONO x10^3 (test code 1.21 10*3/uL 0.36-1.02    H            



             = 742-7)                                            

 

             EOS x10^3 (test code = 0.43 10*3/uL 0.06-0.53                 



             711-2)                                              

 

             BASO x10^3 (test code 0.09 10*3/uL 0.01-0.09                 



             = 704-7)                                            

 

             Lab Interpretation Abnormal                               



             (test code = 43356-2)                                        



[facility] WITH DIFF2021-04-10 01:49:45





             Test Item    Value        Reference Range Interpretation Comments

 

             WBC (test code =              See_Comment  H             [Automated



             6690-2)                                             message] The



                                                                 system which



                                                                 generated this



                                                                 result transmit

ronnie



                                                                 reference range

:



                                                                 4.20 - 10.70



                                                                 10*3/?L. The



                                                                 reference range



                                                                 was not used to



                                                                 interpret this



                                                                 result as



                                                                 normal/abnormal

.

 

             RBC (test code =              See_Comment  L             [Automated



             789-8)                                              message] The



                                                                 system which



                                                                 generated this



                                                                 result transmit

ronnie



                                                                 reference range

:



                                                                 4.26 - 5.52



                                                                 10*6/?L. The



                                                                 reference range



                                                                 was not used to



                                                                 interpret this



                                                                 result as



                                                                 normal/abnormal

.

 

             HGB (test code = 12.1 g/dL    12.2-16.4    L            



             718-7)                                              

 

             HCT (test code = 35.9 %       38.4-49.3    L            



             4544-3)                                             

 

             MCV (test code = 85.5 fL      81.7-95.6                 



             787-2)                                              

 

             MCH (test code = 28.8 pg      26.1-32.7                 



             785-6)                                              

 

             MCHC (test code = 33.7 g/dL    31.2-35.0                 



             786-4)                                              

 

             RDW-SD (test code = 36.7 fL      38.5-51.6    L            



             13549-0)                                            

 

             RDW-CV (test code = 11.9 %       12.1-15.4    L            



             788-0)                                              

 

             PLT (test code =              See_Comment  H             [Automated



             777-3)                                              message] The



                                                                 system which



                                                                 generated this



                                                                 result transmit

ronnie



                                                                 reference range

:



                                                                 150 - 328 10*3/

?L.



                                                                 The reference



                                                                 range was not u

sed



                                                                 to interpret th

is



                                                                 result as



                                                                 normal/abnormal

.

 

             MPV (test code = 9.3 fL       9.8-13.0     L            



             92414-0)                                            

 

             NRBC/100 WBC (test              See_Comment                [Automat

ed



             code = 7375985023)                                        message] 

The



                                                                 system which



                                                                 generated this



                                                                 result transmit

ronnie



                                                                 reference range

:



                                                                 0.0 - 10.0 /100



                                                                 WBCs. The



                                                                 reference range



                                                                 was not used to



                                                                 interpret this



                                                                 result as



                                                                 normal/abnormal

.

 

             NRBC x10^3 (test code <0.01        See_Comment                [Auto

mated



             = [id number])                                        message] The



                                                                 system which



                                                                 generated this



                                                                 result transmit

ronnie



                                                                 reference range

:



                                                                 10*3/?L. The



                                                                 reference range



                                                                 was not used to



                                                                 interpret this



                                                                 result as



                                                                 normal/abnormal

.

 

             GRAN MAT (NEUT) % 73.8 %                                 



             (test code = 770-8)                                        

 

             IMM GRAN % (test code 0.60 %                                 



             = [id number])                                        

 

             LYMPH % (test code = 14.3 %                                 



             736-9)                                              

 

             MONO % (test code = 7.9 %                                  



             5905-5)                                             

 

             EOS % (test code = 2.8 %                                  



             713-8)                                              

 

             BASO % (test code = 0.6 %                                  



             706-2)                                              

 

             GRAN MAT x10^3(ANC) 11.28 10*3/uL 1.99-6.95    H            



             (test code =                                        



             [id number])                                         

 

             IMM GRAN x10^3 (test 0.09 10*3/uL 0.00-0.06    H            



             code = 7152264512)                                        

 

             LYMPH x10^3 (test code 2.18 10*3/uL 1.09-3.23                 



             = 731-0)                                            

 

             MONO x10^3 (test code 1.21 10*3/uL 0.36-1.02    H            



             = 742-7)                                            

 

             EOS x10^3 (test code = 0.43 10*3/uL 0.06-0.53                 



             711-2)                                              

 

             BASO x10^3 (test code 0.09 10*3/uL 0.01-0.09                 



             = 704-7)                                            

 

             Lab Interpretation Abnormal                               



             (test code = 80068-1)                                        



[facility] WITH DIFF2021-04-10 01:49:45





             Test Item    Value        Reference Range Interpretation Comments

 

             WBC (test code =              See_Comment  H             [Automated



             0290-2)                                             message] The



                                                                 system which



                                                                 generated this



                                                                 result transmit

ronnie



                                                                 reference range

:



                                                                 4.20 - 10.70



                                                                 10*3/?L. The



                                                                 reference range



                                                                 was not used to



                                                                 interpret this



                                                                 result as



                                                                 normal/abnormal

.

 

             RBC (test code =              See_Comment  L             [Automated



             739-8)                                              message] The



                                                                 system which



                                                                 generated this



                                                                 result transmit

ronnie



                                                                 reference range

:



                                                                 4.26 - 5.52



                                                                 10*6/?L. The



                                                                 reference range



                                                                 was not used to



                                                                 interpret this



                                                                 result as



                                                                 normal/abnormal

.

 

             HGB (test code = 12.1 g/dL    12.2-16.4    L            



             718-7)                                              

 

             HCT (test code = 35.9 %       38.4-49.3    L            



             4544-3)                                             

 

             MCV (test code = 85.5 fL      81.7-95.6                 



             787-2)                                              

 

             MCH (test code = 28.8 pg      26.1-32.7                 



             785-6)                                              

 

             MCHC (test code = 33.7 g/dL    31.2-35.0                 



             786-4)                                              

 

             RDW-SD (test code = 36.7 fL      38.5-51.6    L            



             69104-8)                                            

 

             RDW-CV (test code = 11.9 %       12.1-15.4    L            



             788-0)                                              

 

             PLT (test code =              See_Comment  H             [Automated



             777-3)                                              message] The



                                                                 system which



                                                                 generated this



                                                                 result transmit

ronnie



                                                                 reference range

:



                                                                 150 - 328 10*3/

?L.



                                                                 The reference



                                                                 range was not u

sed



                                                                 to interpret th

is



                                                                 result as



                                                                 normal/abnormal

.

 

             MPV (test code = 9.3 fL       9.8-13.0     L            



             00963-3)                                            

 

             NRBC/100 WBC (test              See_Comment                [Automat

ed



             code = [id number])                                        message] 

The



                                                                 system which



                                                                 generated this



                                                                 result transmit

ronnie



                                                                 reference range

:



                                                                 0.0 - 10.0 /100



                                                                 WBCs. The



                                                                 reference range



                                                                 was not used to



                                                                 interpret this



                                                                 result as



                                                                 normal/abnormal

.

 

             NRBC x10^3 (test code <0.01        See_Comment                [Auto

mated



             = [id number])                                        message] The



                                                                 system which



                                                                 generated this



                                                                 result transmit

ronnie



                                                                 reference range

:



                                                                 10*3/?L. The



                                                                 reference range



                                                                 was not used to



                                                                 interpret this



                                                                 result as



                                                                 normal/abnormal

.

 

             GRAN MAT (NEUT) % 73.8 %                                 



             (test code = 770-8)                                        

 

             IMM GRAN % (test code 0.60 %                                 



             = [id number])                                        

 

             LYMPH % (test code = 14.3 %                                 



             736-9)                                              

 

             MONO % (test code = 7.9 %                                  



             5905-5)                                             

 

             EOS % (test code = 2.8 %                                  



             713-8)                                              

 

             BASO % (test code = 0.6 %                                  



             706-2)                                              

 

             GRAN MAT x10^3(ANC) 11.28 10*3/uL 1.99-6.95    H            



             (test code =                                        



             9334530721)                                         

 

             IMM GRAN x10^3 (test 0.09 10*3/uL 0.00-0.06    H            



             code = 7219992292)                                        

 

             LYMPH x10^3 (test code 2.18 10*3/uL 1.09-3.23                 



             = 731-0)                                            

 

             MONO x10^3 (test code 1.21 10*3/uL 0.36-1.02    H            



             = 742-7)                                            

 

             EOS x10^3 (test code = 0.43 10*3/uL 0.06-0.53                 



             711-2)                                              

 

             BASO x10^3 (test code 0.09 10*3/uL 0.01-0.09                 



             = 704-7)                                            

 

             Lab Interpretation Abnormal                               



             (test code = 87269-3)                                        



[facility]

## 2022-06-20 NOTE — P.HP
Certification for Inpatient


With expected LOS: >2 Midnights


Patient will require the following post-hospital care: None


Practitioner: I am a practitioner with admitting privileges, knowledge of 

patient current condition, hospital course, and medical plan of care.


Services: Services provided to patient in accordance with Admission requirements

found in Title 42 Section 412.3 of the Code of Federal Regulations





Patient History


Date of Service: 06/20/22


Reason for admission: Dizziness


History of Present Illness: 





41-year-old male with past medical history of longstanding hypertension, 

diabetes mellitus type 2 since age 13 but on metformin, history of self-reported

CKD -told at a [place] clinic 1 year ago but not following up with any PCP, not

on any blood pressure regimen since over the last 1 year after running out of 

his previous medication, only taking metformin presented because of new onset 

dizziness while at work today.  He felt unwell and weak.  Blood pressure was 

checked by the  and noted to be markedly elevated and he was asked

to come to the ED.  He admits to increased sunlight exposure and decreased p.o. 

intake.  He admits to transient nausea.  He denies any diarrhea.  He denies any 

fainting or syncope episode.  He denies any chest pain.  On presentation in the 

ED he was noted with marked elevated blood pressure with systolic of 230s over 

120s.  He was given p.o. amlodipine as well as IV metoprolol with slight 

improvement in blood pressure to the 190s over low 100s now.  EKG shows normal 

sinus rhythm with LVH strain pattern, chest x-ray shows no acute infiltrate or 

pulmonary edema.  Troponin was mildly elevated at 325.  BNP was elevated at 

greater than 2600.  Creatinine elevated at 5.6 with BUN of 50.


Patient has been admitted for hypertensive emergency as well as acute kidney 

injury


He admits to father  being on dialysis from diabetes complication prior to his 

death 


Home medications list reviewed: Yes





- Past Medical/Surgical History


-: Hypertension


-: Diabetes mellitus


-: Self-reported CKD


-: Foot surgery-right great toe amputation 1 year ago





- Family History


  ** Father


-: Kidney disease





- Social History


Smoking Status: Never smoker


Smoking therapy provided: No


Alcohol use: No


CD- Drugs: No


Caffeine use: No


Place of Residence: Home





Review of Systems


General: Weakness, Malaise





Physical Examination





- Physical Exam


General: Alert, In no apparent distress, Oriented x3


HEENT: Atraumatic, Normocephalic


Neck: Supple, 2+ carotid pulse no bruit, JVD not distended


Respiratory: Clear to auscultation bilaterally, Normal air movement


Cardiovascular: No edema, Normal pulses, Regular rate/rhythm, Normal S1 S2


Gastrointestinal: Normal bowel sounds, Soft and benign, Non-distended


Musculoskeletal: No clubbing, No swelling


Neurological: Normal gait, Normal speech, Normal strength at 5/5 x4 extr, Normal

tone, Sensation intact, Cranial nerves 3-12 intact





- Studies


Laboratory Data (last 24 hrs)





06/20/22 12:45: PT 11.0, INR 1.00


06/20/22 12:45: WBC 9.4, Hgb 12.4 L, Hct 36.7 L, Plt Count 313


06/20/22 12:45: Sodium 142, Potassium 3.8, BUN 50 H, Creatinine 5.64 H*, Glucose

159 H, Magnesium 1.9, Total Bilirubin 0.2, AST 32, ALT 31, Alkaline Phosphatase 

109, Lipase 268








Assessment and Plan





- Advance Directives


Does patient have a Living Will: No


Does patient have a Durable POA for Healthcare: No





- Code Status/Comfort Care


Code Status Assessed: Yes


Code Status: Full Code


Physician Review: Patient Assessed, Agree with Above Assessment and Plan


Physician Review Additional Text: 





Impression


Acute kidney injurylikely due to prerenal versus malignant hypertensive 

nephrosclerosis


CKDunclear stage


Hypertensive emergency


Diabetes mellitus


Non-STEMI





Plan


We will admit patient to inpatient status


Will start gentle IV fluid hydration


Obtain urine studies for fractional excretion of sodium


Obtain renal sonogram


Minus urology work-up


Follow creatinine with hydration


Troponin elevated, follow trend


Start aspirin/ Plavix and low-dose statin


Follow cardiology consult


Strict glycemic control


Hold metformin for now


Insulin regimen as tolerated


Follow hemoglobin A1c level


Initiate patient on amlodipine/metoprolol for now


IV hydralazine as needed


Subcutaneous heparin for DVT prophylaxis


Advance directivefull code

## 2022-06-20 NOTE — ER
Nurse's Notes                                                                                     

 [facility]                                                                 

Name: Isrrael Bernal                                                                               

Age: 41 yrs                                                                                       

Sex: Male                                                                                         

: 1980                                                                                   

MRN: [id number]                                                                                   

Arrival Date: 2022                                                                          

Time: 12:33                                                                                       

Account#: [id number]                                                                            

Bed 3                                                                                             

Private MD:                                                                                       

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      

  chronic kidney disease, or end stage renal disease;Dizziness and giddiness;Type 2               

  diabetes mellitus with hyperglycemia                                                            

                                                                                                  

Presentation:                                                                                     

                                                                                             

12:33 Chief complaint: EMS states: "pt reporting he was having some dizziness with high blood jd3 

      pressure and working out in the heat. we gave him 2 nitro's and started an 18 G in his      

      left AC. his BGL was 176. his pressures 216/120 and lowest we were able to get his          

      pressure was 184/100. reports he has not been taking his blood pressure medications.".      

      Coronavirus screen: At this time, the client does not indicate any symptoms associated      

      with coronavirus-19. Ebola Screen: No symptoms or risks identified at this time.            

      Initial Sepsis Screen: Does the patient meet any 2 criteria? No. Patient's initial          

      sepsis screen is negative. Does the patient have a suspected source of infection? No.       

      Patient's initial sepsis screen is negative. Risk Assessment: Do you want to hurt           

      yourself or someone else? Patient reports no desire to harm self or others. Onset of        

      symptoms was 2022.                                                                 

12:33 Method Of Arrival: EMS: [facility]                                                         jd3 

12:33 Acuity: HEMANT 2                                                                           jd3 

                                                                                                  

Triage Assessment:                                                                                

12:35 General: Appears distressed, comfortable, Behavior is cooperative, appropriate for age, bp  

      anxious. Pain: Complains of pain in chest. EENT: No deficits noted. Neuro: Level of         

      Consciousness is awake, alert, obeys commands, Oriented to Appropriate for age.             

      Cardiovascular: Rhythm is sinus rhythm. Respiratory: No deficits noted. GI: No signs        

      and/or symptoms were reported involving the gastrointestinal system. : No signs           

      and/or symptoms were reported regarding the genitourinary system. Derm: No deficits         

      noted. Musculoskeletal: No deficits noted.                                                  

                                                                                                  

Historical:                                                                                       

- Allergies:                                                                                      

12:37 No Known Allergies;                                                                     jd3 

- Home Meds:                                                                                      

12:37 Metformin Oral [Active];                                                                jd3 

- PMHx:                                                                                           

12:37 Diabetes - NIDDM; Hypertensive disorder;                                                jd3 

- PSHx:                                                                                           

12:37 right foot;                                                                             jd3 

                                                                                                  

- Immunization history:: Adult Immunizations up to date, Client reports having NOT                

  received the Covid vaccine. Flu vaccine is not up to date.                                      

- Social history:: Smoking status: Patient denies any tobacco usage or history of.                

- Family history:: not pertinent.                                                                 

                                                                                                  

                                                                                                  

Screenin:35 Abuse screen: Denies threats or abuse. Denies injuries from another. Nutritional        bp  

      screening: No deficits noted. Tuberculosis screening: No symptoms or risk factors           

      identified. Fall Risk None identified.                                                      

                                                                                                  

Assessment:                                                                                       

12:35 General: SEE TRIAGE NOTE.                                                               bp  

12:35 General: Appears in no apparent distress. comfortable, Behavior is calm, cooperative,   jd3 

      appropriate for age. Pain: Complains of pain in head Quality of pain is described as        

      aching. Neuro: Solorzano Agitation-Sedation Scale (RASS): 0 - Alert and Calm Level of        

      Consciousness is awake, alert, obeys commands, Oriented to person, place, time,             

      situation, Reports dizziness. Cardiovascular: Heart tones present Capillary refill < 3      

      seconds Patient's skin is warm and dry. Rhythm is regular. Respiratory: Airway is           

      patent Respiratory effort is even, unlabored, Respiratory pattern is regular,               

      symmetrical, Breath sounds are clear bilaterally. Denies cough, shortness of breath.        

      GI: Abdomen is non-distended, Bowel sounds present X 4 quads. Abd is soft and non           

      tender X 4 quads. Reports nausea. : No signs and/or symptoms were reported regarding      

      the genitourinary system. EENT: No signs and/or symptoms were reported regarding the        

      EENT system. Derm: Skin is intact, Skin is dry, Skin is normal, Skin temperature is         

      warm. Musculoskeletal: Circulation, motion, and sensation intact. Range of motion:          

      intact in all extremities.                                                                  

13:31 Reassessment: Patient appears in no apparent distress at this time. No changes from     jd3 

      previously documented assessment. Patient and/or family updated on plan of care and         

      expected duration. Pain level reassessed. Patient is alert, oriented x 3, equal             

      unlabored respirations, skin warm/dry/pink.                                                 

15:30 Reassessment: No changes from previously documented assessment. Patient and/or family   bp  

      updated on plan of care and expected duration. Pain level reassessed. PT SEEN BY            

      HOSPITALIST.                                                                                

15:58 Reassessment: ADMIT INITIATED.                                                          bp  

16:05 Reassessment: Patient appears in no apparent distress at this time. Patient and/or      jd3 

      family updated on plan of care and expected duration. Pain level reassessed. Patient is     

      alert, oriented x 3, equal unlabored respirations, skin warm/dry/pink.                      

16:06 Reassessment: Patient appears in no apparent distress at this time. Patient and/or      jd3 

      family updated on plan of care and expected duration. Pain level reassessed. Patient is     

      alert, oriented x 3, equal unlabored respirations, skin warm/dry/pink.                      

17:32 Reassessment: Patient appears in no apparent distress at this time. No changes from     jd3 

      previously documented assessment. Patient and/or family updated on plan of care and         

      expected duration. Pain level reassessed. Patient is alert, oriented x 3, equal             

      unlabored respirations, skin warm/dry/pink.                                                 

                                                                                                  

Vital Signs:                                                                                      

12:38  / 113; Pulse 101; Resp 17 S; Temp 98.0(O); Pulse Ox 98% on R/A; Weight 77.11 kg  jd3 

      (R); Height 5 ft. 6 in. (167.64 cm) (R); Pain 3/10;                                         

13:31  / 107; Pulse 83; Resp 16 S; Pulse Ox 99% on R/A;                                 jd3 

14:30  / 117; Pulse 85; Resp 13; Pulse Ox 100% ;                                        bp  

15:30  / 108; Pulse 87; Resp 19; Pulse Ox 99% ;                                         bp  

16:05  / 101; Pulse 69; Resp 18 S; Pulse Ox 99% on R/A;                                 jd3 

17:32  / 105; Pulse 65; Resp 18 S; Pulse Ox 100% on R/A;                                jd3 

12:38 Body Mass Index 27.44 (77.11 kg, 167.64 cm)                                             jd3 

13:31 provider notified of high blood pressure                                                jd3 

                                                                                                  

ED Course:                                                                                        

12:33 Patient arrived in ED.                                                                  jd3 

12:34 Damion Ogden MD is Attending Physician.                                             lila 

12:35 Patient has correct armband on for positive identification. Bed in low position. Call   bp  

      light in reach. Side rails up X2.                                                           

12:35 Inserted saline lock: 18 gauge in left antecubital area, using aseptic technique.       bp  

12:37 Triage completed.                                                                       jd3 

12:38 Arm band placed on. EKG completed in triage. Results shown to MD.                       jd3 

12:39 EKG done, by ED staff, reviewed by Damion Ogden MD.                                   em1 

12:44 William Marie, RN is Primary Nurse.                                                    bp  

12:53 Primary Nurse role handed off by William Marie RN                                     jd3 

12:53 Tawanda Rosas, RN is Primary Nurse.                                                  jd3 

13:07 XRAY Chest (1 view) In Process Unspecified.                                             EDMS

14:12 Stone Protocol CT In Process Unspecified.                                               EDMS

14:33 Rodolfo Kulkarni MD is Hospitalizing Provider.                                            lila 

14:33 Hospitalizing Provider role handed off by Rodolfo Kulkarni MD                             lila 

14:33 Justen Cody MD is Hospitalizing Provider.                                          lila 

15:51 Renal Ultrasound-Complete In Process Unspecified.                                       EDMS

18:19 No provider procedures requiring assistance completed. Patient admitted, IV remains in  jd3 

      place.                                                                                      

                                                                                                  

Administered Medications:                                                                         

12:44 Drug: Aspirin 81 mg Route: PO;                                                          bp  

13:40 Follow up: Response: No adverse reaction                                                jd3 

12:44 Drug: NS 0.9% 1000 ml Route: IV; Rate: 1 bolus; Site: left antecubital;                 bp  

13:40 Follow up: Response: No adverse reaction; IV Status: Completed infusion                 jd3 

14:37 Drug: Norvasc (amlodipine) 10 mg Route: PO;                                             jd3 

15:30 Follow up: Response: No adverse reaction                                                jd3 

14:37 Drug: Lopressor (metoprolol TARTRATE) 50 mg Route: PO;                                  jd3 

15:30 Follow up: Response: No adverse reaction                                                jd3 

15:40 Drug: Labetalol 20 mg Route: IV; Rate: bolus; Infused Over: 2 mins; Site: left          bp  

      antecubital;                                                                                

16:40 Follow up: Response: No adverse reaction; IV Status: Completed infusion                 jd3 

                                                                                                  

                                                                                                  

Medication:                                                                                       

12:35 VIS not applicable for this client.                                                     bp  

                                                                                                  

Outcome:                                                                                          

14:34 Decision to Hospitalize by Provider.                                                    lila 

18:20 Admitted to ER Hold.  Please see [facility] for further documentation.                    jd3 

18:20 Condition: stable                                                                           

18:20 Instructed on the need for admit.                                                           

20:04 Patient left the ED.                                                                    bb  

                                                                                                  

Signatures:                                                                                       

Dispatcher [employer]                           Damion Mirza MD MD cha Ballard, Brenda, RN RN bb Martinez, Eric                               em1                                                  

Rosas, Tawanda, RN                    RN   jWilliam Nogueira RN                      RN   bp                                                   

                                                                                                  

Corrections: (The following items were deleted from the chart)                                    

13:53 13:31  / 107; Pulse 83bpm; Resp 16bpm; Spontaneous; Pulse Ox 99% RA; omid anne 

                                                                                                  

**************************************************************************************************

## 2022-06-20 NOTE — RAD REPORT
EXAM DESCRIPTION:  [facility] Protocol - 6/20/2022 2:10 pm

 

CLINICAL HISTORY:

Abd pain

 

COMPARISON:  No comparisons

 

TECHNIQUE:  CT of the abdomen and pelvis was performed.

 

All CT scans are performed using dose optimization technique as appropriate and may include automated
 exposure control or mA/KV adjustment according to patient size.

 

FINDINGS:  Lower chest: Coronary artery calcifications. Circumferential thickened distal esophagus melendez
ggesting gastroesophageal reflux.

Liver: No acute abnormality or suspicious lesions.

Biliary: No biliary ductal dilatation.

Stomach: No significant focal abnormality.

Duodenum: No significant focal abnormality.

Pancreas: No significant abnormality.

Spleen: No significant abnormality.

Adrenal: No suspicious lesions.

Kidney/ureter: No hydronephrosis. No renal calculi. Renal vascular calcifications though tiny stones 
difficult to exclude.

Retroperitoneum: No retroperitoneal adenopathy.

Vascular: No aneurysm.

Bowel: Moderate colonic stool.. Normal appendix.

Peritoneum: No ascites or free air. Small fat containing umbilical hernia.

Bladder: Grossly unremarkable.

Reproductive: Mild prostatomegaly.

Bones: No acute fracture.

Other: n/a

 

IMPRESSION:  No acute intra-abdominal or pelvic finding. Incidental findings as noted above.

## 2022-06-21 LAB
ALBUMIN SERPL BCP-MCNC: 2.1 G/DL (ref 3.4–5)
ALP SERPL-CCNC: 88 U/L (ref 45–117)
ALT SERPL W P-5'-P-CCNC: 26 U/L (ref 12–78)
AST SERPL W P-5'-P-CCNC: 26 U/L (ref 15–37)
BUN BLD-MCNC: 48 MG/DL (ref 7–18)
GLUCOSE SERPLBLD-MCNC: 142 MG/DL (ref 74–106)
HCT VFR BLD CALC: 36.1 % (ref 39.6–49)
HDLC SERPL-MCNC: 50 MG/DL (ref 40–60)
LDLC SERPL CALC-MCNC: 145 MG/DL (ref ?–130)
LYMPHOCYTES # SPEC AUTO: 2 K/UL (ref 0.7–4.9)
MCV RBC: 88 FL (ref 80–100)
PMV BLD: 7.6 FL (ref 7.6–11.3)
POTASSIUM SERPL-SCNC: 3.7 MMOL/L (ref 3.5–5.1)
RBC # BLD: 4.1 M/UL (ref 4.33–5.43)

## 2022-06-21 RX ADMIN — HEPARIN SODIUM SCH UNIT: 5000 INJECTION, SOLUTION INTRAVENOUS; SUBCUTANEOUS at 09:52

## 2022-06-21 RX ADMIN — SODIUM CITRATE AND CITRIC ACID MONOHYDRATE SCH ML: 500; 334 SOLUTION ORAL at 13:43

## 2022-06-21 RX ADMIN — HYDRALAZINE HYDROCHLORIDE PRN MG: 20 INJECTION INTRAMUSCULAR; INTRAVENOUS at 02:08

## 2022-06-21 RX ADMIN — Medication SCH ML: at 09:54

## 2022-06-21 RX ADMIN — HUMAN INSULIN SCH: 100 INJECTION, SOLUTION SUBCUTANEOUS at 07:30

## 2022-06-21 RX ADMIN — AMLODIPINE BESYLATE SCH MG: 10 TABLET ORAL at 09:53

## 2022-06-21 RX ADMIN — ASPIRIN SCH MG: 81 TABLET, COATED ORAL at 09:53

## 2022-06-21 RX ADMIN — SODIUM CHLORIDE SCH MLS: 0.9 INJECTION, SOLUTION INTRAVENOUS at 17:25

## 2022-06-21 RX ADMIN — PENTOXIFYLLINE SCH MG: 400 TABLET, EXTENDED RELEASE ORAL at 13:38

## 2022-06-21 RX ADMIN — SODIUM CITRATE AND CITRIC ACID MONOHYDRATE SCH ML: 500; 334 SOLUTION ORAL at 21:35

## 2022-06-21 RX ADMIN — SODIUM CHLORIDE SCH MLS: 0.9 INJECTION, SOLUTION INTRAVENOUS at 04:19

## 2022-06-21 RX ADMIN — METOPROLOL TARTRATE SCH MG: 25 TABLET ORAL at 09:54

## 2022-06-21 RX ADMIN — FAMOTIDINE SCH MG: 20 TABLET, FILM COATED ORAL at 09:52

## 2022-06-21 RX ADMIN — HEPARIN SODIUM SCH UNIT: 5000 INJECTION, SOLUTION INTRAVENOUS; SUBCUTANEOUS at 21:35

## 2022-06-21 RX ADMIN — ATORVASTATIN CALCIUM SCH MG: 40 TABLET, FILM COATED ORAL at 21:35

## 2022-06-21 RX ADMIN — METOPROLOL TARTRATE SCH MG: 25 TABLET ORAL at 17:22

## 2022-06-21 RX ADMIN — SODIUM CHLORIDE SCH MLS: 0.9 INJECTION, SOLUTION INTRAVENOUS at 13:58

## 2022-06-21 RX ADMIN — HUMAN INSULIN SCH: 100 INJECTION, SOLUTION SUBCUTANEOUS at 11:30

## 2022-06-21 RX ADMIN — Medication SCH ML: at 21:36

## 2022-06-21 RX ADMIN — HUMAN INSULIN SCH: 100 INJECTION, SOLUTION SUBCUTANEOUS at 21:00

## 2022-06-21 RX ADMIN — CLOPIDOGREL BISULFATE SCH MG: 75 TABLET, FILM COATED ORAL at 09:53

## 2022-06-21 RX ADMIN — PENTOXIFYLLINE SCH MG: 400 TABLET, EXTENDED RELEASE ORAL at 21:35

## 2022-06-21 RX ADMIN — HUMAN INSULIN SCH: 100 INJECTION, SOLUTION SUBCUTANEOUS at 16:30

## 2022-06-21 RX ADMIN — ZINC SULFATE CAP 220 MG (50 MG ELEMENTAL ZN) SCH MG: 220 (50 ZN) CAP at 09:53

## 2022-06-21 NOTE — P.PN
Subjective


Date of Service: 06/21/22


Chief Complaint: Dizziness


Subjective: No new changes, Tolerating diet (Feels much better, denies any 

nausea vomiting No headache or dizziness Blood pressure much controlled 

overnight)





Physical Examination





- Vital Signs


Temperature: 98.6 F


Blood Pressure: 153/89


Pulse: 86


Respirations: 12


Pulse Ox (%): 98





- Studies


Laboratory Data (last 24 hrs)





06/20/22 12:45: PT 11.0, INR 1.00


06/20/22 12:45: WBC 9.4, Hgb 12.4 L, Hct 36.7 L, Plt Count 313


06/20/22 12:45: Sodium 142, Potassium 3.8, BUN 50 H, Creatinine 5.64 H*, Glucose

159 H, Magnesium 1.9, Total Bilirubin 0.2, AST 32, ALT 31, Alkaline Phosphatase 

109, Lipase 268








Assessment And Plan


Physician Review: Patient Assessed, Agree with Above Assessment and Plan


Physician Review Additional Text: 








- Physical Exam


General: Alert, In no apparent distress, Oriented x3


HEENT: Atraumatic, Normocephalic


Neck: Supple, 2+ carotid pulse no bruit, JVD not distended


Respiratory: Clear to auscultation bilaterally, Normal air movement


Cardiovascular: No edema, Normal pulses, Regular rate/rhythm, Normal S1 S2


Gastrointestinal: Normal bowel sounds, Soft and benign, Non-distended


Musculoskeletal: No clubbing, No swelling


Neurological: Normal gait, Normal speech, Normal strength at 5/5 x4 extr, Normal

tone, Sensation intact, Cranial nerves 3-12 intact





- Studies


Laboratory Data (last 24 hrs)





06/20/22 12:45: PT 11.0, INR 1.00


06/20/22 12:45: WBC 9.4, Hgb 12.4 L, Hct 36.7 L, Plt Count 313


06/20/22 12:45: Sodium 142, Potassium 3.8, BUN 50 H, Creatinine 5.64 H*, Glucose

159 H, Magnesium 1.9, Total Bilirubin 0.2, AST 32, ALT 31, Alkaline Phosphatase 

109, Lipase 268





Renal sonogram with normal-sized kidney 10 cm bilaterally, mild corticomedullary

differentiation


Urine studiespending





Assessment and Plan








Impression


Acute kidney injurylikely due to prerenal versus malignant hypertensive 

nephrosclerosis


CKDunclear stage


Hypertensive emergency


Diabetes mellitus


Non-STEMI





Plan


Creatinine marginally improved to 5.2


Continue gentle IV fluid


HbA1c of 7.0, continue insulin sliding scale with Accu-Cheks


Might benefit from glipizide


Mild elevated cholesterol levelstart statin


Since nonuremic, no urgent need for dialysis initiation at this time, will 

follow for now


Continue aspirin follow cardiology consult


May benefit from stress test in a.m. given trending elevated troponin


No urgent need for cardiac cath if not absolutely necessary since mild 

creatinine elevation 


Blood pressure much improved, continue amlodipine/metoprolol for now


IV hydralazine as needed


Subcutaneous heparin for DVT prophylaxis


Advance directivefull code

## 2022-06-22 LAB
ALBUMIN SERPL BCP-MCNC: 1.9 G/DL (ref 3.4–5)
ALP SERPL-CCNC: 88 U/L (ref 45–117)
ALT SERPL W P-5'-P-CCNC: 23 U/L (ref 12–78)
AST SERPL W P-5'-P-CCNC: 19 U/L (ref 15–37)
BUN BLD-MCNC: 47 MG/DL (ref 7–18)
CREAT UR-SCNC: 106 MG/DL (ref 20–370)
GLUCOSE SERPLBLD-MCNC: 155 MG/DL (ref 74–106)
MAGNESIUM SERPL-MCNC: 1.8 MG/DL (ref 1.8–2.4)
POTASSIUM SERPL-SCNC: 3.5 MMOL/L (ref 3.5–5.1)
PROT UR-MCNC: 863.6 MG/DL (ref ?–11.9)
TROPONIN I SERPL HS-MCNC: 570.4 PG/ML (ref ?–58.9)

## 2022-06-22 RX ADMIN — SODIUM CITRATE AND CITRIC ACID MONOHYDRATE SCH ML: 500; 334 SOLUTION ORAL at 09:08

## 2022-06-22 RX ADMIN — HUMAN INSULIN SCH: 100 INJECTION, SOLUTION SUBCUTANEOUS at 20:02

## 2022-06-22 RX ADMIN — SODIUM CHLORIDE SCH: 0.9 INJECTION, SOLUTION INTRAVENOUS at 16:46

## 2022-06-22 RX ADMIN — ZINC SULFATE CAP 220 MG (50 MG ELEMENTAL ZN) SCH MG: 220 (50 ZN) CAP at 09:08

## 2022-06-22 RX ADMIN — ATORVASTATIN CALCIUM SCH MG: 40 TABLET, FILM COATED ORAL at 21:28

## 2022-06-22 RX ADMIN — HYDRALAZINE HYDROCHLORIDE SCH MG: 25 TABLET, FILM COATED ORAL at 21:28

## 2022-06-22 RX ADMIN — HUMAN INSULIN SCH: 100 INJECTION, SOLUTION SUBCUTANEOUS at 07:30

## 2022-06-22 RX ADMIN — SODIUM CHLORIDE SCH MLS: 0.9 INJECTION, SOLUTION INTRAVENOUS at 17:28

## 2022-06-22 RX ADMIN — SODIUM CITRATE AND CITRIC ACID MONOHYDRATE SCH ML: 500; 334 SOLUTION ORAL at 21:00

## 2022-06-22 RX ADMIN — HEPARIN SODIUM SCH UNIT: 5000 INJECTION, SOLUTION INTRAVENOUS; SUBCUTANEOUS at 09:08

## 2022-06-22 RX ADMIN — ASPIRIN SCH MG: 81 TABLET, COATED ORAL at 09:08

## 2022-06-22 RX ADMIN — HYDRALAZINE HYDROCHLORIDE SCH MG: 25 TABLET, FILM COATED ORAL at 10:36

## 2022-06-22 RX ADMIN — HUMAN INSULIN SCH: 100 INJECTION, SOLUTION SUBCUTANEOUS at 16:30

## 2022-06-22 RX ADMIN — HEPARIN SODIUM SCH UNIT: 5000 INJECTION, SOLUTION INTRAVENOUS; SUBCUTANEOUS at 20:56

## 2022-06-22 RX ADMIN — PENTOXIFYLLINE SCH MG: 400 TABLET, EXTENDED RELEASE ORAL at 21:29

## 2022-06-22 RX ADMIN — Medication SCH: at 09:00

## 2022-06-22 RX ADMIN — HUMAN INSULIN SCH: 100 INJECTION, SOLUTION SUBCUTANEOUS at 11:20

## 2022-06-22 RX ADMIN — FAMOTIDINE SCH MG: 20 TABLET, FILM COATED ORAL at 09:08

## 2022-06-22 RX ADMIN — SODIUM CHLORIDE SCH MLS: 0.9 INJECTION, SOLUTION INTRAVENOUS at 09:07

## 2022-06-22 RX ADMIN — METOPROLOL TARTRATE SCH MG: 25 TABLET ORAL at 17:28

## 2022-06-22 RX ADMIN — AMLODIPINE BESYLATE SCH MG: 10 TABLET ORAL at 09:08

## 2022-06-22 RX ADMIN — METOPROLOL TARTRATE SCH MG: 25 TABLET ORAL at 05:47

## 2022-06-22 RX ADMIN — HYDRALAZINE HYDROCHLORIDE PRN MG: 20 INJECTION INTRAMUSCULAR; INTRAVENOUS at 11:46

## 2022-06-22 RX ADMIN — Medication SCH ML: at 21:00

## 2022-06-22 RX ADMIN — PENTOXIFYLLINE SCH MG: 400 TABLET, EXTENDED RELEASE ORAL at 09:08

## 2022-06-22 NOTE — ECHO
HEIGHT: 5 ft 6 in   WEIGHT: 186 lb 4 oz   DATE OF STUDY: 6/21/22   REFER DR: Nato Devi MD

2-DIMENSIONAL: YES

     M.MODE: YES

 DOPPLER: YES

COLOR FLOW: YES



                    TDS:  NO

PORTABLE: YES

 DEFINITY:  NO

BUBBLE STUDY: NO





DIAGNOSIS:  TROPONIN ELEVATION



CARDIAC HISTORY:  

CATHERIZATION: 

SURGERY: 

PROSTHETIC VALVE: 

PACEMAKER: 





MEASUREMENTS (cm)

    DIASTOLIC (NORMALS)                 SYSTOLIC (NORMALS)

IVSd                 1.3 (0.6-1.2)                    LA Diam 3.5 (1.9-4.0)     LVEF       
  51%  

LVIDd               4.4 (3.5-5.7)                        LVIDs      3.2 (2.0-3.5)     %FS  
        26%

LVPWd             1.2 (0.6-1.2)

Ao Diam           2.9 (2.0-3.7)



2 DIMENSIONAL ASSESSMENT:

RIGHT ATRIUM:                   

LEFT ATRIUM:       



RIGHT VENTRICLE:            

LEFT VENTRICLE: 



TRICUSPID VALVE:             

MITRAL VALVE:     



PULMONIC VALVE:             

AORTIC VALVE:     



PERICARDIAL EFFUSION: 

AORTIC ROOT:      





LEFT VENTRICULAR WALL MOTION:     



DOPPLER/COLOR FLOW:     



COMMENTS:      NORMAL 2D ECHO WITH DOPPLER. NO WALL MOTION ABNORMALITY



TECHNOLOGIST:   DION RHODES

## 2022-06-22 NOTE — P.PN
Subjective


Date of Service: 06/22/22


Chief Complaint: Dizziness


Subjective: No new changes, Ambulating





Physical Examination





- Vital Signs


Temperature: 98.2 F


Blood Pressure: 180/86


Pulse: 63


Respirations: 19


Pulse Ox (%): 98





Assessment And Plan


Physician Review: Patient Assessed, Agree with Above Assessment and Plan


Physician Review Additional Text: 








- Physical Exam


General: Alert, In no apparent distress, Oriented x3


HEENT: Atraumatic, Normocephalic


Neck: Supple, 2+ carotid pulse no bruit, JVD not distended


Respiratory: Clear to auscultation bilaterally, Normal air movement


Cardiovascular: No edema, Normal pulses, Regular rate/rhythm, Normal S1 S2


Gastrointestinal: Normal bowel sounds, Soft and benign, Non-distended


Musculoskeletal: No clubbing, No swelling


Neurological: Normal gait, Normal speech, Normal strength at 5/5 x4 extr, Normal

tone, Sensation intact, Cranial nerves 3-12 intact





- Studies


Laboratory Data (last 24 hrs)





06/20/22 12:45: PT 11.0, INR 1.00


06/20/22 12:45: WBC 9.4, Hgb 12.4 L, Hct 36.7 L, Plt Count 313


06/20/22 12:45: Sodium 142, Potassium 3.8, BUN 50 H, Creatinine 5.64 H*, Glucose

159 H, Magnesium 1.9, Total Bilirubin 0.2, AST 32, ALT 31, Alkaline Phosphatase 

109, Lipase 268





Renal sonogram with normal-sized kidney 10 cm bilaterally, mild corticomedullary

differentiation





Urine protein  creatinine ratio of 8.15





Assessment and Plan








Impression


Acute kidney injurylikely due to prerenal versus malignant hypertensive 

nephrosclerosis


CKDunclear stage


Hypertensive emergency


Diabetes mellitus


Non-STEMI


Hypocalcemia








Plan


Creatinine marginally improved to 5.1


Continue gentle IV fluid


Continue pentoxifylline/Bicitra


We will plan for renal biopsy in a.m.


Follow pending serology


Uncontrolled blood pressure today, add hydralazine, continue Norvasc/metoprolol 


HbA1c of 7.0, continue insulin sliding scale with Accu-Cheks


Might benefit from glipizide


Mild elevated cholesterol levelstarted statin


Since nonuremic, no urgent need for dialysis initiation at this time


Continue aspirin


Start calcitriol for low calcium


follow cardiology consult


No urgent need for cardiac cath if not absolutely necessary since mild 

creatinine elevation 


Blood pressure much improved, continue amlodipine/metoprolol for now


IV hydralazine as needed


Subcutaneous heparin for DVT prophylaxis


Advance directivefull code

## 2022-06-22 NOTE — EKG
Test Date:    2022-06-20               Test Time:    12:29:12

Technician:   NITIN                                    

                                                     

MEASUREMENT RESULTS:                                       

Intervals:                                           

Rate:         95                                     

MD:           134                                    

QRSD:         88                                     

QT:           364                                    

QTc:          457                                    

Axis:                                                

P:            57                                     

MD:           134                                    

QRS:          83                                     

T:            24                                     

                                                     

INTERPRETIVE STATEMENTS:                                       

                                                     

Normal sinus rhythm

Normal ECG

No previous ECG available for comparison



Electronically Signed On 06-22-22 07:19:47 CDT by Nato Devi

## 2022-06-23 VITALS — OXYGEN SATURATION: 97 % | SYSTOLIC BLOOD PRESSURE: 128 MMHG | TEMPERATURE: 97.8 F | DIASTOLIC BLOOD PRESSURE: 71 MMHG

## 2022-06-23 LAB
ALBUMIN SERPL BCP-MCNC: 2 G/DL (ref 3.4–5)
ALP SERPL-CCNC: 93 U/L (ref 45–117)
ALT SERPL W P-5'-P-CCNC: 23 U/L (ref 12–78)
AST SERPL W P-5'-P-CCNC: 21 U/L (ref 15–37)
BUN BLD-MCNC: 43 MG/DL (ref 7–18)
GLUCOSE SERPLBLD-MCNC: 129 MG/DL (ref 74–106)
POTASSIUM SERPL-SCNC: 3.6 MMOL/L (ref 3.5–5.1)
TROPONIN I SERPL HS-MCNC: 505.9 PG/ML (ref ?–58.9)

## 2022-06-23 RX ADMIN — ZINC SULFATE CAP 220 MG (50 MG ELEMENTAL ZN) SCH MG: 220 (50 ZN) CAP at 10:28

## 2022-06-23 RX ADMIN — HUMAN INSULIN SCH: 100 INJECTION, SOLUTION SUBCUTANEOUS at 07:30

## 2022-06-23 RX ADMIN — HYDRALAZINE HYDROCHLORIDE SCH MG: 25 TABLET, FILM COATED ORAL at 10:28

## 2022-06-23 RX ADMIN — HEPARIN SODIUM SCH UNIT: 5000 INJECTION, SOLUTION INTRAVENOUS; SUBCUTANEOUS at 10:29

## 2022-06-23 RX ADMIN — AMLODIPINE BESYLATE SCH MG: 10 TABLET ORAL at 10:29

## 2022-06-23 RX ADMIN — PENTOXIFYLLINE SCH MG: 400 TABLET, EXTENDED RELEASE ORAL at 10:29

## 2022-06-23 RX ADMIN — SODIUM CHLORIDE SCH MLS: 0.9 INJECTION, SOLUTION INTRAVENOUS at 02:36

## 2022-06-23 RX ADMIN — SODIUM CITRATE AND CITRIC ACID MONOHYDRATE SCH: 500; 334 SOLUTION ORAL at 09:00

## 2022-06-23 RX ADMIN — HYDRALAZINE HYDROCHLORIDE PRN MG: 20 INJECTION INTRAMUSCULAR; INTRAVENOUS at 05:20

## 2022-06-23 RX ADMIN — Medication SCH: at 09:00

## 2022-06-23 RX ADMIN — HUMAN INSULIN SCH: 100 INJECTION, SOLUTION SUBCUTANEOUS at 11:30

## 2022-06-23 RX ADMIN — FAMOTIDINE SCH MG: 20 TABLET, FILM COATED ORAL at 10:28

## 2022-06-23 RX ADMIN — ASPIRIN SCH MG: 81 TABLET, COATED ORAL at 10:29

## 2022-06-23 RX ADMIN — METOPROLOL TARTRATE SCH MG: 25 TABLET ORAL at 05:18

## 2022-06-23 NOTE — P.DS
Admission Date: 06/20/22


Discharge Date: 06/23/22


Disposition: ROUTINE DISCHARGE


Discharge Condition: FAIR


Reason for Admission: Dizziness


Brief History of Present Illness: 





41-year-old male with past medical history of longstanding hypertension, 

diabetes mellitus type 2 since age 13 but on metformin, history of self-reported

CKD -told at a [place] [facility] 1 year ago but not following up with any PCP, not

on any blood pressure regimen since over the last 1 year after running out of 

his previous medication, only taking metformin presented because of new onset 

dizziness while at work today.  He felt unwell and weak.  Blood pressure was 

checked by the  and noted to be markedly elevated and he was asked

to come to the ED.  He admits to increased sunlight exposure and decreased p.o. 

intake.  He admits to transient nausea.  He denies any diarrhea.  He denies any 

fainting or syncope episode.  He denies any chest pain.  On presentation in the 

ED he was noted with marked elevated blood pressure with systolic of 230s over 

120s.  He was given p.o. amlodipine as well as IV metoprolol with slight 

improvement in blood pressure to the 190s over low 100s now.  EKG shows normal 

sinus rhythm with LVH strain pattern, chest x-ray shows no acute infiltrate or 

pulmonary edema.  Troponin was mildly elevated at 325.  BNP was elevated at 

greater than 2600.  Creatinine elevated at 5.6 with BUN of 50.


Patient has been admitted for hypertensive emergency as well as acute kidney 

injury


He admits to father  being on dialysis from diabetes complication prior to his 

death 


Hospital Course: 





History of diabetes admitted for hypertensive encephalopathy.  Noted with 

elevated troponin and marked elevated creatinine.  Renal sonogram showed normal-

sized kidneys with no with mild corticomedullary differentiation.  Urine random 

proteinuria level of 8.3 g/g.  Patient blood pressure was controlled with 

adjustment of her medications.  His metformin was switched to glipizide.  He had

an echocardiogram done with normal EF.  He had a stress test for  Elevated 

troponin with normal myocardial perfusion.  His EGFR is marginally improved from

12-14 mils per minute now.  He will be discharged home today and to follow-up in

renal clinic.  Patient has been advised to get insurance to help with his CKD 

outpatient management.


Vital Signs/Physical Exam: 














Temp Pulse Resp BP Pulse Ox


 


 97.8 F   85   18   128/71   98 


 


 06/23/22 12:00  06/23/22 12:00  06/23/22 12:00  06/23/22 12:00  06/22/22 14:36








Laboratory Data at Discharge: 














WBC  7.7 K/uL (4.3-10.9)  D 06/21/22  04:45    


 


Hgb  12.2 g/dL (13.6-17.9)  L  06/21/22  04:45    


 


Hct  36.1 % (39.6-49.0)  L  06/21/22  04:45    


 


Plt Count  298 K/uL (152-406)   06/21/22  04:45    


 


PT  11.0 SECONDS (9.5-12.5)   06/20/22  12:45    


 


INR  1.00   06/20/22  12:45    


 


Sodium  142 mmol/L (136-145)   06/23/22  04:10    


 


Potassium  3.6 mmol/L (3.5-5.1)   06/23/22  04:10    


 


BUN  43 mg/dL (7-18)  H  06/23/22  04:10    


 


Creatinine  4.94 mg/dL (0.55-1.3)  H  06/23/22  04:10    


 


Glucose  129 mg/dL ()  H  06/23/22  04:10    


 


Magnesium  1.8 mg/dL (1.8-2.4)   06/22/22  14:55    


 


Total Bilirubin  0.2 mg/dL (0.2-1.0)   06/23/22  04:10    


 


AST  21 U/L (15-37)   06/23/22  04:10    


 


ALT  23 U/L (12-78)   06/23/22  04:10    


 


Alkaline Phosphatase  93 U/L ()   06/23/22  04:10    


 


Triglycerides  214 mg/dL (<150)  H  06/21/22  04:45    


 


Cholesterol  238 mg/dL (<200)  H  06/21/22  04:45    


 


HDL Cholesterol  50 mg/dL (40-60)   06/21/22  04:45    


 


Cholesterol/HDL Ratio  4.76   06/21/22  04:45    


 


Lipase  268 U/L ()   06/20/22  12:45    








Home Medications: 








Amlodipine [Norvasc*] 10 mg PO DAILY #30 tab 06/23/22 


Calcitrol [Rocaltrol*] 0.25 mcg PO DAILY #30 cap 06/23/22 


Glipizide [Glipizide Xl] 2.5 mg PO DAILY #30 tab.er.24 06/23/22 


Hydralazine HCl 50 mg PO BID #60 tablet 06/23/22 


Metoprolol Tartrate 50 mg PO BID #60 tablet 06/23/22 


Pentoxifylline 400 mg PO BID #60 tablet.er 06/23/22 





New Medications: 


Glipizide [Glipizide Xl] 2.5 mg PO DAILY #30 tab.er.24


Hydralazine HCl 50 mg PO BID #60 tablet


Metoprolol Tartrate 50 mg PO BID #60 tablet


Amlodipine [Norvasc*] 10 mg PO DAILY #30 tab


Pentoxifylline 400 mg PO BID #60 tablet.er


Calcitrol [Rocaltrol*] 0.25 mcg PO DAILY #30 cap


Physician Discharge Instructions: 


call Dr Cody  office - [phone] for follow up 


Followup: 


Justen Cody [ACTIVE - CAN ADMIT] - 


 JASMIN JO [Primary Care Provider] -

## 2022-06-23 NOTE — RAD REPORT
EXAM DESCRIPTION:  [facility] - Rest Stress Cardiac Imaging - 6/23/2022 10:04 am

 

CLINICAL HISTORY:  Elevated troponin

 

COMPARISON:  None.

 

TECHNIQUE:  The patient was administered 10. 6mCi of Tc 99m Sestamibi prior to resting SPECT imaging 
of the heart. The patient was then administered 31.4 mCi of Tc 99m Sestamibi following exercise or ph
armacologic stress. Multiplanar SPECT images were reviewed.

 

FINDINGS:   There is uniformity of radiotracer uptake involving the entire left ventricular myocardiu
m on rest and stress images.

 

The left ventricular ejection fraction equals 52%

 

 

IMPRESSION:   Negative for a myocardial perfusion defect

## 2022-06-23 NOTE — CON
Date of Consultation:  06/21/2022



Reason For Consultation:  Hypertensive emergency.



History Of Present Illness:  Mr. Bernal is 41.  Has a history of hypertension, diabetes, noncomplian
ce.  Comes in to the emergency room with a creatinine of 5.64, his BNP was __________, glucose is 153
, BNP was 2956.  No cardiac symptoms.  Denied PND, orthopnea, pedal edema, palpitations, or syncope. 
 Denied any fever or chills.  Denied any chest pain, nausea, vomiting, or diaphoresis.  He is mostly 
having headache and dizziness.



Past Medical History:  As stated above.



Allergies:  HE IS ALLERGIC TO NO MEDICATIONS.



Medications:  At home include metformin.



Review of Systems:

Negative.



Social History:  Negative.



Family History:  Positive for hypertension.



Physical Examination:

Vital Signs:  Blood pressure 168/116, sinus rhythm. 

HEENT:  Negative. 

Neck:  Supple with no bruit. 

Chest:  Clear to auscultation and percussion. 

Cardiac:  Revealed a regular rhythm and rate with S4 gallops. 

Abdomen:  Benign. 

Extremities:  Revealed no clubbing, cyanosis, or edema.



Diagnostic Data:  As stated earlier.  Chest x-ray is negative.  EKG, LVH.  CPK __________.



Impression And Plan:  

1.Hypertensive crisis.

2.Elevated BNP and CPK secondary to hypertension.

3.Diabetes.

4.Renal failure.  The patient is presently on hydralazine, metoprolol, Norvasc, and metformin.  Neph
rology has been consulted.  Echocardiogram is pending.  He may have to be dialyzed down the road, but
 hopefully his creatinine will improve slowly with controlling his pressure and glucose.  We will see
 what the echocardiogram shows.  The patient needs to be instructed on being compliant with his regim
en.





NB/MODL

DD:  06/23/2022 09:32:38Voice ID:  [id number]

DT:  06/23/2022 12:14:54Report ID:  [id number]

## 2022-06-23 NOTE — TREADPHA
DX:  ELEVATED TROPONIN



Date of Study: 06/23/2022





Ht: 5' 6 "

Wt:  186 lb 4 oz



Consulting Physician:  HILARY







MEDICATIONS:  TYLENOL, NORVASC, ASPIRIN, LIPITOR, ROCALTROL, PEPCID, GLUCAGEN, HEPARIN, 
APRESOLINE, NOVOLIN-R, ATIVAN, LOPRESSOR, MORPHINE, ZOFRAN, TRENTAL, ZINC



HISTORY:  41 YEAR OLD MALE WITH COMPLAINTS OF CHEST PAIN.  HISTORY OF END STAGE RENAL 
DISEASE, DIABETES MELLITUS TYPE 2.  DENIES ALCOHOL, DRUG AND TOBACCO USE.



PHYSICIAL EXAMINATION: 

            



RESTING B.P.:  159/86

RESTING H.R.:  83





RESTING EKG:  WITHIN NORMAL LIMITS

                    

            

PROTOCOL:  PHARMACOLOGIC

EXERCISE TIME:  3:30 

B.P. AT PEAK STRESS:  174/83





IMPRESSION:  LEXISCAN INJECTED, CARDIOLITE GIVEN PER PROTOCOL.  SEE NUCLEAR MEDICINE 
REPORT.  NO COMPLAINTS OF CHEST PAIN.  NO SUPRAVENTRICULAR TACHYCARDIA, VENTRICULAR 
TACHYCARDIA, PREMATURE ATRIAL COMPLEXES OR PREMATURE VENTRICULAR COMPLEXES.  

PATIENT COMPLAINS OF SHORTNESS OF BREATH.  NO ST CHANGES WITH LEXISCAN, NEGATIVE 
ELECTROCARDIOGRAM, POSITIVE FOR ISCHEMIA.

## 2023-01-24 ENCOUNTER — HOSPITAL ENCOUNTER (EMERGENCY)
Dept: HOSPITAL 97 - ER | Age: 43
Discharge: HOME | End: 2023-01-24
Payer: COMMERCIAL

## 2023-01-24 VITALS — TEMPERATURE: 99.2 F

## 2023-01-24 VITALS — SYSTOLIC BLOOD PRESSURE: 149 MMHG | DIASTOLIC BLOOD PRESSURE: 86 MMHG

## 2023-01-24 VITALS — OXYGEN SATURATION: 98 %

## 2023-01-24 DIAGNOSIS — U07.1: Primary | ICD-10-CM

## 2023-01-24 DIAGNOSIS — N18.6: ICD-10-CM

## 2023-01-24 DIAGNOSIS — I12.0: ICD-10-CM

## 2023-01-24 DIAGNOSIS — Z99.2: ICD-10-CM

## 2023-01-24 DIAGNOSIS — E11.22: ICD-10-CM

## 2023-01-24 LAB
ALBUMIN SERPL BCP-MCNC: 3.9 G/DL (ref 3.4–5)
ALP SERPL-CCNC: 81 U/L (ref 45–117)
ALT SERPL W P-5'-P-CCNC: 19 U/L (ref 16–61)
AST SERPL W P-5'-P-CCNC: 22 U/L (ref 15–37)
BUN BLD-MCNC: 83 MG/DL (ref 7–18)
GLUCOSE SERPLBLD-MCNC: 159 MG/DL (ref 74–106)
HCT VFR BLD CALC: 36.3 % (ref 39.6–49)
INR BLD: 1.11
LYMPHOCYTES # SPEC AUTO: 0.4 K/UL (ref 0.7–4.9)
MCV RBC: 92 FL (ref 80–100)
PMV BLD: 7.9 FL (ref 7.6–11.3)
POTASSIUM SERPL-SCNC: 5.1 MMOL/L (ref 3.5–5.1)
RBC # BLD: 3.95 M/UL (ref 4.33–5.43)
SARS-COV-2 RNA RESP QL NAA+PROBE: POSITIVE

## 2023-01-24 PROCEDURE — 87040 BLOOD CULTURE FOR BACTERIA: CPT

## 2023-01-24 PROCEDURE — 85730 THROMBOPLASTIN TIME PARTIAL: CPT

## 2023-01-24 PROCEDURE — 36415 COLL VENOUS BLD VENIPUNCTURE: CPT

## 2023-01-24 PROCEDURE — 85610 PROTHROMBIN TIME: CPT

## 2023-01-24 PROCEDURE — 0241U: CPT

## 2023-01-24 PROCEDURE — 96367 TX/PROPH/DG ADDL SEQ IV INF: CPT

## 2023-01-24 PROCEDURE — 80053 COMPREHEN METABOLIC PANEL: CPT

## 2023-01-24 PROCEDURE — 99284 EMERGENCY DEPT VISIT MOD MDM: CPT

## 2023-01-24 PROCEDURE — 83605 ASSAY OF LACTIC ACID: CPT

## 2023-01-24 PROCEDURE — 93005 ELECTROCARDIOGRAM TRACING: CPT

## 2023-01-24 PROCEDURE — 85025 COMPLETE CBC W/AUTO DIFF WBC: CPT

## 2023-01-24 PROCEDURE — 71045 X-RAY EXAM CHEST 1 VIEW: CPT

## 2023-01-24 PROCEDURE — 96365 THER/PROPH/DIAG IV INF INIT: CPT

## 2023-01-24 PROCEDURE — 96366 THER/PROPH/DIAG IV INF ADDON: CPT

## 2023-01-24 NOTE — ER
Nurse's Notes                                                                                     

 [facility]                                                                 

Name: Isrrael Bernal                                                                               

Age: 42 yrs                                                                                       

Sex: Male                                                                                         

: 1980                                                                                   

MRN: [id number]                                                                                   

Arrival Date: 2023                                                                          

Time: 06:51                                                                                       

Account#: [id number]                                                                            

Bed 8                                                                                             

Private MD:                                                                                       

Diagnosis: SARS-associated coronavirus as the cause of diseases classified elsewhere;Fever,       

  unspecified;End stage renal disease                                                             

                                                                                                  

Presentation:                                                                                     

                                                                                             

06:57 Chief complaint: Patient states: Patient C/O possible infection to right anterior chest pf1 

      dialysis catheter,onset yesterday with fever of highest temp 103F. Patient stated went      

      to [facility] today and was sent here to R/O infection to dialysis catheter.           

      Patient stated did not receive dialysis today. Patient stated was given Tylenol x 2         

      tablets PTA. Coronavirus screen: Vaccine status: Patient reports being unvaccinated.        

      Client denies travel out of the [place] in the last 14 days. Client presents with at least     

      one sign or symptom that may indicate coronavirus-19. Standard/surgical mask placed on      

      the client. Ebola Screen: Patient negative for fever greater than or equal to 101.5         

      degrees Fahrenheit, and additional compatible Ebola Virus Disease symptoms. Initial         

      Sepsis Screen: Does the patient meet any 2 criteria? Temp <36.0*C (96.8*F)) or > 38.3*C     

      (100.9*F). HR > 90 bpm. Yes Does the patient have a suspected source of infection? No.      

      Patient's initial sepsis screen is negative. Risk Assessment: Do you want to hurt           

      yourself or someone else? Patient reports no desire to harm self or others. Onset of        

      symptoms was 2023.                                                              

06:57 Method Of Arrival: Ambulatory                                                           pf1 

06:57 Acuity: HEMANT 3                                                                           pf1 

                                                                                                  

Historical:                                                                                       

- Allergies:                                                                                      

07:59 No Known Allergies;                                                                     ph  

- PMHx:                                                                                           

07:15 Diabetes - NIDDM; Hypertensive disorder; Dialysis T TH Sat;                             ss  

- PSHx:                                                                                           

07:15 right foot;                                                                             ss  

                                                                                                  

- Immunization history:: Adult Immunizations unknown.                                             

- Social history:: Smoking status: unknown.                                                       

                                                                                                  

                                                                                                  

Screenin:58 [facility] ED Fall Risk Assessment (Adult) History of falling in the last 3 months,       ph  

      including since admission No falls in past 3 months (0 pts) Confusion or Disorientation     

      No (0 pts) Intoxicated or Sedated No (0 pts) Impaired Gait No (0 pts) Mobility Assist       

      Device Used No (0 pt) Altered Elimination No (0 pt) Score/Fall Risk Level 0 - 2 = Low       

      Risk Oriented to surroundings, Maintained a safe environment, Hourly rounding (assess       

      needs \T\ fall precautionary measures) done. Abuse screen: Denies threats or abuse.         

      Denies injuries from another. Nutritional screening: No deficits noted. Tuberculosis        

      screening: No symptoms or risk factors identified.                                          

                                                                                                  

Assessment:                                                                                       

07:30 General: Appears in no apparent distress. comfortable, well groomed, Behavior is calm,  ph  

      cooperative, appropriate for age, Reports fever for 0-12 hours. Pain: Complains of pain     

      in body aches. Neuro: Level of Consciousness is awake, alert, obeys commands, Oriented      

      to person, place, time, situation. Cardiovascular: Capillary refill < 3 seconds in          

      bilateral fingers Patient's skin is warm and dry. Cardiovascular: Dialysis shunt: in        

      the chest. Respiratory: Reports cough that is Airway is patent Respiratory effort is        

      even, unlabored, Respiratory pattern is regular, symmetrical, Breath sounds are clear       

      bilaterally. Denies shortness of breath labored breathing, pain with cough. GI: No          

      signs and/or symptoms were reported involving the gastrointestinal system. EENT:            

      Reports nasal congestion sneezing. Derm: Skin is healthy with good turgor, Skin is          

      pink, warm \T\ dry.                                                                         

                                                                                                  

Vital Signs:                                                                                      

06:57  / 82; Pulse 91; Resp 18; Temp 102.7; Pulse Ox 98% on R/A; Weight 78 kg; Height 5 pf1 

      ft. 7 in. (170.18 cm); Pain 0/10;                                                           

08:03  / 75; Pulse 75; Resp 18; Pulse Ox 97% on R/A;                                    ph  

08:22 Temp 99.2(O);                                                                           ph  

09:00  / 68; Pulse 65; Resp 18; Pulse Ox 99% on R/A;                                    ph  

10:06  / 79; Pulse 73; Resp 18; Pulse Ox 98% on R/A;                                    ph  

12:01  / 86; Pulse 83; Resp 18; Pulse Ox 98% on R/A;                                    ph  

06:57 Body Mass Index 26.93 (78.00 kg, 170.18 cm)                                             pf1 

                                                                                                  

ED Course:                                                                                        

06:51 Patient arrived in ED.                                                                  ja2 

06:59 Jose Dominguez DO is Attending Physician.                                                ms3 

07:07 Triage completed.                                                                       pf1 

07:07 Sami Moore, RN is Primary Nurse.                                                      jl7 

07:25 Initial lab(s) drawn, by me, sent to lab. First set of blood cultures drawn. Inserted   ph  

      saline lock: 22 gauge in right forearm, using aseptic technique.                            

07:48 Teodora Park, RN is Primary Nurse.                                                    ph  

07:58 Chest Single View XRAY In Process Unspecified.                                          EDMS

07:58 Arm band placed on Patient placed in an exam room, on a stretcher.                      ph  

08:03 Patient has correct armband on for positive identification. Bed in low position. Call   ph  

      light in reach. Side rails up X 1. Client placed on continuous cardiac and pulse            

      oximetry monitoring. NIBP monitoring applied.                                               

10:07 No provider procedures requiring assistance completed.                                  ph  

                                                                                                  

Administered Medications:                                                                         

09:17 Drug: Cefepime 1 grams Route: IVPB; Rate: 200 ml/hr; Infused Over: 30 mins; Site: right ph  

      forearm;                                                                                    

09:50 Follow up: Response: No adverse reaction; IV Status: Completed infusion; IV Intake:     ph  

      100ml                                                                                       

09:50 Drug: vancoMYCIN 1.5 grams Route: IVPB; Rate: calculated rate; Site: right forearm;     ph  

12:32 Follow up: Response: No adverse reaction; IV Status: Completed infusion                 ph  

                                                                                                  

                                                                                                  

Medication:                                                                                       

07:59 VIS not applicable for this client.                                                     ph  

                                                                                                  

Intake:                                                                                           

09:50 IV: 100ml; Total: 100ml.                                                                ph  

                                                                                                  

Outcome:                                                                                          

12:19 Discharge ordered by MD.                                                                ms3 

12:36 Patient left the ED.                                                                    ph  

                                                                                                  

Signatures:                                                                                       

Dispatcher [employer]                           EDMS                                                 

Naya Geiger RN                      RN                                                      

Teodora Park, MATT                      RN   ph                                                   

Sami Moore, RN                        RN   jl7                                                  

Jose Dominguez DO                        DO   ms3                                                  

Aranza Wei                                                  

Angela hancock, RN                      RN   pf1                                                  

                                                                                                  

**************************************************************************************************

## 2023-01-24 NOTE — EDPHYS
Physician Documentation                                                                           

 [facility]                                                                 

Name: Isrrael Bernal                                                                               

Age: 42 yrs                                                                                       

Sex: Male                                                                                         

: 1980                                                                                   

MRN: [id number]                                                                                   

Arrival Date: 2023                                                                          

Time: 06:51                                                                                       

Account#: [id number]                                                                            

Bed 8                                                                                             

Private MD:                                                                                       

ED Physician Jose Dominguez                                                                         

HPI:                                                                                              

                                                                                             

07:13 This 42 yrs old  Male presents to ER via Ambulatory with complaints of fever.   ms3 

07:16 42-year-old male with past medical history of end-stage renal disease presents for      ms3 

      fever. Patient states he went to [facility] for dialysis today and was unable to       

      receive dialysis secondary to fever. Patient was referred to the emergency department.      

      Patient denies pain. Patient endorses sneezing. Patient denies abdominal pain, nausea,      

      vomiting, shortness of breath..                                                             

                                                                                                  

Historical:                                                                                       

- Allergies:                                                                                      

07:59 No Known Allergies;                                                                     ph  

- PMHx:                                                                                           

07:15 Diabetes - NIDDM; Hypertensive disorder; Dialysis T TH Sat;                             ss  

- PSHx:                                                                                           

07:15 right foot;                                                                             ss  

                                                                                                  

- Immunization history:: Adult Immunizations unknown.                                             

- Social history:: Smoking status: unknown.                                                       

                                                                                                  

                                                                                                  

ROS:                                                                                              

07:16 Neck: Negative for injury, pain, and swelling, Abdomen/GI: Negative for abdominal pain, ms3 

      nausea, vomiting, diarrhea, and constipation, Skin: Negative for injury, rash, and          

      discoloration.                                                                              

07:16 Constitutional: Positive for fever.                                                         

07:16 ENT: Positive for sneezing.                                                                 

07:16 All other systems are negative.                                                             

                                                                                                  

Exam:                                                                                             

07:16 Constitutional:  This is a well developed, well nourished patient who is awake, alert,  ms3 

      and in no acute distress. Head/Face:  Normocephalic, atraumatic. Eyes:  Pupils equal        

      round and reactive to light, extra-ocular motions intact.  Lids and lashes normal.          

      Conjunctiva and sclera are non-icteric and not injected.  Periorbital areas with no         

      swelling, redness, or edema. Chest/axilla:  Normal chest wall appearance and motion.        

      Nontender with no deformity.   Cardiovascular:  Regular rate and rhythm with a normal       

      S1 and S2.  No gallops, murmurs, or rubs.  Normal PMI, no JVD.  No pulse deficits.          

      Respiratory:  Lungs have equal breath sounds bilaterally, clear to auscultation and         

      percussion.  No rales, rhonchi or wheezes noted.  No increased work of breathing, no        

      retractions or nasal flaring. Abdomen/GI:  Soft, non-tender, with normal bowel sounds.      

      No distension or tympany.  No guarding or rebound.  No evidence of tenderness               

      throughout. Skin:  Warm, dry with normal turgor.  Normal color with no rashes, no           

      lesions, and no evidence of cellulitis. MS/ Extremity:  Pulses equal, no cyanosis.          

      Neurovascular intact.  Full, normal range of motion.                                        

08:08 ECG was reviewed by the Attending Physician.                                            ms3 

                                                                                                  

Vital Signs:                                                                                      

06:57  / 82; Pulse 91; Resp 18; Temp 102.7; Pulse Ox 98% on R/A; Weight 78 kg; Height 5 pf1 

      ft. 7 in. (170.18 cm); Pain 0/10;                                                           

08:03  / 75; Pulse 75; Resp 18; Pulse Ox 97% on R/A;                                    ph  

08:22 Temp 99.2(O);                                                                           ph  

09:00  / 68; Pulse 65; Resp 18; Pulse Ox 99% on R/A;                                    ph  

10:06  / 79; Pulse 73; Resp 18; Pulse Ox 98% on R/A;                                    ph  

12:01  / 86; Pulse 83; Resp 18; Pulse Ox 98% on R/A;                                    ph  

06:57 Body Mass Index 26.93 (78.00 kg, 170.18 cm)                                             pf1 

                                                                                                  

MDM:                                                                                              

07:06 Patient medically screened.                                                             ms3 

07:16 Differential diagnosis: viral Infection, bacterial infection, URI, pneumonia UTI.       ms3 

08:56 ED course: Discussed case with Dr Schwab, Nephrology, and he would like patient to get   ms3 

      Cefepime 1 gm and Vancomycin 1.5 gm while blood cultures are pending. Patient can do        

      dialysis on isolation shift on T, TR, Sat..                                                 

19:24 Data reviewed: vital signs, nurses notes, lab test result(s), EKG, radiologic studies,  ms3 

      and as a result, I will discharge patient. Consideration of Admission/Observation           

      Escalation of care including admission/observation considered. No emergent medical          

      condition necessitating admission found at this time. Management of patient was             

      discussed with the following: Consultant: Dr Schwab. I considered the following              

      discharge prescriptions or medication management in the emergency department                

      Medications were administered in the Emergency Department. See MAR. Independent             

      interpretation of the following test(s) in the Emergency Department EKG: See my EKG         

      interpretation above Cardiac monitor: rate is 84 beats/min, Rhythm is normal sinus          

      rhythm, regular, with no ectopy, Interpretation: normal rate, normal rhythm.                

      Counseling: I had a detailed discussion with the patient and/or guardian regarding: the     

      historical points, exam findings, and any diagnostic results supporting the                 

      discharge/admit diagnosis, lab results, radiology results, the need for outpatient          

      follow up, to return to the emergency department if symptoms worsen or persist or if        

      there are any questions or concerns that arise at home.                                     

                                                                                                  

                                                                                             

07:07 Order name: Blood Culture Adult (2)                                                                                                                                                  

07:07 Order name: CBC with Diff; Complete Time: 08:06                                                                                                                                      

07:07 Order name: CMP; Complete Time: 08:06                                                                                                                                                

07:07 Order name: Lactate w/ 2H reflex if indic.; Complete Time: 08:06                                                                                                                     

07:07 Order name: Protime (+inr)                                                                                                                                                           

07:07 Order name: Ptt, Activated                                                                                                                                                           

07:07 Order name: Chest Single View XRAY; Complete Time: 08:20                                                                                                                             

07:07 Order name: EKG; Complete Time: 07:08                                                                                                                                                

07:07 Order name: Accucheck; Complete Time: 07:49                                                                                                                                          

07:07 Order name: Cardiac monitoring; Complete Time: 07:49                                                                                                                                 

07:15 Order name: COVID-19/FLU A+B/RSV; Complete Time: 08:51                                                                                                                               

07:07 Order name: EKG - Nurse/Tech; Complete Time: 08:18                                                                                                                                   

07:07 Order name: IV Saline Lock - Large Bore; Complete Time: 07:49                                                                                                                        

07:07 Order name: Labs collected and sent; Complete Time: 07:49                                                                                                                            

07:07 Order name: O2 Per Protocol; Complete Time: 07:49                                                                                                                                    

07:07 Order name: O2 Sat Monitoring; Complete Time: 07:49                                                                                                                                  

07:07 Order name: Urine Dipstick-Ancillary (obtain specimen)                                                                                                                               

07:07 Order name: Vital Signs; Complete Time: 07:48                                           ms3 

                                                                                                  

EC:08 Rate is 73 beats/min. Rhythm is regular. QRS Axis is Normal. WV interval is normal. QRS ms3 

      interval is normal. Clinical impression: Normal ECG. Interpreted by me. Reviewed by me.     

                                                                                                  

Administered Medications:                                                                         

09:17 Drug: Cefepime 1 grams Route: IVPB; Rate: 200 ml/hr; Infused Over: 30 mins; Site: right ph  

      forearm;                                                                                    

09:50 Follow up: Response: No adverse reaction; IV Status: Completed infusion; IV Intake:     ph  

      100ml                                                                                       

09:50 Drug: vancoMYCIN 1.5 grams Route: IVPB; Rate: calculated rate; Site: right forearm;     ph  

12:32 Follow up: Response: No adverse reaction; IV Status: Completed infusion                 ph  

                                                                                                  

                                                                                                  

Disposition Summary:                                                                              

23 12:19                                                                                    

Discharge Ordered                                                                                 

      Location: Home                                                                          ms3 

      Condition: Stable                                                                       ms3 

      Diagnosis                                                                                   

        - SARS-associated coronavirus as the cause of diseases classified elsewhere           ms3 

        - Fever, unspecified                                                                  ms3 

        - End stage renal disease                                                             ms3 

      Followup:                                                                               ms3 

        - With: Private Physician                                                                  

        - When: 2 - 3 days                                                                         

        - Reason: Re-evaluation by your physician                                                  

      Discharge Instructions:                                                                     

        - Discharge Summary Sheet                                                             ms3 

        - Fever, Adult                                                                        ms3 

        - COVID-19                                                                            ms3 

        - COVID-19: Quarantine vs. Isolation - [place]                                            ms3 

        - Prevent the Spread of COVID-19 if You Are Sick - [place]                                ms3 

      Forms:                                                                                      

        - Medication Reconciliation Form                                                      ms3 

        - Thank You Letter                                                                    ms3 

        - Antibiotic Education                                                                ms3 

        - Prescription Opioid Use                                                             ms3 

Signatures:                                                                                       

Dispatcher [employer]                           Naya Encarnacion RN RN                                                      

Teodora Park RN                      RN                                                      

Jose Dominguez DO                        DO   ms3                                                  

                                                                                                  

**************************************************************************************************

## 2023-01-24 NOTE — RAD REPORT
EXAM DESCRIPTION:  RAD - Chest Single View - 1/24/2023 7:56 am

 

CLINICAL HISTORY:  FEVER

 

COMPARISON:  Portable 06/20/2022

 

TECHNIQUE:  AP portable chest image was obtained 1/24/2023 7:56 am .

 

FINDINGS:  Lungs are clear. Heart and vasculature are normal. No measurable pleural effusion and no p
neumothorax. No acute bony abnormality seen. No acute aortic findings suspected. Double-lumen dialysi
s catheter has been placed on the right side since prior imaging.

 

IMPRESSION:  No acute cardiopulmonary process.

## 2023-01-24 NOTE — XMS REPORT
Continuity of Care Document

                           Created on:2023



Patient:GIULIANO BERNAL

Sex:Male

:1980

External Reference #:[id number]





Demographics







                          Address                   [address]



                                                    [place] [address]

 

                          Home Phone                [phone]

 

                          Work Phone                [phone]

 

                          Mobile Phone              [phone] )

 

                          Email Address             [email or web address]

 

                          Preferred Language        English

 

                          Marital Status            Unknown

 

                          Taoist Affiliation     Unknown

 

                          Race                      Unknown

 

                          Additional Race(s)        Unavailable



                                                    White

 

                          Ethnic Group              Unknown









Author







                          Organization              [facility]

t

 

                          Address                   [address]



                                                    [place] [address]

 

                          Phone                     [phone]









Support







                Name            Relationship    Address         Phone

 

                YAA BERNAL P               [address] Unavailable



                                                [place] [address] 

 

                Amada Bernal  Spouse          Unavailable     [phone]









Care Team Providers







                    Name                Role                Phone

 

                    [facility] Robert HOWELL     Primary Care Physician [phone]

 

                    ALBERTO BUTT Attending Clinician Unavailable

 

                    Alberto Butt MD Attending Clinician [phone]

 

                    Doctor Unassigned, No Name Attending Clinician Unavailable

 

                    Belem Reinoso LVN Attending Clinician [phone]

 

                    STEVE ROLLINS      Attending Clinician Unavailable

 

                    Gerry Hood DO   Attending Clinician [phone]

 

                    Trey Mason DO Attending Clinician [phone]

 

                    Steve Rollins DO   Attending Clinician [phone]

 

                    [facility] Attending Clinician Unavailable

 

                    DMITRI MARIE        Attending Clinician Unavailable

 

                    Edd Nova MD Attending Clinician [phone]

 

                    Heriberto Dacosta MD   Attending Clinician [phone]

 

                    Des Amado DO    Attending Clinician [phone]

 

                    DES AMADO       Attending Clinician Unavailable

 

                    Skip Veloz DPM Attending Clinician [phone]

 

                    TREY MASON Admitting Clinician Unavailable

 

                    Trey Mason DO Admitting Clinician [phone]

 

                    Heriberto Dacosta MD   Admitting Clinician [phone]

 

                    HERIBERTO DACOSTA      Admitting Clinician Unavailable









Payers







           Payer Name Policy Type Policy Number Effective Date Expiration Date DEBBIE bravo

 

           MEDICARE PART A            [id number] 2023            



           \T\ B                            00:00:00              







Problems







       Condition Condition Condition Status Onset  Resolution Last   Treating Co

mments 

Source



       Name   Details Category        Date   Date   Treatment Clinician        



                                                 Date                 

 

       Hypertensi Hypertensi Disease Active 2022                             U

nivers



       on,    on,                  0-28                               ity of



       unspecifie unspecifie               00:00:                             [facility]

[place]



       d type d type               00                                 Medical



                                                                      Branch

 

       Type 2 Type 2 Disease Active                              Univers



       diabetes diabetes               4-10                               ity of



       mellitus mellitus               00:00:                             [place]



       without without               00                                 Medical



       complicati complicati                                                  Br

anch



       on,    on,                                                     



       without without                                                  



       long-term long-term                                                  



       current current                                                  



       use of use of                                                  



       insulin insulin                                                  

 

       Essential Essential Disease Active                              Uni

vers



       hypertensi hypertensi               4-10                               it

y of



       on     on                   00:00:                             [place]



                                                                    Medical



                                                                      Branch

 

       ANY (acute ANY (acute Disease Active                              U

nivers



       kidney kidney               4-10                               ity of



       injury) injury)               00:00:                             [place]



                                   00                                 Medical



                                                                      Branch

 

       Elevated Elevated Disease Active                              Unive

rs



       brain  brain                4-10                               ity of



       natriureti natriureti               00:00:                             [facility]

[place]



       c peptide c peptide               00                                 Medi

hussain



       (BNP)  (BNP)                                                   Branch



       level  level                                                   

 

       Hypertensi Hypertensi Disease Active                              U

nivers



       ve urgency ve urgency               4-10                               it

y of



                                   00:00:                             [place]



                                                                    Medical



                                                                      Branch

 

       Preop  Preop  Disease Active                              Univers



       cardiovasc cardiovasc               4-10                               it

y of



       ular exam ular exam               00:00:                             [place]

[facility]



                                                                    [facility]



                                                                      Branch

 

       Osteomyeli Osteomyeli Disease Active                              U

nivers



       tis    tis                  4-09                               ity of



                                   00:00:                             [place]



                                                                    Medical



                                                                      Branch

 

       Septic Septic Disease Active                              Univers



       arthritis arthritis               5-31                               ity 

of



                                   00:00:                             [place]



                                                                    Medical



                                                                      Branch

 

       Obesity Obesity Disease Active                              Univers



       (BMI   (BMI                 5-31                               ity of



       30-39.9) 30-39.9)               00:00:                             [place]



                                                                    Medical



                                                                      Branch

 

       Cellulitis Cellulitis Disease Active                              U

nivers



                                   5-31                               ity of



                                   00:00:                             [place]



                                                                    Medical



                                                                      Branch







Allergies, Adverse Reactions, Alerts







       Allergy Allergy Status Severity Reaction(s) Onset  Inactive Treating Comm

ents 

Source



       Name   Type                        Date   Date   Clinician        

 

       [facility] Active                                     



       Intraven ty to                       6-25                        



       ous    adverse                      00:00:                      



              reaction                      00                          



              to drug                                                  

 

       NO KNOWN Drug   Active                                           Univers



       ALLERGIE Class                                                   ity of



       S                                                              [facility]







Family History







           Family Member Diagnosis  Comments   Start Date Stop Date  Source

 

           Natural father Diabetes                                    [facility]

 

           Natural mother Asthma                                      [facility]

 

           Natural mother Diabetes                                    [facility]







Social History







           Social Habit Start Date Stop Date  Quantity   Comments   Source

 

           History of                       Current smoker            University

 of



           tobacco use                                             [facility]



                                                                  Branch

 

           History SDOH 2022-10-31 2022-10-31 3                     University o

f



           Financial  00:00:00   00:00:00                         [place] [facility]



                                                                  Branch

 

           History SDOH Food 2022-10-31 2022-10-31 2                     Univers

ity of



           Worry      00:00:00   00:00:00                         [place] [facility]



                                                                  Branch

 

           History SDOH Food 2022-10-31 2022-10-31 2                     Univers

ity of



           Scarcity   00:00:00   00:00:00                         [place] [facility]



                                                                  Branch

 

           History SDOH 2022-10-31 2022-10-31 2                     University o

f



           Transport Med 00:00:00   00:00:00                         [place] [facility]

al



                                                                  Branch

 

           History SDOH 2022-10-31 2022-10-31 2                     University o

f



           Transport Non-Med 00:00:00   00:00:00                         [facility]

edical



                                                                  Branch

 

           Exposure to 2022-10-18 2022-10-28 Not sure              University 



           SARS-CoV-2 00:00:00   13:12:00                         [facility]



           (event)                                                Branch

 

           Alcohol intake 2022-10-28 2022-10-28 Current drinker            Unive

rsity of



                      00:00:00   00:00:00   of alcohol            [facility]



                                            (finding)             Branch

 

           Tobacco use and 2021-04-10 2021-04-10 Smokeless tobacco            Un

iversity of



           exposure   00:00:00   00:00:00   non-user              [facility]

 

           Alcohol Comment 2019 occasional drinks            Un

iversity of



                      00:00:00   00:00:00                         [facility]

 

           Sex Assigned At 1980                       Universit

y of



           Birth      00:00:00   00:00:00                         [facility]









                Smoking Status  Start Date      Stop Date       Source

 

                Ex-smoker       2021-04-10 00:00:00 2021-04-10 00:00:00 Universi

ty of [facility]







Medications







       Ordered Filled Start  Stop   Current Ordering Indication Dosage Frequency

 Signature

                    Comments            Components          Source



     Medication Medication Date Date Medication? Clinician                (SIG) 

          



     Name Name                                                   

 

     TAKE 2022      No                                      



     TABLET      2-07                                              



     DAILY.      00:00:                                              



               00                                                

 

     TAKE 2022      No                                      



     TABLET      2-07                                              



     DAILY.      00:00:                                              



               00                                                

 

     ergocalcife      2022- Yes       [id number]      Take 1     

      Univers



     rol,                 [id number]           capsule by           ity of



     vitamin d2,      00:00: 05:59                          mouth           [place]

[place]



     1,250 mcg      00   :00                           weekly for           Medi

hussain



     (50,000                                         5 doses.           Branch



     unit)                                                        



     capsule                                                        

 

     ergocalcife      2022- Yes       [id number]      Take 1     

      Univers



     rol,                 [id number]           capsule by           ity of



     vitamin d2,      00:00: 05:59                          mouth           [place]

[facility]



     1,250 mcg      00   :00                           weekly for           Medi

hussain



     (50,000                                         5 doses.           Branch



     unit)                                                        



     capsule                                                        

 

     ergocalcife      2022- Yes       [id number]      Take 1     

      [facility]



     rol,                 [id number]           capsule by           ity of



     vitamin d2,      00:00: 05:59                          mouth           [place]

[place]



     1,250 mcg      00   :00                           weekly for           Medi

hussain



     (50,000                                         5 doses.           Branch



     unit)                                                        



     capsule                                                        

 

     pantoprazol      2022- Yes       [id number] 40mg      Take 1       

    Univers



     e 40 mg EC                 [id number]           tablet by           i

ty of



     tablet      00:00: 05:59                          mouth in           [place]



               00   :00                           [facility]



                                                  for 30           



                                                  days.           

 

     pantoprazol      2022- Yes       [id number] 40mg      Take 1       

    Univers



     e 40 mg EC                 [id number]           tablet by           i

ty of



     tablet      00:00: 05:59                          mouth in           [place]



               00   :00                           [facility]



                                                  for 30           



                                                  days.           

 

     pantoprazol      2022- Yes       [id number] 40mg      Take 1       

    Univers



     e 40 mg EC                 [id number]           tablet by           i

ty of



     tablet      00:00: 05:59                          mouth in           [place]



               00   :00                           [facility]



                                                  for 30           



                                                  days.           

 

     aspirin 81      2022      Yes            81mg      Take 81 mg           U

nivers



     mg chewable                                     by mouth           ity 

of



     tablet      17:41:                               daily.           [address]

 

     metoprolol      2022      Yes            25mg      Take 25 mg           U

nivers



     tartrate 25      1-07                               by mouth           ity 

of



     mg tablet      17:41:                               in the           [place]



               41                                 morning           Medical



                                                  and 25 mg           Branch



                                                  in the           



                                                  evening.           

 

     calcitrioL      2022      Yes            .25ug      Take 0.25           U

nivers



     0.25 mcg      1-07                               mcg by           ity of



     capsule      17:41:                               mouth in           [place]



               41                                 the            Medical



                                                  morning.           Branch

 

     aspirin 81      2022      Yes            81mg      Take 81 mg           U

nivers



     mg chewable      1-07                               by mouth           ity 

of



     tablet      17:41:                               daily.           [place]



               41                                                Medical



                                                                 Branch

 

     metoprolol      2022      Yes            25mg      Take 25 mg           U

nivers



     tartrate 25      1-07                               by mouth           ity 

of



     mg tablet      17:41:                               in the           [place]



               41                                 morning           Medical



                                                  and 25 mg           Branch



                                                  in the           



                                                  evening.           

 

     calcitrioL      2022      Yes            .25ug      Take 0.25           U

nivers



     0.25 mcg      1-07                               mcg by           ity of



     capsule      17:41:                               mouth in           [place]



               41                                 the            Medical



                                                  morning.           Branch

 

     aspirin 81      2022      Yes            81mg      Take 81 mg           U

nivers



     mg chewable      1-07                               by mouth           ity 

of



     tablet      17:41:                               daily.           [place]



               41                                                Medical



                                                                 Branch

 

     metoprolol      2022      Yes            25mg      Take 25 mg           U

nivers



     tartrate 25      1-07                               by mouth           ity 

of



     mg tablet      17:41:                               in the           [place]



               41                                 morning           Medical



                                                  and 25 mg           Branch



                                                  in the           



                                                  evening.           

 

     calcitrioL      2022      Yes            .25ug      Take 0.25           U

nivers



     0.25 mcg      1-07                               mcg by           ity of



     capsule      17:41:                               mouth in           [place]



               41                                 the            Medical



                                                  morning.           Branch

 

     aspirin 81      2022      Yes            81mg      Take 81 mg           U

nivers



     mg chewable      1-07                               by mouth           ity 

of



     tablet      17:41:                               daily.           [place]



               41                                                Medical



                                                                 Branch

 

     metoprolol      2022      Yes            25mg      Take 25 mg           U

nivers



     tartrate 25      1-07                               by mouth           ity 

of



     mg tablet      17:41:                               in the           [place]



               41                                 morning           Medical



                                                  and 25 mg           Branch



                                                  in the           



                                                  evening.           

 

     calcitrioL      2022      Yes            .25ug      Take 0.25           U

nivers



     0.25 mcg      1-07                               mcg by           ity of



     capsule      17:41:                               mouth in           [place]



               41                                 the            Medical



                                                  morning.           Branch

 

     aspirin 81      2022      Yes            81mg      Take 81 mg           U

nivers



     mg chewable      1-07                               by mouth           ity 

of



     tablet      17:41:                               daily.           [place]



               41                                                Medical



                                                                 Branch

 

     metoprolol      2022      Yes            25mg      Take 25 mg           U

nivers



     tartrate 25      07                               by mouth           ity 

of



     mg tablet      17:41:                               in the           [place]



               41                                 morning           Medical



                                                  and 25 mg           Branch



                                                  in the           



                                                  evening.           

 

     calcitrioL      2022      Yes            .25ug      Take 0.25           U

nivers



     0.25 mcg      1-07                               mcg by           ity of



     capsule      17:41:                               mouth in           [place]



               41                                 the            Medical



                                                  morning.           Branch

 

     metFORMIN      2022- No             500mg      Take 500           Un

anna



     500 mg       11-07                          mg by           ity of



     tablet      15:06: 00:00                          mouth 2           [place]



               53   :00                           (two)           Medical



                                                  times           [facility]



                                                  daily with           



                                                  meals.           

 

     glipiZIDE      2022- No             2.5mg      Take 2.5           Un

anna



     XL 2.5 mg                                mg by           ity of



     24 hr      15:06: 00:00                          mouth           [place]



     tablet      53   :00                           daily with           Medical



                                                  breakfast.           [facility]

 

     calcium      2022- Yes       [id number] 667mg      Take 1          

 [facility]



     acetate,alfredo                 [id number]           capsule by          

 [facility] of



     sphat bind,      00:00: 05:59                          mouth in           [place]

[place]



     667 mg      00   :00                           the            Medical



     capsule                                         morning           [facility]



                                                  and 1           



                                                  capsule at           



                                                  noon and 1           



                                                  capsule in           



                                                  the            



                                                  evening.           



                                                  Take with           



                                                  meals. Do           



                                                  all this           



                                                  for 30           



                                                  days.           

 

     furosemide      2022- Yes       [id number] 20mg      Take 1        

   [facility]



     20 mg                 [id number]           tablet by           ity of



     tablet      00:00: 05:59                          mouth in           [place]



               00   :00                           the            Medical



                                                  morning           [facility]



                                                  and 1           



                                                  tablet in           



                                                  the            



                                                  evening.           



                                                  Do all           



                                                  this for           



                                                  30 days.           

 

     calcium      2022- Yes       [id number] 667mg      Take 1          

 [facility]



     acetate,alfredo                 [id number]           capsule by          

 ity of



     sphat bind,      00:00: 05:59                          mouth in           [place]

[place]



     667 mg      00   :00                           the            Medical



     capsule                                         morning           [facility]



                                                  and 1           



                                                  capsule at           



                                                  noon and 1           



                                                  capsule in           



                                                  the            



                                                  evening.           



                                                  Take with           



                                                  meals. Do           



                                                  all this           



                                                  for 30           



                                                  days.           

 

     furosemide      2022- Yes       [id number] 20mg      Take 1        

   [facility]



     20 mg                 [id number]           tablet by           ity of



     tablet      00:00: 05:59                          mouth in           [place]



               00   :00                           the            Medical



                                                  morning           [facility]



                                                  and 1           



                                                  tablet in           



                                                  the            



                                                  evening.           



                                                  Do all           



                                                  this for           



                                                  30 days.           

 

     calcium      2022- Yes       [id number] 667mg      Take 1          

 [facility]



     acetate,alfredo                 capsule by          

 ity of



     sphat bind,      00:00: 05:59                          mouth in           [facility]

[place]



     667 mg      00   :00                           the            Medical



     capsule                                         morning           [facility]



                                                  and 1           



                                                  capsule at           



                                                  noon and 1           



                                                  capsule in           



                                                  the            



                                                  evening.           



                                                  Take with           



                                                  meals. Do           



                                                  all this           



                                                  for 30           



                                                  days.           

 

     furosemide      2022- Yes       [id number] 20mg      Take 1        

   [facility]



     20 mg                 tablet by           ity of



     tablet      00:00: 05:59                          mouth in           [place]



               00   :00                           the            Medical



                                                  morning           [facility]



                                                  and 1           



                                                  tablet in           



                                                  the            



                                                  evening.           



                                                  Do all           



                                                  this for           



                                                  30 days.           

 

     furosemide      2022      Yes            20mg      20 mg,           [facility]

rs



     (LASIX)                                     Oral, BID,           ity of



     tablet 20      14:00:                               First dose           [facility]

[place]



     mg        00                                 (after           Medical



                                                  last           [facility]



                                                  modificati           



                                                  on) on Sun           



                                                  11/6/22 at           



                                                  0800,           



                                                  Until           



                                                  Discontinu           



                                                  ed,            



                                                  Routine           

 

     NaCl 0.9%      2022- No             5mL       5 mL, Slow           U

nivers



     (NS)                                IV Push,           ity of



     injection 5      13:45: 16:30                          ONCE, 1           [facility]

[place]



     mL        00   :00                           dose, On           Medical



                                                  Sat            [facility]



                                                  22 at           



                                                  0845,           



                                                  Routine           

 

     heparin      2022      Yes            2000U      PRN - SEE           [facility]

ers



     1,000      1-                               INSTRUCTIO           ity of



     unit/mL (10      13:41:                               NS,            [place]



     mL) -      31                                 Starting           Medical



     dialysis                                         on Sat           [facility]



     catheter                                         22 at           



     care                                         0841,           



                                                  Until           



                                                  Discontinu           



                                                  ed,            



                                                  Routine<br           



                                                  >For           



                                                  Priming of           



                                                  Ports:&nbs           



                                                  p;&nbsp;&n           



                                                  bsp;&nbsp;           



                                                  After           



                                                  initial           



                                                  saline           



                                                  flush,           



                                                  prime each           



                                                  port with           



                                                  heparin           



                                                  according           



                                                  to the           



                                                  priming           



                                                  volume           



                                                  listed on           



                                                  each           



                                                  catheter           



                                                  port for           



                                                  catheter           



                                                  lock.<br>           

 

     heparin      2022- No             2000U      PRN - SEE           Uni

vers



     1,000      1 11-                          INSTRUCTIO           ity of



     unit/mL (10      13:03: 19:56                          NS,            [place]



     mL) -      24   :58                           Starting           Medical



     dialysis                                         on Thu           [facility]



     catheter                                         11/3/22 at           



     care                                         0803,           



                                                  Until 22 at           



                                                  1456,           



                                                  Routine<br           



                                                  >For           



                                                  Priming of           



                                                  Ports:&nbs           



                                                  p;&nbsp;&n           



                                                  bsp;&nbsp;           



                                                  After           



                                                  initial           



                                                  saline           



                                                  flush,           



                                                  prime each           



                                                  port with           



                                                  heparin           



                                                  according           



                                                  to the           



                                                  priming           



                                                  volume           



                                                  listed on           



                                                  each           



                                                  catheter           



                                                  port for           



                                                  catheter           



                                                  lock.<br>           

 

     furosemide      2022- No             40mg      40 mg,           Univ

ers



     (LASIX)                                Oral, BID,           ity o

f



     tablet 40      01:00: 12:45                          First dose           [facility]

[place]



     mg        00   :18                           (after           Medical



                                                  last           [facility]



                                                  modificati           



                                                  on) on 22 at           



                                                  2000,           



                                                  Until           



                                                  Discontinu           



                                                  ed,            



                                                  Routine           

 

     magnesium      2022      Yes            400mg      400 mg,           Univ

ers



     oxide                                     Oral,           ity of



     (MAG-OX      14:00:                               DAILY,           [place]



     400) tablet      00                                 First dose           Me

dical



     400 mg                                         on 22 at           



                                                  0900,           



                                                  Until           



                                                  Discontinu           



                                                  ed,            



                                                  Routine           

 

     NaCl 0.9%      2022- No             5mL       5 mL, Slow           U

nivers



     (NS)                                IV Push,           ity of



     injection 5      22:00: 22:00                          ONCE, 1           [facility]

[place]



     mL        00   :00                           dose, On           [facility]



                                                  22 at           



                                                  1700,           



                                                  Routine           

 

     heparin      2022 No             2000U      PRN - SEE           Uni

vers



     1,000                                INSTRUCTIO           ity of



     unit/mL (10      21:49: 19:56                          NS,            [place]



     mL) -      41   :58                           Starting           Medical



     dialysis                                         on [facility]



     catheter                                         22 at           



     care                                         1649,           



                                                  Until 22 at           



                                                  1456,           



                                                  Routine<br           



                                                  >For           



                                                  Priming of           



                                                  Ports:&nbs           



                                                  p;&nbsp;&n           



                                                  bsp;&nbsp;           



                                                  After           



                                                  initial           



                                                  saline           



                                                  flush,           



                                                  prime each           



                                                  port with           



                                                  heparin           



                                                  according           



                                                  to the           



                                                  priming           



                                                  volume           



                                                  listed on           



                                                  each           



                                                  catheter           



                                                  port for           



                                                  catheter           



                                                  lock.<br>           

 

     FENTanyl PF      2022- No                       Slow IV           Un

anna



     (SUBLIMAZE                                Push, PRN,           it

y of



     (PF))      18:16: 21:24                          Starting           [place]



     injection      00   :51                           on [facility]



                                                  22 at           [facility]



                                                  1316,           



                                                  Until Thu           



                                                  11/3/22 at           



                                                  1624,           



                                                  Routine           

 

     midazolam      2022- No                       IV Push,           Uni

vers



     (VERSED)                                PRN,           ity of



     injection      18:16: 21:24                          Starting           [place]

as



               00   :51                           on Tue           Medical



                                                  22 at           [facility]



                                                  1316,           



                                                  Until Thu           



                                                  11/3/22 at           



                                                  1624,           



                                                  Routine           

 

     heparin      2022- No                       Slow IV           Univer

s



     1,000                                Push, PRN,           ity of



     unit/mL      18:07: 19:56                          Starting           [place]



     injection      46   :58                           on Tue           Medical



                                                  22 at           [facility]



                                                  1307,           



                                                  Until Fri           



                                                  22 at           



                                                  1456,           



                                                  Routine           

 

     lidocaine      2022- No                       PRN,           [facility]



     1% (PF)                                Starting           ity of



     (XYLOCAINE)      18:07: 21:24                          on Tue           [place]

as



     injection      18   :51                           22 at           [facility]

hussain



                                                  1307,           Branch



                                                  Until Thu           



                                                  11/3/22 at           



                                                  1624,           



                                                  Routine           

 

     hydrALAZINE      2022- No             10mg      10 mg,           Uni

vers



     (APRESOLINE      0-31 10-31                          Oral,           ity of



     ) tablet 10      14:15: 14:15                          ONCE, 1           Te

[place]



     mg        00   :00                           dose, On           Medical



                                                  [facility]



                                                  10/31/22           



                                                  at 0915,           



                                                  Routine           

 

     amLODIPine      2022- No             10mg      10 mg,           Univ

ers



     (NORVASC)                                Oral,           ity of



     tablet 10      14:00: 19:12                          DAILY,           [place]



     mg        00   :58                           First dose           Medical



                                                  (after           Branch



                                                  last           



                                                  modificati           



                                                  on) on Mon           



                                                  10/31/22           



                                                  at 0900,           



                                                  Until           



                                                  Discontinu           



                                                  ed,            



                                                  Routine           

 

     ergocalcife      2022      Yes            [id number]      50,000           Un

anna



     rol       0-30                               Units,           ity of



     (vitamin      14:00:                               Oral,           [place]



     d2)       00                                 QWEEKLY,           Medical



     (CALCIFEROL                                         First dose           Br

anch



     ) capsule                                         on Sun           



     50,000                                         10/30/22           



     Units                                         at 0900,           



                                                  Until           



                                                  Discontinu           



                                                  ed,            



                                                  Routine           

 

     calcium      2022      Yes            667mg      667 mg,           Univer

s



     acetate(alfredo      0-29                               Oral, TID           ity

 of



     sphat bind)      22:00:                               MEALS,           [place]

[place]



     (PHOSLO)      00                                 First dose           Medic

al



     capsule 667                                         on Sat           Branch



     mg                                           10/29/22           



                                                  at 1700,           



                                                  Until           



                                                  Discontinu           



                                                  ed,            



                                                  Routine           

 

     sevelamer      2022- No             800mg      800 mg,           Uni

vers



     (RENVELA)      0-29 10-                          Oral, TID           ity 

of



     tablet 800      17:00: 21:44                          MEALS,           [place]

s



     mg        00   :03                           First dose           Medical



                                                  on Sat           [facility]



                                                  10/29/22           



                                                  at 1200,           



                                                  Until           



                                                  Discontinu           



                                                  ed,            



                                                  Routine           

 

     pantoprazol      2022      Yes            40mg      40 mg,           Univ

ers



     e         0-                               Oral,           ity of



     (PROTONIX)      14:00:                               DAILY,           [place]



     EC tablet      00                                 First dose           Medi

hussain



     40 mg                                         on Sat           Branch



                                                  10/29/22           



                                                  at 0900,           



                                                  Until           



                                                  Discontinu           



                                                  ed,            



                                                  Routine           

 

     calcitrioL      2022      Yes            .25ug      0.25 mcg,           U

nivers



     (ROCALTROL)      0-                               Oral,           ity of



     capsule      14:00:                               DAILY,           [place]



     0.25 mcg      00                                 First dose           Medic

al



                                                  on Sat           Branch



                                                  10/29/22           



                                                  at 0900,           



                                                  Until           



                                                  Discontinu           



                                                  ed,            



                                                  Routine           

 

     aspirin      2022      Yes            81mg      81 mg,           [facility]



     chewable      0-                               Oral,           ity of



     tablet 81      14:00:                               DAILY,           [place]



     mg        00                                 First dose           Medical



                                                  on Sat           [facility]



                                                  10/29/22           



                                                  at 0900,           



                                                  Until           



                                                  Discontinu           



                                                  ed,            



                                                  Routine           

 

     Sliding      2022      Yes                      Subcutaneo           Univ

ers



     Scale      0-29                               us, TID           ity of



     Insulin -      13:00:                               MEALS+HS,           [place]

as



     Lispro      00                                 First dose           Medical



     (HumaLOG) +                                         on Sat           [facility]



     Fsbg                                         10/29/22           



     Testing                                         at 0800,           



                                                  Until           



                                                  Discontinu           



                                                  ed,            



                                                  Routine           

 

     heparin      2022      Yes            5000U      5,000           [facility]



     (porcine)      0-                               Units,           [facility] of



     injection      13:00:                               Subcutaneo           [place]

[place]



     5,000 Units      00                                 us, Q12H,           Med

ical



                                                  First dose           [facility]



                                                  on Sat           



                                                  10/29/22           



                                                  at 0800,           



                                                  Until           



                                                  Discontinu           



                                                  ed,            



                                                  Routine           

 

     magnesium      2022- No             4g        4 g, IV           Univ

ers



     sulfate in      0-29 10-29                          Piggyback,           it

y of



     water 4      10:45: 15:33                          at 25           [place]



     gram/50 mL      00   :00                           mL/hr           Medical



     (8 %) IV                                         Administer           Br[facility]

h



     Piggyback 4                                         over 120           



     g                                            Minutes,           



                                                  ONCE, 1           



                                                  dose, On           



                                                  Sat            



                                                  10/29/22           



                                                  at 0545,           



                                                  Routine           

 

     metoprolol      2022      Yes            25mg      25 mg,           Unive

rs



     tartrate      0-29                               Oral, BID,           ity o

f



     (LOPRESSOR)      05:00:                               First dose           

[place]



     tablet 25      00                                 (after           Medical



     mg                                           last           [facility]



                                                  modificati           



                                                  on) on Sat           



                                                  10/29/22           



                                                  at 0000,           



                                                  Until           



                                                  Discontinu           



                                                  ed,            



                                                  Routine           

 

     calcium      2022- No             2g        2 g, IV           Univer

s



     gluconate 2      0-29 10-29                          Infusion,           it

y of



     g in NaCl      04:45: 04:45                          at 200           [place]



     100 mL      00   :00                           mL/hr           Medical



     (ISO-OSM)                                         Administer           [facility]

ch



     RTU IV                                         over 30           



     infusion 2                                         Minutes,           



     g                                            ONCE, 1           



                                                  dose, On           



                                                  Fri            



                                                  10/28/22           



                                                  at 2345,           



                                                  Routine           

 

     amLODIPine      2022- No             5mg       5 mg,           Unive

rs



     (NORVASC)      0-29 10-31                          Oral,           ity of



     tablet 5 mg      04:30: 12:16                          DAILY,           [place]

as



               00   :44                           First dose           Medical



                                                  on Fri           [facility]



                                                  10/28/22           



                                                  at 2330,           



                                                  Until           



                                                  Discontinu           



                                                  ed,            



                                                  Routine           

 

     acetaminoph      2022      Yes            650mg      650 mg,           Un

anna



     en                                       Oral,           ity of



     (TYLENOL)      03:28:                               Q6HPRN,           [place]



     tablet 650      22                                 Starting           Medic

al



     mg                                           on Fri           [facility]



                                                  10/28/22           



                                                  at 2228,           



                                                  Until           



                                                  Discontinu           



                                                  ed,            



                                                  Routine,           



                                                  Pain           



                                                  (scale           



                                                  1-3)           

 

     cloNIDine      2022 No             .2mg      0.2 mg,           Univ

ers



     (CATAPRES)      0-29 10-29                          Oral,           ity of



     tablet 0.2      01:15: 01:19                          ONCE, 1           [place]

as



     mg        00   :00                           dose, On           Medical



                                                  Fri            [facility]



                                                  10/28/22           



                                                  at 2015,           



                                                  STAT           

 

     aspirin 81      2022      Yes            81mg      Take 81 mg           U

nivers



     mg chewable      0-28                               by mouth           ity 

of



     tablet      23:19:                               daily.           [place]



                                                               Medical



                                                                 [facility]

 

     metFORMIN      2022      Yes            500mg      Take 500           Uni

vers



     500 mg      0-28                               mg by           ity of



     tablet      23:19:                               mouth 2           [place]



               09                                 (two)           Medical



                                                  times           [facility]



                                                  daily with           



                                                  meals.           

 

     glipiZIDE      2022      Yes            2.5mg      Take 2.5           Uni

vers



     XL 2.5 mg      0-28                               mg by           ity of



     24 hr      23:19:                               mouth           [place]



     tablet      09                                 daily with           Medical



                                                  breakfast.           [facility]anch

 

     metoprolol      2022      Yes            25mg      Take 25 mg           U

nivers



     tartrate 25      0-28                               by mouth           ity 

of



     mg tablet      23:19:                               in the           [place]



               09                                 morning           Medical



                                                  and 25 mg           [facility]



                                                  in the           



                                                  evening.           

 

     calcitrioL      2022      Yes            .25ug      Take 0.25           U

nivers



     0.25 mcg      0-28                               mcg by           ity of



     capsule      23:19:                               mouth in           [place]



               09                                 the            Medical



                                                  morning.           [facility]anch

 

     calcium      2022- No             2g        2 g, IV           Univer

s



     gluconate 2      0-28 10-28                          Infusion,           it

y of



     g in NaCl      21:45: 22:00                          at 200           [place]



     100 mL      00   :00                           mL/hr           Medical



     (ISO-OSM)                                         Administer           [facility]

ch



     RTU IV                                         over 30           



     infusion 2                                         Minutes,           



     g                                            ONCE, 1           



                                                  dose, On           



                                                  Fri            



                                                  10/28/22           



                                                  at 1645,           



                                                  Routine           

 

     labetaloL      2022- No             20mg      20 mg,           Unive

rs



     (NORMODYNE)      0-28 10-28                          Slow IV           ity 

of



     injection      19:15: 18:43                          Push,           [place]



     20 mg      00   :00                           ONCE, 1           Medical



                                                  dose, On           [facility]



                                                  Fri            



                                                  10/28/22           



                                                  at 1415,           



                                                  Routine           

 

     Dose      -0      No                                      



     Unknown      7-14                                              



               00:00:                                              



               00                                                

 

     Dose      -0      No                                      



     Unknown      7-14                                              



               00:00:                                              



               00                                                

 

     Dose      -0      No                                      



     Unknown      7-14                                              



               00:00:                                              



               00                                                

 

     Dose      -0      No                                      



     Unknown      7-11                                              



               00:00:                                              



               00                                                

 

     Dose      -0      No                                      



     Unknown      7-11                                              



               00:00:                                              



               00                                                

 

     Dose      -0      No                                      



     Unknown      7-11                                              



               00:00:                                              



               00                                                

 

     Dose      -0      No                                      



     Unknown      6-29                                              



               00:00:                                              



               00                                                

 

     TAKE 1      -0      No             10                       



     TABLET      6-29                                              



     DAILY.      00:00:                                              



               00                                                

 

     Dose      -0      No                                      



     Unknown      6-29                                              



               00:00:                                              



               00                                                

 

     Dose      2-0      No                                      



     Unknown      6-29                                              



               00:00:                                              



               00                                                

 

     TAKE 1      -0      No             400                      



     TABLET      6-29                                              



     TWICE DAILY      00:00:                                              



     WITH MEALS.      00                                                

 

     Dose      -0      No                                      



     Unknown      6-29                                              



               00:00:                                              



               00                                                

 

     Dose      2-0      No                                      



     Unknown      6-29                                              



               00:00:                                              



               00                                                

 

     TAKE 1      -0      No             400                      



     TABLET      6-29                                              



     TWICE DAILY      00:00:                                              



     WITH MEALS.      00                                                

 

     Dose      -0      No                                      



     Unknown      6-29                                              



               00:00:                                              



               00                                                

 

     TAKE 1      -0      No             10                       



     TABLET      6-29                                              



     DAILY.      00:00:                                              



               00                                                

 

     Dose      2022-0      No                                      



     Unknown      6-29                                              



               00:00:                                              



               00                                                

 

     Dose      2022-0      No                                      



     Unknown      6                                              



               00:00:                                              



               00                                                

 

     Dose      2022-0      No                                      



     Unknown                                                    



               00:00:                                              



               00                                                

 

     TAKE 1      2-0      No             400                      



     TABLET      6-29                                              



     TWICE DAILY      00:00:                                              



     WITH MEALS.      00                                                

 

     Dose      2022-0      No                                      



     Unknown                                                    



               00:00:                                              



               00                                                

 

     Dose      2022-0      No                                      



     Unknown                                                    



               00:00:                                              



               00                                                

 

     TAKE 1      2-0      No             400                      



     TABLET      6-29                                              



     TWICE DAILY      00:00:                                              



     WITH MEALS.      00                                                

 

     TAKE 1      2-0      No             10                       



     TABLET      6-29                                              



     DAILY.      00:00:                                              



               00                                                

 

     Dose      2022-0      No                                      



     Unknown                                                    



               00:00:                                              



               00                                                

 

     Dose      2022-0      No                                      



     Unknown                                                    



               00:00:                                              



               00                                                

 

     TAKE 1      2-0      No             400                      



     TABLET      6-29                                              



     TWICE DAILY      00:00:                                              



     WITH MEALS.      00                                                

 

     Dose      2-0      No                                      



     Unknown                                                    



               00:00:                                              



               00                                                

 

     Dose      2022-0      No                                      



     Unknown                                                    



               00:00:                                              



               00                                                

 

     TAKE 1      2-0      No             400                      



     TABLET      -29                                              



     TWICE DAILY      00:00:                                              



     WITH MEALS.      00                                                

 

     Dose      2-0      No                                      



     Unknown      6                                              



               00:00:                                              



               00                                                

 

     TAKE 1      2-0      No             25                       



     CAPSULE BY      6-27                                              



     MOUTH ONCE      00:00:                                              



     DAILY      00                                                

 

     Dose      2022-0      No                                      



     Unknown                                                    



               00:00:                                              



               00                                                

 

     TAKE 1      2-0      No             25                       



     CAPSULE BY      6-27                                              



     MOUTH ONCE      00:00:                                              



     DAILY      00                                                

 

     Dose      2022-0      No                                      



     Unknown      6                                              



               00:00:                                              



               00                                                

 

     TAKE 1      2-0      No             25                       



     CAPSULE BY      6-27                                              



     MOUTH ONCE      00:00:                                              



     DAILY      00                                                

 

     Dose      2022-0      No                                      



     Unknown      6-                                              



               00:00:                                              



               00                                                

 

     Dose      2022-0      No                                      



     Unknown      6-                                              



               00:00:                                              



               00                                                

 

     Dose      2022-0      No                                      



     Unknown      6-                                              



               00:00:                                              



               00                                                

 

     Dose      2022-0      No                                      



     Unknown      6-                                              



               00:00:                                              



               00                                                

 

     Dose      2022-0      No                                      



     Unknown      6-                                              



               00:00:                                              



               00                                                

 

     Dose      2022-0      No                                      



     Unknown      6-                                              



               00:00:                                              



               00                                                

 

     Dose      2022-0      No                                      



     Unknown      6-                                              



               00:00:                                              



               00                                                

 

     Dose      2022-0      No                                      



     Unknown      6-                                              



               00:00:                                              



               00                                                

 

     Dose      2022-0      No                                      



     Unknown      6-                                              



               00:00:                                              



               00                                                

 

     Dose      2022-0      No                                      



     Unknown      6-                                              



               00:00:                                              



               00                                                

 

     Dose      2022-0      No                                      



     Unknown      6-25                                              



               00:00:                                              



               00                                                

 

     Dose      2022-0      No                                      



     Unknown      6-25                                              



               00:00:                                              



               00                                                

 

     Dose      2022-0      No                                      



     Unknown      6-25                                              



               00:00:                                              



               00                                                

 

     Dose      2022-0      No                                      



     Unknown      6-25                                              



               00:00:                                              



               00                                                

 

     Dose      2022-0      No                                      



     Unknown      6-25                                              



               00:00:                                              



               00                                                

 

     Dose      2022-0      No                                      



     Unknown      6-25                                              



               00:00:                                              



               00                                                

 

     Dose      2022-0      No                                      



     Unknown      6-25                                              



               00:00:                                              



               00                                                

 

     Dose      2022-0      No                                      



     Unknown      6-25                                              



               00:00:                                              



               00                                                

 

     Dose      2022-0      No                                      



     Unknown      6-25                                              



               00:00:                                              



               00                                                

 

     Dose      2022-0      No                                      



     Unknown      6-25                                              



               00:00:                                              



               00                                                

 

     Dose      2022-0      No                                      



     Unknown      6-25                                              



               00:00:                                              



               00                                                

 

     Dose      2022-0      No                                      



     Unknown      6-25                                              



               00:00:                                              



               00                                                

 

     Dose      2022-0      No                                      



     Unknown      6-25                                              



               00:00:                                              



               00                                                

 

     Dose      2022-0      No                                      



     Unknown      6-25                                              



               00:00:                                              



               00                                                

 

     Dose      2021-0      No                                      



     Unknown      8-19                                              



               00:00:                                              



               00                                                

 

     Dose      2021-0      No                                      



     Unknown      8-19                                              



               00:00:                                              



               00                                                

 

     Dose      2021-0      No                                      



     Unknown      8-19                                              



               00:00:                                              



               00                                                

 

     lisinopril      2021-0      No             1mg                      



     30 mg      4-26                                              



     tablet      00:00:                                              



               00                                                

 

     lisinopril      2021-0      No             1mg                      



     30 mg      4-26                                              



     tablet      00:00:                                              



               00                                                

 

     lisinopril      2021-0      No             1mg                      



     30 mg      4-26                                              



     tablet      00:00:                                              



               00                                                

 

     amLODIPine      -0 - No        [id number] 10mg      Take 1          

 [facility]



     10 mg      4-15 05-16                          tablet by           [facility]



     tablet      00:00: 04:59                          mouth           [place]



               00   :00                           daily for           Medical



                                                  30 days.           [facility]

 

     glipiZIDE 5      -2021- No        [id number] 5mg       Take 1         

  [facility]



     mg tablet      4-15 05-16                          tablet by           [facility] 

of



               00:00: 04:59                          mouth           [place]



               00   :00                           daily for           Medical



                                                  30 days.           [facility]

 

     amLODIPine      -0 - No        [id number] 10mg      Take 1          

 [facility]



     10 mg      4-15 05-16                          tablet by           [facility]



     tablet      00:00: 04:59                          mouth           [place]



               00   :00                           daily for           Medical



                                                  30 days.           [facility]

 

     glipiZIDE 5      -2021- No        [id number] 5mg       Take 1         

  [facility]



     mg tablet      4-15 05-16                          tablet by           [facility] 

of



               00:00: 04:59                          mouth           [place]



               00   :00                           daily for           Medical



                                                  30 days.           [facility]

 

     aspirin 81      -0      Yes            81mg      Take 81 mg           [facility]

[facility]



     mg chewable      4-14                               by mouth           [facility] 

of



     tablet      23:26:                               daily.           [address]

 

     metFORMIN      -0      Yes            500mg      Take 500           [facility]

[facility]



     500 mg      4-14                               mg by           [facility] of



     tablet      23:26:                               mouth 2           [place]



               16                                 (two)           Medical



                                                  times           [place]



                                                  daily with           



                                                  meals.           

 

     aspirin 81      0      Yes            81mg      Take 81 mg           U

nivers



     mg chewable      4-14                               by mouth           ity 

of



     tablet      23:26:                               daily.           [address]

 

     metFORMIN      0      Yes            500mg      Take 500           [facility]

[facility]



     500 mg      4-14                               mg by           [facility] of



     tablet      23:26:                               mouth 2           [place]



               16                                 (two)           Medical



                                                  times           [place]



                                                  daily with           



                                                  meals.           

 

     aspirin 81      0      Yes            81mg      Take 81 mg           U

n[facility]



     mg chewable      4-14                               by mouth           ity 

of



     tablet      23:26:                               daily.           [address]

 

     metFORMIN            Yes            500mg      Take 500           [facility]

[facility]



     500 mg      4-14                               mg by           [facility] of



     tablet      23:26:                               mouth 2           [place]



               16                                 (two)           Medical



                                                  times           [place]



                                                  daily with           



                                                  meals.           

 

     doxycycline            Yes            100mg      100 mg,           [facility]

[facility]



     hyclate      4-14                               Oral,           [facility]



     (Vibramycin      23:00:                               Q12HA2,           [place]

[facility]



     ) capsule      00                                 First dose           Medi

huss[facility]



     100 mg                                         on 21 at           



                                                  1800,           



                                                  Until           



                                                  Discontinu           



                                                  ed,            



                                                  ASAP<br>Re           



                                                  ason for           



                                                  Anti-Infec           



                                                  tive:           



                                                  Documented           



                                                  Infection<           



                                                  br>Documen           



                                                  ronnie            



                                                  Infection           



                                                  Site: Skin           



                                                  / Soft           



                                                  Tissue<br&           



                                                  gt;Duratio           



                                                  n of           



                                                  Therapy:           



                                                  Other (see           



                                                  Comments)           

 

     vancomycin      0      Yes            1250mg      1,250 mg,           

[facility]



     1250 mg in      4-14                               IV             [facility]



      mL      02:00:                               Infusion,           [place]

as



     RTU IV      00                                 Q24H ABX,           Medical



     Piggyback                                         First dose           [facility]

ch



     1,250 mg                                         on 21 at           



                                                  2100,           



                                                  Until           



                                                  Discontinu           



                                                  ed<br>Reas           



                                                  on for           



                                                  Anti-Infec           



                                                  tive:           



                                                  Documented           



                                                  Infection<           



                                                  br>Documen           



                                                  ronnie            



                                                  Infection           



                                                  Site: Skin           



                                                  / Soft           



                                                  Tissue<br>           



                                                  Duration           



                                                  of             



                                                  Therapy:           



                                                  Other (see           



                                                  Comments)           

 

     vancomycin      -0 - No             1250mg      1,250 mg,          

 [facility]



     1250 mg in                                IV             [facility]



      mL      02:00: 17:16                          Infusion,           [facility]

[place]



     RTU IV      00   :39                           Q24H ABX,           [facility]



     Piggyback                                         First dose           [facility]

ch



     1,250 mg                                         on 21 at           



                                                  2100,           



                                                  Until           



                                                  Discontinu           



                                                  ed<br>Reas           



                                                  on for           



                                                  Anti-Infec           



                                                  tive:           



                                                  Documented           



                                                  Infection<           



                                                  br>Documen           



                                                  ronnie            



                                                  Infection           



                                                  Site: Skin           



                                                  / Soft           



                                                  Tissue<br>           



                                                  Duration           



                                                  of             



                                                  Therapy:           



                                                  Other (see           



                                                  Comments)           

 

     atorvastati      2021- No        [id number] 20mg      Take 1         

  [facility]



     n 20 mg      -15                          tablet by           [facility]



     tablet      00:00: 04:59                          mouth at           [place]



               00   :00                           bedtime           Medical



                                                  for 30           [facility]



                                                  days.           

 

     carvediloL      2021- No        [id number] 12.5mg      Take 1        

   [facility]



     12.5 mg      -15                          tablet by           [facility]



     tablet      00:00: 04:59                          mouth 2           [place]



               00   :00                           (two)           [facility]



                                                  times           [facility]



                                                  daily with           



                                                  meals for           



                                                  30 days.           

 

     ferrous      2021- No        [id number] 325mg      Take 1           [facility]

n[facility]



     sulfate 325      -15                          tablet by           it

y [facility]



     mg (65 mg      00:00: 04:59                          mouth 2           [place]

[facility]



     iron)      00   :00                           (two)           Medical



     tablet                                         times           [facility]



                                                  daily for           



                                                  30 days.           

 

     atorvastati      2021- No        [id number] 20mg      Take 1         

  [facility]



     n 20 mg      -15                          tablet by           [facility]



     tablet      00:00: 04:59                          mouth at           [place]



               00   :00                           bedtime           Medical



                                                  for 30           [facility]



                                                  days.           

 

     carvediloL      2021- No        [id number] 12.5mg      Take 1        

   [facility]



     12.5 mg      -15                          tablet by           [facility]



     tablet      00:00: 04:59                          mouth 2           [place]



               00   :00                           (two)           Medical



                                                  times           [facility]



                                                  daily with           



                                                  meals for           



                                                  30 days.           

 

     ferrous      2021- No        [id number] 325mg      Take 1           [facility]

n[facility]



     sulfate 325      -15                          tablet by           it

y [facility]



     mg (65 mg      00:00: 04:59                          mouth 2           [place]

[facility]



     iron)      00   :00                           (two)           [facility]



     tablet                                         times           [facility]



                                                  daily for           



                                                  30 days.           

 

     lactobacill      2021- No        [id number] 1{tbl}      Take 1       

    [facility]



     us        4-14 04-25                          tablet by           [facility]



     acidophilus      00:00: 04:59                          mouth 2           [facility]

[facility]



     25 million      00   :00                           (two)           [facility]



     cell -100                                         times           [facility]



     mg captab                                         daily for           



                                                  10 days.           

 

     lactobacill      2021- No        [id number] 1{tbl}      Take 1       

    [facility]



     us        4-14 04-25                          tablet by           [facility]



     acidophilus      00:00: 04:59                          mouth 2           [facility]

[facility]



     25 million      00   :00                           (two)           Medical



     cell -100                                         times           [facility]



     mg captab                                         daily for           



                                                  10 days.           

 

     doxycycline      2021- No        [id number] 100mg      Take 1        

   [facility]



     hyclate 100      4-14 04-20                          capsule by           i

ty of



     mg capsule      00:00: 04:59                          mouth           [place]



               00   :00                           every 12           [facility]



                                                  (twelve)           [facility]



                                                  hours for           



                                                  5 days.           

 

     doxycycline      2021- No        [id number] 100mg      Take 1        

   [facility]



     hyclate 100      4-14 04-20                          capsule by           i

ty of



     mg capsule      00:00: 04:59                          mouth           [place]



               00   :00                           every 12           [facility]



                                                  (twelve)           [facility]



                                                  hours for           



                                                  5 days.           

 

     sulfamethox      2021- No        [id number] 1{tbl}      Take 1       

    [facility]



     azole-trime      4-14 04-14                          tablet by           it

y of



     thoprim      00:00: 00:00                          mouth 2           [place]



     800-160 mg      00   :00                           (two)           Medical



     per tablet                                         times           [facility]



                                                  daily for           



                                                  5 days.           

 

     HYDROcodone            Yes            1{tbl}      1 tablet,          

 [facility]



     -acetaminop      4-13                               Oral,           ity of



     hen (NORCO)      17:56:                               Q6HPRN,           [place]

as



      mg      59                                 Starting           [facility]

l



     tablet 1                                         Tue            [facility]



     tablet                                         21 at           



                                                  1256,           



                                                  Until           



                                                  Discontinu           



                                                  ed,            



                                                  Routine,           



                                                  Pain           



                                                  (scale           



                                                  4-6), pain           

 

     HYDROcodone            Yes            1{tbl}      1 tablet,          

 [facility]



     -acetaminop      4-13                               Oral,           ity of



     hen (NORCO)      17:56:                               Q6HPRN,           [place]

as



      mg      59                                 Starting           [facility]

l



     tablet 1                                         Tue            [facility]



     tablet                                         21 at           



                                                  1256,           



                                                  Until           



                                                  Discontinu           



                                                  ed,            



                                                  Routine,           



                                                  Pain           



                                                  (scale           



                                                  4-6), pain           

 

     amLODIPine            Yes            10mg      10 mg,           [facility]

rs



     (NORVASC)      4-13                               Oral,           ity of



     tablet 10      13:30:                               DAILY,           [place]



     mg        00                                 First dose           Medical



                                                  (after           [facility]



                                                  last           



                                                  modificati           



                                                  on) on 21 at           



                                                  0830,           



                                                  Until           



                                                  Discontinu           



                                                  ed,            



                                                  Routine           

 

     amLODIPine      0      Yes            10mg      10 mg,           Un[facility]

rs



     (NORVASC)      4-13                               Oral,           ity of



     tablet 10      13:30:                               DAILY,           [place]



     mg        00                                 First dose           Medical



                                                  (after           [facility]



                                                  last           



                                                  modificati           



                                                  on) on 21 at           



                                                  0830,           



                                                  Until           



                                                  Discontinu           



                                                  ed,            



                                                  Routine           

 

     metFORMIN      0      Yes            500mg      Take 500           [facility]

vers



     500 mg      4-12                               mg by           ity of



     tablet      16:58:                               mouth 2           [place]



               30                                 (two)           Medical



                                                  times           [facility]



                                                  daily with           



                                                  meals.           

 

     metFORMIN      0      Yes            500mg      Take 500           [facility]

vers



     500 mg      4-12                               mg by           ity of



     tablet      16:58:                               mouth 2           [place]



               30                                 (two)           Medical



                                                  times           [facility]



                                                  daily with           



                                                  meals.           

 

     bupivacaine       202- No                       PRN,           Un[facility]

rs



     (preserv      -12                          Starting           ity of



     free) 0.5%      16:06: 16:58                          [facility]



     (SENSORCAIN      00   :30                           21 at           Me

dical



     E MPF) 0.5                                         1106,           [facility]



     % (5 mg/mL)                                         Intra-op           



     10 mL,                                                        



     lidocaine                                                        



     1% (PF)                                                        



     (XYLOCAINE)                                                        



     10 mL                                                        

 

     glipiZIDE      0      Yes            5mg       5 mg,           [facility]



     (GLUCOTROL)      4-12                               Oral,           ity of



     tablet 5 mg      14:00:                               DAILY,           [place]

[facility]



               00                                 First dose           Medical



                                                  (after           [facility]



                                                  last           



                                                  modificati           



                                                  on) on 21 at           



                                                  0900,           



                                                  Until           



                                                  Discontinu           



                                                  ed,            



                                                  Routine           

 

     glipiZIDE      0      Yes            5mg       5 mg,           [facility]



     (GLUCOTROL)      4-12                               Oral,           ity of



     tablet 5 mg      14:00:                               DAILY,           [place]

[facility]



               00                                 First dose           Medical



                                                  (after           [facility]



                                                  last           



                                                  modificati           



                                                  on) on 21 at           



                                                  0900,           



                                                  Until           



                                                  Discontinu           



                                                  ed,            



                                                  Routine           

 

     glipiZIDE      0      Yes            5mg       5 mg,           [facility]



     (GLUCOTROL)      4-12                               Oral,           ity of



     tablet 5 mg      14:00:                               DAILY,           [place]

[facility]



               00                                 First dose           Medical



                                                  (after           [facility]



                                                  last           



                                                  modificati           



                                                  on) on 21 at           



                                                  0900,           



                                                  Until           



                                                  Discontinu           



                                                  ed,            



                                                  Routine           

 

     NaCl 0.45%      2021-0      Yes            1000mL      at 100           Uni

vers



     (1/2NS) IV      4-11                               mL/hr,           ity of



     infusion      23:45:                               1,000 mL,           [place]

[facility]



     1,000 mL      00                                 IV             Medical



                                                  Infusion,           [facility]



                                                  CONTINUOUS           



                                                  , Starting           



                                                  Sun            



                                                  21 at           



                                                  1845,           



                                                  Until           



                                                  Discontinu           



                                                  ed,            



                                                  Routine           

 

     NaCl 0.45%      2021-0      Yes            1000mL      at 100           Uni

vers



     (1/2NS) IV      4-11                               mL/hr,           ity of



     infusion      23:45:                               1,000 mL,           [place]

[facility]



     1,000 mL      00                                 IV             Medical



                                                  Infusion,           [facility]



                                                  CONTINUOUS           



                                                  , Starting           



                                                  Sun            



                                                  21 at           



                                                  1845,           



                                                  Until           



                                                  Discontinu           



                                                  ed,            



                                                  Routine           

 

     NaCl 0.45%      2021-0      Yes            1000mL      at 100           Uni

vers



     (1/2NS) IV      4-11                               mL/hr,           ity of



     infusion      23:45:                               1,000 mL,           [place]

[facility]



     1,000 mL      00                                 IV             Medical



                                                  Infusion,           [facility]



                                                  CONTINUOUS           



                                                  , Starting           



                                                  Sun            



                                                  21 at           



                                                  1845,           



                                                  Until           



                                                  Discontinu           



                                                  ed,            



                                                  Routine           

 

     hydralAZINE      1-0      Yes            10mg      10 mg,           Univ

ers



     (APRESOLINE      4-11                               Slow IV           ity o

f



     ) injection      22:15:                               Push,           [place]



     10 mg      07                                 Q4HPRN,           [facility]            



                                                  21 at           



                                                  1715,           



                                                  Until           



                                                  Discontinu           



                                                  ed, STAT,           



                                                  DBP=>100;           



                                                  SBP=>180<b           



                                                  r>Indicati           



                                                  on:            



                                                  Hypertensi           



                                                  ve             



                                                  Emergency           

 

     hydralAZINE      -0      Yes            10mg      10 mg,           Univ

ers



     (APRESOLINE      4-11                               Slow IV           ity o

f



     ) injection      22:15:                               Push,           [place]



     10 mg      07                                 Q4HPRN,           [facility]            



                                                  21 at           



                                                  1715,           



                                                  Until           



                                                  Discontinu           



                                                  ed, STAT,           



                                                  DBP=>100;           



                                                  SBP=>180<b           



                                                  r>Indicati           



                                                  on:            



                                                  Hypertensi           



                                                  ve             



                                                  Emergency           

 

     hydralAZINE      2021-0      Yes            10mg      10 mg,           Univ

ers



     (APRESOLINE      4-11                               Slow IV           ity o

f



     ) injection      22:15:                               Push,           [place]



     10 mg      07                                 Q4HPRN,           [facility]            



                                                  21 at           



                                                  1715,           



                                                  Until           



                                                  Discontinu           



                                                  ed, STAT,           



                                                  DBP=>100;           



                                                  SBP=>180<b           



                                                  r>Indicati           



                                                  on:            



                                                  Hypertensi           



                                                  ve             



                                                  Emergency           

 

     carvediloL      -0      Yes            12.5mg      12.5 mg,           U

nivers



     (COREG)      4-11                               Oral, BID           ity of



     tablet 12.5      22:15:                               MEALS,           [place]

[facility]



     mg        00                                 First dose           Medical



                                                  (after           [facility]



                                                  last           



                                                  modificati           



                                                  on) on Sun           



                                                  21 at           



                                                  1715,           



                                                  Until           



                                                  Discontinu           



                                                  ed,            



                                                  Routine           

 

     carvediloL      -0      Yes            12.5mg      12.5 mg,           U

nivers



     (COREG)      4-11                               Oral, BID           ity of



     tablet 12.5      22:15:                               [place]ALS,           [place]

[facility]



     mg        00                                 First dose           Medical



                                                  (after           [facility]



                                                  last           



                                                  modificati           



                                                  on) on Sun           



                                                  21 at           



                                                  1715,           



                                                  Until           



                                                  Discontinu           



                                                  ed,            



                                                  Routine           

 

     carvediloL      -0      Yes            12.5mg      12.5 mg,           U

nivers



     (COREG)      4-11                               Oral, BID           ity of



     tablet 12.5      22:15:                               [place]ALS,           [place]

[facility]



     mg        00                                 First dose           Medical



                                                  (after           [facility]



                                                  last           



                                                  modificati           



                                                  on) on Sun           



                                                  21 at           



                                                  1715,           



                                                  Until           



                                                  Discontinu           



                                                  ed,            



                                                  Routine           

 

     calcitrioL      0      Yes            .5ug      0.5 mcg,           Uni

vers



     (ROCALTROL)                                     Oral,           ity of



     capsule 0.5      14:00:                               DAILY,           [place]

[facility]



     mcg       00                                 First dose           Medical



                                                  on [facility]



                                                  21 at           



                                                  0900,           



                                                  Until           



                                                  Discontinu           



                                                  ed,            



                                                  Routine           

 

     calcitrioL      0      Yes            .5ug      0.5 mcg,           Uni

vers



     (ROCALTROL)                                     Oral,           ity of



     capsule 0.5      14:00:                               DAILY,           [place]

[facility]



     mcg                                        First dose           Medical



                                                  on [facility]



                                                  21 at           



                                                  0900,           



                                                  Until           



                                                  Discontinu           



                                                  ed,            



                                                  Routine           

 

     calcitrioL      0      Yes            .5ug      0.5 mcg,           Uni

vers



     (ROCALTROL)                                     Oral,           ity of



     capsule 0.5      14:00:                               DAILY,           [place]

[place]



     mcg       00                                 First dose           Medical



                                                  on [facility]



                                                  21 at           



                                                  0900,           



                                                  Until           



                                                  Discontinu           



                                                  ed,            



                                                  Routine           

 

     ferrous      -0      Yes            325mg      325 mg,           Univer

s



     sulfate                                     Oral, BID,           ity of



     tablet 325      13:00:                               First dose           [facility]

[facility]



     mg        00                                 on [facility]



                                                  21 at           [facility]



                                                  0800,           



                                                  Until           



                                                  Discontinu           



                                                  ed,            



                                                  Routine           

 

     ferrous      -0      Yes            325mg      325 mg,           Univer

s



     sulfate                                     Oral, BID,           ity of



     tablet 325      13:00:                               First dose           T

[facility]



     mg        00                                 on [facility]



                                                  21 at           [facility]



                                                  0800,           



                                                  Until           



                                                  Discontinu           



                                                  ed,            



                                                  Routine           

 

     ferrous            Yes            325mg      325 mg,           Univer

s



     sulfate                                     Oral, BID,           ity of



     tablet 325      13:00:                               First dose           T

exas



     mg        00                                 on [facility]



                                                  21 at           [facility]



                                                  0800,           



                                                  Until           



                                                  Discontinu           



                                                  ed,            



                                                  Routine           

 

     glipiZIDE      2021- No             2.5mg      2.5 mg,           Uni

vers



     (GLUCOTROL)       04-12                          Oral, QAM           it

y of



     tablet 2.5      13:00: 12:50                          WITH           [place]



     mg        00   :36                           BREAKFAST,           Medical



                                                  First dose           [facility]



                                                  on Sun           



                                                  21 at           



                                                  0800,           



                                                  Until           



                                                  Discontinu           



                                                  ed,            



                                                  Routine           

 

     insulin NPH            Yes            3U        3 Units,           Un

anna



     and regular                                     Subcutaneo           it

y of



     human 70-30      12:30:                               [place], [place]



     (HUMULIN      00                                 First dose           Medic

al



     70-30 U-100                                         on [facility]



     INSULIN)                                         21 at           



     100 unit/mL                                         0730,           



     (70-30)                                         Until           



     injection 3                                         Discontinu           



     Units                                         ed,            



                                                  Routine           

 

     insulin NPH            Yes            3U        3 Units,           Un

anna



     and regular                                     Subcutaneo           it

y of



     human 70-30      12:30:                               [place], [place]



     (HUMULIN      00                                 First dose           Medic

al



     70-30 U-100                                         on [facility]



     INSULIN)                                         21 at           



     100 unit/mL                                         0730,           



     (70-30)                                         Until           



     injection 3                                         Discontinu           



     Units                                         ed,            



                                                  Routine           

 

     insulin NPH            Yes            3U        3 Units,           Un

anna



     and regular                                     Subcutaneo           it

y of



     human 70-30      12:30:                               [place], [place]



     (HUMULIN      00                                 First dose           Medic

al



     70-30 U-100                                         on [facility]



     INSULIN)                                         21 at           



     100 unit/mL                                         0730,           



     (70-30)                                         Until           



     injection 3                                         Discontinu           



     Units                                         ed,            



                                                  Routine           

 

     magnesium            Yes            400mg      400 mg,           Univ

ers



     oxide                                     Oral, BID,           ity of



     (MAG-OX      11:30:                               First dose           [place]

[place]



     400) tablet                                       on Sun           Medica

l



     400 mg                                         21 at           [facility]



                                                  0630,           



                                                  Until           



                                                  Discontinu           



                                                  ed,            



                                                  Routine           

 

     magnesium      0      Yes            400mg      400 mg,           Univ

ers



     oxide      4-11                               Oral, BID,           ity of



     (MAG-OX      11:30:                               First dose           [place]

[place]



     400) tablet      00                                 on Sun           Medica

l



     400 mg                                         21 at           [facility]



                                                  0630,           



                                                  Until           



                                                  Discontinu           



                                                  ed,            



                                                  Routine           

 

     magnesium      0      Yes            400mg      400 mg,           Univ

ers



     oxide      4-11                               Oral, BID,           ity of



     (MAG-OX      11:30:                               First dose           [place]

[place]



     400) tablet      00                                 on Sun           Medica

l



     400 mg                                         21 at           [facility]



                                                  0630,           



                                                  Until           



                                                  Discontinu           



                                                  ed,            



                                                  Routine           

 

     atorvastati      0      Yes            20mg      20 mg,           Univ

ers



     n (LIPITOR)      4-11                               Oral, QHS,           it

y of



     tablet 20      02:00:                               First dose           [place]

[place]



     mg        00                                 on Sat           Medical



                                                  4/10/21 at           [facility]



                                                  2100,           



                                                  Until           



                                                  Discontinu           



                                                  ed,            



                                                  Routine           

 

     atorvastati      0      Yes            20mg      20 mg,           Univ

ers



     n (LIPITOR)      4-11                               Oral, QHS,           it

y of



     tablet 20      02:00:                               First dose           [place]

[place]



     mg        00                                 on [facility]



                                                  4/10/21 at           [facility]



                                                  2100,           



                                                  Until           



                                                  Discontinu           



                                                  ed,            



                                                  Routine           

 

     vancomycin            Yes            1000mg      1,000 mg,           

[facility]



     (VANCOCIN)      11                               IV             ity of



     1,000 mg in      02:00:                               [place],           

[place]



     NaCl 0.9%      00                                 Q24H,           Medical



     (NS) 250 mL                                         First dose           Br

anch



     VIAL-MATE                                         (after           



     IV                                           last           



     piggyback                                         modificati           



                                                  on) on Sat           



                                                  4/10/21 at           



                                                  2100,           



                                                  Until           



                                                  Discontinu           



                                                  ed, 250           



                                                  mL<br>Reas           



                                                  on for           



                                                  Anti-Infec           



                                                  tive:           



                                                  Documented           



                                                  Infection<           



                                                  br>Documen           



                                                  ronnie            



                                                  Infection           



                                                  Site: Skin           



                                                  / Soft           



                                                  Tissue<br>           



                                                  Duration           



                                                  of             



                                                  Therapy:           



                                                  Other (see           



                                                  Comments)           

 

     atorvastati      0      Yes            20mg      20 mg,           Univ

ers



     n (LIPITOR)      4-11                               Oral, QHS,           it

y of



     tablet 20      02:00:                               First dose           [place]

[place]



     mg        00                                 on [facility]



                                                  4/10/21 at           [facility]



                                                  2100,           



                                                  Until           



                                                  Discontinu           



                                                  ed,            



                                                  Routine           

 

     vancomycin      2021- No             1000mg      1,000 mg,          

 [facility]



     (VANCOCIN)       04-13                          IV             ity of



     1,000 mg in      02:00: 13:00                          [place],          

 [place]



     NaCl 0.9%      00   :56                           Q24H,           Medical



     (NS) 250 mL                                         First dose           Br

anch



     VIAL-MATE                                         (after           



     IV                                           last           



     piggyback                                         modificati           



                                                  on) on Sat           



                                                  4/10/21 at           



                                                  2100,           



                                                  Until           



                                                  Discontinu           



                                                  ed, 250           



                                                  mL<br>Reas           



                                                  on for           



                                                  Anti-Infec           



                                                  tive:           



                                                  Documented           



                                                  Infection<           



                                                  br>Documen           



                                                  ronnie            



                                                  Infection           



                                                  Site: Skin           



                                                  / Soft           



                                                  Tissue<br>           



                                                  Duration           



                                                  of             



                                                  Therapy:           



                                                  Other (see           



                                                  Comments)           

 

     NaCl 0.9%      0 - No             1000mL      at 100           Uni

vers



     (NS) IV      4-10 04-11                          mL/hr, IV           ity of



     infusion      18:15: 22:38                          Infusion,           [place]

as



     1,000 mL      00   :44                           CONTINUOUS           Medic

al



                                                  , Starting           [facility]



                                                  Sat            



                                                  4/10/21 at           



                                                  1315,           



                                                  Until Sun           



                                                  21 at           



                                                  1738,           



                                                  Routine           

 

     morpHINE      -0      Yes            2mg       2 mg, Slow           Uni

vers



     injection 2      4-10                               IV Push,           ity 

of



     mg        17:07:                               Q4HPRN,           [place]



               55                                 Starting           [facility]



                                                  4/10/21 at           



                                                  1207,           



                                                  Until           



                                                  Discontinu           



                                                  ed,            



                                                  Routine,           



                                                  Pain           



                                                  (scale           



                                                  7-10)           

 

     morpHINE      -0      Yes            2mg       2 mg, Slow           Uni

vers



     injection 2      4-10                               IV Push,           ity 

of



     mg        17:07:                               Q4HPRN,           [place]



               55                                 Starting           [facility]



                                                  4/10/21 at           



                                                  1207,           



                                                  Until           



                                                  Discontinu           



                                                  ed,            



                                                  Routine,           



                                                  Pain           



                                                  (scale           



                                                  7-10)           

 

     morpHINE      -0      Yes            2mg       2 mg, Slow           Uni

vers



     injection 2      4-10                               IV Push,           ity 

of



     mg        17:07:                               Q4HPRN,           [place]



               55                                 Starting           [facility]



                                                  4/10/21 at           



                                                  1207,           



                                                  Until           



                                                  Discontinu           



                                                  ed,            



                                                  Routine,           



                                                  Pain           



                                                  (scale           



                                                  7-10)           

 

     HYDROcodone      -0      Yes            1{tbl}      1 tablet,          

 [facility]



     -acetaminop      4-10                               Oral,           ity of



     hen (NORCO      17:07:                               Q4HPRN,           [place]

s



     5) 5-325 mg      24                                 Starting           Medi

hussain



     tablet 1                                         Sat            [facility]



     tablet                                         4/10/21 at           



                                                  1207,           



                                                  Until           



                                                  Discontinu           



                                                  ed,            



                                                  Routine,           



                                                  Pain           



                                                  (scale           



                                                  4-6)           

 

     HYDROcodone      -0 - No             1{tbl}      1 tablet,         

  [facility]



     -acetaminop      4-10 04-13                          Oral,           ity of



     hen (NORCO      17:07: 17:58                          Q4HPRN,           [place]

as



     5) 5-325 mg      24   :40                           Starting           Medi

hussain



     tablet 1                                         Sat            [facility]



     tablet                                         4/10/21 at           



                                                  1207,           



                                                  Until Tu21 at           



                                                  1258,           



                                                  Routine,           



                                                  Pain           



                                                  (scale           



                                                  4-6)           

 

     acetaminoph      -0      Yes            650mg      650 mg,           Un

anna



     en        4-10                               Oral,           ity of



     (TYLENOL)      17:07:                               Q4HPRN,           [place]



     tablet 650      07                                 Starting           Medic

al



     mg                                           Sat            [facility]



                                                  4/10/21 at           



                                                  1207,           



                                                  Until           



                                                  Discontinu           



                                                  ed,            



                                                  Routine,           



                                                  Pain           



                                                  (scale           



                                                  1-3)           

 

     acetaminoph      -0      Yes            650mg      650 mg,           Un

anna



     en        4-10                               Oral,           ity of



     (TYLENOL)      17:07:                               Q4HPRN,           [place]



     tablet 650      07                                 Starting           Medic

al



     mg                                           Sat            [facility]



                                                  4/10/21 at           



                                                  1207,           



                                                  Until           



                                                  Discontinu           



                                                  ed,            



                                                  Routine,           



                                                  Pain           



                                                  (scale           



                                                  1-3)           

 

     acetaminoph      -0      Yes            650mg      650 mg,           Un

anna



     en        4-10                               Oral,           ity of



     (TYLENOL)      17:07:                               Q4HPRN,           [place]



     tablet 650      07                                 Starting           Medic

al



     mg                                           Sat            [facility]



                                                  4/10/21 at           



                                                  1207,           



                                                  Until           



                                                  Discontinu           



                                                  ed,            



                                                  Routine,           



                                                  Pain           



                                                  (scale           



                                                  1-3)           

 

     bupivacaine      -0 202- No                       PRN,           Unive

rs



     (preserv      4-10 04-12                          Starting           ity of



     free) 0.5%      15:40: 16:58                          [facility]



     (SENSORCAIN      00   :30                           4/10/21 at           Me

dical



     E MPF) 0.5                                         1040,           [facility]



     % (5 mg/mL)                                         Intra-op           



     7.5 mL,                                                        



     lidocaine                                                        



     1% (PF)                                                        



     (XYLOCAINE)                                                        



     7.5 mL                                                        

 

     enoxaparin      -0      Yes            30mg      30 mg,           Unive

rs



     (LOVENOX)      4-10                               Subcutaneo           ity 

of



     injection      14:00:                               us, DAILY,           [facility]

[place]



     30 mg      00                                 First dose           Medical



                                                  on Sat           [facility]



                                                  4/10/21 at           



                                                  0900,           



                                                  Until           



                                                  Discontinu           



                                                  ed,            



                                                  Routine           

 

     enoxaparin      -0      Yes            30mg      30 mg,           Unive

rs



     (LOVENOX)      4-10                               Subcutaneo           ity 

of



     injection      14:00:                               us, DAILY,           Te

[place]



     30 mg      00                                 First dose           Medical



                                                  on [facility]



                                                  4/10/21 at           



                                                  0900,           



                                                  Until           



                                                  Discontinu           



                                                  ed,            



                                                  Routine           

 

     enoxaparin            Yes            30mg      30 mg,           Unive

rs



     (LOVENOX)      4-10                               Subcutaneo           ity 

of



     injection      14:00:                               us, DAILY,           [place]

[place]



     30 mg      00                                 First dose           Medical



                                                  on [facility]



                                                  4/10/21 at           



                                                  0900,           



                                                  Until           



                                                  Discontinu           



                                                  ed,            



                                                  Routine           

 

     NaCl 0.9%      2021- No             1000mL      at 100           Uni

vers



     (NS) IV      4-10 04-10                          mL/hr, IV           ity of



     infusion      13:15: 17:06                          Infusion,           [place]

as



     1,000 mL      00   :58                           CONTINUOUS           Medic

al



                                                  , Starting           Branch



                                                  Sat            



                                                  4/10/21 at           



                                                  0815,           



                                                  Until Sat           



                                                  4/10/21 at           



                                                  1206,           



                                                  Routine           

 

     docusate            Yes            100mg      100 mg,           Unive

rs



     (COLACE)      4-10                               Oral, BID,           ity o

f



     capsule 100      13:00:                               First dose           

[place]



     mg        00                                 on [facility]



                                                  4/10/21 at           Branch



                                                  0800,           



                                                  Until           



                                                  Discontinu           



                                                  ed,            



                                                  Routine           

 

     docusate            Yes            100mg      100 mg,           Unive

rs



     (COLACE)      4-10                               Oral, BID,           ity o

f



     capsule 100      13:00:                               First dose           

[place]



     mg        00                                 on [facility]



                                                  4/10/21 at           Branch



                                                  0800,           



                                                  Until           



                                                  Discontinu           



                                                  ed,            



                                                  Routine           

 

     docusate      0      Yes            100mg      100 mg,           Unive

rs



     (COLACE)      4-10                               Oral, BID,           ity o

f



     capsule 100      13:00:                               First dose           

[place]



     mg        00                                 on [facility]



                                                  4/10/21 at           Branch



                                                  0800,           



                                                  Until           



                                                  Discontinu           



                                                  ed,            



                                                  Routine           

 

     traMADoL       No             50mg      50 mg,           Univer

s



     (ULTRAM)      4-10 04-10                          Oral,           ity of



     tablet 50      10:30: 09:25                          ONCE, 1           [place]

[place]



     mg        00   :00                           dose, [facility]



                                                  4/10/21 at           Branch



                                                  0530,           



                                                  Routine           

 

     piperacilli       No             2.25g      2.25 g, IV         

  [facility]



     n-tazobacta      4-10 04-12                          Piggyback,           i

ty of



     m (ZOSYN)      07:30: 16:13                          Q8H ABX,           [place]

as



     2.25 g in      00   :42                           First dose           Medi

hussain



     NaCl 0.9%                                         (after           Branch



     (NS) 100 mL                                         last           



     MINI-BAG                                         reorder)           



                                                  on Sat           



                                                  4/10/21 at           



                                                  0230,           



                                                  Until           



                                                  Discontinu           



                                                  ed, 100           



                                                  mL<br>Reas           



                                                  on for           



                                                  Anti-Infec           



                                                  tive:           



                                                  Documented           



                                                  Infection<           



                                                  br>Documen           



                                                  ronnie            



                                                  Infection           



                                                  Site: Skin           



                                                  / Soft           



                                                  Tissue<br>           



                                                  Duration           



                                                  of             



                                                  Therapy:           



                                                  Other (see           



                                                  Comments)           

 

     Sliding      -0      Yes                      Subcutaneo           Univ

ers



     Scale      4-10                               us, TID           ity of



     Insulin -      06:45:                               MEALS,           [place]



     Lispro      00                                 First dose           Medical



     (HumaLOG) +                                         on Sat           Branch



     [facility]                                         4/10/21 at           



     Testing                                         0145,           



                                                  Until           



                                                  Discontinu           



                                                  ed,            



                                                  Routine           

 

     aspirin      -0      Yes            81mg      81 mg,           [facility]



     chewable      4-10                               Oral, QAM           ity of



     tablet 81      06:45:                               WITH           [place]



     mg        00                                 BREAKFAST,           Medical



                                                  First dose           Branch



                                                  on Sat           



                                                  4/10/21 at           



                                                  0145,           



                                                  Until           



                                                  Discontinu           



                                                  ed,            



                                                  Routine           

 

     lactobacill      -0      Yes            1{tbl}      1 tablet,          

 [facility]



     us        4-10                               Oral, BID,           ity of



     acidophilus      06:45:                               First dose           

[place]



     (ACIDOPHILL      00                                 on Sat           Medica

l



     ) 25                                         4/10/21 at           [place]



     million                                         0145,           



     cell -100                                         Until           



     mg captab 1                                         Discontinu           



     tablet                                         ed,            



                                                  Routine           

 

     Sliding            Yes                      Subcutaneo           Univ

ers



     Scale      4-10                               us, TID           ity of



     Insulin -      06:45:                               MEALS,           [place]



     Lispro      00                                 First dose           Medical



     (HumaLOG) +                                         on [facility]                                         4/10/21 at           



     Testing                                         0145,           



                                                  Until           



                                                  Discontinu           



                                                  ed,            



                                                  Routine           

 

     aspirin      -0      Yes            81mg      81 mg,           [facility]



     chewable      4-10                               Oral, QAM           ity of



     tablet 81      06:45:                               WITH           [place]



     mg        00                                 BREAKFAST,           Medical



                                                  First dose           Branch



                                                  on Sat           



                                                  4/10/21 at           



                                                  0145,           



                                                  Until           



                                                  Discontinu           



                                                  ed,            



                                                  Routine           

 

     lactobacill      -0      Yes            1{tbl}      1 tablet,          

 [facility]



     us        4-10                               Oral, BID,           ity of



     acidophilus      06:45:                               First dose           

[place]



     (ACIDOPHILL      00                                 on Sat           Medica

l



     ) 25                                         4/10/21 at           [place]



     million                                         0145,           



     cell -100                                         Until           



     mg captab 1                                         Discontinu           



     tablet                                         ed,            



                                                  Routine           

 

     amLODIPine      -0      Yes            5mg       5 mg,           Univer

s



     (NORVASC)      4-10                               Oral,           ity of



     tablet 5 mg      06:45:                               DAILY,           [place]

[place]



               00                                 First dose           Medical



                                                  on Sat           Branch



                                                  4/10/21 at           



                                                  0145,           



                                                  Until           



                                                  Discontinu           



                                                  ed,            



                                                  Routine           

 

     Sliding            Yes                      Subcutaneo           Univ

ers



     Scale      4-10                               us, TID           ity of



     Insulin -      06:45:                               MEALS,           [place]



     Lispro      00                                 First dose           Medical



     (HumaLOG) +                                         on Sat           Branch



     Fsbg                                         4/10/21 at           



     Testing                                         0145,           



                                                  Until           



                                                  Discontinu           



                                                  ed,            



                                                  Routine           

 

     aspirin            Yes            81mg      81 mg,           [facility]



     chewable      4-10                               Oral, QAM           ity of



     tablet 81      06:45:                               WITH           [place]



     mg        00                                 BREAKFAST,           Medical



                                                  First dose           Branch



                                                  on Sat           



                                                  4/10/21 at           



                                                  0145,           



                                                  Until           



                                                  Discontinu           



                                                  ed,            



                                                  Routine           

 

     lactobacill            Yes            1{tbl}      1 tablet,          

 [facility]



     us        4-10                               Oral, BID,           ity of



     acidophilus      06:45:                               First dose           

[place]



     (ACIDOPHILL      00                                 on Sat           Medica

l



     US) 25                                         4/10/21 at           Branch



     million                                         0145,           



     cell -100                                         Until           



     mg captab 1                                         Discontinu           



     tablet                                         ed,            



                                                  Routine           

 

     amLODIPine      2021- No             5mg       5 mg,           Unive

rs



     (NORVASC)      4-10 04-13                          Oral,           ity of



     tablet 5 mg      06:45: 13:16                          DAILY,           [place]

as



               00   :33                           First dose           Medical



                                                  on Sat           Branch



                                                  4/10/21 at           



                                                  0145,           



                                                  Until           



                                                  Discontinu           



                                                  ed,            



                                                  Routine           

 

     carvediloL      2021- No             6.25mg      6.25 mg,           

[facility]



     (COREG)      4-10 04-11                          Oral, BID           ity of



     tablet 6.25      06:45: 22:14                          MEALS,           [place]

as



     mg        00   :43                           First dose           Medical



                                                  on Sat           Branch



                                                  4/10/21 at           



                                                  0145,           



                                                  Until           



                                                  Discontinu           



                                                  ed,            



                                                  Routine           

 

     NaCl 0.9%      2021- No             1000mL      at 125           Uni

vers



     (NS) IV      4-10 04-10                          mL/hr, IV           ity of



     infusion      06:45: 13:10                          Infusion,           [facility]

as



     1,000 mL      00   :52                           CONTINUOUS           Medic

al



                                                  , Starting           Branch



                                                  Sat            



                                                  4/10/21 at           



                                                  0145,           



                                                  Until Sat           



                                                  4/10/21 at           



                                                  0810,           



                                                  Routine           

 

     aspirin 81            Yes            81mg      Take 81 mg           U

nivers



     mg chewable      4-10                               by mouth           ity 

of



     tablet      06:38:                               daily.           [place]



               18                                                Medical



                                                                 Branch

 

     aspirin 81            Yes            81mg      Take 81 mg           U

nivers



     mg chewable      4-10                               by mouth           ity 

of



     tablet      06:38:                               daily.           [place]



               18                                                Medical



                                                                 Branch

 

     ondansetron      2021-0      Yes            4mg       4 mg, Slow           

[facility]



     (ZOFRAN      4-10                               IV Push,           ity of



     (PF))      06:37:                               Q6HPRN,           [place]



     injection 4      47                                 Starting           Medi

hussain



     mg                                           Sat            Branch



                                                  4/10/21 at           



                                                  0137,           



                                                  Until           



                                                  Discontinu           



                                                  ed,            



                                                  Routine,           



                                                  Nausea and           



                                                  Vomiting           



                                                  (N/V)           

 

     ondansetron      2021-0      Yes            4mg       4 mg, Slow           

[facility]



     (ZOFRAN      4-10                               IV Push,           ity of



     (PF))      06:37:                               Q6HPRN,           [place]



     injection 4      47                                 Starting           Medi

hussain



     mg                                           Sat            Branch



                                                  4/10/21 at           



                                                  0137,           



                                                  Until           



                                                  Discontinu           



                                                  ed,            



                                                  Routine,           



                                                  Nausea and           



                                                  Vomiting           



                                                  (N/V)           

 

     ondansetron      2021-0      Yes            4mg       4 mg, Slow           

[facility]



     (ZOFRAN      4-10                               IV Push,           ity of



     (PF))      06:37:                               Q6HPRN,           [place]



     injection 4      47                                 Starting           Medi

hussain



     mg                                           Sat            Branch



                                                  4/10/21 at           



                                                  0137,           



                                                  Until           



                                                  Discontinu           



                                                  ed,            



                                                  Routine,           



                                                  Nausea and           



                                                  Vomiting           



                                                  (N/V)           

 

     glucagon      2021-0      Yes            1mg       1 mg,           [facility]



     (GLUCAGEN      4-10                               Intramuscu           ity 

of



     DIAGNOSTIC      06:35:                               lar, PRN,           [facility]

[place]



     KIT)      31                                 Starting           Medical



     injection 1                                         Sat            Branch



     mg                                           4/10/21 at           



                                                  0135,           



                                                  Until           



                                                  Discontinu           



                                                  ed, ASAP,           



                                                  Blood           



                                                  Glucose           



                                                  &lt; or =           



                                                  70 mg/dL           



                                                  and            



                                                  patient is           



                                                  unable to           



                                                  swallow or           



                                                  has mental           



                                                  changes.           

 

     dextrose 50      1-0      Yes            25mL      25 mL,           Univ

ers



     % in water      4-10                               Slow IV           ity of



     (D50W)      06:35:                               Push, PRN,           [place]



     injection      31                                 Starting           Medica

l



     25 mL                                         Sat            Branch



                                                  4/10/21 at           



                                                  0135,           



                                                  Until           



                                                  Discontinu           



                                                  ed, ASAP,           



                                                  Blood           



                                                  Glucose           



                                                  &lt; or =           



                                                  70 mg/dL           



                                                  and            



                                                  patient is           



                                                  unable to           



                                                  swallow or           



                                                  has mental           



                                                  status           



                                                  changes.           

 

     glucagon      2021-0      Yes            1mg       1 mg,           [facility]



     (GLUCAGEN      4-10                               Intramuscu           ity 

of



     DIAGNOSTIC      06:35:                               lar, PRN,           [facility]

[place]



     KIT)      31                                 Starting           Medical



     injection 1                                         Sat            [facility]



     mg                                           4/10/21 at           



                                                  0135,           



                                                  Until           



                                                  Discontinu           



                                                  ed, ASAP,           



                                                  Blood           



                                                  Glucose           



                                                  &lt; or =           



                                                  70 mg/dL           



                                                  and            



                                                  patient is           



                                                  unable to           



                                                  swallow or           



                                                  has mental           



                                                  changes.           

 

     dextrose 50      0      Yes            25mL      25 mL,           [facility]

ers



     % in water      4-10                               Slow IV           ity of



     (D50W)      06:35:                               Push, PRN,           [place]



     injection      31                                 Starting           [facility]

l



     25 mL                                         Sat            [facility]



                                                  4/10/21 at           



                                                  0135,           



                                                  Until           



                                                  Discontinu           



                                                  ed, ASAP,           



                                                  Blood           



                                                  Glucose           



                                                  &lt; or =           



                                                  70 mg/dL           



                                                  and            



                                                  patient is           



                                                  unable to           



                                                  swallow or           



                                                  has mental           



                                                  status           



                                                  changes.           

 

     glucagon            Yes            1mg       1 mg,           [facility]



     (GLUCAGEN      4-10                               Intramuscu           ity 

of



     DIAGNOSTIC      06:35:                               lar, PRN,           [facility]

[place]



     KIT)      31                                 Starting           Medical



     injection 1                                         Sat            [facility]



     mg                                           4/10/21 at           



                                                  0135,           



                                                  Until           



                                                  Discontinu           



                                                  ed, ASAP,           



                                                  Blood           



                                                  Glucose           



                                                  &lt; or =           



                                                  70 mg/dL           



                                                  and            



                                                  patient is           



                                                  unable to           



                                                  swallow or           



                                                  has mental           



                                                  changes.           

 

     dextrose 50            Yes            25mL      25 mL,           [facility]

ers



     % in water      4-10                               Slow IV           ity of



     (D50W)      06:35:                               Push, PRN,           [place]



     injection      31                                 Starting           [facility]

l



     25 mL                                         Sat            [facility]



                                                  4/10/21 at           



                                                  0135,           



                                                  Until           



                                                  Discontinu           



                                                  ed, ASAP,           



                                                  Blood           



                                                  Glucose           



                                                  &lt; or =           



                                                  70 mg/dL           



                                                  and            



                                                  patient is           



                                                  unable to           



                                                  swallow or           



                                                  has mental           



                                                  status           



                                                  changes.           

 

     FENTanyl PF      2021- No             50ug      50 mcg,           [facility]

[facility]



     (SUBLIMAZE      4-10 04-10                          Slow IV           ity o

f



     (PF))      02:45: 01:51                          Push,           [place]



     injection      00   :00                           ONCE, 1           Medical



     50 mcg                                         dose, Fri           [facility]



                                                  21 at           



                                                  2145,           



                                                  Routine           

 

     piperacilli      2021- No             3.375g      3.375 g,          

 [facility]



     n-tazobacta      4-10 04-10                          IV             ity of



     m (ZOSYN)      02:45: 02:21                          Piggyback,           [facility]

[place]



     3.375 g in      00   :00                           ONCE, 1           [facility]

l



     NaCl 0.9%                                         dose, Fri           [facility]

h



     (NS) 100 mL                                         21 at           



     MINI-BAG                                         2145, 100           



                                                  mL<br>Reas           



                                                  on for           



                                                  Anti-Infec           



                                                  tive:           



                                                  Documented           



                                                  Infection<           



                                                  br>Documen           



                                                  ronnie            



                                                  Infection           



                                                  Site: Skin           



                                                  / Soft           



                                                  Tissue<br>           



                                                  Duration           



                                                  of             



                                                  Therapy:           



                                                  Other (see           



                                                  Comments)           

 

     vancomycin      -0 2021- No             1000mg      1,000 mg,          

 [facility]



     (VANCOCIN)      4-10 04-10                          IV             ity of



     1,000 mg in      02:45: 06:33                          [place], [place]



     NaCl 0.9%      00   :16                           Q12H ABX,           Medic

al



     (NS) 250 mL                                         First dose           Br

anch



     VIAL-MATE                                         on Fri           



     IV                                           21 at           



     piggyback                                         2145,           



                                                  Until           



                                                  Discontinu           



                                                  ed, 250           



                                                  mL<br&gt;R           



                                                  carolann for           



                                                  Anti-Infec           



                                                  tive:           



                                                  Documented           



                                                  Infection<           



                                                  br>Documen           



                                                  ronnie            



                                                  Infection           



                                                  Site: Skin           



                                                  / Soft           



                                                  Tissue<br>           



                                                  Duration           



                                                  of             



                                                  Therapy:           



                                                  Other (see           



                                                  Comments)           

 

     metformin      -0      No             1mg                      



     500 mg      4-07                                              



     tablet      00:00:                                              



               00                                                

 

     metformin      -0      No             2mg                      



     500 mg      4-07                                              



     tablet      00:00:                                              



               00                                                

 

     metformin      -0      No             1mg                      



     500 mg      4-07                                              



     tablet      00:00:                                              



               00                                                

 

     metformin      -0      No             2mg                      



     500 mg      4-07                                              



     tablet      00:00:                                              



               00                                                

 

     metformin      -0      No             1mg                      



     500 mg      4-07                                              



     tablet      00:00:                                              



               00                                                

 

     metformin      -0      No             2mg                      



     500 mg      4-07                                              



     tablet      00:00:                                              



               00                                                

 

     metformin      -0      No             1mg                      



     500 mg      4-01                                              



     tablet      00:00:                                              



               00                                                

 

     metformin      -0      No             1mg                      



     500 mg      4-01                                              



     tablet      00:00:                                              



               00                                                

 

     indomethaci      -0      No             1mg                      



     n 50 mg      4-01                                              



     capsule      00:00:                                              



               00                                                

 

     metformin      -0      No             1mg                      



     500 mg      4-01                                              



     tablet      00:00:                                              



               00                                                

 

     metformin      -0      No             1mg                      



     500 mg      4-01                                              



     tablet      00:00:                                              



               00                                                

 

     indomethaci      -0      No             1mg                      



     n 50 mg      4-01                                              



     capsule      00:00:                                              



               00                                                

 

     metformin      -0      No             1mg                      



     500 mg      4-01                                              



     tablet      00:00:                                              



               00                                                

 

     metformin      202-0      No             1mg                      



     500 mg      4-01                                              



     tablet      00:00:                                              



               00                                                

 

     indomethaci      1-0      No             1mg                      



     n 50 mg      4-01                                              



     capsule      00:00:                                              



               00                                                

 

     lisinopril      -      Yes       [id number] 10mg      Take 1         

  [facility]



     10 mg      6-05                [id number]           tablet by           ity of



     tablet      00:00:                               mouth           [place]



               00                                 daily.           Medical



                                                                 [facility]

 

     lisinopril      -      Yes       [id number] 10mg      Take 1         

  [facility]



     10 mg      6-05                [id number]           tablet by           ity of



     tablet      00:00:                               mouth           [place]



               00                                 daily.           Medical



                                                                 [facility]

 

     lisinopril      -2021- No        [id number] 10mg      Take 1        

   [facility]



     10 mg      6-05            [id number]           tablet by           ity of



     tablet      00:00: 00:00                          mouth           [place]



               00   :00                           daily.           Medical



                                                                 [facility]

 

     Insulin      -      Yes       [id number]           Use as           U

nivers



     Syringe-Nee                      [id number]           directed           it

y of



     dle U-100      00:00:                                              [place]



     0.3 mL 30      00                                                Medical



     gauge x                                                        [facility]



     5/16 Syrg                                                        

 

     Insulin      -      Yes       [id number]           Use as           U

nivers



     Syringe-Nee                      [id number]           directed           it

y of



     dle U-100      00:00:                                              [place]



     0.3 mL 30      00                                                Medical



     gauge x                                                        [facility]



     5/16 Syrg                                                        

 

     Insulin      -      Yes       [id number]           Use as           U

nivers



     Syringe-Nee      6-                [id number]           directed           it

y of



     dle U-100      00:00:                                              [place]



     0.3 mL 30      00                                                Medical



     gauge x                                                        [facility]



     5/16 Syrg                                                        

 

     acetaminoph            Yes       [id number] 1{tbl}      Take 1         

  [facility]



     en-codeine      6-04                               tablet by           ity 

of



     (TYLENOL-CO      00:00:                               mouth           [place]



     DEINE #3)      00                                 every 4           Medical



     300-30 mg                                         (four)           [facility]



     tablet                                         hours as           



                                                  needed for           



                                                  Pain           



                                                  (scale           



                                                  4-6).           

 

     acetaminoph            Yes       [id number] 1{tbl}      Take 1         

  [facility]



     en-codeine      6-04                               tablet by           ity 

of



     (TYLENOL-CO      00:00:                               mouth           [place]



     DEINE #3)      00                                 every 4           Medical



     300-30 mg                                         (four)           [facility]



     tablet                                         hours as           



                                                  needed for           



                                                  Pain           



                                                  (scale           



                                                  4-6).           

 

     Insulin            Yes       [id number]           Use as           U

nivers



     Syringe-Nee      -                [id number]           directed           it

y of



     dle U-100      00:00:                                              [place]



     0.3 mL 30      00                                                Medical



     gauge x                                                        [facility]



     5/16 Syrg                                                        

 

     acetaminoph            Yes       [id number] 1{tbl}      Take 1         

  [facility]



     en-codeine      6-04                               tablet by           ity 

of



     (TYLENOL-CO      00:00:                               mouth           [place]



     DEINE #3)      00                                 every 4           Medical



     300-30 mg                                         (four)           [facility]



     tablet                                         hours as           



                                                  needed for           



                                                  Pain           



                                                  (scale           



                                                  4-6).           

 

     acetaminoph            Yes       [id number] 1{tbl}      Take 1         

  [facility]



     en-codeine      6-04                               tablet by           ity 

of



     (TYLENOL-CO      00:00:                               mouth           [place]



     DEINE #3)      00                                 every 4           Medical



     300-30 mg                                         (four)           [facility]



     tablet                                         hours as           



                                                  needed for           



                                                  Pain           



                                                  (scale           



                                                  4-6).           

 

     Insulin            Yes       [id number]           Use as           U

nivers



     Syringe-Nee      6-                [id number]           directed           it

y of



     dle U-100      00:00:                                              [place]



     0.3 mL 30      00                                                Medical



     gauge x                                                        [facility]



     5/16 Syrg                                                        

 

     acetaminoph            Yes       [id number] 1{tbl}      Take 1         

  [facility]



     en-codeine      6-04                               tablet by           ity 

of



     (TYLENOL-CO      00:00:                               mouth           [place]



     DEINE #3)      00                                 every 4           Medical



     300-30 mg                                         (four)           [facility]



     tablet                                         hours as           



                                                  needed for           



                                                  Pain           



                                                  (scale           



                                                  4-6).           

 

     acetaminoph            Yes       [id number] 1{tbl}      Take 1         

  [facility]



     en-codeine      6-04                               tablet by           ity 

of



     (TYLENOL-CO      00:00:                               mouth           [place]



     DEINE #3)      00                                 every 4           Medical



     300-30 mg                                         (four)           [facility]



     tablet                                         hours as           



                                                  needed for           



                                                  Pain           



                                                  (scale           



                                                  4-6).           

 

     Insulin            Yes       [id number]           Use as           U

nivers



     Syringe-Nee      -                [id number]           directed           it

y of



     dle U-100      00:00:                                              [place]



     0.3 mL 30      00                                                Medical



     gauge x                                                        [facility]



     5/16 Syrg                                                        

 

     acetaminoph            Yes       [id number] 1{tbl}      Take 1         

  [facility]



     en-codeine      6-04                               tablet by           ity 

of



     (TYLENOL-CO      00:00:                               mouth           [place]



     DEINE #3)      00                                 every 4           Medical



     300-30 mg                                         (four)           [facility]



     tablet                                         hours as           



                                                  needed for           



                                                  Pain           



                                                  (scale           



                                                  4-6).           

 

     acetaminoph            Yes       [id number] 1{tbl}      Take 1         

  [facility]



     en-codeine      6-04                               tablet by           ity 

of



     (TYLENOL-CO      00:00:                               mouth           [place]



     DEINE #3)      00                                 every 4           Medical



     300-30 mg                                         (four)           [facility]



     tablet                                         hours as           



                                                  needed for           



                                                  Pain           



                                                  (scale           



                                                  4-6).           

 

     Insulin            Yes       [id number]           Use as           U

nivers



     Syringe-Nee      6-04                [id number]           directed           it

y of



     dle U-100      00:00:                                              [place]



     0.3 mL 30      00                                                Medical



     gauge x                                                        [facility]



     5/16 Syrg                                                        

 

     acetaminoph            Yes       [id number] 1{tbl}      Take 1         

  [facility]



     en-codeine      6-04                               tablet by           ity 

of



     (TYLENOL-CO      00:00:                               mouth           [place]



     DEINE #3)      00                                 every 4           Medical



     300-30 mg                                         (four)           [facility]



     tablet                                         hours as           



                                                  needed for           



                                                  Pain           



                                                  (scale           



                                                  4-6).           

 

     Insulin            Yes       [id number]           Use as           U

nivers



     Syringe-Nee      6-                [id number]           directed           it

y of



     dle U-100      00:00:                                              [place]



     0.3 mL 30      00                                                Medical



     gauge x                                                        Branch



     5/16 Syrg                                                        

 

     acetaminoph            Yes       [id number] 1{tbl}      Take 1         

  [facility]



     en-codeine      6-04                               tablet by           ity 

of



     (TYLENOL-CO      00:00:                               mouth           [place]



     DEINE #3)      00                                 every 4           Medical



     300-30 mg                                         (four)           [facility]



     tablet                                         hours as           



                                                  needed for           



                                                  Pain           



                                                  (scale           



                                                  4-6).           

 

     acetaminoph            Yes       [id number] 1{tbl}      Take 1         

  [facility]



     en-codeine      6-04                               tablet by           ity 

of



     (TYLENOL-CO      00:00:                               mouth           [place]



     DEINE #3)      00                                 every 4           Medical



     300-30 mg                                         (four)           [facility]



     tablet                                         hours as           



                                                  needed for           



                                                  Pain           



                                                  (scale           



                                                  4-6).           

 

     acetaminoph            Yes       [id number] 1{tbl}      Take 1         

  [facility]



     en-codeine      6-04                               tablet by           ity 

of



     (TYLENOL-CO      00:00:                               mouth           [place]



     DEINE #3)      00                                 every 4           Medical



     300-30 mg                                         (four)           [facility]



     tablet                                         hours as           



                                                  needed for           



                                                  Pain           



                                                  (scale           



                                                  4-6).           

 

     Insulin            Yes       [id number]           Use as           U

nivers



     Syringe-Nee      6-                [id number]           directed           it

y of



     dle U-100      00:00:                                              [place]



     0.3 mL 30      00                                                Medical



     gauge x                                                        Branch



     5/16 Syrg                                                        

 

     Insulin      -      Yes       [id number]           Use as           U

nivers



     Syringe-Nee      6-04                [id number]           directed           it

y of



     dle U-100      00:00:                                              [place]



     0.3 mL 30      00                                                Medical



     gauge x                                                        Branch



     5/16 Syrg                                                        

 

     Insulin            Yes       [id number]           Use as           U

nivers



     Syringe-Nee      6-04                [id number]           directed           it

y of



     dle U-100      00:00:                                              [place]



     0.3 mL 30      00                                                Medical



     gauge x                                                        Branch



     5/16 Syrg                                                        

 

     acetaminoph      2022- No        [id number] 1{tbl}      Take 1        

   [facility]



     en-codeine      6--                          tablet by           ity

 of



     (TYLENOL-CO      00:00: 00:00                          [place]           [place]

[facility]



     DEINE #3)      00   :00                           every 4           Medical



     300-30 mg                                         (four)           [facility]



     tablet                                         hours as           



                                                  needed for           



                                                  Pain           



                                                  (scale           



                                                  4-6).           

 

     acetaminoph      2022- No        [id number] 1{tbl}      Take 1        

   [facility]



     en-codeine      6--                          tablet by           ity

 of



     (TYLENOL-CO      00:00: 00:00                          [place]           [place]

[facility]



     DEINE #3)      00   :00                           every 4           Medical



     300-30 mg                                         (four)           Branch



     tablet                                         hours as           



                                                  needed for           



                                                  Pain           



                                                  (scale           



                                                  4-6).           







Immunizations







           Ordered    Filled Immunization Date       Status     Comments   [facility]



           Immunization Name Name                                        

 

           Pneumococcal            2019 Completed             [facility] o

f



           Polysaccharide,            00:00:00                         [place] [facility]

ical



           PPSV23 (PNEUMOVAX)                                             Branch

 

           Pneumococcal            2019 Completed             [facility] o

f



           Polysaccharide,            00:00:00                         [place] [facility]

ical



           PPSV23 (PNEUMOVAX)                                             Branch

 

           Pneumococcal            2019 Completed             [facility] o

f



           Polysaccharide,            00:00:00                         [place] [facility]

ical



           PPSV23 (PNEUMOVAX)                                             Branch

 

           Pneumococcal            2019 Completed             [facility] o

f



           Polysaccharide,            00:00:00                         [place] [facility]

ical



           PPSV23 (PNEUMOVAX)                                             Branch

 

           Pneumococcal            2019 Completed             [facility] o

f



           Polysaccharide,            00:00:00                         [place] [facility]

ical



           PPSV23 (PNEUMOVAX)                                             Branch

 

           Pneumococcal            2019 Completed             [facility] o

f



           Polysaccharide,            00:00:00                         [place] [facility]

ical



           PPSV23 (PNEUMOVAX)                                             Branch

 

           Pneumococcal            2019 Completed             [facility] o

f



           Polysaccharide,            00:00:00                         [place] [facility]

ical



           PPSV23 (PNEUMOVAX)                                             Branch

 

           Pneumococcal            2019 Completed             [facility] o

f



           Polysaccharide,            00:00:00                         [place] [facility]

ical



           PPSV23 (PNEUMOVAX)                                             Branch

 

           Pneumococcal            2019 Completed             [facility] o

f



           Polysaccharide,            00:00:00                         [place] [facility]

ical



           PPSV23 (PNEUMOVAX)                                             Branch

 

           Pneumococcal            2019 Completed             University o

f



           Polysaccharide,            00:00:00                         [place] [facility]

ical



           PPSV23 (PNEUMOVAX)                                             Branch

 

           Pneumococcal            2019 Completed             University o

f



           Polysaccharide,            00:00:00                         [place] [facility]

ical



           PPSV23 (PNEUMOVAX)                                             Branch







Vital Signs







             Vital Name   Observation Time Observation Value Comments     Source

 

             Systolic blood 2022 17:38:00 135 mm[Hg]                [facility]

[facility] of



             pressure                                            [place] [facility]



                                                                 [facility]anch

 

             Diastolic blood 2022 17:38:00 89 mm[Hg]                 Unive

rsity of



             pressure                                            [place] [facility]



                                                                 Branch

 

             Heart rate   2022 17:38:00 67 /min                   [facility]

ty of



                                                                 [place] [facility]



                                                                 Branch

 

             Body temperature 2022 17:38:00 36.94 Kavitha                 Univ

ersity of



                                                                 [place] [facility]



                                                                 Branch

 

             Respiratory rate 2022 17:38:00 18 /min                   Univ

ersity of



                                                                 [place] [facility]



                                                                 Branch

 

             Oxygen saturation in 2022 17:38:00 99 /min                   

[facility]



             Arterial blood by                                        [place] [facility]

hussain



             Pulse oximetry                                        Branch

 

             Body weight  2022 17:45:00 76 kg                     [facility]

ty of



                                                                 [place] [facility]



                                                                 [facility]anch

 

             BMI          2022 17:45:00 27.04 kg/m2               [facility]

ty of



                                                                 [place] [facility]



                                                                 Branch

 

             Body height  2022-10-28 18:13:00 167.6 cm                  [facility]

ty of



                                                                 [place] [facility]



                                                                 Branch

 

             Systolic blood 2021 21:30:00 144 mm[Hg]                Univer

sity of



             pressure                                            [place] [facility]



                                                                 Branch

 

             Diastolic blood 2021 21:30:00 88 mm[Hg]                 Unive

rsity of



             pressure                                            [place] [facility]



                                                                 Branch

 

             Heart rate   2021 21:30:00 68 /min                   [facility]

ty of



                                                                 [place] [facility]



                                                                 Branch

 

             Body temperature 2021 21:30:00 36.39 Kavitha                 Univ

ersity of



                                                                 [place] [facility]



                                                                 Branch

 

             Respiratory rate 2021 21:30:00 18 /min                   Univ

ersity of



                                                                 [place] [facility]



                                                                 Branch

 

             Oxygen saturation in 2021 21:30:00 96 /min                   

[facility]



             Arterial blood by                                        [place] [facility]

hussain



             Pulse oximetry                                        Branch

 

             Body weight  2021-04-10 09:44:00 78.971 kg                 [facility]

ty of



                                                                 [place] [facility]



                                                                 Branch

 

             BMI          2021-04-10 09:44:00 28.10 kg/m2               Univers[facility]

ty of



                                                                 [place] [facility]



                                                                 Branch

 

             Body height  2021-04-10 04:54:00 167.6 cm                  [facility]

ty of



                                                                 [place] [facility]



                                                                 Branch

 

             Systolic blood 2021 17:28:00 177 mm[Hg]                Univer

sity of



             pressure                                            [place] [facility]



                                                                 Branch

 

             Diastolic blood 2021 17:28:00 99 mm[Hg]                 Unive

rsity of



             pressure                                            [place] [facility]



                                                                 Branch

 

             Heart rate   2021 17:28:00 73 /min                   [facility]

ty of



                                                                 [place] [facility]



                                                                 Branch

 

             Body temperature 2021 17:28:00 36.61 Kavitha                 Univ

ersity of



                                                                 [place] [facility]



                                                                 Branch

 

             Respiratory rate 2021 17:28:00 18 /min                   Univ

ersity of



                                                                 [place] [facility]



                                                                 Branch

 

             Oxygen saturation in 2021 17:28:00 98 /min                   

[facility]



             Arterial blood by                                        [place] [facility]

hussain



             Pulse oximetry                                        Branch

 

             Body weight  2021-04-10 09:44:00 78.971 kg                 [facility]

ty of



                                                                 [place] [facility]



                                                                 Branch

 

             BMI          2021-04-10 09:44:00 28.10 kg/m2               Universi

ty of



                                                                 [place] [facility]



                                                                 Branch

 

             Body height  2021-04-10 04:54:00 167.6 cm                  [facility]i

ty of



                                                                 [place] [facility]



                                                                 Branch

 

             Systolic blood 2021-04-10 16:14:00 165 mm[Hg]                Univer

sity of



             pressure                                            [place] [facility]



                                                                 Branch

 

             Diastolic blood 2021-04-10 16:14:00 97 mm[Hg]                 Unive

rsity of



             pressure                                            [place] [facility]



                                                                 Branch

 

             Heart rate   2021-04-10 16:14:00 97 /min                   [facility]

ty of



                                                                 [place] [facility]



                                                                 Branch

 

             Body temperature 2021-04-10 16:14:00 36.94 Kavitha                 Univ

ersity of



                                                                 [place] [facility]



                                                                 Branch

 

             Respiratory rate 2021-04-10 16:14:00 15 /min                   Univ

ersity of



                                                                 [place] [facility]



                                                                 Branch

 

             Oxygen saturation in 2021-04-10 16:14:00 100 /min                  

[facility]



             Arterial blood by                                        [place] [facility]

hussain



             Pulse oximetry                                        Branch

 

             Body weight  2021-04-10 09:44:00 78.971 kg                 [facility]

ty of



                                                                 [place] [facility]



                                                                 Branch

 

             BMI          2021-04-10 09:44:00 28.10 kg/m2               Universi

ty of



                                                                 [place] [facility]



                                                                 Branch

 

             Body height  2021-04-10 04:54:00 167.6 cm                  Univers[facility]

ty of



                                                                 [place] [facility]



                                                                 Branch

 

             BP Systolic  2022 10:48:00 195 mm[Hg]                

 

             BP Diastolic 2022 10:48:00 107 mm[Hg]                

 

             Weight Measured 2022 10:48:00 175.40 pounds              

 

             Height Measured 2022 10:48:00 66.00 inches              

 

             Body Temperature 2022 10:48:00 98.40 degrees              

 

             Heart Rate   2022 10:48:00 64.00 /min                

 

             Respiratory Rate 2022 10:48:00 17.00 /min                

 

             BP Systolic  2022 13:17:00 136 mm[Hg]                

 

             BP Diastolic 2022 13:17:00 89 mm[Hg]                 

 

             Weight Measured 2022 13:17:00 176.20 pounds              

 

             Height Measured 2022 13:17:00 66.00 inches              

 

             Body Temperature 2022 13:17:00 98.00 degrees              

 

             Heart Rate   2022 13:17:00 68.00 /min                

 

             Respiratory Rate 2022 13:17:00 18.00 /min                

 

             Systolic blood 2022 17:00:00 128 mm[Hg]                Univer

sity of



             pressure                                            [facility]

 

             Diastolic blood 2022 17:00:00 82 mm[Hg]                 Unive

rsity of



             [facility]

 

             Heart rate   2022 17:00:00 85 /min                   [facility]

 

             Body temperature 2022 12:46:00 37.11 Kavitha                 [facility]

[facility]

 

             Respiratory rate 2022 12:46:00 18 /min                   [facility]

[facility]

 

             Body weight  2022 12:46:00 77.8 kg                   [facility]

 

             BMI          2022 12:46:00 27.68 kg/m2               [facility]

 

             Oxygen saturation in 2022 08:35:00 98 /min                   

[facility]



             Arterial blood by                                        [place] [facility]

hussain



             Pulse oximetry                                        Branch

 

             Body height  2022-10-28 18:13:00 167.6 cm                  [facility]

 

             BP Systolic  2022 10:48:00 153 mm[Hg]                

 

             BP Diastolic 2022 10:48:00 88 mm[Hg]                 

 

             Weight Measured 2022 10:48:00 196.60 pounds              

 

             Height Measured 2022 10:48:00 66.00 inches              

 

             Body Temperature 2022 10:48:00 97.70 degrees              

 

             Heart Rate   2022 10:48:00 73.00 /min                

 

             Respiratory Rate 2022 10:48:00 16.00 /min                

 

             BP Systolic  2022 15:31:00 165 mm[Hg]                

 

             BP Diastolic 2022 15:31:00 91 mm[Hg]                 

 

             Weight Measured 2022 15:31:00 193.80 pounds              

 

             Height Measured 2022 15:31:00 66.00 inches              

 

             Body Temperature 2022 15:31:00 98.20 degrees              

 

             Heart Rate   2022 15:31:00 85.00 /min                

 

             Respiratory Rate 2022 15:31:00                           

 

             BP Systolic  2022 14:51:00 165 mm[Hg]                

 

             BP Diastolic 2022 14:51:00 91 mm[Hg]                 

 

             Weight Measured 2022 14:51:00 193.80 pounds              

 

             Height Measured 2022 14:51:00 66.00 inches              

 

             Body Temperature 2022 14:51:00 98.20 degrees              

 

             Heart Rate   2022 14:51:00 85.00 /min                

 

             Respiratory Rate 2022 14:51:00                           

 

             BP Systolic  2021 09:18:00                           

 

             BP Diastolic 2021 09:18:00                           

 

             Weight Measured 2021 09:18:00 165.00 pounds              

 

             Height Measured 2021 09:18:00 66.00 inches              

 

             Body Temperature 2021 09:18:00                           

 

             Heart Rate   2021 09:18:00                           

 

             Respiratory Rate 2021 09:18:00                           

 

             BP Systolic  2021 16:56:00 174 mm[Hg]                

 

             BP Diastolic 2021 16:56:00 96 mm[Hg]                 

 

             Weight Measured 2021 16:56:00 173.40 pounds              

 

             Height Measured 2021 16:56:00 66.00 inches              

 

             Body Temperature 2021 16:56:00 99.10 degrees              

 

             Heart Rate   2021 16:56:00 82.00 /min                

 

             Respiratory Rate 2021 16:56:00                           

 

             BP Systolic  2021 14:35:00 208 mm[Hg]                

 

             BP Diastolic 2021 14:35:00 123 mm[Hg]                

 

             Weight Measured 2021 14:35:00 177.20 pounds              

 

             Height Measured 2021 14:35:00 66.00 inches              

 

             Body Temperature 2021 14:35:00 98.30 degrees              

 

             Heart Rate   2021 14:35:00 89.00 /min                

 

             Respiratory Rate 2021 14:35:00 17.00 /min                







Procedures







                Procedure       Date / Time     Performing Clinician Source



                                Performed                       

 

                EXTERNAL PROVIDER RECORDS 2022-11-15 06:01:00 Doctor Unassigned,

 [facility] Name         [facility]

 

                POCT GLUCOSE (AUTOMATED) 2022 18:40:00 Trey Mason [facility]

 

                POCT GLUCOSE (AUTOMATED) 2022 14:45:00 Trey Mason [facility]

 

                PHOSPHORUS      2022 09:34:00 Raj [facility]

 

                MAGNESIUM       2022 09:34:00 [facility]

 

                BASIC METABOLIC PANEL (NA, 2022 09:34:00 Kristin Anthony    [facility]



                K, CL, CO2, GLUCOSE, BUN,                                 Medica

l Branch



                CREATININE, CA)                                 

 

                POCT GLUCOSE (AUTOMATED) 2022 02:40:00 Trey Mason [facility]

 

                POCT GLUCOSE (AUTOMATED) 2022 22:51:00 Trey Mason [facility]

 

                POCT GLUCOSE (AUTOMATED) 2022 18:18:00 Trey Mason [facility]

 

                POCT GLUCOSE (AUTOMATED) 2022 15:58:00 Trey Mason [facility]

 

                PHOSPHORUS      2022 09:16:00 Xiao [facility]

 

                MAGNESIUM       2022 09:16:00 Jewell [facility]

 

                BASIC METABOLIC PANEL (NA, 2022 09:16:00 Naya Hagen  [facility]



                K, CL, CO2, GLUCOSE, BUN,                                 Medica

l Branch



                CREATININE, CA)                                 

 

                CBC WITH DIFF   2022 09:16:00 XiaoNaya busby  [facility]

 

                POCT GLUCOSE (AUTOMATED) 2022 01:23:00 Trey Mason [facility]

 

                POCT GLUCOSE (AUTOMATED) 2022 22:04:00 Trey Mason [facility]

 

                POCT GLUCOSE (AUTOMATED) 2022 17:53:00 Trey Mason [facility]

 

                POCT GLUCOSE (AUTOMATED) 2022 12:34:00 Trey Mason [facility]

 

                POCT GLUCOSE (AUTOMATED) 2022 01:20:00 Trey Mason [facility]

 

                POCT GLUCOSE (AUTOMATED) 2022 22:07:00 Trey Mason [facility]

 

                POCT GLUCOSE (AUTOMATED) 2022 18:43:00 Trey Mason [facility]

 

                POCT GLUCOSE (AUTOMATED) 2022 17:07:00 Trey Mason [facility]

 

                POCT GLUCOSE (AUTOMATED) 2022 14:28:00 Trey Mason [facility]

 

                PHOSPHORUS      2022 08:10:00 [facility]

 

                MAGNESIUM       2022 08:10:00 [facility]

 

                BASIC METABOLIC PANEL (NA, 2022 08:10:00 Kristin Anthony

[facility]



                K, CL, CO2, GLUCOSE, BUN,                                 Medica

l Branch



                CREATININE, CA)                                 

 

                CBC WITH DIFF   2022 08:10:00 [facility]

 

                POCT GLUCOSE (AUTOMATED) 2022 01:49:00 Trey Mason [facility]

 

                POCT GLUCOSE (AUTOMATED) 2022 21:53:00 Trey Mason [facility]

 

                POCT GLUCOSE (AUTOMATED) 2022 17:58:00 Trey Mason [facility]

 

                POCT GLUCOSE (AUTOMATED) 2022 14:08:00 Trey Mason [facility]

 

                POCT GLUCOSE (AUTOMATED) 2022 14:08:00 Trey Mason [facility]

 

                BASIC METABOLIC PANEL (NA, 2022 09:13:00 Raj Kristin    [facility] [place]



                K, CL, CO2, GLUCOSE, BUN,                                 Medica

l Branch



                CREATININE, CA)                                 

 

                MAGNESIUM       2022 09:13:00 [facility]

 

                PHOSPHORUS      2022 09:13:00 [facility]

 

                PHOSPHORUS      2022 09:13:00 [facility]

 

                MAGNESIUM       2022 09:13:00 [facility]

 

                BASIC METABOLIC PANEL (NA, 2022 09:13:00 [facility]



                K, CL, CO2, GLUCOSE, BUN,                                 Medica

l Branch



                CREATININE, CA)                                 

 

                POCT GLUCOSE (AUTOMATED) 2022 01:29:00 Trey Mason [facility]

 

                POCT GLUCOSE (AUTOMATED) 2022 01:29:00 Trey Mason [facility]

 

                POCT GLUCOSE (AUTOMATED) 2022 22:19:00 Trey Mason [facility]

 

                POCT GLUCOSE (AUTOMATED) 2022 22:19:00 Trey Mason [facility]

 

                POCT GLUCOSE (AUTOMATED) 2022 18:38:00 Trey Mason [facility]

 

                POCT GLUCOSE (AUTOMATED) 2022 18:38:00 Trey Mason [facility]

 

                CBC WITH DIFF   2022 11:00:00 [facility]

 

                CBC WITH DIFF   2022 11:00:00 [facility]

 

                COMP. METABOLIC PANEL 2022 10:59:00 Naya Hagen  [facility]



                ([id number])                                         Medical Branch

 

                MAGNESIUM       2022 10:59:00 [facility]

 

                PHOSPHORUS      2022 10:59:00 [facility]

 

                PHOSPHORUS      2022 10:59:00 [facility]

 

                MAGNESIUM       2022 10:59:00 [facility]

 

                COMP. METABOLIC PANEL 2022 10:59:00 [facility]



                ([id number])                                         [facility]

 

                POCT GLUCOSE (AUTOMATED) 2022 00:43:00 Trey Mason [facility]

 

                POCT GLUCOSE (AUTOMATED) 2022 00:43:00 Trey Mason [facility]

 

                POCT GLUCOSE (AUTOMATED) 2022 23:51:00 Trey Mason [facility]

 

                POCT GLUCOSE (AUTOMATED) 2022 23:51:00 Trey Mason [facility]

 

                POCT GLUCOSE (AUTOMATED) 2022 21:12:00 Trey Mason [facility]

 

                POCT GLUCOSE (AUTOMATED) 2022 21:12:00 Trey Mason [facility]

 

                IR CENTRALLY INSERTED 2022 19:16:36 Kristin Anthony    [facility] [place]



                DEVICE TUNNELED 5 OR OLDER                                 Medic

al Branch



                NO PORT/PUMP                                    

 

                POCT GLUCOSE (AUTOMATED) 2022 16:23:00 Trey Mason [facility]

 

                POCT GLUCOSE (AUTOMATED) 2022 16:23:00 Trey Mason [facility]

 

                XR CHEST 1 VW   [facility]46[facility]

 

                XR CHEST 1 VW   2022 13:46:00 [facility]

 

                POCT GLUCOSE (AUTOMATED) 2022 13:11:00 Trey Mason [facility]

 

                POCT GLUCOSE (AUTOMATED) 2022 13:11:00 Trey Mason [facility]

 

                COMP. METABOLIC PANEL 2022 10:50:00 [facility]



                ([id number])                                         [facility]

 

                CBC WITH DIFF   2022 10:50:00 [facility]

 

                MAGNESIUM       2022 10:50:00 [facility]

 

                PHOSPHORUS      2022 10:50:00 [facility]

 

                PHOSPHORUS      2022 10:50:00 [facility]

 

                MAGNESIUM       2022 10:50:00 [facility]

 

                COMP. METABOLIC PANEL 2022 10:50:00 [facility]



                ([id number])                                         [facility]

 

                CBC WITH DIFF   2022 10:50:00 [facility]

 

                DISCLOSURE AND CONSENT, 2022 05:01:00 [facility]



                [facility] SURGICAL                 No Name         Medical Bra

[facility]



                PROCEDURES                                      

 

                DISCLOSURE AND CONSENT, 2022 05:01:00 [facility] 

[facility]



                MEDICAL AND SURGICAL                 No Name         Medical Bra

[facility]



                PROCEDURES                                      

 

                POCT GLUCOSE (AUTOMATED) 2022 01:38:00 Trey Mason [facility]

 

                POCT GLUCOSE (AUTOMATED) 2022 01:38:00 Trey Mason [facility]

 

                POCT GLUCOSE (AUTOMATED) 2022-10-31 22:12:00 Trey Mason [facility]

 

                POCT GLUCOSE (AUTOMATED) 2022-10-31 22:12:00 Trey Mason [facility]

 

                POCT GLUCOSE (AUTOMATED) 2022-10-31 17:00:00 Trey Mason [facility]

 

                POCT GLUCOSE (AUTOMATED) 2022-10-31 17:00:00 Trey Mason [facility]

 

                POCT GLUCOSE (AUTOMATED) 2022-10-31 14:15:00 Trey Mason [facility]

 

                POCT GLUCOSE (AUTOMATED) 2022-10-31 14:15:00 Trey Mason [facility]

 

                BASIC METABOLIC PANEL (NA, 2022-10-31 08:27:00 Naya Hagen  [facility]



                K, CL, CO2, GLUCOSE, BUN,                                 Medica

l Branch



                CREATININE, CA)                                 

 

                CBC WITH DIFF   2022-10-31 08:27:00 Xiao [facility]

 

                MAGNESIUM       2022-10-31 08:27:00 Xiao [facility]

 

                PHOSPHORUS      2022-10-31 08:27:[facility]

 

                HBC ANTIBODY (IGM & IGG) 2022-10-31 08:27:00 Naya Hagen  [facility]

 

                PHOSPHORUS      2022-10-31 08:27:00 Xiao [facility]

 

                MAGNESIUM       2022-10-31 08:27:00 Xiao [facility]

 

                BASIC METABOLIC PANEL (NA, 2022-10-31 08:27:00 Naya Hagen  [facility]



                K, CL, CO2, GLUCOSE, BUN,                                 Medica

l Branch



                CREATININE, CA)                                 

 

                CBC WITH DIFF   2022-10-31 08:27:00 Xiao [facility]

 

                HBC ANTIBODY (IGM & IGG) 2022-10-31 08:27:00 Xiao Naya  [facility]

 

                POCT GLUCOSE (AUTOMATED) 2022-10-31 01:30:00 Trey Mason [facility]

 

                POCT GLUCOSE (AUTOMATED) 2022-10-31 01:30:00 Trey Mason [facility]

 

                POCT GLUCOSE (AUTOMATED) 2022-10-30 22:10:00 Trey Mason [facility]

 

                POCT GLUCOSE (AUTOMATED) 2022-10-30 22:10:00 Trey Mason [facility]

 

                POCT GLUCOSE (AUTOMATED) 2022-10-30 18:24:00 Trey Mason [facility]

 

                POCT GLUCOSE (AUTOMATED) 2022-10-30 18:24:00 Trey Mason [facility]

 

                POCT GLUCOSE (AUTOMATED) 2022-10-30 15:03:00 Trey Mason [facility]

 

                POCT GLUCOSE (AUTOMATED) 2022-10-30 15:03:00 Trey Mason [facility]

 

                BASIC METABOLIC PANEL (NA, 2022-10-30 10:00:00 Kristin Anthony

[facility]



                K, CL, CO2, GLUCOSE, BUN,                                 Medica

l Branch



                CREATININE, CA)                                 

 

                MAGNESIUM       2022-10-30 10:00:00 Raj [facility]

 

                MAGNESIUM       2022-10-30 10:00:00 Raj [facility]

 

                BASIC METABOLIC PANEL (NA, 2022-10-30 10:00:00 Debbie Anthonya    [facility] [place]



                K, CL, CO2, GLUCOSE, BUN,                                 Medica

l Branch



                CREATININE, CA)                                 

 

                POCT GLUCOSE (AUTOMATED) 2022-10-30 01:31:00 Trey Mason [facility]

 

                POCT GLUCOSE (AUTOMATED) 2022-10-30 01:31:00 Trey Mason [facility]

 

                POCT GLUCOSE (AUTOMATED) 2022-10-29 23:31:00 Trey Mason [facility]

 

                POCT GLUCOSE (AUTOMATED) 2022-10-29 23:31:00 Trey Mason [facility]

 

                HAV ANTIBODY (IGG AND IGM) 2022-10-29 18:58:00 Kristin Anthony    [facility]

 

                HAV ANTIBODY (IGG AND IGM) 2022-10-29 18:58:00 Kristin Anthony

[facility]

 

                POCT GLUCOSE (AUTOMATED) 2022-10-29 18:21:00 Trey Mason [facility]

 

                POCT GLUCOSE (AUTOMATED) 2022-10-29 18:21:00 Trey Mason [facility]

 

                CREATININE, URINE RANDOM 2022-10-29 13:25:00 Kristin Anthony    [facility]

 

                CREATININE, URINE RANDOM 2022-10-29 13:25:00 Erica Anthonyssa    [facility]

 

                URIC ACID       2022-10-29 08:58:00 Raj [facility]

 

                MAGNESIUM       2022-10-29 08:58:00 Jose Cisneros [facility]

 

                TRANSFERRIN     2022-10-29 [facility]58[facility]

 

                COMP. METABOLIC PANEL 2022-10-29 08:58:00 Jose Cisneros [facility]



                ([id number])                         [facility]

 

                ANTI-NUCLEAR ANTIBODY 2022-10-29 08:58:00 [facility]



                SCREEN                                          [facility]

 

                HEPATITIS B SURFACE 2022-10-29 08:58:00 [facility]



                ANTIBODY                                        Medical Branch

 

                HEPATITIS B SURFACE 2022-10-29 08:58:00 [facility]



                ANTIGEN                                         [facility]

 

                HCV ANTIBODY    2022-10-29 08:58:00 [facility]

 

                HEPATITIS B CORE ANTIBODY 2022-10-29 08:58:00 [facility]



                IGM                                             [facility]

 

                ANTI-NUCLEAR    2022-10-29 08:58:00 [facility]



                ANTIBODY-PATHOLOGIST                                 Medical Bra

[facility]



                INTERPRETATION                                  

 

                PHOSPHORUS      2022-10-29 08:58:00 [facility]

 

                MAGNESIUM       2022-10-29 08:58:00 Blayne Jose [facility]

 

                COMP. METABOLIC PANEL 2022-10-29 08:58:00 Jose Cisneros [facility]



                ([id number])                         [facility]

 

                TRANSFERRIN     2022-10-29 08:58:00 Freya [facility]

 

                URIC ACID       2022-10-29 08:58:00 [facility]

 

                ANTI-NUCLEAR ANTIBODY 2022-10-29 08:58:00 [facility]



                SCREEN                                          [facility]

 

                HCV ANTIBODY    2022-10-29 08:58:00 [facility]

 

                HEPATITIS B SURFACE 2022-10-29 08:58:00 [facility]



                ANTIBODY                                        Medical Branch

 

                HEPATITIS B SURFACE 2022-10-29 08:58:00 [facility]



                ANTIGEN                                         [facility]

 

                HEPATITIS B CORE ANTIBODY 2022-10-29 08:58:00 Kristin Anthony    [facility]

 

                ANTI-NUCLEAR    2022-10-29 08:58:00 Kristin Anthony [facility]



                ANTIBODY-PATHOLOGIST                                 [facility]



                INTERPRETATION                                  

 

                PHOSPHORUS      2022-10-29 08:58:00 Tierra Pillai   [facility]

 

                CBC WITH DIFF   2022-10-29 08:57:00 Tierra Pillai   [facility]

 

                IONIZED CALCIUM 2022-10-29 08:57:00 Jose Cisneros [facility]

 

                IONIZED CALCIUM 2022-10-29 08:57:00 Jose Cisneros [facility]

 

                CBC WITH DIFF   2022-10-29 08:57[facility] 2022-10-29 05:29:07 Jose Cisneros

[facility] RETROPERITONEAL LIMITED 2022-10-29 05:29:07 Jose Cisneros

[facility]

 

                MRSA / MSSA SCREEN BY PCR, 2022-10-29 04:12:00 Tierra Pillai

[facility]

 

                MRSA / MSSA SCREEN BY PCR, 2022-10-29 04:12:00 Tierra Pillai

[facility]

 

                INTACT PTH CALCIUM GROUP 2022-10-29 04:07:00 Jose Cisneros [facility]

 

                VITAMIN D, 25-OH 2022-10-29 04:07:00 Jose Cisneros [facility]

 

                IONIZED CALCIUM 2022-10-29 04:07:00 Jose Cisneros [facility]

 

                IONIZED CALCIUM 2022-10-29 04:07:00 Jose Cisneros [facility]

 

                INTACT PTH CALCIUM GROUP 2022-10-29 04:07:00 Jose Cisneros [facility]

 

                VITAMIN D, 25-OH 2022-10-29 04:07:00 Jose Cisneros [facility]

 

                URINALYSIS      2022-10-29 04:00:00 Tierra Pillai   [facility]

 

                SODIUM, URINE RANDOM 2022-10-29 04:00:00 Jose Cisneros [facility]

 

                CALCIUM, URINE RANDOM 2022-10-29 04:00:00 Jose Cisneros [facility]

 

                URINE DRUG (IMMUNOASSAY) - 2022-10-29 04:00:00 Jose Cisneros

[facility]



                COMPREHENSIVE DRUG SCREEN                 [facility]

[facility]

 

                PROTEIN CREAT RATIO URINE 2022-10-29 04:00:00 [facility] Pillai

[facility]

 

                [facility]INE DRUG (IMMUNOASSAY) - 2022-10-29 [facility]:00:[facility]



                COMPREHENSIVE DRUG SCREEN                 [facility]           Medica

l Branch

 

                URINALYSIS      2022-10-29 04:00:00 Tierra Pillai   [facility]

 

                SODIUM, URINE RANDOM 2022-10-29 04:00:00 Jose Cisneros [facility]

 

                CALCIUM, URINE RANDOM 2022-10-29 04:00:00 Jose Cisneros [facility]

 

                PROTEIN CREAT RATIO URINE 2022-10-29 04:00:00 Tierra Pillai

[facility]

 

                HB ECG ROUTINE & RHYTHM 2022-10-28 21:20:23 [facility]  [facility]

 

                HB ECG ROUTINE & RHYTHM 2022-10-28 21:20:23 Erwin [facility]

 

                IONIZED CALCIUM 2022-10-28 21:08:00 [facility]  [facility]

 

                IONIZED CALCIUM 2022-10-28 21:08:00 Erwin [facility]

 

                COMP. METABOLIC PANEL 2022-10-28 19:43:00 Gerry Hood  [facility]



                ([id number])                                         [facility] [place]

 

                CREATINE KINASE 2022-10-28 19:43:00 Jose Cisneros [facility]

 

                LIPID PANEL ([id number])(TOTAL 2022-10-28 19:43:00 Tierra Pillai   [facility]



                CHOLESTEROL,                                    Medical Branch



                TRIGLYCERIDES, HDL)                                 

 

                CREATINE KINASE 2022-10-28 19:43:00 Jose Cisneros [facility]

 

                COMP. METABOLIC PANEL 2022-10-28 19:43:00 Gerry Hood  [facility]



                ([id number])                                         Medical Branch

 

                LIPID PANEL ([id number])(TOTAL 2022-10-28 19:43:00 Tierra Pillai   [facility]



                CHOLESTEROL,                                    Medical Branch



                TRIGLYCERIDES, HDL)                                 

 

                CBC WITH DIFF   2022-10-28 18:43:00 Erwin [facility]

 

                BASIC METABOLIC PANEL (NA, 2022-10-28 18:43:00 Gerry Hood

[facility]



                K, CL, CO2, GLUCOSE, BUN,                                 Medica

l Branch



                CREATININE, CA)                                 

 

                GLYCOSYLATED HEMOGLOBIN 2022-10-28 18:43:00 Freya [facility]



                (A1C)                                           Medical Branch

 

                IRON PANEL      2022-10-28 18:43:00 Freya [facility]

 

                FERRITIN SERUM  2022-10-28 18:43:00 Freya [facility]

 

                FERRITIN SERUM  2022-10-28 18:43:00 Freya [facility]

 

                BASIC METABOLIC PANEL (NA, 2022-10-28 18:43:00 Gerry Hood  [facility]



                K, CL, CO2, GLUCOSE, BUN,                                 Medica

l Branch



                CREATININE, CA)                                 

 

                IRON PANEL      2022-10-28 18:43:00 [facility]

 

                CBC WITH DIFF   2022-10-28 18:43:00 Erwin [facility]

 

                GLYCOSYLATED HEMOGLOBIN 2022-10-28 [facility]:43[facility]00 [facility]



                (A1C)                                           Medical Branch

 

                NOTICE OF PRIVACY 2022-10-28 18:09:50 Doctor Unassigned, [facility]



                PRACTICES                       [place]         Medical [facility]

 

                NOTICE OF PRIVACY 2022-10-28 18:09:50 Doctor Unassigned, [facility]



                PRACTICES                       [place]         [facility] [place]

 

                CONSENT/REFUSAL FOR 2022-10-28 18:08:35 Doctor Champ [facility]



                DIAGNOSIS AND TREATMENT                 [place]         [facility] 

[place]

 

                CONSENT/REFUSAL FOR 2022-10-28 18:08:35 Doctor Champ [facility]



                DIAGNOSIS AND TREATMENT                 [facility]

 

                HOSPITAL ADMISSION 2022-10-28 05:01:00 Doctor Champ [facility]

 

                AUTHORIZATION FOR RELEASE 2021 05:01:00 Doctor Champ,

 [facility]         [facility] [place]

 

                POCT GLUCOSE (AUTOMATED) 2021 21:51:00 Edd Nova Un

[facility] 

[facility]

 

                POCT GLUCOSE (AUTOMATED) 2021 16:23:00 Edd Nova Un

[facility] of 

[facility]

 

                POCT GLUCOSE (AUTOMATED) 2021 12:34:00 Edd Nova Un

[facility] of 

[facility]

 

                COMP. METABOLIC PANEL 2021 08:41:00 Heriberto Dacosta  [facility]



                ([id number])                                         Medical Branch

 

                CBC WITH DIFF   2021 08:41:00 [facility] [facility]

[facility]

 

                POCT GLUCOSE (AUTOMATED) 2021 00:38:00 Edd Nova Un

[facility] of 

[facility]

 

                POCT GLUCOSE (AUTOMATED) 2021 21:08:00 Edd Nova Un

[facility] of 

[facility]

 

                POCT GLUCOSE (AUTOMATED) 2021 16:39:00 Edd Nova Un

[facility] of 

[facility]

 

                POCT GLUCOSE (AUTOMATED) 2021 16:39:00 Edd Nova Un

[facility] of 

[facility]

 

                TRANSTHORACIC ECHO (TTE) 2021 13:25:00 [facility]

 

                POCT GLUCOSE (AUTOMATED) 2021 12:32:00 Edd Nova Un

[facility] of 

[facility]

 

                POCT GLUCOSE (AUTOMATED) 2021 12:32:00 Edd Nova Un

[facility] of 

[facility]

 

                COMP. METABOLIC PANEL 2021 08:30:00 Heriberto Dacosta  [facility]



                ([id number])                                         [facility]

 

                CBC WITH DIFF   2021 08:30:00 Sharmaine jeanie  [facility]

 

                COMP. METABOLIC PANEL 2021 08:30:00 Heriberto Dacosta  [facility]



                ([id number])                                         [facility]

 

                CBC WITH DIFF   2021 08:30:00 Sharmaine jeanie  [facility]

 

                POCT GLUCOSE (AUTOMATED) 2021 08:11:00 Edd Nova Un

[facility] of 

[facility]

 

                POCT GLUCOSE (AUTOMATED) 2021 08:11:00 Edd Nova Un

[facility] of 

[facility]

 

                VANCOMYCIN TROUGH 2021 01:34:00 Sharmaine [facility]

 

                VANCOMYCIN TROUGH 2021 01:34:00 Sharmaine jeanie  [facility]

 

                POCT GLUCOSE (AUTOMATED) 2021 21:40:00 Edd Nova Un

[facility] of 

[facility]

 

                POCT GLUCOSE (AUTOMATED) 2021 21:40:00 Edd Nova Un

[facility] of 

[facility]

 

                POCT GLUCOSE (AUTOMATED) 2021 17:28:00 Edd Nova Un

[facility] of 

[facility]

 

                POCT GLUCOSE (AUTOMATED) 2021 17:28:00 Edd Nova Un

[facility] of 

[facility]

 

                POCT GLUCOSE (AUTOMATED) 2021 17:28:00 Edd Nova Un

[facility] 

[place]



                                                                Medical Branch

 

                CLOSURE SURGICAL WOUND 2021 15:36:00 Skip Veloz

[facility]



                LOWER EXTREMITY                                 Medical Branch

 

                INCISION AND DRAINAGE 2021 15:36:00 Skip Veloz

sitashlee [facility]



                LOWER EXTREMITY                                 Medical Branch

 

                CLOSURE SURGICAL WOUND 2021 15:36:00 Skip Veloz

[facility] [place]



                LOWER EXTREMITY                                 Medical Branch

 

                INCISION AND DRAINAGE 2021 15:36:00 Skip Veloz [facility]



                LOWER EXTREMITY                                 Medical Branch

 

                US RETROPERITONEAL 2021 13:43:05 Heriberto Dacosta  [facility]



                COMPLETE                                        Medical Branch

 

                US RETROPERITONEAL 2021 13:43:05 Heriberto Dacosta  [facility]



                COMPLETE                                        Medical Branch

 

                US RETROPERITONEAL 2021 13:43:05 Sharmaine jeanie  [facility]

 

                POCT GLUCOSE (AUTOMATED) 2021 12:55:00 Edd Nova Un

[facility] 

[place]



                                                                Medical Branch

 

                POCT GLUCOSE (AUTOMATED) 2021 12:55:00 Edd Nova Un

[facility] of 

[facility]

 

                POCT GLUCOSE (AUTOMATED) 2021 12:55:00 Edd Nova Un

iversity of 

[facility]

 

                POCT GLUCOSE (AUTOMATED) 2021 08:26:00 Edd Nova Un

[facility] 

[place]



                                                                Medical Branch

 

                POCT GLUCOSE (AUTOMATED) 2021 08:26:00 Edd Nova Un

[facility] 

[place]



                                                                Medical Branch

 

                POCT GLUCOSE (AUTOMATED) 2021 08:26:00 Edd Nova Un

[facility] 

[place]



                                                                Medical Branch

 

                COMP. METABOLIC PANEL 2021 08:21:00 Heriberto Dacosta  [facility]



                ([id number])                                         Medical Branch

 

                CBC WITH DIFF   2021 08:21:00 Heriberto Dacosta  [facility]

 

                COMP. METABOLIC PANEL 2021 08:21:00 Heriberto Dacosta  [facility]



                ([id number])                                         Medical Branch

 

                CBC WITH DIFF   2021 08:21:00 Heriberto Dacosta  [facility]

 

                COMP. METABOLIC PANEL 2021 08:21:00 Heriberto Dacosta  [facility]



                ([id number])                                         Medical Branch

 

                CBC WITH DIFF   2021 08:21:00 Sharmaine jeanie  [facility]

 

                POCT GLUCOSE (AUTOMATED) 2021 21:30:00 Edd Nova Un

[facility] 

[place]



                                                                Medical Branch

 

                POCT GLUCOSE (AUTOMATED) 2021 21:30:00 Edd Nova Un

[facility] 

[place]



                                                                Medical Branch

 

                POCT GLUCOSE (AUTOMATED) 2021 21:30:00 Edd Nova Un

[facility] 

[place]



                                                                Medical Branch

 

                POCT GLUCOSE (AUTOMATED) 2021 16:26:00 Edd Nova Un

[facility] 

[place]



                                                                Medical Branch

 

                POCT GLUCOSE (AUTOMATED) 2021 16:26:00 Edd Nova Un

[facility] 

[place]



                                                                Medical Branch

 

                POCT GLUCOSE (AUTOMATED) 2021 16:26:00 Edd Nova Un

[facility] 

[place]



                                                                Medical Branch

 

                POCT GLUCOSE (AUTOMATED) 2021 12:32:00 Edd Nova Un

[facility] 

[place]



                                                                Medical Branch

 

                POCT GLUCOSE (AUTOMATED) 2021 12:32:00 Edd Nova Un

[facility] 

[place]



                                                                Medical Branch

 

                POCT GLUCOSE (AUTOMATED) 2021 12:32:00 Edd Nova Un

[facility] 

[place]



                                                                Medical Branch

 

                POCT GLUCOSE (AUTOMATED) 2021 08:55:00 Edd Nova Un

[facility] 

[place]



                                                                Medical Branch

 

                POCT GLUCOSE (AUTOMATED) 2021 08:55:00 Edd Nova Un

[facility] 

[place]



                                                                Medical Branch

 

                POCT GLUCOSE (AUTOMATED) 2021 08:55:00 Edd Nova Un

[facility] 

[place]



                                                                Medical Branch

 

                PHOSPHORUS      2021 08:51:00 Sharmaine [facility]

 

                CREATINE KINASE 2021 08:51:00 Sharmaine [facility]

 

                URIC ACID       2021 08:51:00 Sharmaine [facility]

 

                MAGNESIUM       2021 08:51:00 Sharmaine [facility]

 

                COMP. METABOLIC PANEL 2021 08:51:00 Sharmaine [facility]



                ([id number])                                         Medical Branch

 

                CBC WITH DIFF   2021 08:51:00 Sharmaine [facility]

 

                N-TERMINAL PRO-BNP 2021 08:51:00 Sharmaine [facility]

 

                PHOSPHORUS      2021 08:51:00 Sharmaine [facility]

 

                CREATINE KINASE 2021 08:51:00 Sharmaine [facility]

 

                URIC ACID       2021 08:51:00 Sharmaine [facility]

 

                MAGNESIUM       2021 08:51:00 Sharmaine [facility]

 

                COMP. METABOLIC PANEL 2021 08:51:00 Sharmaine [facility] [address]



                ([id number])                                         [facility]

 

                CBC WITH DIFF   2021 08:51:00 Sharmaine [facility]

 

                N-TERMINAL PRO-BNP 2021 08:51:00 Sharmaine [facility]

 

                PHOSPHORUS      2021 08:51:00 Sharmaine [facility]

 

                CREATINE KINASE 2021 08:51:00 Sharmaine [facility]

 

                URIC ACID       2021 08:51:00 Sharmaine [facility]

 

                MAGNESIUM       2021 08:51:00 Sharmaine [facility]

 

                COMP. METABOLIC PANEL 2021 08:51:00 Sharmaine [facility] [address]



                ([id number])                                         [facility]

 

                CBC WITH DIFF   2021 08:51:00 Sharmaine [facility]

 

                N-TERMINAL PRO-BNP 2021 08:51:00 Sharmaine [facility]

 

                POCT GLUCOSE (AUTOMATED) 2021-04-10 21:39:00 Edd Nova

[facility]

 

                POCT GLUCOSE (AUTOMATED) 2021-04-10 21:39:00 Edd Nova

[facility] 

[facility]

 

                POCT GLUCOSE (AUTOMATED) 2021-04-10 21:39:00 Edd Nova Un

[facility] of 

[facility]

 

                POCT GLUCOSE (AUTOMATED) 2021-04-10 17:03:00 Edd Nova Un

[facility] of 

[facility]

 

                POCT GLUCOSE (AUTOMATED) 2021-04-10 17:03:00 Edd Nova Un

[facility] of 

[facility]

 

                POCT GLUCOSE (AUTOMATED) 2021-04-10 17:03:00 Edd Nova Un

[facility] of 

[facility]

 

                ASPIRATE OR ABSCESS 2021-04-10 15:58:00 Skip Veloz [facility]



                CULTURE(AEROBIC/ANAEROBIC)                                 [facility]

 

                AFB CULTURE     2021-04-10 15:58:00 Skip Veloz [facility]

 

                FUNGUS (ROUTINE) CULTURE 2021-04-10 15:58:00 Skip Veloz Uni

[facility]

 

                ASPIRATE OR ABSCESS 2021-04-10 15:58:00 Skip Veloz [facility]

ty [facility]



                CULTURE(AEROBIC/ANAEROBIC)                                 [facility]

 

                AFB CULTURE     2021-04-10 15:58:00 Skip Veloz [facility]

 

                FUNGUS (ROUTINE) CULTURE 2021-04-10 15:58:00 Skip Veloz Uni

[facility]

 

                ASPIRATE OR ABSCESS 2021-04-10 15:58:00 Skip Veloz [facility]



                CULTURE(AEROBIC/ANAEROBIC)                                 [facility]

 

                AFB CULTURE     2021-04-10 15:58:00 Skip Veloz [facility]

 

                FUNGUS (ROUTINE) CULTURE 2021-04-10 15:58:00 Skip Veloz Uni

[facility]

 

                SURGICAL PATHOLOGY EXAM 2021-04-10 15:56:00 [facility]

[facility]

 

                TOE AMPUTATION  2021-04-10 15:18:00 Skip Veloz [facility]

 

                TOE AMPUTATION  2021-04-10 15:18:00 Skip Veloz [facility]

 

                URINALYSIS      2021-04-10 14:10:00 Heriberto Dacosta  [facility]

 

                SODIUM, URINE RANDOM 2021-04-10 14:10:00 Heriberto Dacosta  [facility]

 

                PROTEIN CREAT RATIO URINE 2021-04-10 14:10:00 [facility]

 

                URINALYSIS      2021-04-10 14:10:00 Sharmaine jeanie  [facility]

 

                SODIUM, URINE RANDOM 2021-04-10 14:10:00 Heriberto Dacosta  [facility]

 

                PROTEIN CREAT RATIO URINE 2021-04-10 14:10:00 [facility]iberto Dacosta

[facility]

 

                URINALYSIS      2021-04-10 14:10:00 Heriberto Dacosta  [facility]

 

                SODIUM, URINE RANDOM 2021-04-10 14:10:00 Heriberto Dacosta  [facility]

 

                PROTEIN CREAT RATIO URINE 2021-04-10 14:10:00 Heriberto Dacosta  

[facility]

 

                POCT GLUCOSE (AUTOMATED) 2021-04-10 13:03:00 Edd Nova 

[facility]

 

                POCT GLUCOSE (AUTOMATED) 2021-04-10 13:03:00 Edd Nova 

[facility]

 

                POCT GLUCOSE (AUTOMATED) 2021-04-10 13:03:00 Edd Nova 

[facility]

 

                HB ECG ROUTINE & RHYTHM 2021-04-10 09:07:57 Heriberto Dacosta  [facility]

 

                HB ECG ROUTINE & RHYTHM 2021-04-10 09:07:57 Heriberto Dacosta  [facility]

 

                HB ECG ROUTINE & RHYTHM 2021-04-10 09:07:57 Heriberto Dacosta  [facility]

 

                PHOSPHORUS      2021-04-10 08:45:00 Heriberto Dacosta  [facility]

 

                CREATINE KINASE 2021-04-10 08:45:00 Sharmaine [facility]

 

                URIC ACID       2021-04-10 08:45:00 Sharmaine, Adnan  [facility]

 

                MAGNESIUM       2021-04-10 08:45:00 Sharmaine [facility]

 

                FERRITIN SERUM  2021-04-10 08:45:00 Sharmaine [facility]

 

                VITAMIN B12, LEVEL 2021-04-10 08:45:00 Sharmaine [facility]

 

                C-REACTIVE PROTEIN 2021-04-10 08:45:00 Sharmaine [facility]

 

                THYROID STIMULATING 2021-04-10 08:45:00 Heriberto Dacosta  [facility]



                HORMONE                                         [facility]

 

                COMP. METABOLIC PANEL 2021-04-10 08:45:00 Heriberto Dacosta  [facility]



                ([id number])                                         [facility] [place]

 

                LIPID PANEL ([id number])(TOTAL 2021-04-10 08:45:00 Heriberto Dacosta  [facility]



                CHOLESTEROL,                                    [facility]



                TRIGLYCERIDES, HDL)                                 

 

                IRON PANEL      2021-04-10 08:45:00 Sharmaine [facility]

 

                CBC WITH DIFF   2021-04-10 08:45:00 Sharmaine [facility]

 

                PROTHROMBIN TIME / INR 2021-04-10 08:45:00 Sharmaine jeanie  [facility]

 

                N-TERMINAL PRO-BNP 2021-04-10 08:45:00 Sharmaine jeanie  [facility]

 

                LACTIC ACID WHOLE BLOOD 2021-04-10 08:45:00 Sharmaine jeanie  [facility]

 

                PROCALCITONIN   2021-04-10 08:45:00 Sharmaine [facility]

 

                PHOSPHORUS      2021-04-10 08:45:00 Sharmaine [facility]

 

                CREATINE KINASE 2021-04-10 08:45:00 Sharmaine [facility]

 

                URIC ACID       2021-04-10 08:45:00 Sharmaine [facility]

 

                MAGNESIUM       2021-04-10 08:45:00 Sharmaine [facility]

 

                FERRITIN SERUM  2021-04-10 08:45:00 Sharmaine [facility]

 

                VITAMIN B12, LEVEL 2021-04-10 08:45:00 Sharmaine [facility]

 

                C-REACTIVE PROTEIN 2021-04-10 08:45:00 Sharmaine jeanie  [facility]

 

                THYROID STIMULATING 2021-04-10 08:45:00 Heriberto Dacosta  [facility]



                HORMONE                                         [facility]

 

                COMP. METABOLIC PANEL 2021-04-10 08:45:00 Heriberto Dacosta  [facility]



                ([id number])                                         [facility] [place]

 

                LIPID PANEL ([id number])(TOTAL 2021-04-10 08:45:00 Heriberto Dacosta  [facility]



                CHOLESTEROL,                                    [facility]



                TRIGLYCERIDES, HDL)                                 

 

                IRON PANEL      2021-04-10 08:45:00 Sharmaine [facility]

 

                CBC WITH DIFF   2021-04-10 08:45:00 Sharmaine [facility]

 

                PROTHROMBIN TIME / INR 2021-04-10 08:45:00 Sharmaine jeanie  [facility]

 

                N-TERMINAL PRO-BNP 2021-04-10 08:45:00 Sharmaine jeanie  [facility]

 

                VITAMIN D, 25-OH 2021-04-10 08:45:00 Sharmaine [facility]

 

                LACTIC ACID WHOLE BLOOD 2021-04-10 08:45:00 Sharmaine jeanie  [facility]

 

                PROCALCITONIN   2021-04-10 08:45:00 Sharmaine [facility]

 

                PHOSPHORUS      2021-04-10 08:45:00 Sharmaine [facility]

 

                CREATINE KINASE 2021-04-10 08:45:00 Sharmaine [facility]

 

                URIC ACID       2021-04-10 08:45:00 Sharmaine [facility]

 

                MAGNESIUM       2021-04-10 08:45:00 Sharmaine [facility]

 

                FERRITIN SERUM  2021-04-10 08:45:00 Sharmaine [facility]

 

                VITAMIN B12, LEVEL 2021-04-10 08:45:00 Sharmaine [facility]

 

                C-REACTIVE PROTEIN 2021-04-10 08:45:00 Heriberto Dacosta  [facility]

 

                THYROID STIMULATING 2021-04-10 08:45:00 Heriberto Dacosta  [facility]



                HORMONE                                         [facility]

 

                COMP. METABOLIC PANEL 2021-04-10 08:45:00 Heriberto Dacosta  [facility]



                ([id number])                                         [facility] [place]

 

                LIPID PANEL ([id number])(TOTAL 2021-04-10 08:45:00 Heriberto Dacosta  [facility]



                CHOLESTEROL,                                    [facility]



                TRIGLYCERIDES, HDL)                                 

 

                IRON PANEL      2021-04-10 08:45:00 Sharmaine jeanie  [facility]

 

                CBC WITH DIFF   2021-04-10 08:45:00 Sharmaine [facility]

 

                PROTHROMBIN TIME / INR 2021-04-10 08:45:00 Heriberto Dacosta  [facility]

 

                N-TERMINAL PRO-BNP 2021-04-10 08:45:00 Heriberto Dacosta  [facility]

 

                VITAMIN D, 25-OH 2021-04-10 08:45:00 Sharmaine jeanie  [facility]

 

                LACTIC ACID WHOLE BLOOD 2021-04-10 08:45:00 Heriberto Dacosta  [facility]

 

                PROCALCITONIN   2021-04-10 08:45:00 Sharmaine [facility]

 

                SEDIMENTATION RATE 2021-04-10 08:38:00 Sharmaine jeanie  [facility]

 

                SEDIMENTATION RATE 2021-04-10 08:38:00 Sharmaine jeanie  [facility]

 

                SEDIMENTATION RATE 2021-04-10 08:38:00 Heriberto Dacosta  [facility]

 

                CT FOOT RIGHT WO CONTRAST 2021-04-10 07:09:41 Heriberto Dacosta

[facility]

 

                CT FOOT RIGHT WO CONTRAST 2021-04-10 07:09:41 [facility]

 

                CT FOOT RIGHT WO CONTRAST 2021-04-10 07:09:41 Heriberto Dacosta

[facility] [facility]

 

                XR CHEST 1 VW   2021-04-10 07:09:23 Heriberto Dacosta [facility]

 

                XR CHEST 1 VW   2021-04-10 07:09:23 Heriberto Dacosta  [facility]

 

                XR CHEST 1 VW   2021-04-10 07:09:23 Heriberto Dacosta  [facility]

 

                XR FOOT 3+ VW RIGHT 2021-04-10 01:44:18 Edd [facility]

 

                XR FOOT 3+ VW RIGHT 2021-04-10 01:44:18 Edd [facility]

 

                XR FOOT 3+ VW RIGHT 2021-04-10 01:44:18 Edd [facility]

 

                BLOOD CULTURE SCREEN 2021-04-10 01:37:00 Edd [facility]

 

                COMP. METABOLIC PANEL 2021-04-10 01:37:00 Edd [facility]



                ([id number])                                         [facility]

 

                CBC WITH DIFF   2021-04-10 01:37:00 Edd Nova [facility]

 

                GLYCOSYLATED HEMOGLOBIN 2021-04-10 01:37:00 Heriberto Dacosta  [facility]

[facility] [place]



                (A1C)                                           [facility]

 

                EXTRA TUBE LT. BLUE 2021-04-10 01:37:00 Edd [facility]

 

                EXTRA TUBE LT. GREEN 2021-04-10 01:37:00 Edd [facility]

 

                COVID-19 (ID NOW RAPID 2021-04-10 01:37:00 Edd Nova [facility]



                TESTING)                                        [facility]

 

                BLOOD CULTURE SCREEN 2021-04-10 01:37:00 Edd [facility]

 

                COMP. METABOLIC PANEL 2021-04-10 01:37:00 Edd [facility]



                ([id number])                                         [facility]

 

                CBC WITH DIFF   2021-04-10 01:37:00 Edd Nova [facility]

 

                GLYCOSYLATED HEMOGLOBIN 2021-04-10 01:37:00 Heriberto Dacosta  [facility]

[facility] [place]



                (A1C)                                           [facility]

 

                EXTRA TUBE LT. BLUE 2021-04-10 01:37:00 Edd [facility]

 

                EXTRA TUBE LT. GREEN 2021-04-10 01:37:00 Edd [facility]

 

                COVID-19 (ID NOW RAPID 2021-04-10 01:37:00 Edd Nova [facility]



                TESTING)                                        [facility]

 

                BLOOD CULTURE SCREEN 2021-04-10 01:37:00 Edd [facility]

 

                COMP. METABOLIC PANEL 2021-04-10 01:37:00 Edd [facility]



                ([id number])                                         [facility]

 

                CBC WITH DIFF   2021-04-10 01:37:00 Edd Nova [facility]

 

                GLYCOSYLATED HEMOGLOBIN 2021-04-10 01:37:00 Heriberto Dacosta  Univ

[facility] [place]



                (A1C)                                           [facility] [place]

 

                EXTRA TUBE LT. BLUE 2021-04-10 01:37:00 Edd [facility]

 

                EXTRA TUBE LT. GREEN 2021-04-10 01:37:00 Edd [facility]

 

                COVID-19 (ID NOW RAPID 2021-04-10 01:37:00 Edd [facility]



                TESTING)                                        [facility]

 

                BLOOD CULTURE SCREEN 2021-04-10 01:22:00 Edd [facility]

 

                BLOOD CULTURE SCREEN 2021-04-10 01:22:00 Edd [facility]

 

                BLOOD CULTURE SCREEN 2021-04-10 01:22:00 Edd [facility]

 

                NOTICE OF PRIVACY 2021-04-10 00:41:02 Doctor Unassigned, [facility]



                PRACTICES                       [place]         [facility] [place]

 

                NOTICE OF PRIVACY 2021-04-10 00:41:02 Doctor Unassigned, [facility]                       [place]         [facility]

 

                NOTICE OF PRIVACY 2021-04-10 00:41:02 Doctor Unassigned, [facility]

[facility] [place]



                PRACTICES                       [place]         [facility]

 

                CONSENT/REFUSAL FOR 2021-04-10 00:40:11 Doctor [facility]



                [facility]                 [place]         [facility] 

Branch

 

                CONSENT/REFUSAL FOR 2021-04-10 00:40:11 Doctor Unassigned, [facility]

[facility]



                [facility]                 [place]         [facility] 

Branch

 

                CONSENT/REFUSAL FOR 2021-04-10 00:40:11 Doctor [facility]Martina

[facility]



                [facility]REATMENT                 [place]         [facility] 

Branch







Plan of Care







             Planned Activity Planned Date Details      Comments     Source

 

             Goal                      Plan of Care Note [code = 86369-3]       

       

 

             Goal                      Plan of Care Note [code = 18354-2]       

       

 

             Goal                      Plan of Care Note [code = 71719-4]       

       

 

             Goal                      Plan of Care Note [code = 96221-1]       

       

 

             Goal                      Plan of Care Note [code = 76236-5]       

       

 

             Goal                      Plan of Care Note [code = 52543-1]       

       

 

             Goal                      Plan of Care Note [code = 23239-3]       

       

 

             Goal                      Plan of Care Note [code = 18093-9]       

       

 

             Goal                      Plan of Care Note [code = 01357-4]       

       

 

             Goal                      Plan of Care Note [code = 69267-9]       

       

 

             Goal                      Plan of Care Note [code = 60530-6]       

       

 

             Goal                      Plan of Care Note [code = 41915-7]       

       

 

             Goal                      Plan of Care Note [code = 06877-2]       

       

 

             Goal                      Plan of Care Note [code = 74239-8]       

       

 

             Goal                      Plan of Care Note [code = 64087-5]       

       

 

             Goal                      Plan of Care Note [code = 94008-5]       

       

 

             Goal                      Plan of Care Note [code = 94346-7]       

       

 

             Goal                      Plan of Care Note [code = 04913-2]       

       

 

             Goal                      Plan of Care Note [code = 63229-5]       

       

 

             Goal                      Plan of Care Note [code = 58410-6]       

       

 

             Goal                      Plan of Care Note [code = 75068-4]       

       

 

             Goal                      Plan of Care Note [code = 69851-6]       

       

 

             Goal                      Plan of Care Note [code = 67923-2]       

       

 

             Goal                      Plan of Care Note [code = 44868-9]       

       

 

             Goal                      Plan of Care Note [code = 15340-8]       

       

 

             Goal                      Plan of Care Note [code = 37431-3]       

       

 

             Goal                      Plan of Care Note [code = 98138-6]       

       

 

             Goal                      Plan of Care Note [code = 13392-2]       

       

 

             Goal                      Plan of Care Note [code = 85824-6]       

       

 

             Goal                      Plan of Care Note [code = 74261-3]       

       

 

             Goal                      Plan of Care Note [code = 39917-8]       

       

 

             Goal                      Plan of Care Note [code = 11177-7]       

       

 

             Goal                      Plan of Care Note [code = 26313-7]       

       

 

             Goal                      Plan of Care Note [code = 83292-9]       

       

 

             Goal                      Plan of Care Note [code = 10928-7]       

       

 

             Goal                      Plan of Care Note [code = 87042-0]       

       

 

             Goal                      Plan of Care Note [code = 99927-5]       

       

 

             Goal                      Plan of Care Note [code = 22303-0]       

       

 

             Goal                      Plan of Care Note [code = 72260-4]       

       

 

             Goal                      Plan of Care Note [code = 79221-0]       

       

 

             Goal                      Plan of Care Note [code = 46216-6]       

       

 

             Goal                      Plan of Care Note [code = 51587-6]       

       

 

             Goal                      Plan of Care Note [code = 49701-0]       

       

 

             Goal                      Plan of Care Note [code = 53112-5]       

       

 

             Goal                      Plan of Care Note [code = 34092-8]       

       







Encounters







        Start   End     Encounter Admission Attending Care    Care    Encounter 

Source



        Date/Time Date/Time Type    Type    Clinicians Facility Department ID   

   

 

        2023 Outpatient R       [facility]     [id number]

[id number] Univers



        00:00:00 00:00:00                 MARTINEZ                         ity o

f



                                                                        [facility]

 

        2023 Letter          [facility]    1.2.840.114 [id number]

3593 Univers



        00:00:00 00:00:00 (Out)           Albetro A MULTISPEC 350.1.13.10      

   ity of



                                                IALTY   4.2.7.2.686         [facility]

s



                                                [place]  [phone]         Medi

hussain



                                                [address]             [place]



                                                DIABETES                 



                                                CLINIC                  

 

        2023 Telephone         [facility]    1.2.840.114 [id number]

[id number] Univers



        00:00:00 00:00:00                 Martinez A MULTISPEC 350.1.13.10      

   ity of



                                                IALTY   4.2.7.2.686         [facility]

s



                                                [place]  [phone]         Medi

hussain



                                                [address]             [place]



                                                DIABETES                 



                                                CLINIC                  

 

        2022 Outpatient                 KIRT     [facility]     43875-6

022 Raymond



        13:41:20 13:41:20                                         1214    F



                                                                        [facility]

 

        2022 Outpatient                 [id number]

p11vnj-l 



        00:00:00 00:00:00 Visit                   e83y-75u7         07a-48a2-9 



                                                -5g3r-88m         w6k-21iswo 



                                                [id number]         [id number]  

 

        2022 Outpatient                 SFA     [facility]     78738-4

022 Raymond



        10:38:38 10:38:38                                         1207    F



                                                                        [facility]

 

        2022 Outpatient                 [id number]- [id number]

[id number]-l 



        00:00:00 00:00:00 Visit                   g8n1-42c2         9u3-18p6-c 



                                                -hd80-zbk         m71-[id number] 



                                                [id number]         [id number]  

 

        2022 Outpatient                 SFA     SFA     44991-9

022 Raymond



        16:06:42 16:06:42                                         1130    F



                                                                        [facility]

 

        2022 Outpatient                 SFA     SFA     70733-7

022 Raymond



        13:09:21 13:09:21                                         1116    F



                                                                        [facility]

 

        2022 Outpatient                 [id number]

[id number]-c 



        00:00:00 00:00:00 Visit                   c855-9576         140-4117-b 



                                                -n5rc-w89         2cf-h41930 



                                                003vp6925         ei6496  

 

        2022-11-15 2022-11-15 Orders          Doctor  LEE    1.2.840.114 [id number]

09 



        00:00:00 00:00:00 Only            Unassigned, LUIS   350.1.13.10       

  ity of



                                        [facility] 4.2.7.2.686         [place]

as



                                                        [phone]         Medi

hussain



                                                        009             Branch

 

        2022 Transition         SUZAN Reinoso  1.2.840.114 [id number]

[id number] Univers



        00:00:00 00:00:00 of Care         Belem AHMADI   350.1.13.10        

 ity of



                                                [place]   4.2.7.2.686         [place]

[place]



                                                        [phone]         Medi

hussain



                                                        403             Branch

 

        2022-10-28 2022 Inpatient DAVID MCCORMICK    [facility]     [id number]

16 Univers



        13:14:00 17:17:00                 STEVE                         ity of



                                                                        [facility]

 

        2022-10-28 2022 Hospital         Gerry Hood  1.2.840.11

4 [id number] 

Univers



        13:14:00 17:17:00 Encounter         Trey Mason   350.1.1

3.10         ity of



                                        [place] 4.2.7.2.686     

    [place]



                                                        [phone]         Medi

hussain



                                                        095             Branch

 

        2022-10-31 2022-10-31 Case            [facility] 1.2.840.7 [id number]

[id number] Univers



        00:00:00 00:00:00 Management         Care    42623.1.1                 i

ty of



                                        Transition 3.104.2.7                 [place]

as



                                                .3.080765                 Medica

l



                                                .8                      Branch

 

        2022-10-28 2022-10-28 Travel                  1.2.840.1 1.2.929.810 [id number]

1434 Univers



        00:00:00 00:00:00                         30547.1.1 350.1.13.10         

ity of



                                                3.104.2.7 4.2.7.3.698         [place]

[place]



                                                .3.904305 084.8           Medica

l



                                                .8                      Branch

 

        2021 Outpatient BARRIE MARIE   [facility]    [id number]

831 Univers



        19:00:00 19:00:00                 DMITRI                          ity of



                                                                        [facility]

 

        2021 Orders          Doctor  LEE    1.2.840.114 [id number]

04 Univers



        00:00:00 00:00:00 Only            Unassigned, LUIS   350.1.13.10       

  ity of



                                        [facility] 4.2.7.2.686         [place]

as



                                                        [phone]         Medi

hussain



                                                        009             Branch

 

        2021-04-15 2021-04-15 Transition         Suzan Reinoso  1.2.840.114 [id number]

[id number] Univers



        00:00:00 00:00:00 of Care         Belemavelina Ahmadi   350.1.13.10        

 ity of



                                                [place]   4.2.7.2.686         [place]

[place]



                                                        [phone]         Medi

hussain



                                                        403             Branch

 

        2021 [facility]         Edd [facility]    1.2.840.

114 [id number] 

Univers



        19:49:00 18:26:00 Encounter         Heriberto Dacosta 350.1.13.10 

        ity of



                                        [place] 4.2.7.2.686       

  [facility]  [phone]         Medi

hussain



                                                        081             Branch

 

        2021 Inpatient X       ANEUDY [facility]     [id number]

78 Univers



        19:49:00 18:26:00                 DES markham of



                                                                        [facility]

 

        2021 Surgery         [facility]    1.2.840.114 [id number]

60 Univers



        10:50:00 12:42:00                 Skip Menendez 350.1.13.10         

ity ирина Maldon[place] 4.2.7.2.686         [place]

[place]



                                                Surgical [phone]         [facility]  020             Branch

 

        2021-04-10 2021-04-10 Surgery         [facility]    1.2.840.114 [id number]

06 Univers



        10:10:00 11:17:00                 Skip Menendez 350.1.13.10         

ity ирина [place]don[place] 4.2.7.2.686         [place]

[place]



                                                Surgical [phone]         [address]







Results







           Test Description Test Time  Test Comments Results    Result Comments 

Source









                    HEMOGLOBIN A1c      2022 00:00:00 









                      Test Item  Value      Reference Range Interpretation Comme

nts









             HEMOGLOBIN A1c (test code = 09798) 6.0 %                           

       



HEMOGLOBIN [id number] 00:00:00





             Test Item    Value        Reference Range Interpretation Comments

 

             HEMOGLOBIN A1c (test code = 79306) 6.0 %                           

       



LIPID [id number] 00:00:00





             Test Item    Value        Reference Range Interpretation Comments

 

             CHOLESTEROL (test code = 2210) 196 MG/DL                           

   

 

             TRIGLYCERIDES (test code = 2232) 181 MG/DL                         

     

 

             HDL CHOLESTEROL (test code = 2220) 38 MG/DL                        

       

 

             CALC LDL CHOL (test code = 2237) 128 MG/DL                         

     

 

             RISK RATIO LDL/HDL (test code = 3.37 RATIO                         

    



             2238)                                               



LIPID [id number] 00:00:00





             Test Item    Value        Reference Range Interpretation Comments

 

             CHOLESTEROL (test code = 2210) 196 MG/DL                           

   

 

             TRIGLYCERIDES (test code = 2232) 181 MG/DL                         

     

 

             HDL CHOLESTEROL (test code = 2220) 38 MG/DL                        

       

 

             CALC LDL CHOL (test code = 2237) 128 MG/DL                         

     

 

             RISK RATIO LDL/HDL (test code = 3.37 RATIO                         

    



             2238)                                               



COMPREHENSIVE METABOLIC [id number] 00:00:00





             Test Item    Value        Reference Range Interpretation Comments

 

             GLUCOSE (test code = 2217) 130 MG/DL                              

 

             BUN (test code = 2208) 61 MG/DL                               

 

             CREATININE (test code = 2214) 9.22 MG/DL                           

  

 

             eGFR ( CKD-EPI) (test code 7 ML/MIN/1.73                       

    



             = 49710)                                            

 

             CALC BUN/CREAT (test code = 7 RATIO                                



             2235)                                               

 

             SODIUM (test code = 2231) 145 MEQ/L                              

 

             POTASSIUM (test code = 2228) 5.2 MEQ/L                             

 

 

             CHLORIDE (test code = 2215) 104 MEQ/L                              

 

             CARBON DIOXIDE (test code = 24 MEQ/L                               



             )                                               

 

             CALCIUM (test code = 2209) 9.0 MG/DL                              

 

             PROTEIN, TOTAL (test code = 7.6 G/DL                               



             )                                               

 

             ALBUMIN (test code = 2201) 4.1 G/DL                               

 

             CALC GLOBULIN (test code = 3.5 G/DL                               



             )                                               

 

             CALC A/G RATIO (test code = 1.2 RATIO                              



             )                                               

 

             BILIRUBIN, TOTAL (test code = 0.2 MG/DL                            

  



             )                                               

 

             ALKALINE PHOSPHATASE (test code 92 U/L                             

    



             = )                                             

 

             AST (test code = 2218) 29 U/L                                 

 

             ALT (test code = 2219) 39 U/L                                 



COMPREHENSIVE METABOLIC [id number] 00:00:00





             Test Item    Value        Reference Range Interpretation Comments

 

             GLUCOSE (test code = 2217) 130 MG/DL                              

 

             BUN (test code = 2208) 61 MG/DL                               

 

             CREATININE (test code = 2214) 9.22 MG/DL                           

  

 

             eGFR ( CKD-EPI) (test code 7 ML/MIN/1.73                       

    



             = 38130)                                            

 

             CALC BUN/CREAT (test code = 7 RATIO                                



             2235)                                               

 

             SODIUM (test code = 2231) 145 MEQ/L                              

 

             POTASSIUM (test code = 2228) 5.2 MEQ/L                             

 

 

             CHLORIDE (test code = 2215) 104 MEQ/L                              

 

             CARBON DIOXIDE (test code = 24 MEQ/L                               



             )                                               

 

             CALCIUM (test code = 2209) 9.0 MG/DL                              

 

             PROTEIN, TOTAL (test code = 7.6 G/DL                               



             )                                               

 

             ALBUMIN (test code = 2201) 4.1 G/DL                               

 

             CALC GLOBULIN (test code = 3.5 G/DL                               



             2240)                                               

 

             CALC A/G RATIO (test code = 1.2 RATIO                              



             2234)                                               

 

             BILIRUBIN, TOTAL (test code = 0.2 MG/DL                            

  



             7)                                               

 

             ALKALINE PHOSPHATASE (test code 92 U/L                             

    



             = 2204)                                             

 

             AST (test code = 2218) 29 U/L                                 

 

             ALT (test code = 2219) 39 U/L                                 



CBC W/AUTO [id number] 00:00:00





             Test Item    Value        Reference Range Interpretation Comments

 

             WBC (test code = 1001) 8.0 K/UL                               

 

             RBC (test code = 1002) 3.80 M/UL                              

 

             HEMOGLOBIN (test code = 1003) 10.9 G/DL                            

  

 

             HEMATOCRIT (test code = 1004) 34.2 %                               

  

 

             MCV (test code = 1005) 90.0 fL                                

 

             MCH (test code = 1006) 28.7 PG                                

 

             MCHC (test code = 1007) 31.9 G/DL                              

 

             RDW (test code = 1038) 12.5 %                                 

 

             NEUTROPHILS (test code = 1008) 58.6 %                              

   

 

             LYMPHOCYTES (test code = 1010) 26.1 %                              

   

 

             MONOCYTES (test code = 1011) 7.8 %                                 

 

 

             EOSINOPHILS (test code = 1012) 5.9 %                               

   

 

             BASOPHILS (test code = 1013) 1.1 %                                 

 

 

             IMMATURE GRANULOCYTES (test 0.5 %                                  



             code = 1036)                                        

 

             NUCLEATED RBCS (test code = 0.0 /100WBC'S                          

 



             1065)                                               

 

             PLATELET COUNT (test code = 357 K/UL                               



             1015)                                               

 

             ABSOLUTE NEUTROPHILS (test code 4.67 K/UL                          

    



             = 1066)                                             

 

             ABSOLUTE LYMPHOCYTES (test code 2.08 K/UL                          

    



             = 1067)                                             

 

             ABSOLUTE MONOCYTES (test code = 0.62 K/UL                          

    



             1068)                                               

 

             ABSOLUTE EOSINOPHILS (test code 0.47 K/UL                          

    



             = 1040)                                             

 

             ABSOLUTE BASOPHILS (test code = 0.09 K/UL                          

    



             1069)                                               

 

             ABS IMMATURE GRANULOCYTES (test 0.04 K/UL                          

    



             code = 1020)                                        

 

             ABS NUCLEATED RBCS (test code = 0.00 K/UL                          

    



             14931)                                              



CBC W/AUTO [id number] 00:00:00





             Test Item    Value        Reference Range Interpretation Comments

 

             WBC (test code = 1001) 8.0 K/UL                               

 

             RBC (test code = 1002) 3.80 M/UL                              

 

             HEMOGLOBIN (test code = 1003) 10.9 G/DL                            

  

 

             HEMATOCRIT (test code = 1004) 34.2 %                               

  

 

             MCV (test code = 1005) 90.0 fL                                

 

             MCH (test code = 1006) 28.7 PG                                

 

             MCHC (test code = 1007) 31.9 G/DL                              

 

             RDW (test code = 1038) 12.5 %                                 

 

             NEUTROPHILS (test code = 1008) 58.6 %                              

   

 

             LYMPHOCYTES (test code = 1010) 26.1 %                              

   

 

             MONOCYTES (test code = 1011) 7.8 %                                 

 

 

             EOSINOPHILS (test code = 1012) 5.9 %                               

   

 

             BASOPHILS (test code = 1013) 1.1 %                                 

 

 

             IMMATURE GRANULOCYTES (test 0.5 %                                  



             code = 1036)                                        

 

             NUCLEATED RBCS (test code = 0.0 /100WBC'S                          

 



             1065)                                               

 

             PLATELET COUNT (test code = 357 K/UL                               



             1015)                                               

 

             ABSOLUTE NEUTROPHILS (test code 4.67 K/UL                          

    



             = 1066)                                             

 

             ABSOLUTE LYMPHOCYTES (test code 2.08 K/UL                          

    



             = 1067)                                             

 

             ABSOLUTE MONOCYTES (test code = 0.62 K/UL                          

    



             1068)                                               

 

             ABSOLUTE EOSINOPHILS (test code 0.47 K/UL                          

    



             = 1040)                                             

 

             ABSOLUTE BASOPHILS (test code = 0.09 K/UL                          

    



             1069)                                               

 

             ABS IMMATURE GRANULOCYTES (test 0.04 K/UL                          

    



             code = 1020)                                        

 

             ABS NUCLEATED RBCS (test code = 0.00 K/UL                          

    



             21807)                                              



CBC W/AUTO [id number] 00:00:00





             Test Item    Value        Reference Range Interpretation Comments

 

             WBC (test code = 1001) 8.0 K/UL                               

 

             RBC (test code = 1002) 3.80 M/UL                              

 

             HEMOGLOBIN (test code = 1003) 10.9 G/DL                            

  

 

             HEMATOCRIT (test code = 1004) 34.2 %                               

  

 

             MCV (test code = 1005) 90.0 fL                                

 

             MCH (test code = 1006) 28.7 PG                                

 

             MCHC (test code = 1007) 31.9 G/DL                              

 

             RDW (test code = 1038) 12.5 %                                 

 

             NEUTROPHILS (test code = 1008) 58.6 %                              

   

 

             LYMPHOCYTES (test code = 1010) 26.1 %                              

   

 

             MONOCYTES (test code = 1011) 7.8 %                                 

 

 

             EOSINOPHILS (test code = 1012) 5.9 %                               

   

 

             BASOPHILS (test code = 1013) 1.1 %                                 

 

 

             IMMATURE GRANULOCYTES (test 0.5 %                                  



             code = 1036)                                        

 

             NUCLEATED RBCS (test code = 0.0 /100WBC'S                          

 



             1065)                                               

 

             PLATELET COUNT (test code = 357 K/UL                               



             1015)                                               

 

             ABSOLUTE NEUTROPHILS (test code 4.67 K/UL                          

    



             = 1066)                                             

 

             ABSOLUTE LYMPHOCYTES (test code 2.08 K/UL                          

    



             = 1067)                                             

 

             ABSOLUTE MONOCYTES (test code = 0.62 K/UL                          

    



             1068)                                               

 

             ABSOLUTE EOSINOPHILS (test code 0.47 K/UL                          

    



             = 1040)                                             

 

             ABSOLUTE BASOPHILS (test code = 0.09 K/UL                          

    



             1069)                                               

 

             ABS IMMATURE GRANULOCYTES (test 0.04 K/UL                          

    



             code = 1020)                                        

 

             ABS NUCLEATED RBCS (test code = 0.00 K/UL                          

    



             80240)                                              



HEMOGLOBIN [id number] 00:00:00





             Test Item    Value        Reference Range Interpretation Comments

 

             HEMOGLOBIN A1c (test code = 62881) 6.0 %                           

       



HEMOGLOBIN [id number] 00:00:00





             Test Item    Value        Reference Range Interpretation Comments

 

             HEMOGLOBIN A1c (test code = 88119) 6.0 %                           

       



HEMOGLOBIN [id number] 00:00:00





             Test Item    Value        Reference Range Interpretation Comments

 

             HEMOGLOBIN A1c (test code = 66660) 6.0 %                           

       



LIPID [id number] 00:00:00





             Test Item    Value        Reference Range Interpretation Comments

 

             CHOLESTEROL (test code = 2210) 196 MG/DL                           

   

 

             TRIGLYCERIDES (test code = 2232) 181 MG/DL                         

     

 

             HDL CHOLESTEROL (test code = 2220) 38 MG/DL                        

       

 

             CALC LDL CHOL (test code = 2237) 128 MG/DL                         

     

 

             RISK RATIO LDL/HDL (test code = 3.37 RATIO                         

    



             2238)                                               



LIPID [id number] 00:00:00





             Test Item    Value        Reference Range Interpretation Comments

 

             CHOLESTEROL (test code = 2210) 196 MG/DL                           

   

 

             TRIGLYCERIDES (test code = 2232) 181 MG/DL                         

     

 

             HDL CHOLESTEROL (test code = 2220) 38 MG/DL                        

       

 

             CALC LDL CHOL (test code = 2237) 128 MG/DL                         

     

 

             RISK RATIO LDL/HDL (test code = 3.37 RATIO                         

    



             2238)                                               



COMPREHENSIVE METABOLIC [id number] 00:00:00





             Test Item    Value        Reference Range Interpretation Comments

 

             GLUCOSE (test code = 2217) 130 MG/DL                              

 

             BUN (test code = 2208) 61 MG/DL                               

 

             CREATININE (test code = 2214) 9.22 MG/DL                           

  

 

             eGFR ( CKD-EPI) (test code 7 ML/MIN/1.73                       

    



             = 27092)                                            

 

             CALC BUN/CREAT (test code = 7 RATIO                                



             2235)                                               

 

             SODIUM (test code = 2231) 145 MEQ/L                              

 

             POTASSIUM (test code = 2228) 5.2 MEQ/L                             

 

 

             CHLORIDE (test code = 2215) 104 MEQ/L                              

 

             CARBON DIOXIDE (test code = 24 MEQ/L                               



             )                                               

 

             CALCIUM (test code = 2209) 9.0 MG/DL                              

 

             PROTEIN, TOTAL (test code = 7.6 G/DL                               



             )                                               

 

             ALBUMIN (test code = 2201) 4.1 G/DL                               

 

             CALC GLOBULIN (test code = 3.5 G/DL                               



             2240)                                               

 

             CALC A/G RATIO (test code = 1.2 RATIO                              



             2234)                                               

 

             BILIRUBIN, TOTAL (test code = 0.2 MG/DL                            

  



             )                                               

 

             ALKALINE PHOSPHATASE (test code 92 U/L                             

    



             = 2204)                                             

 

             AST (test code = 2218) 29 U/L                                 

 

             ALT (test code = 2219) 39 U/L                                 



COMPREHENSIVE METABOLIC [id number] 00:00:00





             Test Item    Value        Reference Range Interpretation Comments

 

             GLUCOSE (test code = 2217) 130 MG/DL                              

 

             BUN (test code = 2208) 61 MG/DL                               

 

             CREATININE (test code = 2214) 9.22 MG/DL                           

  

 

             eGFR ( CKD-EPI) (test code 7 ML/MIN/1.73                       

    



             = 48379)                                            

 

             CALC BUN/CREAT (test code = 7 RATIO                                



             )                                               

 

             SODIUM (test code = 2231) 145 MEQ/L                              

 

             POTASSIUM (test code = 2228) 5.2 MEQ/L                             

 

 

             CHLORIDE (test code = 2215) 104 MEQ/L                              

 

             CARBON DIOXIDE (test code = 24 MEQ/L                               



             )                                               

 

             CALCIUM (test code = 2209) 9.0 MG/DL                              

 

             PROTEIN, TOTAL (test code = 7.6 G/DL                               



             )                                               

 

             ALBUMIN (test code = 2201) 4.1 G/DL                               

 

             CALC GLOBULIN (test code = 3.5 G/DL                               



             2240)                                               

 

             CALC A/G RATIO (test code = 1.2 RATIO                              



             2234)                                               

 

             BILIRUBIN, TOTAL (test code = 0.2 MG/DL                            

  



             )                                               

 

             ALKALINE PHOSPHATASE (test code 92 U/L                             

    



             = 2204)                                             

 

             AST (test code = 2218) 29 U/L                                 

 

             ALT (test code = 2219) 39 U/L                                 



CBC W/AUTO [id number] 00:00:00





             Test Item    Value        Reference Range Interpretation Comments

 

             WBC (test code = 1001) 8.0 K/UL                               

 

             RBC (test code = 1002) 3.80 M/UL                              

 

             HEMOGLOBIN (test code = 1003) 10.9 G/DL                            

  

 

             HEMATOCRIT (test code = 1004) 34.2 %                               

  

 

             MCV (test code = 1005) 90.0 fL                                

 

             MCH (test code = 1006) 28.7 PG                                

 

             MCHC (test code = 1007) 31.9 G/DL                              

 

             RDW (test code = 1038) 12.5 %                                 

 

             NEUTROPHILS (test code = 1008) 58.6 %                              

   

 

             LYMPHOCYTES (test code = 1010) 26.1 %                              

   

 

             MONOCYTES (test code = 1011) 7.8 %                                 

 

 

             EOSINOPHILS (test code = 1012) 5.9 %                               

   

 

             BASOPHILS (test code = 1013) 1.1 %                                 

 

 

             IMMATURE GRANULOCYTES (test 0.5 %                                  



             code = 1036)                                        

 

             NUCLEATED RBCS (test code = 0.0 /100WBC'S                          

 



             1065)                                               

 

             PLATELET COUNT (test code = 357 K/UL                               



             1015)                                               

 

             ABSOLUTE NEUTROPHILS (test code 4.67 K/UL                          

    



             = 1066)                                             

 

             ABSOLUTE LYMPHOCYTES (test code 2.08 K/UL                          

    



             = 1067)                                             

 

             ABSOLUTE MONOCYTES (test code = 0.62 K/UL                          

    



             1068)                                               

 

             ABSOLUTE EOSINOPHILS (test code 0.47 K/UL                          

    



             = 1040)                                             

 

             ABSOLUTE BASOPHILS (test code = 0.09 K/UL                          

    



             1069)                                               

 

             ABS IMMATURE GRANULOCYTES (test 0.04 K/UL                          

    



             code = 1020)                                        

 

             ABS NUCLEATED RBCS (test code = 0.00 K/UL                          

    



             99465)                                              



CBC W/AUTO [id number] 00:00:00





             Test Item    Value        Reference Range Interpretation Comments

 

             WBC (test code = 1001) 8.0 K/UL                               

 

             RBC (test code = 1002) 3.80 M/UL                              

 

             HEMOGLOBIN (test code = 1003) 10.9 G/DL                            

  

 

             HEMATOCRIT (test code = 1004) 34.2 %                               

  

 

             MCV (test code = 1005) 90.0 fL                                

 

             MCH (test code = 1006) 28.7 PG                                

 

             MCHC (test code = 1007) 31.9 G/DL                              

 

             RDW (test code = 1038) 12.5 %                                 

 

             NEUTROPHILS (test code = 1008) 58.6 %                              

   

 

             LYMPHOCYTES (test code = 1010) 26.1 %                              

   

 

             MONOCYTES (test code = 1011) 7.8 %                                 

 

 

             EOSINOPHILS (test code = 1012) 5.9 %                               

   

 

             BASOPHILS (test code = 1013) 1.1 %                                 

 

 

             IMMATURE GRANULOCYTES (test 0.5 %                                  



             code = 1036)                                        

 

             NUCLEATED RBCS (test code = 0.0 /100WBC'S                          

 



             1065)                                               

 

             PLATELET COUNT (test code = 357 K/UL                               



             1015)                                               

 

             ABSOLUTE NEUTROPHILS (test code 4.67 K/UL                          

    



             = 1066)                                             

 

             ABSOLUTE LYMPHOCYTES (test code 2.08 K/UL                          

    



             = 1067)                                             

 

             ABSOLUTE MONOCYTES (test code = 0.62 K/UL                          

    



             1068)                                               

 

             ABSOLUTE EOSINOPHILS (test code 0.47 K/UL                          

    



             = 1040)                                             

 

             ABSOLUTE BASOPHILS (test code = 0.09 K/UL                          

    



             1069)                                               

 

             ABS IMMATURE GRANULOCYTES (test 0.04 K/UL                          

    



             code = 1020)                                        

 

             ABS NUCLEATED RBCS (test code = 0.00 K/UL                          

    



             18737)                                              



CBC W/AUTO [id number] 00:00:00





             Test Item    Value        Reference Range Interpretation Comments

 

             WBC (test code = 1001) 8.0 K/UL                               

 

             RBC (test code = 1002) 3.80 M/UL                              

 

             HEMOGLOBIN (test code = 1003) 10.9 G/DL                            

  

 

             HEMATOCRIT (test code = 1004) 34.2 %                               

  

 

             MCV (test code = 1005) 90.0 fL                                

 

             MCH (test code = 1006) 28.7 PG                                

 

             MCHC (test code = 1007) 31.9 G/DL                              

 

             RDW (test code = 1038) 12.5 %                                 

 

             NEUTROPHILS (test code = 1008) 58.6 %                              

   

 

             LYMPHOCYTES (test code = 1010) 26.1 %                              

   

 

             MONOCYTES (test code = 1011) 7.8 %                                 

 

 

             EOSINOPHILS (test code = 1012) 5.9 %                               

   

 

             BASOPHILS (test code = 1013) 1.1 %                                 

 

 

             IMMATURE GRANULOCYTES (test 0.5 %                                  



             code = 1036)                                        

 

             NUCLEATED RBCS (test code = 0.0 /100WBC'S                          

 



             1065)                                               

 

             PLATELET COUNT (test code = 357 K/UL                               



             1015)                                               

 

             ABSOLUTE NEUTROPHILS (test code 4.67 K/UL                          

    



             = 1066)                                             

 

             ABSOLUTE LYMPHOCYTES (test code 2.08 K/UL                          

    



             = 1067)                                             

 

             ABSOLUTE MONOCYTES (test code = 0.62 K/UL                          

    



             1068)                                               

 

             ABSOLUTE EOSINOPHILS (test code 0.47 K/UL                          

    



             = 1040)                                             

 

             ABSOLUTE BASOPHILS (test code = 0.09 K/UL                          

    



             1069)                                               

 

             ABS IMMATURE GRANULOCYTES (test 0.04 K/UL                          

    



             code = 1020)                                        

 

             ABS NUCLEATED RBCS (test code = 0.00 K/UL                          

    



             72633)                                              



HEMOGLOBIN [id number] 00:00:00





             Test Item    Value        Reference Range Interpretation Comments

 

             HEMOGLOBIN A1c (test code = 93972) 6.0 %                           

       



POCT GLUCOSE (AUTOMATED)2022 18:41:11





             Test Item    Value        Reference Range Interpretation Comments

 

             POCT GLU (test code = 3542192269) 221 mg/dL           H      

      

 

             Lab Interpretation (test code = Abnormal                           

    



             02778-1)                                            



[facility] GLUCOSE (AUTOMATED)2022 14:47:59





             Test Item    Value        Reference Range Interpretation Comments

 

             POCT GLU (test code = 9506005887) 145 mg/dL           H      

      

 

             Lab Interpretation (test code = Abnormal                           

    



             04441-4)                                            



[facility] GLUCOSE (AUTOMATED)2022 02:40:55





             Test Item    Value        Reference Range Interpretation Comments

 

             POCT GLU (test code = 4503878871) 264 mg/dL           H      

      

 

             Lab Interpretation (test code = Abnormal                           

    



             71281-4)                                            



[facility] GLUCOSE (AUTOMATED)2022 22:53:11





             Test Item    Value        Reference Range Interpretation Comments

 

             POCT GLU (test code = 2629579388) 207 mg/dL           H      

      

 

             Lab Interpretation (test code = Abnormal                           

    



             78365-6)                                            



[facility] GLUCOSE (AUTOMATED)2022 18:23:28





             Test Item    Value        Reference Range Interpretation Comments

 

             POCT GLU (test code = 2936017759) 230 mg/dL           H      

      

 

             Lab Interpretation (test code = Abnormal                           

    



             50374-1)                                            



[facility] GLUCOSE (AUTOMATED)2022 16:00:35





             Test Item    Value        Reference Range Interpretation Comments

 

             POCT GLU (test code = 9651012982) 149 mg/dL           H      

      

 

             Lab Interpretation (test code = Abnormal                           

    



             29449-5)                                            



[facility] GLUCOSE (AUTOMATED)2022 01:25:29





             Test Item    Value        Reference Range Interpretation Comments

 

             POCT GLU (test code = 7271589371) 199 mg/dL           H      

      

 

             Lab Interpretation (test code = Abnormal                           

    



             84900-6)                                            



[facility] GLUCOSE (AUTOMATED)2022 22:05:22





             Test Item    Value        Reference Range Interpretation Comments

 

             POCT GLU (test code = 4885397377) 228 mg/dL           H      

      

 

             Lab Interpretation (test code = Abnormal                           

    



             27189-0)                                            



[facility] GLUCOSE (AUTOMATED)2022 18:04:15





             Test Item    Value        Reference Range Interpretation Comments

 

             POCT GLU (test code = 8049577726) 166 mg/dL           H      

      

 

             Lab Interpretation (test code = Abnormal                           

    



             70254-8)                                            



[facility] GLUCOSE (AUTOMATED)2022 12:34:55





             Test Item    Value        Reference Range Interpretation Comments

 

             POCT GLU (test code = 7321024657) 133 mg/dL           H      

      

 

             Lab Interpretation (test code = Abnormal                           

    



             68176-7)                                            



[facility] GLUCOSE (AUTOMATED)2022 01:20:58





             Test Item    Value        Reference Range Interpretation Comments

 

             POCT GLU (test code = 6035973482) 224 mg/dL           H      

      

 

             Lab Interpretation (test code = Abnormal                           

    



             60643-1)                                            



[facility] GLUCOSE (AUTOMATED)2022 22:09:59





             Test Item    Value        Reference Range Interpretation Comments

 

             POCT GLU (test code = 0568326865) 189 mg/dL           H      

      

 

             Lab Interpretation (test code = Abnormal                           

    



             08927-5)                                            



[facility] GLUCOSE (AUTOMATED)2022 18:44:07





             Test Item    Value        Reference Range Interpretation Comments

 

             POCT GLU (test code = 9429939657) 159 mg/dL           H      

      

 

             Lab Interpretation (test code = Abnormal                           

    



             06729-0)                                            



[facility] GLUCOSE (AUTOMATED)2022 17:09:25





             Test Item    Value        Reference Range Interpretation Comments

 

             POCT GLU (test code = 8792480056) 200 mg/dL           H      

      

 

             Lab Interpretation (test code = Abnormal                           

    



             52012-1)                                            



[facility] GLUCOSE (AUTOMATED)2022 14:29:42





             Test Item    Value        Reference Range Interpretation Comments

 

             POCT GLU (test code = 7455943941) 159 mg/dL           H      

      

 

             Lab Interpretation (test code = Abnormal                           

    



             75066-6)                                            



[facility] METABOLIC PANEL (NA, K, CL, CO2, 
GLUCOSE, BUN, CREATININE, CA)2022 08:44:11





             Test Item    Value        Reference Range Interpretation Comments

 

             NA (test code = 137 mmol/L   135-145                   



             6726717777)                                         

 

             K (test code = 4.2 mmol/L   3.5-5.0                   



             2118386484)                                         

 

             CL (test code = 103 mmol/L                       



             1219586391)                                         

 

             CO2 TOTAL (test code = 27 mmol/L    23-31                     



             0724445006)                                         

 

             AGAP (test code =              2-16                      



             4127024198)                                         

 

             BUN (test code = 32 mg/dL     7-23         H            



             1842505131)                                         

 

             GLUCOSE (test code = 126 mg/dL           H            



             2338639586)                                         

 

             CREATININE (test code = 5.97 mg/dL   0.60-1.25    H            



             5413656521)                                         

 

             CALCIUM (test code = 8.5 mg/dL    8.6-10.6     L            



             5771403294)                                         

 

             eGFR (test code =              mL/min/1.73m2              



             9369008308)                                         

 

             ELIJAH (test code = ELIJAH) Association of                           



                          Glomerular Filtration                           



                          Rate (GFR) and Staging                           



                          of Kidney Disease*                           



                          +---------------------                           



                          --+-------------------                           



                          --+-------------------                           



                          ------+| GFR                           



                          (mL/min/1.73 m2) ?|                           



                          With Kidney Damage ?|                           



                          ?Without Kidney                           



                          Damage+---------------                           



                          --------+-------------                           



                          --------+-------------                           



                          ------------+| ?>90 ?                           



                          ? ? ? ? ? ? ? ?|                           



                          ?Stage one ? ? ? ? ?|                           



                          ? Normal ? ? ? ? ? ? ?                           



                          ?+--------------------                           



                          ---+------------------                           



                          ---+------------------                           



                          -------+| ?60-89 ? ? ?                           



                          ? ? ? ? ?| ?Stage two                           



                          ? ? ? ? ?| ? Decreased                           



                          GFR ? ? ? ?                            



                          +---------------------                           



                          --+-------------------                           



                          --+-------------------                           



                          ------+| ?30-59 ? ? ?                           



                          ? ? ? ? ?| ?Stage                           



                          three ? ? ? ?| ? Stage                           



                          three ? ? ? ? ?                           



                          +---------------------                           



                          --+-------------------                           



                          --+-------------------                           



                          ------+| ?15-29 ? ? ?                           



                          ? ? ? ? ?| ?Stage four                           



                          ? ? ? ? | ? Stage four                           



                          ? ? ? ? ?                              



                          ?+--------------------                           



                          ---+------------------                           



                          ---+------------------                           



                          -------+| ?<15 (or                           



                          dialysis) ? ?| ?Stage                           



                          five ? ? ? ? | ? Stage                           



                          five ? ? ? ? ?                           



                          ?+--------------------                           



                          ---+------------------                           



                          ---+------------------                           



                          -------+ *Each stage                           



                          assumes the associated                           



                          GFR level has been in                           



                          effect for at least                           



                          three months. ?Stages                           



                          1 to 5, with or                           



                          without kidney                           



                          disease, indicate                           



                          chronic kidney                           



                          disease. Notes:                           



                          Determination of                           



                          stages one and two                           



                          (with eGFR                             



                          >59mL/min/1.73 m2)                           



                          requires estimation of                           



                          kidney damage for at                           



                          least three months as                           



                          defined by structural                           



                          or functional                           



                          abnormalities of the                           



                          kidney, manifested by                           



                          either:Pathological                           



                          abnormalities or                           



                          Markers of kidney                           



                          damage (including                           



                          abnormalities in the                           



                          composition of the                           



                          blood or urine or                           



                          abnormalities in                           



                          imaging tests).                           

 

             Lab Interpretation Abnormal                               



             (test code = 17129-4)                                        



[facility]





             Test Item    Value        Reference Range Interpretation Comments

 

             MAGNESIUM (test code = 8479697335) 1.9 mg/dL    1.7-2.4            

       

 

             Lab Interpretation (test code = Normal                             

    



             54980-0)                                            



[facility]





             Test Item    Value        Reference Range Interpretation Comments

 

             PHOSPHORUS (test code = 0699601517) 5.0 mg/dL    2.5-5.0           

        

 

             Lab Interpretation (test code = Normal                             

    



             81618-9)                                            



[facility] WITH [id number] 08:35:48





             Test Item    Value        Reference Range Interpretation Comments

 

             WBC (test code =              See_Comment  H             [Automated



             6690-2)                                             message] The sy

stem



                                                                 which generated



                                                                 this result



                                                                 transmitted



                                                                 reference range

:



                                                                 4.20 - 10.70



                                                                 10*3/?L. The



                                                                 reference range

 was



                                                                 not used to



                                                                 interpret this



                                                                 result as



                                                                 normal/abnormal

.

 

             RBC (test code =              See_Comment  L             [Automated



             789-8)                                              message] The sy

stem



                                                                 which generated



                                                                 this result



                                                                 transmitted



                                                                 reference range

:



                                                                 4.26 - 5.52



                                                                 10*6/?L. The



                                                                 reference range

 was



                                                                 not used to



                                                                 interpret this



                                                                 result as



                                                                 normal/abnormal

.

 

             HGB (test code = 10.3 g/dL    12.2-16.4    L            



             718-7)                                              

 

             HCT (test code = 30.0 %       38.4-49.3    L            



             4544-3)                                             

 

             MCV (test code = 86.7 fL      81.7-95.6                 



             787-2)                                              

 

             MCH (test code = 29.8 pg      26.1-32.7                 



             785-6)                                              

 

             MCHC (test code = 34.3 g/dL    31.2-35.0                 



             786-4)                                              

 

             RDW-SD (test code = 41.4 fL      38.5-51.6                 



             49196-0)                                            

 

             RDW-CV (test code = 13.3 %       12.1-15.4                 



             788-0)                                              

 

             PLT (test code =              See_Comment                [Automated



             777-3)                                              message] The sy

stem



                                                                 which generated



                                                                 this result



                                                                 transmitted



                                                                 reference range

:



                                                                 150 - 328 10*3/

?L.



                                                                 The reference r

ap



                                                                 was not used to



                                                                 interpret this



                                                                 result as



                                                                 normal/abnormal

.

 

             MPV (test code = 10.1 fL      9.8-13.0                  



             36922-7)                                            

 

             NRBC/100 WBC (test              See_Comment                [Automat

ed



             code = 5605689189)                                        message] 

The system



                                                                 which generated



                                                                 this result



                                                                 transmitted



                                                                 reference range

:



                                                                 0.0 - 10.0 /100



                                                                 WBCs. The refer

ence



                                                                 range was not u

sed



                                                                 to interpret th

is



                                                                 result as



                                                                 normal/abnormal

.

 

             NRBC x10^3 (test code              See_Comment                [Auto

mated



             = 0224033600)                                        message] The s

ystem



                                                                 which generated



                                                                 this result



                                                                 transmitted



                                                                 reference range

:



                                                                 10*3/?L. The



                                                                 reference range

 was



                                                                 not used to



                                                                 interpret this



                                                                 result as



                                                                 normal/abnormal

.

 

             GRAN MAT (NEUT) % 61.7 %                                 



             (test code = 770-8)                                        

 

             IMM GRAN % (test code 0.50 %                                 



             = 5927702203)                                        

 

             LYMPH % (test code = 22.9 %                                 



             736-9)                                              

 

             MONO % (test code = 10.1 %                                 



             5905-5)                                             

 

             EOS % (test code = 4.3 %                                  



             713-8)                                              

 

             BASO % (test code = 0.5 %                                  



             706-2)                                              

 

             GRAN MAT x10^3(ANC) 6.73 10*3/uL 1.99-6.95                 



             (test code =                                        



             0760641495)                                         

 

             IMM GRAN x10^3 (test 0.05 10*3/uL 0.00-0.06                 



             code = 9393815856)                                        

 

             LYMPH x10^3 (test code 2.49 10*3/uL 1.09-3.23                 



             = 731-0)                                            

 

             MONO x10^3 (test code 1.10 10*3/uL 0.36-1.02    H            



             = 742-7)                                            

 

             EOS x10^3 (test code = 0.47 10*3/uL 0.06-0.53                 



             711-2)                                              

 

             BASO x10^3 (test code 0.05 10*3/uL 0.01-0.09                 



             = 704-7)                                            

 

             Lab Interpretation Abnormal                               



             (test code = 64411-1)                                        



[facility] GLUCOSE (AUTOMATED)2022 01:50:35





             Test Item    Value        Reference Range Interpretation Comments

 

             POCT GLU (test code = 4969804021) 272 mg/dL           H      

      

 

             Lab Interpretation (test code = Abnormal                           

    



             76567-9)                                            



[facility] GLUCOSE (AUTOMATED)2022 21:54:50





             Test Item    Value        Reference Range Interpretation Comments

 

             POCT GLU (test code = 7656847742) 221 mg/dL           H      

      

 

             Lab Interpretation (test code = Abnormal                           

    



             25746-0)                                            



[facility] GLUCOSE (AUTOMATED)2022 18:00:15





             Test Item    Value        Reference Range Interpretation Comments

 

             POCT GLU (test code = 6387683621) 169 mg/dL           H      

      

 

             Lab Interpretation (test code = Abnormal                           

    



             94713-5)                                            



[facility] GLUCOSE (AUTOMATED)2022 14:09:38





             Test Item    Value        Reference Range Interpretation Comments

 

             POCT GLU (test code = 7577874683) 127 mg/dL           H      

      

 

             Lab Interpretation (test code = Abnormal                           

    



             24365-5)                                            



[facility] GLUCOSE (AUTOMATED)2022 14:09:38





             Test Item    Value        Reference Range Interpretation Comments

 

             POCT GLU (test code = 0372114910) 127 mg/dL           H      

      

 

             Lab Interpretation (test code = Abnormal                           

    



             24983-1)                                            



[facility]





             Test Item    Value        Reference Range Interpretation Comments

 

             PHOSPHORUS (test code = 5625746488) 5.5 mg/dL    2.5-5.0      H    

        

 

             Lab Interpretation (test code = Abnormal                           

    



             70057-3)                                            



[facility]





             Test Item    Value        Reference Range Interpretation Comments

 

             MAGNESIUM (test code = 0814115123) 1.8 mg/dL    1.7-2.4            

       

 

             Lab Interpretation (test code = Normal                             

    



             86008-5)                                            



[facility] METABOLIC PANEL (NA, K, CL, CO2, 
GLUCOSE, BUN, CREATININE, CA)2022 09:55:36





             Test Item    Value        Reference Range Interpretation Comments

 

             NA (test code = 140 mmol/L   135-145                   



             [id number])                                         

 

             K (test code = 4.3 mmol/L   3.5-5.0                   



             2888773801)                                         

 

             CL (test code = 107 mmol/L                       



             3147792468)                                         

 

             CO2 TOTAL (test code = 27 mmol/L    23-31                     



             8593682467)                                         

 

             AGAP (test code =              2-16                      



             3881623403)                                         

 

             BUN (test code = 47 mg/dL     7-23         H            



             1992041467)                                         

 

             GLUCOSE (test code = 168 mg/dL           H            



             4199883928)                                         

 

             CREATININE (test code = 6.83 mg/dL   0.60-1.25    H            



             5075872465)                                         

 

             CALCIUM (test code = 8.2 mg/dL    8.6-10.6     L            



             3654936343)                                         

 

             eGFR (test code =              mL/min/1.73m2              



             4785407403)                                         

 

             ELIJAH (test code = ELIJAH) Association of                           



                          Glomerular Filtration                           



                          Rate (GFR) and Staging                           



                          of Kidney Disease*                           



                          +---------------------                           



                          --+-------------------                           



                          --+-------------------                           



                          ------+| GFR                           



                          (mL/min/1.73 m2) ?|                           



                          With Kidney Damage ?|                           



                          ?Without Kidney                           



                          Damage+---------------                           



                          --------+-------------                           



                          --------+-------------                           



                          ------------+| ?>90 ?                           



                          ? ? ? ? ? ? ? ?|                           



                          ?Stage one ? ? ? ? ?|                           



                          ? Normal ? ? ? ? ? ? ?                           



                          ?+--------------------                           



                          ---+------------------                           



                          ---+------------------                           



                          -------+| ?60-89 ? ? ?                           



                          ? ? ? ? ?| ?Stage two                           



                          ? ? ? ? ?| ? Decreased                           



                          GFR ? ? ? ?                            



                          +---------------------                           



                          --+-------------------                           



                          --+-------------------                           



                          ------+| ?30-59 ? ? ?                           



                          ? ? ? ? ?| ?Stage                           



                          three ? ? ? ?| ? Stage                           



                          three ? ? ? ? ?                           



                          +---------------------                           



                          --+-------------------                           



                          --+-------------------                           



                          ------+| ?15-29 ? ? ?                           



                          ? ? ? ? ?| ?Stage four                           



                          ? ? ? ? | ? Stage four                           



                          ? ? ? ? ?                              



                          ?+--------------------                           



                          ---+------------------                           



                          ---+------------------                           



                          -------+| ?<15 (or                           



                          dialysis) ? ?| ?Stage                           



                          five ? ? ? ? | ? Stage                           



                          five ? ? ? ? ?                           



                          ?+--------------------                           



                          ---+------------------                           



                          ---+------------------                           



                          -------+ *Each stage                           



                          assumes the associated                           



                          GFR level has been in                           



                          effect for at least                           



                          three months. ?Stages                           



                          1 to 5, with or                           



                          without kidney                           



                          disease, indicate                           



                          chronic kidney                           



                          disease. Notes:                           



                          Determination of                           



                          stages one and two                           



                          (with eGFR                             



                          >59mL/min/1.73 m2)                           



                          requires estimation of                           



                          kidney damage for at                           



                          least three months as                           



                          defined by structural                           



                          or functional                           



                          abnormalities of the                           



                          kidney, manifested by                           



                          either:Pathological                           



                          abnormalities or                           



                          Markers of kidney                           



                          damage (including                           



                          abnormalities in the                           



                          composition of the                           



                          blood or urine or                           



                          abnormalities in                           



                          imaging tests).                           

 

             Lab Interpretation Abnormal                               



             (test code = 06917-9)                                        



[facility] METABOLIC PANEL (NA, K, CL, CO2, 
GLUCOSE, BUN, CREATININE, CA)2022 09:55:36





             Test Item    Value        Reference Range Interpretation Comments

 

             NA (test code = 140 mmol/L   135-145                   



             3459619101)                                         

 

             K (test code = 4.3 mmol/L   3.5-5.0                   



             3454603893)                                         

 

             CL (test code = 107 mmol/L                       



             1199073353)                                         

 

             CO2 TOTAL (test code = 27 mmol/L    23-31                     



             5429939424)                                         

 

             AGAP (test code =              2-16                      



             0863513792)                                         

 

             BUN (test code = 47 mg/dL     7-23         H            



             8698150519)                                         

 

             GLUCOSE (test code = 168 mg/dL           H            



             7437369886)                                         

 

             CREATININE (test code = 6.83 mg/dL   0.60-1.25    H            



             5686817326)                                         

 

             CALCIUM (test code = 8.2 mg/dL    8.6-10.6     L            



             5264229771)                                         

 

             eGFR (test code =              mL/min/1.73m2              



             4366099845)                                         

 

             ELIJAH (test code = ELIJAH) Association of                           



                          Glomerular Filtration                           



                          Rate (GFR) and Staging                           



                          of Kidney Disease*                           



                          +---------------------                           



                          --+-------------------                           



                          --+-------------------                           



                          ------+| GFR                           



                          (mL/min/1.73 m2) ?|                           



                          With Kidney Damage ?|                           



                          ?Without Kidney                           



                          Damage+---------------                           



                          --------+-------------                           



                          --------+-------------                           



                          ------------+| ?>90 ?                           



                          ? ? ? ? ? ? ? ?|                           



                          ?Stage one ? ? ? ? ?|                           



                          ? Normal ? ? ? ? ? ? ?                           



                          ?+--------------------                           



                          ---+------------------                           



                          ---+------------------                           



                          -------+| ?60-89 ? ? ?                           



                          ? ? ? ? ?| ?Stage two                           



                          ? ? ? ? ?| ? Decreased                           



                          GFR ? ? ? ?                            



                          +---------------------                           



                          --+-------------------                           



                          --+-------------------                           



                          ------+| ?30-59 ? ? ?                           



                          ? ? ? ? ?| ?Stage                           



                          three ? ? ? ?| ? Stage                           



                          three ? ? ? ? ?                           



                          +---------------------                           



                          --+-------------------                           



                          --+-------------------                           



                          ------+| ?15-29 ? ? ?                           



                          ? ? ? ? ?| ?Stage four                           



                          ? ? ? ? | ? Stage four                           



                          ? ? ? ? ?                              



                          ?+--------------------                           



                          ---+------------------                           



                          ---+------------------                           



                          -------+| ?<15 (or                           



                          dialysis) ? ?| ?Stage                           



                          five ? ? ? ? | ? Stage                           



                          five ? ? ? ? ?                           



                          ?+--------------------                           



                          ---+------------------                           



                          ---+------------------                           



                          -------+ *Each stage                           



                          assumes the associated                           



                          GFR level has been in                           



                          effect for at least                           



                          three months. ?Stages                           



                          1 to 5, with or                           



                          without kidney                           



                          disease, indicate                           



                          chronic kidney                           



                          disease. Notes:                           



                          Determination of                           



                          stages one and two                           



                          (with eGFR                             



                          >59mL/min/1.73 m2)                           



                          requires estimation of                           



                          kidney damage for at                           



                          least three months as                           



                          defined by structural                           



                          or functional                           



                          abnormalities of the                           



                          kidney, manifested by                           



                          either:Pathological                           



                          abnormalities or                           



                          Markers of kidney                           



                          damage (including                           



                          abnormalities in the                           



                          composition of the                           



                          blood or urine or                           



                          abnormalities in                           



                          imaging tests).                           

 

             Lab Interpretation Abnormal                               



             (test code = 07467-8)                                        



[facility]





             Test Item    Value        Reference Range Interpretation Comments

 

             MAGNESIUM (test code = 7606795107) 1.8 mg/dL    1.7-2.4            

       

 

             Lab Interpretation (test code = Normal                             

    



             27432-9)                                            



[facility]36





             Test Item    Value        Reference Range Interpretation Comments

 

             PHOSPHORUS (test code = 0917851186) 5.5 mg/dL    2.5-5.0      H    

        

 

             Lab Interpretation (test code = Abnormal                           

    



             09825-8)                                            



[facility]:31





             Test Item    Value        Reference Range Interpretation Comments

 

             POCT GLU (test code = 2977039306) 218 mg/dL           H      

      

 

             Lab Interpretation (test code = Abnormal                           

    



             42989-5)                                            



[facility] GLUCOSE (AUTOMATED)2022 22:20:34





             Test Item    Value        Reference Range Interpretation Comments

 

             POCT GLU (test code = 4972273014) 248 mg/dL           H      

      

 

             Lab Interpretation (test code = Abnormal                           

    



             43933-5)                                            



[facility] GLUCOSE (AUTOMATED)2022 18:39:49





             Test Item    Value        Reference Range Interpretation Comments

 

             POCT GLU (test code = 4269443189) 151 mg/dL           H      

      

 

             Lab Interpretation (test code = Abnormal                           

    



             63830-7)                                            



[facility] GLUCOSE (AUTOMATED)2022 00:44:41





             Test Item    Value        Reference Range Interpretation Comments

 

             POCT GLU (test code = 7923197121) 139 mg/dL           H      

      

 

             Lab Interpretation (test code = Abnormal                           

    



             66038-5)                                            



[facility] GLUCOSE (AUTOMATED)2022 23:52:04





             Test Item    Value        Reference Range Interpretation Comments

 

             POCT GLU (test code = 6066096246) 123 mg/dL           H      

      

 

             Lab Interpretation (test code = Abnormal                           

    



             20605-7)                                            



[facility] GLUCOSE (AUTOMATED)2022 21:13:30





             Test Item    Value        Reference Range Interpretation Comments

 

             POCT GLU (test code = 0005518043) 176 mg/dL           H      

      

 

             Lab Interpretation (test code = Abnormal                           

    



             67952-7)                                            



[facility] GLUCOSE (AUTOMATED)2022 16:25:08





             Test Item    Value        Reference Range Interpretation Comments

 

             POCT GLU (test code = 2712918049) 301 mg/dL           H      

      

 

             Lab Interpretation (test code = Abnormal                           

    



             31121-9)                                            



[facility] GLUCOSE (AUTOMATED)2022 13:19:02





             Test Item    Value        Reference Range Interpretation Comments

 

             POCT GLU (test code = 6679418758) 121 mg/dL           H      

      

 

             Lab Interpretation (test code = Abnormal                           

    



             06635-9)                                            



[facility] GLUCOSE (AUTOMATED)2022 01:39:24





             Test Item    Value        Reference Range Interpretation Comments

 

             POCT GLU (test code = 5449639233) 142 mg/dL           H      

      

 

             Lab Interpretation (test code = Abnormal                           

    



             97208-3)                                            



[facility] 22:13:59





             Test Item    Value        Reference Range Interpretation Comments

 

             POCT GLU (test code = 8446515744) 121 mg/dL           H      

      

 

             Lab Interpretation (test code = Abnormal                           

    



             56697-8)                                            



[facility] GLUCOSE (AUTOMATED)2022-10-31 17:03:12





             Test Item    Value        Reference Range Interpretation Comments

 

             POCT GLU (test code = 1715950380) 244 mg/dL           H      

      

 

             Lab Interpretation (test code = Abnormal                           

    



             32173-3)                                            



[facility] GLUCOSE (AUTOMATED)2022-10-31 14:17:00





             Test Item    Value        Reference Range Interpretation Comments

 

             POCT GLU (test code = 9513831512) 150 mg/dL           H      

      

 

             Lab Interpretation (test code = Abnormal                           

    



             40245-5)                                            



[facility] GLUCOSE (AUTOMATED)2022-10-31 01:31:35





             Test Item    Value        Reference Range Interpretation Comments

 

             POCT GLU (test code = 4497436563) 182 mg/dL           H      

      

 

             Lab Interpretation (test code = Abnormal                           

    



             38244-7)                                            



[facility] GLUCOSE (AUTOMATED)2022-10-30 22:11:48





             Test Item    Value        Reference Range Interpretation Comments

 

             POCT GLU (test code = 4275582345) 152 mg/dL           H      

      

 

             Lab Interpretation (test code = Abnormal                           

    



             94543-0)                                            



[facility] GLUCOSE (AUTOMATED)2022-10-30 18:25:07





             Test Item    Value        Reference Range Interpretation Comments

 

             POCT GLU (test code = 1417123541) 227 mg/dL           H      

      

 

             Lab Interpretation (test code = Abnormal                           

    



             22999-7)                                            



[facility] GLUCOSE (AUTOMATED)2022-10-30 15:05:03





             Test Item    Value        Reference Range Interpretation Comments

 

             POCT GLU (test code = 7605500643) 165 mg/dL           H      

      

 

             Lab Interpretation (test code = Abnormal                           

    



             77002-0)                                            



[facility] GLUCOSE (AUTOMATED)2022-10-30 01:32:50





             Test Item    Value        Reference Range Interpretation Comments

 

             POCT GLU (test code = 2319775941) 179 mg/dL           H      

      

 

             Lab Interpretation (test code = Abnormal                           

    



             23656-8)                                            



[facility] GLUCOSE (AUTOMATED)2022-10-29 23:32:34





             Test Item    Value        Reference Range Interpretation Comments

 

             POCT GLU (test code = 6872258892) 170 mg/dL           H      

      

 

             Lab Interpretation (test code = Abnormal                           

    



             15432-2)                                            



[facility] GLUCOSE (AUTOMATED)2022-10-29 18:22:12





             Test Item    Value        Reference Range Interpretation Comments

 

             POCT GLU (test code = 1758199741) 269 mg/dL           H      

      

 

             Lab Interpretation (test code = Abnormal                           

    



             07825-0)                                            



[facility]ERR[facility] [id number] 07:49:10





             Test Item    Value        Reference Range Interpretation Comments

 

             FERRITIN (test code = 159.0 ng/mL  18.0-464.0                



             [id number])                                         

 

             ELIJAH (test code = ELIJAH) Biotin has been                           



                          reported to cause a                           



                          negative bias,                           



                          interpret results                           



                          relative to                            



                          patient's use of                           



                          biotin.                                

 

             Lab Interpretation (test Normal                                 



             code = 63207-2)                                        



[facility] [id number] 07:49:10





             Test Item    Value        Reference Range Interpretation Comments

 

             FERRITIN (test code = 159.0 ng/mL  18.0-464.0                



             [id number])                                         

 

             ELIJAH (test code = ELIJAH) Biotin has been                           



                          reported to cause a                           



                          negative bias,                           



                          interpret results                           



                          relative to                            



                          patient's use of                           



                          biotin.                                

 

             Lab Interpretation (test Normal                                 



             code = 85306-2)                                        



[id number]





             Test Item    Value        Reference Range Interpretation Comments

 

             IRON (test code = 5582991522) 107 ug/dL                      

  

 

             TIBC (test code = 4826922102) 287 ug/dL    250-410                 

  

 

             % FE SAT (test code = 5625390330) 37 %         20-50               

      

 

             Lab Interpretation (test code = Normal                             

    



             09811-3)                                            



[facility] [id number] 07:21:07





             Test Item    Value        Reference Range Interpretation Comments

 

             IRON (test code = 0873049713) 107 ug/dL                      

  

 

             TIBC (test code = 4154310057) 287 ug/dL    250-410                 

  

 

             % FE SAT (test code = 5187465008) 37 %         20-50               

      

 

             Lab Interpretation (test code = Normal                             

    



             86084-9)                                            



[facility] (Total Cholesterol, Triglycerides,
HDL) - Fasting2022-10-29 04:06:02





             Test Item    Value        Reference Range Interpretation Comments

 

             CHOL (test code = 226 mg/dL    120-200      H            



             4281303253)                                         

 

             HDL (test code = 47 mg/dL     See_Comment                [Automated

 message]



             3798340311)                                         The system [employer]



                                                                 generated this



                                                                 result transmit

ronnie



                                                                 reference range

:



                                                                 >=40. The refer

ence



                                                                 range was not u

sed



                                                                 to interpret th

is



                                                                 result as



                                                                 normal/abnormal

.

 

             HDLC RATIO (test code =              See_Comment                [Au

tomated message]



             7591389429)                                         The system [employer]



                                                                 generated this



                                                                 result transmit

ronnie



                                                                 reference range

:



                                                                 <=5.0. The refe

rence



                                                                 range was not u

sed



                                                                 to interpret th

is



                                                                 result as



                                                                 normal/abnormal

.

 

             TRIG (test code = 214 mg/dL           H            



             7449868966)                                         

 

             LDL CHOL (test code = 136 mg/dL    See_Comment                [Auto

mated message]



             07761-6)                                            The system [employer]



                                                                 generated this



                                                                 result transmit

ronnie



                                                                 reference range

:



                                                                 <=160. The refe

rence



                                                                 range was not u

sed



                                                                 to interpret th

is



                                                                 result as



                                                                 normal/abnormal

.

 

             VLDL (test code = 43 mg/dL     5-60                      



             2294232809)                                         

 

             Lab Interpretation (test Abnormal                               



             code = 76185-7)                                        



[facility] (Total Cholesterol, Triglycerides,
HDL) - Fasting2022-10-29 04:06:02





             Test Item    Value        Reference Range Interpretation Comments

 

             CHOL (test code = 226 mg/dL    120-200      H            



             1436386771)                                         

 

             HDL (test code = 47 mg/dL     See_Comment                [Automated

 message]



             8466318701)                                         The system [employer]



                                                                 generated this



                                                                 result transmit

ronnie



                                                                 reference range

:



                                                                 >=40. The refer

ence



                                                                 range was not u

sed



                                                                 to interpret th

is



                                                                 result as



                                                                 normal/abnormal

.

 

             HDLC RATIO (test code =              See_Comment                [Au

tomated message]



             3389738005)                                         The system [employer]



                                                                 generated this



                                                                 result transmit

ronnie



                                                                 reference range

:



                                                                 <=5.0. The refe

rence



                                                                 range was not u

sed



                                                                 to interpret th

is



                                                                 result as



                                                                 normal/abnormal

.

 

             TRIG (test code = 214 mg/dL           H            



             1446047591)                                         

 

             LDL CHOL (test code = 136 mg/dL    See_Comment                [Auto

mated message]



             80779-9)                                            The system [employer]



                                                                 generated this



                                                                 result transmit

ronnie



                                                                 reference range

:



                                                                 <=160. The refe

rence



                                                                 range was not u

sed



                                                                 to interpret th

is



                                                                 result as



                                                                 normal/abnormal

.

 

             VLDL (test code = 43 mg/dL     5-60                      



             1226847565)                                         

 

             Lab Interpretation (test Abnormal                               



             code = 04118-2)                                        



[facility]-29 
04:00:13





             Test Item    Value        Reference Range Interpretation Comments

 

             HGB A1C (test code = 5.6 %        4.0-5.7                   



             4548-4)                                             

 

             ELIJAH (test code = ELIJAH) Reference                              



                          RangesNormal:                           



                          <5.7%Prediabetes:                           



                          5.7 - 6.4%Diabetes:                           



                          > 6.5%                                 

 

             Lab Interpretation (test Normal                                 



             code = 86509-4)                                        



[facility]2022-10-29 
04:00:13





             Test Item    Value        Reference Range Interpretation Comments

 

             HGB A1C (test code = 5.6 %        4.0-5.7                   



             4548-4)                                             

 

             ELIJAH (test code = ELIJAH) Reference                              



                          RangesNormal:                           



                          <5.7%Prediabetes:                           



                          5.7 - 6.4%Diabetes:                           



                          > 6.5%                                 

 

             Lab Interpretation (test Normal                                 



             code = 71479-8)                                        



[facility] ClubION[facility] [id number] 03:37:23





             Test Item    Value        Reference Range Interpretation Comments

 

             IONIZED CA (test code = 2.90 mg/dL   4.50-5.30    LL           



             7860736371)                                         

 

             PH SERUM (test code = 6825190537)              7.35-7.45    L      

      

 

             Lab Interpretation (test code = Abnormal                           

    



             12695-8)                                            



[facility] ClubCREAT[facility] [id number] 01:09:46





             Test Item    Value        Reference Range Interpretation Comments

 

             CK (test code = 3161920580) 2250 U/L            H            

 

             Lab Interpretation (test code = Abnormal                           

    



             78645-0)                                            



[facility] ClubCREATINE [id number] 01:09:46





             Test Item    Value        Reference Range Interpretation Comments

 

             CK (test code = 3920387055) 2250 U/L            H            

 

             Lab Interpretation (test code = Abnormal                           

    



             32749-0)                                            



[facility]COMP[facility]OL[facility] (81219)2022-10-28 
20:51:07





             Test Item    Value        Reference Range Interpretation Comments

 

             NA (test code = 136 mmol/L   135-145                   



             8345907460)                                         

 

             K (test code = 4.4 mmol/L   3.5-5.0                   



             8756033104)                                         

 

             CL (test code = 106 mmol/L                       



             8380366248)                                         

 

             CO2 TOTAL (test code = 17 mmol/L    23-31        L            



             9001878472)                                         

 

             AGAP (test code =              2-16                      



             5458893699)                                         

 

             BUN (test code = 94 mg/dL     7-23         H            



             9798822135)                                         

 

             GLUCOSE (test code = 178 mg/dL           H            



             8403095559)                                         

 

             CREATININE (test code = 10.24 mg/dL  0.60-1.25    H            



             0524846180)                                         

 

             TOTAL BILI (test code = 0.1 mg/dL    0.1-1.1                   



             8191718955)                                         

 

             CALCIUM (test code = 5.4 mg/dL    8.6-10.6     LL           



             2538072010)                                         

 

             T PROTEIN (test code = 6.1 g/dL     6.3-8.2      L            



             0540796965)                                         

 

             ALBUMIN (test code = 3.4 g/dL     3.5-5.0      L            



             8969914535)                                         

 

             ALK PHOS (test code = 95 U/L                           



             3594474696)                                         

 

             ALTv (test code = 35 U/L       5-50                      



             1742-6)                                             

 

             AST(SGOT) (test code = 39 U/L       13-40                     



             2157478334)                                         

 

             eGFR (test code =              mL/min/1.73m2              



             0774766824)                                         

 

             ELIJAH (test code = ELIJAH) Association of                           



                          Glomerular Filtration                           



                          Rate (GFR) and Staging                           



                          of Kidney Disease*                           



                          +---------------------                           



                          --+-------------------                           



                          --+-------------------                           



                          ------+| GFR                           



                          (mL/min/1.73 m2) ?|                           



                          With Kidney Damage ?|                           



                          ?Without Kidney                           



                          Damage+---------------                           



                          --------+-------------                           



                          --------+-------------                           



                          ------------+| ?>90 ?                           



                          ? ? ? ? ? ? ? ?|                           



                          ?Stage one ? ? ? ? ?|                           



                          ? Normal ? ? ? ? ? ? ?                           



                          ?+--------------------                           



                          ---+------------------                           



                          ---+------------------                           



                          -------+| ?60-89 ? ? ?                           



                          ? ? ? ? ?| ?Stage two                           



                          ? ? ? ? ?| ? Decreased                           



                          GFR ? ? ? ?                            



                          +---------------------                           



                          --+-------------------                           



                          --+-------------------                           



                          ------+| ?30-59 ? ? ?                           



                          ? ? ? ? ?| ?Stage                           



                          three ? ? ? ?| ? Stage                           



                          three ? ? ? ? ?                           



                          +---------------------                           



                          --+-------------------                           



                          --+-------------------                           



                          ------+| ?15-29 ? ? ?                           



                          ? ? ? ? ?| ?Stage four                           



                          ? ? ? ? | ? Stage four                           



                          ? ? ? ? ?                              



                          ?+--------------------                           



                          ---+------------------                           



                          ---+------------------                           



                          -------+| ?<15 (or                           



                          dialysis) ? ?| ?Stage                           



                          five ? ? ? ? | ? Stage                           



                          five ? ? ? ? ?                           



                          ?+--------------------                           



                          ---+------------------                           



                          ---+------------------                           



                          -------+ *Each stage                           



                          assumes the associated                           



                          GFR level has been in                           



                          effect for at least                           



                          three months. ?Stages                           



                          1 to 5, with or                           



                          without kidney                           



                          disease, indicate                           



                          chronic kidney                           



                          disease. Notes:                           



                          Determination of                           



                          stages one and two                           



                          (with eGFR                             



                          >59mL/min/1.73 m2)                           



                          requires estimation of                           



                          kidney damage for at                           



                          least three months as                           



                          defined by structural                           



                          or functional                           



                          abnormalities of the                           



                          kidney, manifested by                           



                          either:Pathological                           



                          abnormalities or                           



                          Markers of kidney                           



                          damage (including                           



                          abnormalities in the                           



                          composition of the                           



                          blood or urine or                           



                          abnormalities in                           



                          imaging tests).                           

 

             Lab Interpretation Abnormal                               



             (test code = 84270-7)                                        



[facility] METABOLIC PANEL (NA, K, CL, CO2, 
GLUCOSE, BUN, CREATININE, CA)2022-10-28 19:29:17





             Test Item    Value        Reference Range Interpretation Comments

 

             NA (test code = 137 mmol/L   135-145                   



             6326131923)                                         

 

             K (test code = 4.6 mmol/L   3.5-5.0                   



             2151612629)                                         

 

             CL (test code = 106 mmol/L                       



             2541452951)                                         

 

             CO2 TOTAL (test code = 17 mmol/L    23-31        L            



             4428943771)                                         

 

             AGAP (test code =              2-16                      



             1069716228)                                         

 

             BUN (test code = 94 mg/dL     7-23         H            



             3046223642)                                         

 

             GLUCOSE (test code = 135 mg/dL           H            



             1263948352)                                         

 

             CREATININE (test code = 10.02 mg/dL  0.60-1.25    H            



             2370863161)                                         

 

             CALCIUM (test code = 5.5 mg/dL    8.6-10.6     LL           



             7252855974)                                         

 

             eGFR (test code =              mL/min/1.73m2              



             7138307176)                                         

 

             ELIJAH (test code = ELIJAH) Association of                           



                          Glomerular Filtration                           



                          Rate (GFR) and Staging                           



                          of Kidney Disease*                           



                          +---------------------                           



                          --+-------------------                           



                          --+-------------------                           



                          ------+| GFR                           



                          (mL/min/1.73 m2) ?|                           



                          With Kidney Damage ?|                           



                          ?Without Kidney                           



                          Damage+---------------                           



                          --------+-------------                           



                          --------+-------------                           



                          ------------+| ?>90 ?                           



                          ? ? ? ? ? ? ? ?|                           



                          ?Stage one ? ? ? ? ?|                           



                          ? Normal ? ? ? ? ? ? ?                           



                          ?+--------------------                           



                          ---+------------------                           



                          ---+------------------                           



                          -------+| ?60-89 ? ? ?                           



                          ? ? ? ? ?| ?Stage two                           



                          ? ? ? ? ?| ? Decreased                           



                          GFR ? ? ? ?                            



                          +---------------------                           



                          --+-------------------                           



                          --+-------------------                           



                          ------+| ?30-59 ? ? ?                           



                          ? ? ? ? ?| ?Stage                           



                          three ? ? ? ?| ? Stage                           



                          three ? ? ? ? ?                           



                          +---------------------                           



                          --+-------------------                           



                          --+-------------------                           



                          ------+| ?15-29 ? ? ?                           



                          ? ? ? ? ?| ?Stage four                           



                          ? ? ? ? | ? Stage four                           



                          ? ? ? ? ?                              



                          ?+--------------------                           



                          ---+------------------                           



                          ---+------------------                           



                          -------+| ?<15 (or                           



                          dialysis) ? ?| ?Stage                           



                          five ? ? ? ? | ? Stage                           



                          five ? ? ? ? ?                           



                          ?+--------------------                           



                          ---+------------------                           



                          ---+------------------                           



                          -------+ *Each stage                           



                          assumes the associated                           



                          GFR level has been in                           



                          effect for at least                           



                          three months. ?Stages                           



                          1 to 5, with or                           



                          without kidney                           



                          disease, indicate                           



                          chronic kidney                           



                          disease. Notes:                           



                          Determination of                           



                          stages one and two                           



                          (with eGFR                             



                          >59mL/min/1.73 m2)                           



                          requires estimation of                           



                          kidney damage for at                           



                          least three months as                           



                          defined by structural                           



                          or functional                           



                          abnormalities of the                           



                          kidney, manifested by                           



                          either:Pathological                           



                          abnormalities or                           



                          Markers of kidney                           



                          damage (including                           



                          abnormalities in the                           



                          composition of the                           



                          blood or urine or                           



                          abnormalities in                           



                          imaging tests).                           

 

             Lab Interpretation Abnormal                               



             (test code = 09293-3)                                        



[facility] WITH [id number] 18:59:16





             Test Item    Value        Reference Range Interpretation Comments

 

             WBC (test code =              See_Comment  H             [Automated



             8090-2)                                             message] The



                                                                 system which



                                                                 generated this



                                                                 result transmit

ronnie



                                                                 reference range

:



                                                                 4.20 - 10.70



                                                                 10*3/?L. The



                                                                 reference range



                                                                 was not used to



                                                                 interpret this



                                                                 result as



                                                                 normal/abnormal

.

 

             RBC (test code =              See_Comment  L             [Automated



             159-8)                                              message] The



                                                                 system which



                                                                 generated this



                                                                 result transmit

ronnie



                                                                 reference range

:



                                                                 4.26 - 5.52



                                                                 10*6/?L. The



                                                                 reference range



                                                                 was not used to



                                                                 interpret this



                                                                 result as



                                                                 normal/abnormal

.

 

             HGB (test code = 10.9 g/dL    12.2-16.4    L            



             718-7)                                              

 

             HCT (test code = 31.7 %       38.4-49.3    L            



             4544-3)                                             

 

             MCV (test code = 87.6 fL      81.7-95.6                 



             787-2)                                              

 

             MCH (test code = 30.1 pg      26.1-32.7                 



             785-6)                                              

 

             MCHC (test code = 34.4 g/dL    31.2-35.0                 



             786-4)                                              

 

             RDW-SD (test code = 42.2 fL      38.5-51.6                 



             68670-9)                                            

 

             RDW-CV (test code = 13.1 %       12.1-15.4                 



             788-0)                                              

 

             PLT (test code =              See_Comment                [Automated



             777-3)                                              message] The



                                                                 system which



                                                                 generated this



                                                                 result transmit

ronnie



                                                                 reference range

:



                                                                 150 - 328 10*3/

?L.



                                                                 The reference



                                                                 range was not u

sed



                                                                 to interpret th

is



                                                                 result as



                                                                 normal/abnormal

.

 

             MPV (test code = 10.2 fL      9.8-13.0                  



             05775-0)                                            

 

             NRBC/100 WBC (test              See_Comment                [Automat

ed



             code = [id number])                                        message] 

The



                                                                 system which



                                                                 generated this



                                                                 result transmit

ronnie



                                                                 reference range

:



                                                                 0.0 - 10.0 /100



                                                                 WBCs. The



                                                                 reference range



                                                                 was not used to



                                                                 interpret this



                                                                 result as



                                                                 normal/abnormal

.

 

             NRBC x10^3 (test code              See_Comment                [Auto

mated



             = [id number])                                        message] The



                                                                 system which



                                                                 generated this



                                                                 result transmit

ronnie



                                                                 reference range

:



                                                                 10*3/?L. The



                                                                 reference range



                                                                 was not used to



                                                                 interpret this



                                                                 result as



                                                                 normal/abnormal

.

 

             GRAN MAT (NEUT) % 87.6 %                                 



             (test code = 770-8)                                        

 

             IMM GRAN % (test code 0.30 %                                 



             = [id number])                                        

 

             LYMPH % (test code = 6.7 %                                  



             736-9)                                              

 

             MONO % (test code = 3.9 %                                  



             5905-5)                                             

 

             EOS % (test code = 1.1 %                                  



             713-8)                                              

 

             BASO % (test code = 0.4 %                                  



             706-2)                                              

 

             GRAN MAT x10^3(ANC) 10.02 10*3/uL 1.99-6.95    H            



             (test code =                                        



             [id number])                                         

 

             IMM GRAN x10^3 (test 0.04 10*3/uL 0.00-0.06                 



             code = [id number])                                        

 

             LYMPH x10^3 (test code 0.77 10*3/uL 1.09-3.23    L            



             = 731-0)                                            

 

             MONO x10^3 (test code 0.45 10*3/uL 0.36-1.02                 



             = 742-7)                                            

 

             EOS x10^3 (test code = 0.13 10*3/uL 0.06-0.53                 



             711-2)                                              

 

             BASO x10^3 (test code 0.05 10*3/uL 0.01-0.09                 



             = 704-7)                                            

 

             Lab Interpretation Abnormal                               



             (test code = 92276-2)                                        



[facility] [id number] 
04:34:22





             Test Item    Value        Reference Range Interpretation Comments

 

             GLUCOSE (test code = 169 MG/DL    70-99        H            



             )                                               

 

             BUN (test code = 58 MG/DL     6-20         H            



             )                                               

 

             CREATININE (test 6.78 MG/DL   0.80-1.40    H            



             code = 221)                                        

 

             eGFR ( CKD-EPI) 10           >60          L            



             (test code = 13173) ML/MIN/1.73                            

 

             CALC BUN/CREAT (test 9 RATIO      6-28                      



             code = 2235)                                        

 

             SODIUM (test code = 142 MEQ/L    133-146                   



             )                                               

 

             POTASSIUM (test code 4.9 MEQ/L    3.5-5.4                   



             = )                                             

 

             CHLORIDE (test code 108 MEQ/L           H            



             = )                                             

 

             CARBON DIOXIDE (test 22 MEQ/L     19-31                     



             code = )                                        

 

             CALCIUM (test code = 7.5 MG/DL    8.5-10.5     L            



             )                                               

 

             PROTEIN, TOTAL (test 6.1 G/DL     6.1-8.3                   



             code = )                                        

 

             ALBUMIN (test code = 3.2 G/DL     3.5-5.2      L            



             )                                               

 

             CALC GLOBULIN (test 2.9 G/DL     1.9-3.7                   



             code = 224)                                        

 

             CALC A/G RATIO (test 1.1 RATIO    1.0-2.6                   



             code = 223)                                        

 

             BILIRUBIN, TOTAL <0.2 MG/DL   See_Comment                [Automated

 message]



             (test code = 220)                                        The syste

m which



                                                                 generated this 

result



                                                                 transmitted ref

erence



                                                                 range: <=1.2. T

he



                                                                 reference range

 was



                                                                 not used to int

erpret



                                                                 this result as



                                                                 normal/abnormal

.

 

             ALKALINE PHOSPHATASE 119 U/L                          



             (test code = )                                        

 

             AST (test code = 31 U/L       9-50                      



             )                                               

 

             ALT (test code = 42 U/L       5-50                       UNLESS OTH

ERWISE



             )                                               INDICATED, ALL



                                                                 TESTING PERFORM

ED



                                                                 ATCLINICAL PATH

OLOGY



                                                                 LABORATORIES, [place]

[place].



                                                                 [address] [facility],



                                                                 [place] [id number] [place] [address]



                                                                 DIRECTOR: REJI CHOU M.D.

 CLIA



                                                                 NUMBER [id number]

[id number]



                                                                 ACCREDITATION N

O.



                                                                 [id number]



HEMOGLOBIN [id number] 03:55:54





             Test Item    Value        Reference Range Interpretation Comments

 

             HEMOGLOBIN A1c (test 7.0 %        4.2-5.6      H             AMERIC

AN DIABETES



             code = 83223)                                        ASSOCIATION 

IDELINES FOR



                                                                 HGB A1C:



                                                                 PREDIABETES/INC

REASED RISK .



                                                                 . . . . . . 5.7

-6.4%



                                                                 DIAGNOSIS OF DI

ABETES  . . .



                                                                 . . . . . . >=6

.5% WITH



                                                                 CONFIRMATION OR

 APPROPRIATE



                                                                 SYMPTOMS NOTE: 

ASSAY MAY BE



                                                                 AFFECTED BY



                                                                 HEMOGLOBINOPATH

IES (SICKLE



                                                                 CELL ANEMIA, S-

C DISEASE,



                                                                 OTHERS) OR ULISES

FICIALLY



                                                                 LOWERED BY DECR

EASED RED



                                                                 CELL SURVIVAL (

HEMOLYTIC



                                                                 ANEMIAS, BLOOD 

LOSS, ETC.).



                                                                 CONSIDER ALTERN

ATE TESTING



                                                                 OR LABORATORY C

ONSULTATION.



HEMOGLOBIN [id number] 00:00:00





             Test Item    Value        Reference Range Interpretation Comments

 

             HEMOGLOBIN A1c (test code = 11111) 7.0 %                           

       



HEMOGLOBIN [id number] 00:00:00





             Test Item    Value        Reference Range Interpretation Comments

 

             HEMOGLOBIN A1c (test code = 08778) 7.0 %                           

       



HEMOGLOBIN [id number] 00:00:00





             Test Item    Value        Reference Range Interpretation Comments

 

             HEMOGLOBIN A1c (test code = 18691) 7.0 %                           

       



COMPREHENSIVE METABOLIC [id number] 00:00:00





             Test Item    Value        Reference Range Interpretation Comments

 

             GLUCOSE (test code = 2217) 169 MG/DL                              

 

             BUN (test code = 2208) 58 MG/DL                               

 

             CREATININE (test code = 2214) 6.78 MG/DL                           

  

 

             eGFR ( CKD-EPI) (test code 10 ML/MIN/1.73                      

     



             = 55469)                                            

 

             CALC BUN/CREAT (test code = 9 RATIO                                



             2235)                                               

 

             SODIUM (test code = 2231) 142 MEQ/L                              

 

             POTASSIUM (test code = 2228) 4.9 MEQ/L                             

 

 

             CHLORIDE (test code = 2215) 108 MEQ/L                              

 

             CARBON DIOXIDE (test code = 22 MEQ/L                               



             )                                               

 

             CALCIUM (test code = 2209) 7.5 MG/DL                              

 

             PROTEIN, TOTAL (test code = 6.1 G/DL                               



             )                                               

 

             ALBUMIN (test code = 2201) 3.2 G/DL                               

 

             CALC GLOBULIN (test code = 2.9 G/DL                               



             2240)                                               

 

             CALC A/G RATIO (test code = 1.1 RATIO                              



             2234)                                               

 

             BILIRUBIN, TOTAL (test code = <0.2 MG/DL                           

  



             )                                               

 

             ALKALINE PHOSPHATASE (test 119 U/L                                



             code = 2204)                                        

 

             AST (test code = 2218) 31 U/L                                 

 

             ALT (test code = 2219) 42 U/L                                 



COMPREHENSIVE METABOLIC [id number] 00:00:00





             Test Item    Value        Reference Range Interpretation Comments

 

             GLUCOSE (test code = 2217) 169 MG/DL                              

 

             BUN (test code = 2208) 58 MG/DL                               

 

             CREATININE (test code = 2214) 6.78 MG/DL                           

  

 

             eGFR ( CKD-EPI) (test code 10 ML/MIN/1.73                      

     



             = 05837)                                            

 

             CALC BUN/CREAT (test code = 9 RATIO                                



             2235)                                               

 

             SODIUM (test code = 2231) 142 MEQ/L                              

 

             POTASSIUM (test code = 2228) 4.9 MEQ/L                             

 

 

             CHLORIDE (test code = 2215) 108 MEQ/L                              

 

             CARBON DIOXIDE (test code = 22 MEQ/L                               



             )                                               

 

             CALCIUM (test code = 2209) 7.5 MG/DL                              

 

             PROTEIN, TOTAL (test code = 6.1 G/DL                               



             )                                               

 

             ALBUMIN (test code = 2201) 3.2 G/DL                               

 

             CALC GLOBULIN (test code = 2.9 G/DL                               



             )                                               

 

             CALC A/G RATIO (test code = 1.1 RATIO                              



             )                                               

 

             BILIRUBIN, TOTAL (test code = <0.2 MG/DL                           

  



             )                                               

 

             ALKALINE PHOSPHATASE (test 119 U/L                                



             code = 2204)                                        

 

             AST (test code = 2218) 31 U/L                                 

 

             ALT (test code = 2219) 42 U/L                                 



HEMOGLOBIN [id number] 00:00:00





             Test Item    Value        Reference Range Interpretation Comments

 

             HEMOGLOBIN A1c (test code = 67655) 7.0 %                           

       



HEMOGLOBIN [id number] 00:00:00





             Test Item    Value        Reference Range Interpretation Comments

 

             HEMOGLOBIN A1c (test code = 35986) 7.0 %                           

       



HEMOGLOBIN [id number] 00:00:00





             Test Item    Value        Reference Range Interpretation Comments

 

             HEMOGLOBIN A1c (test code = 37934) 7.0 %                           

       



COMPREHENSIVE METABOLIC [id number] 00:00:00





             Test Item    Value        Reference Range Interpretation Comments

 

             GLUCOSE (test code = 2217) 169 MG/DL                              

 

             BUN (test code = 2208) 58 MG/DL                               

 

             CREATININE (test code = 2214) 6.78 MG/DL                           

  

 

             eGFR ( CKD-EPI) (test code 10 ML/MIN/1.73                      

     



             = 64224)                                            

 

             CALC BUN/CREAT (test code = 9 RATIO                                



             2235)                                               

 

             SODIUM (test code = 2231) 142 MEQ/L                              

 

             POTASSIUM (test code = 2228) 4.9 MEQ/L                             

 

 

             CHLORIDE (test code = 2215) 108 MEQ/L                              

 

             CARBON DIOXIDE (test code = 22 MEQ/L                               



             )                                               

 

             CALCIUM (test code = 2209) 7.5 MG/DL                              

 

             PROTEIN, TOTAL (test code = 6.1 G/DL                               



             )                                               

 

             ALBUMIN (test code = 2201) 3.2 G/DL                               

 

             CALC GLOBULIN (test code = 2.9 G/DL                               



             2240)                                               

 

             CALC A/G RATIO (test code = 1.1 RATIO                              



             2234)                                               

 

             BILIRUBIN, TOTAL (test code = <0.2 MG/DL                           

  



             )                                               

 

             ALKALINE PHOSPHATASE (test 119 U/L                                



             code = 2204)                                        

 

             AST (test code = 2218) 31 U/L                                 

 

             ALT (test code = 2219) 42 U/L                                 



COMPREHENSIVE METABOLIC [id number] 00:00:00





             Test Item    Value        Reference Range Interpretation Comments

 

             GLUCOSE (test code = 2217) 169 MG/DL                              

 

             BUN (test code = 2208) 58 MG/DL                               

 

             CREATININE (test code = 2214) 6.78 MG/DL                           

  

 

             eGFR ( CKD-EPI) (test code 10 ML/MIN/1.73                      

     



             = 58450)                                            

 

             CALC BUN/CREAT (test code = 9 RATIO                                



             2235)                                               

 

             SODIUM (test code = 2231) 142 MEQ/L                              

 

             POTASSIUM (test code = 2228) 4.9 MEQ/L                             

 

 

             CHLORIDE (test code = 2215) 108 MEQ/L                              

 

             CARBON DIOXIDE (test code = 22 MEQ/L                               



             )                                               

 

             CALCIUM (test code = 2209) 7.5 MG/DL                              

 

             PROTEIN, TOTAL (test code = 6.1 G/DL                               



             )                                               

 

             ALBUMIN (test code = 2201) 3.2 G/DL                               

 

             CALC GLOBULIN (test code = 2.9 G/DL                               



             2240)                                               

 

             CALC A/G RATIO (test code = 1.1 RATIO                              



             2234)                                               

 

             BILIRUBIN, TOTAL (test code = <0.2 MG/DL                           

  



             )                                               

 

             ALKALINE PHOSPHATASE (test 119 U/L                                



             code = 2204)                                        

 

             AST (test code = 2218) 31 U/L                                 

 

             ALT (test code = 2219) 42 U/L                                 



HEMOGLOBIN [id number] 00:00:00





             Test Item    Value        Reference Range Interpretation Comments

 

             HEMOGLOBIN A1c (test code = 40153) 7.0 %                           

       



HEMOGLOBIN [id number] 00:00:00





             Test Item    Value        Reference Range Interpretation Comments

 

             HEMOGLOBIN A1c (test code = 15685) 7.0 %                           

       



HEMOGLOBIN [id number] 00:00:00





             Test Item    Value        Reference Range Interpretation Comments

 

             HEMOGLOBIN A1c (test code = 45677) 7.0 %                           

       



COMPREHENSIVE METABOLIC [id number] 00:00:00





             Test Item    Value        Reference Range Interpretation Comments

 

             GLUCOSE (test code = 2217) 169 MG/DL                              

 

             BUN (test code = 2208) 58 MG/DL                               

 

             CREATININE (test code = 2214) 6.78 MG/DL                           

  

 

             eGFR ( CKD-EPI) (test code 10 ML/MIN/1.73                      

     



             = 13397)                                            

 

             CALC BUN/CREAT (test code = 9 RATIO                                



             2235)                                               

 

             SODIUM (test code = 2231) 142 MEQ/L                              

 

             POTASSIUM (test code = 2228) 4.9 MEQ/L                             

 

 

             CHLORIDE (test code = 2215) 108 MEQ/L                              

 

             CARBON DIOXIDE (test code = 22 MEQ/L                               



             220)                                               

 

             CALCIUM (test code = 2209) 7.5 MG/DL                              

 

             PROTEIN, TOTAL (test code = 6.1 G/DL                               



             )                                               

 

             ALBUMIN (test code = 2201) 3.2 G/DL                               

 

             CALC GLOBULIN (test code = 2.9 G/DL                               



             2240)                                               

 

             CALC A/G RATIO (test code = 1.1 RATIO                              



             2234)                                               

 

             BILIRUBIN, TOTAL (test code = <0.2 MG/DL                           

  



             )                                               

 

             ALKALINE PHOSPHATASE (test 119 U/L                                



             code = 2204)                                        

 

             AST (test code = 2218) 31 U/L                                 

 

             ALT (test code = 2219) 42 U/L                                 



COMPREHENSIVE METABOLIC [id number] 00:00:00





             Test Item    Value        Reference Range Interpretation Comments

 

             GLUCOSE (test code = 2217) 169 MG/DL                              

 

             BUN (test code = 2208) 58 MG/DL                               

 

             CREATININE (test code = 2214) 6.78 MG/DL                           

  

 

             eGFR ( CKD-EPI) (test code 10 ML/MIN/1.73                      

     



             = 29617)                                            

 

             CALC BUN/CREAT (test code = 9 RATIO                                



             2235)                                               

 

             SODIUM (test code = 2231) 142 MEQ/L                              

 

             POTASSIUM (test code = 2228) 4.9 MEQ/L                             

 

 

             CHLORIDE (test code = 2215) 108 MEQ/L                              

 

             CARBON DIOXIDE (test code = 22 MEQ/L                               



             2206)                                               

 

             CALCIUM (test code = 2209) 7.5 MG/DL                              

 

             PROTEIN, TOTAL (test code = 6.1 G/DL                               



             )                                               

 

             ALBUMIN (test code = 2201) 3.2 G/DL                               

 

             CALC GLOBULIN (test code = 2.9 G/DL                               



             2240)                                               

 

             CALC A/G RATIO (test code = 1.1 RATIO                              



             2234)                                               

 

             BILIRUBIN, TOTAL (test code = <0.2 MG/DL                           

  



             2207)                                               

 

             ALKALINE PHOSPHATASE (test 119 U/L                                



             code = 2204)                                        

 

             AST (test code = 2218) 31 U/L                                 

 

             ALT (test code = 2219) 42 U/L                                 



SARS-CoV-2 (COVID-19) by RT-PCR (HIGH RISK)2021 00:00:00





             Test Item    Value        Reference Range Interpretation Comments

 

             SARS-CoV-2 INTERPRETATION (test POSITIVE                           

    



             code = 38935)                                        

 

             SOURCE (test code = 81847) NOT SPECIFIED                           



SARS-CoV-2 (COVID-19) by RT-PCR (HIGH RISK)2021 00:00:00





             Test Item    Value        Reference Range Interpretation Comments

 

             SARS-CoV-2 INTERPRETATION (test POSITIVE                           

    



             code = 57202)                                        

 

             SOURCE (test code = 30586) NOT SPECIFIED                           



SARS-CoV-2 (COVID-19) by RT-PCR (HIGH RISK)2021 00:00:00





             Test Item    Value        Reference Range Interpretation Comments

 

             SARS-CoV-2 INTERPRETATION (test POSITIVE                           

    



             code = 15558)                                        

 

             SOURCE (test code = 09266) NOT SPECIFIED                           



SARS-CoV-2 (COVID-19) by RT-PCR (HIGH RISK)2021 00:00:00





             Test Item    Value        Reference Range Interpretation Comments

 

             SARS-CoV-2 INTERPRETATION (test POSITIVE                           

    



             code = 69446)                                        

 

             SOURCE (test code = 14567) NOT SPECIFIED                           



SARS-CoV-2 (COVID-19) by RT-PCR (HIGH RISK)2021 00:00:00





             Test Item    Value        Reference Range Interpretation Comments

 

             SARS-CoV-2 INTERPRETATION (test POSITIVE                           

    



             code = 94445)                                        

 

             SOURCE (test code = 35094) NOT SPECIFIED                           



SARS-CoV-2 (COVID-19) by RT-PCR (HIGH RISK)2021 00:00:00





             Test Item    Value        Reference Range Interpretation Comments

 

             SARS-CoV-2 INTERPRETATION (test POSITIVE                           

    



             code = 39420)                                        

 

             SOURCE (test code = 45419) NOT SPECIFIED                           



POCT GLUCOSE (AUTOMATED)2021 22:18:06





             Test Item    Value        Reference Range Interpretation Comments

 

             POCT GLU (test code = 6951127278) 108 mg/dL                  

      

 

             Lab Interpretation (test code = Normal                             

    



             67155-2)                                            



[facility] GLUCOSE (AUTOMATED)2021 16:49:24





             Test Item    Value        Reference Range Interpretation Comments

 

             POCT GLU (test code = 3245982518) 93 mg/dL                   

      

 

             Lab Interpretation (test code = Normal                             

    



             58565-5)                                            



[facility] [id number] 14:39:00





             Test Item    Value        Reference Range Interpretation Comments

 

             Case Report (test code Surgical Pathology ? ?                      

     



             = [id number]) ? ? ? ? ? ? ? ? ? ? ? ?                           



                          ? ?Case: [id number] ? ?                           



                          ? ? ? ? ? ? ? ? ? ? ? ?                           



                          ? ? Authorizing                           



                          Provider: ?Skip Veloz Jr., NATALIE ?                           



                          Collected: ? ? ? ? ?                           



                          04/10/2021 1056 ? ? ? ?                           



                          ? ?Ordering Location: ?                           



                          ? [facility]                           



                          ? ? ?Received: ? ? ? ?                           



                          ? ?04/10/2021 1131 ? ?                           



                          ? ? ? ? ? ? ? ? ? ? ? ?                           



                          ? ? [facility] ?                           



                          ? ? ? ? ? ? ? ? ? ? ? ?                           



                          ? ? ? ? ? ? ? ? ? ? ? ?                           



                          ? ? ? ? ?Pathologist: ?                           



                          ? ? ? ? Jennyfer Anderson, MD                           



                          PHD ? ? ? ? ? ? ? ? ? ?                           



                          ? ? ? ? ? ? ? ? ? ? ? ?                           



                          ? ? ? ? ? ?Specimens: ?                           



                          A) - TOE, FIFTH                           



                          METATARSAL/TOE, RIGHT ?                           



                          ? ? ? ? ? ? ? ? ? ? ? ?                           



                          ? ? ? ? ? ? ? ? ? ? ? ?                           



                          ? ? ? ? ? B) - BONE,                           



                          FIFTH METATARSAL BONE                           



                          BIOPSY, RIGHT ? ? ? ? ?                           



                          ? ? ? ? ? ? ? ? ? ? ? ?                           



                          ? ?                                    

 

             Final Diagnosis (test k7deuMJpNTZpa7opOBBttSC                      

     



             code = 0524149190) uZzEwMzNcZnRuYmpcdWMxIH                         

  



                          tccnRmMVxlcGljOTQwMlxhb                           



                          aFsEJLwqNKeI9DebgqmDRyo                           



                          GY3iES3pfVckpWOkvDUmBAC                           



                          oRoHgo9srh188wGCrc0pwYK                           



                          VZrsnntNv0wVgxO94bq4J8Q                           



                          txtO02lqBIwDLQ8UCQuIKDe                           



                          kSXgSSGfJZA8ZDQlyVYjE2e                           



                          iEJMwVJ5qtasdVRjfTStgZY                           



                          PcrOO7AXXanOYdE5UbRBDpA                           



                          NodNTQfnxp8PxLvHh4mcFGj                           



                          eTcyMFxwYXJkXHBsYWluXGZ                           



                          cFcJiqIZtCYWtZXRCL9dKSA                           



                          BSSUdIVCBGSUZUSCBNRVRBV                           



                          CNYP9QSZEFHCHRNO7TmKWEQ                           



                          NYFRLQAVY521LHDhdjVzCBX                           



                          kNWXEZ1BBHBDMI7AXT73OXN                           



                          iBRDyXAJrHXFavF0JPJpzDS                           



                          V6KIHETX2XMOXKSZ80ujOWk                           



                          ICAgICAtIEFSVElDVUxBUiB                           



                          TVVJGQUNFIFdJVEggQUNVVE                           



                          OgD9SLWY8YNEHKBHCIWwPDW                           



                          SBISMFDR8VJG5RzE5fLTWzB                           



                          KTYMBcZOTH4OAQTUHkDyVNS                           



                          hciAgICAgICBUSVNTVUVccG                           



                          SrOGGuCEFqRSKTNS9aYH7OG                           



                          HPJPjNuMWjCF8DKIB5HXlyS                           



                          IxRHKMVFIYRUM3ULS9ApUGE                           



                          neywfXGRrVk8kXa9SFBmxBq                           



                          lHSFQgRklGVEggTUVUQVRBU                           



                          lNBTCwgQklPUFNZOlxwYXIg                           



                          ECCtMV9eCu4xVSRWMUAQEK1                           



                          TVEVPTVlFTElUSVMgSURFTl                           



                          RJRklFRFxwYXJccGFyIEFyd                           



                          9arHHE7ts4knaojSN5rUBJP                           



                          PEPyzdqcYZR6a5zdoVMnMVW                           



                          mnWOcErQbECQkYHBzz3qaWD                           



                          VmbGFuZzEwMzNcZnRuYmpcd                           



                          ZAsSWVaPxOhs0cxi844uNZo                           



                          q7udSBZbAzK2uHWrTSDogJw                           



                          wzyi8aFftWkRdNDVes4vmap                           



                          BcZmNoYXJzZXQwIEFyaWFsO                           



                          613XKTaPBnlh0ait2AxNMVf                           



                          cFTum5Y7NMXSQOgrOgOcB25                           



                          7h5gnw2ouvrBhpJW0YDGbJD                           



                          X5LRvdgkSmhkG4VQahtWYfS                           



                          pJ3OFbtovIoKByqinBrquXn                           



                          Pew5CKYdQ426PLN7kZwzv1a                           



                          sXHD9CHPhTABfPialDf8ceK                           



                          AmJ462UNNqTNLPUIEwpAb6J                           



                          EZvsqGahrWnnRLHo110O551                           



                          s7mtQMHlckAiaLjNikonx8t                           



                          qJ341CUNcfWKrkpEwAyLmRY                           



                          TlcFZubYO1AOGpKQ6vdoutY                           



                          RefCMdnUEYpkiK7SLLihIRw                           



                          V9MiNZGvNE6mgyerISC9YUv                           



                          qOAFuHBN2MeGaGMQra2Nlsv                           



                          s3QpWcth8iok18DKJ7h2Knw                           



                          IucSJM5ZXR6WvWnUz1ttGIj                           



                          WDHyBL6zQjUflIUmOQEgna8                           



                          5yPezNWjyshInoV9hYsBaBW                           



                          FbfENiEQJqCM1qtTWiFAIdi                           



                          A0onoggVDIhZxAhkanyWDHc                           



                          vMcttrEoJf5phKmjIKU8ZRb                           



                          mQ7ytzM8yLqE8UWtbH6qsnO                           



                          2kDJf3SWelpLA3KDJajD5fU                           



                          Y2xpjhpg7zqKOznRLljBYFe                           



                          jdW1lyU1VBEoaLIwZ3TdoG8                           



                          pCFWzNK3brjuyg8inPVG5BN                           



                          ukATBwTSK1UsDgQUKdq3Wrr                           



                          pq1SaHro6McoCBnEFrdZ97z                           



                          d578WYAfdgIoI5pcaDHxqqs                           



                          rpFHweyewXXcbmpA1FKVqPK                           



                          BsYWluXGYxXGZzMjBcbGFuZ                           



                          zEwMzNcaGljaFxmMVxkYmNo                           



                          GLRpGKfyF5kwJbVpE6EkJFZ                           



                          gKqSwaWVgXShcwJQ6RFGaBZ                           



                          Wwm43kpTj6VZXcqldaa1RvP                           



                          GUsrIGbrFIqgI7sygGto3er                           



                          UNEeKVZsSXNuP7BkMLL5eML                           



                          sJMWykDAgwHM5BO9ovcInIW                           



                          1hZGUgYnkgcmVzaWRlbnRzL                           



                          EFhGFlzz9lnBI6jZZUogWma                           



                          eJ7ixCN9XSJux3bpxUOqjYY                           



                          ce0dgx1DqjgFcCYspBMMqCJ                           



                          upVUXdBVGhYK4yXIMpjYKzd                           



                          bHlh1M3AcabjAJragsxStvc                           



                          gqZ0FJjsknlxULImUGicU6n                           



                          yNvJmLOYdnDofYqrot6BiQO                           



                          YyXGZzMjhccGFyfX0=                           

 

             Clinical Information RIGHT FIFTH DIGIT                           



             (test code = OSTEOMYELITIS WITH FOOT                           



             [id number])  ABSCESS                                

 

             Gross Description n2ngwYEpWQZqgAHINETkFai                          

 



             (test code = ittDnPUQvkUOuH2ZtwbmyET                           



             [id number])  wmZP4aWG1pcAfxqREkyEIsL                           



                          A6LOFKzLaLjKKAbhABkjbFz                           



                          YpZrKUZkqEPhxFA1VDFcZQ7                           



                          mmgrxUNybKVcjMTWqkfC7ZS                           



                          WazUJzD2VvYQLiHD9zvlqgJ                           



                          EJ4YChacH1axjKETkqwWf5v                           



                          dHRibHtcZjFcZmNoYXJzZXQ                           



                          hUQPzfGekFBMbXNf6yG5ZIj                           



                          bnNZZ4CQTKTcpoTARhRD3Wu                           



                          6fsEYKoySQmSEY8CBnquJDu                           



                          NYObELIhJXn5OHQfHUbhdPG                           



                          iZZ3pyQjhQyommOafv3UrqB                           



                          BcXGlkIDUxMDAyIFxcZGIgI                           



                          C6OPjPyNXr5ByV9VpXlHWl6                           



                          BOo0EB0TXkFtUHViWCh4UoC                           



                          3BJOzUEh0GLdaNW1LHRHuKZ                           



                          O8NGn8CYDnXCW4FYAiUCm8Q                           



                          DIgXFxmIEFyaWFsIFxcZnMg                           



                          IIVjQGqejSKnFX3opWtwlXJ                           



                          pblxmczIwXHBhciANClxwbG                           



                          FpblxlcGljTmVzdERvYzEgD                           



                          QpcbHRycGFyXGxpbjBccmlu                           



                          MCANClxsdHJjaFxmczIyIFN                           



                          wZWNpbWVuIEEgaXMgcmVjZW                           



                          e6QNPxkH0gJp6ysMApxM8lg                           



                          FHoEUqpYNE0sTWzKJGfWPTf                           



                          JOFhIQ20D6DtqhVtLVqmVUh                           



                          achEdUkUpUGVorUP3qXApCX                           



                          XjgJKkj2YgK7ZwUWmzwgqqs                           



                          QIpFUZsqfOwQ81jy8fjxVQj                           



                          k8JhBXNkoFF3fNOkcMzrdQZ                           



                          fNT2mKGtoMi8sASyqPJ67UU                           



                          DzHFUugGAplwHiN5WzYUBkE                           



                          MVywIA2iRBbqVI4QNFxebSo                           



                          vDvraUJaY2RhoKRgb6qyjA4                           



                          sOVOvZVNny0iiPEftKPDznk                           



                          3jfG2sHGTzWTOsr6O2rHAsR                           



                          ErxiEgdSMM6EU7wjBVqjP78                           



                          UMS1fEgmt4DmRNKqieUoqV6                           



                          kOQwiQJFyX5z4XWhkZGToN7                           



                          Wer14vXML2jdDkLYQcGLqhC                           



                          LJ2MJ7nmMDewP22JJXkv0G5                           



                          TY5cAHBjrvUbAFxgSyTmmZC                           



                          hVrFmbQAaRjxeX15jZCEmDM                           



                          PzSAV3zZMyeQkkZRPwmeWvr                           



                          89xlDbrSMHwVM59WWQld0ls                           



                          FSQnobDnM6Qkr9AknSLurmY                           



                          yhRJcnxV2bTTmj4osblFquc                           



                          Gqp42zmZQ2lDAnuRThsuIoC                           



                          RG8nZ1qCJ4wzxwhwvZyrcPu                           



                          vKJht0ChJXIlIH03HZOqIFK                           



                          li77tgZacKTLjawVtJUzwRO                           



                          H9pZU8dGT7JJMyf0XoBdDvK                           



                          M9fXIftDOZ3tHSduZIoRVYx                           



                          tdOhCbV5mVRbFc0rPYRfwxY                           



                          djUsaua99XVnck3qhAW4pVN                           



                          Xswn8bGRYQSSEsJSFadeUot                           



                          Dh2OMFjCPP9jK6evqQiixVg                           



                          f5ZlvEm2rYLmAUpgELWyZZG                           



                          cDTVdc7cmx7aieersFQEfHO                           



                          ssxWCxR2B2pU2oYM6cEHMeD                           



                          EFoCnifQOVcFEsadVCzUD6W                           



                          K1OsxOffdnSbx0JwMawlWBO                           



                          qUZiBIOqtB8wzfoVntnRyg0                           



                          5jfOG3rAWcvSWlwzQhXHW1p                           



                          Y9mSN5oitqqjskcLC79zWSa                           



                          iIroJHJoKFGoX7VujVWiOG2                           



                          FNFW5OTQkqcEeRDdoJBJ0kA                           



                          W7gNG4YJGbw7ZqIiWlQYitS                           



                          M51hGIdlXokUVUiAEWrS7Ls                           



                          QZLhyKbbk8ybScIrZEBaxFS                           



                          lNgruVODxm32fgTWxPA2EVQ                           



                          P6NJVbwDWfm4WjeWE4eXImE                           



                          NUvn9IjEFSwQ3Fyi21hl2Xm                           



                          HRzwkBNfACSkuSceh8fiEtH                           



                          pKYEthEJoGwzfFLFnh21OVv                           



                          xwbGFpblxlcGljTmVzdERvY                           



                          cSslRsnqH53OCRhrJQbLGU4                           



                          EM7wSDIzuyayOLZhDGXpRRW                           



                          5DHsibE32zQQgUVNpFTKxbA                           



                          YglR4Kp4clGVDglRUkJPI7V                           



                          FxcaWQgNTEwMDIgXFxkYiAg                           



                          W6VRHZGbIGwsONm4UhCpLJl                           



                          6TNrrS1DQZCBlTPN5NJc1KI                           



                          KfAeY4PNw5EJKIQb6gJBB8A                           



                          PH0JPT3UxS9PVU3JKDtCUSh                           



                          MiBcXGYgQXJpYWwgXFxmcyA                           



                          wEQTvYZZrKYahgiI2FOMkJp                           



                          ZwuRCaJH6GAWCgNImvUSAnv                           



                          SKOIWE1DA3iICQPQwlkoJSh                           



                          XZYpqEnuXSmxfL4cMI6YFCa                           



                          9zkNvTFQdQjMwD8ByA9goNM                           



                          4gQiBpcyByZWNpZXZlZCBpb                           



                          tVgr4YeQAfwzuApAGKfgZWs                           



                          IHdpdGggdGhlIHBhdGllbnQ                           



                          eiwIxCB2wVVKPBWSluP6jFD                           



                          MsZhDxuBW5lADlVLNlgDNyf                           



                          8TxUXAjztXmTgdwaQY9CPIo                           



                          yFxpbLCyQJ3vGUEsatPxd9H                           



                          mMY5qMQRvx1huG7uxUPOtxt                           



                          2jsX8zOXBepoZkUoIiI65bo                           



                          rUxFQTuCxT7FMTrRwH1HOVx                           



                          MiBjbSkuIFRoZSBzcGVjaW1                           



                          zihWvzgRjcAm6YUEhHEB5oO                           



                          BmgGqnPDRuUkazmRY9VTInG                           



                          nSxnrDaw5DxjJj2vSLrGNfl                           



                          LIWsgY7fuS3zQyWoOBApbBg                           



                          bt7svAcHcUCQyuSYeErpcHB                           



                          Wka16uVKBvnxRLGtykGBEgH                           



                          NdDYIH5ONGclbKvpUneQAKH                           



                          BZInUONwC4E9QABsoQoaBSX                           



                          pXRrFeLveZEDDI1wmsHBfCX                           



                          czTGLWGHiTJ9UKKR7CWSQcW                           



                          TvxFWEscXSRXKH9YM1kTCuq                           



                          pTYuxjuuEDSwK3DyL3AwqzM                           



                          kzWSkJLJrvhMjz3waSOW7JX                           



                          YpgWZbnKBlHdScJsqhMEJ8N                           



                          Mzbf7bnZUG9XSQroVVgdBWf                           



                          AGgmXrCaAussEINgG6JhF8S                           



                          ijyQ1REl2                              

 

             Embedded Images (test                                        



             code = 7024351403)                                        



[facility]





             Test Item    Value        Reference Range Interpretation Comments

 

             POCT GLU (test code = 6590757990) 179 mg/dL           H      

      

 

             Lab Interpretation (test code = Abnormal                           

    



             80703-0)                                            



[facility]. METABOLIC PANEL (24158)2021 
09:53:25





             Test Item    Value        Reference Range Interpretation Comments

 

             NA (test code = 137 mmol/L   135-145                   



             3055629645)                                         

 

             K (test code = 3.9 mmol/L   3.5-5.0                   



             0123608159)                                         

 

             CL (test code = 107 mmol/L                       



             3716083434)                                         

 

             CO2 TOTAL (test code = 24 mmol/L    23-31                     



             3263927394)                                         

 

             AGAP (test code =              2-16                      



             3480470135)                                         

 

             BUN (test code = 25 mg/dL     7-23         H            



             8938762819)                                         

 

             GLUCOSE (test code = 161 mg/dL           H            



             1362957700)                                         

 

             CREATININE (test code = 2.03 mg/dL   0.60-1.25    H            



             7947981327)                                         

 

             TOTAL BILI (test code = 0.3 mg/dL    0.1-1.1                   



             6791603025)                                         

 

             CALCIUM (test code = 8.4 mg/dL    8.6-10.6     L            



             7063997265)                                         

 

             T PROTEIN (test code = 5.7 g/dL     6.3-8.2      L            



             0943328127)                                         

 

             ALBUMIN (test code = 2.6 g/dL     3.5-5.0      L            



             1407477309)                                         

 

             ALK PHOS (test code = 74 U/L                           



             6526289968)                                         

 

             ALTv (test code = 23 U/L       5-50                      



             1742-6)                                             

 

             AST(SGOT) (test code = 38 U/L       13-40                     



             0303812031)                                         

 

             eGFR (test code =              mL/min/1.73m2              



             5727151342)                                         

 

             ELIJAH (test code = ELIJAH) Association of                           



                          Glomerular Filtration                           



                          Rate (GFR) and Staging                           



                          of Kidney Disease*                           



                          +---------------------                           



                          --+-------------------                           



                          --+-------------------                           



                          ------+| GFR                           



                          (mL/min/1.73 m2) ?|                           



                          With Kidney Damage ?|                           



                          ?Without Kidney                           



                          Damage+---------------                           



                          --------+-------------                           



                          --------+-------------                           



                          ------------+| ?>90 ?                           



                          ? ? ? ? ? ? ? ?|                           



                          ?Stage one ? ? ? ? ?|                           



                          ? Normal ? ? ? ? ? ? ?                           



                          ?+--------------------                           



                          ---+------------------                           



                          ---+------------------                           



                          -------+| ?60-89 ? ? ?                           



                          ? ? ? ? ?| ?Stage two                           



                          ? ? ? ? ?| ? Decreased                           



                          GFR ? ? ? ?                            



                          +---------------------                           



                          --+-------------------                           



                          --+-------------------                           



                          ------+| ?30-59 ? ? ?                           



                          ? ? ? ? ?| ?Stage                           



                          three ? ? ? ?| ? Stage                           



                          three ? ? ? ? ?                           



                          +---------------------                           



                          --+-------------------                           



                          --+-------------------                           



                          ------+| ?15-29 ? ? ?                           



                          ? ? ? ? ?| ?Stage four                           



                          ? ? ? ? | ? Stage four                           



                          ? ? ? ? ?                              



                          ?+--------------------                           



                          ---+------------------                           



                          ---+------------------                           



                          -------+| ?<15 (or                           



                          dialysis) ? ?| ?Stage                           



                          five ? ? ? ? | ? Stage                           



                          five ? ? ? ? ?                           



                          ?+--------------------                           



                          ---+------------------                           



                          ---+------------------                           



                          -------+ *Each stage                           



                          assumes the associated                           



                          GFR level has been in                           



                          effect for at least                           



                          three months. ?Stages                           



                          1 to 5, with or                           



                          without kidney                           



                          disease, indicate                           



                          chronic kidney                           



                          disease. Notes:                           



                          Determination of                           



                          stages one and two                           



                          (with eGFR                             



                          >59mL/min/1.73 m2)                           



                          requires estimation of                           



                          kidney damage for at                           



                          least three months as                           



                          defined by structural                           



                          or functional                           



                          abnormalities of the                           



                          kidney, manifested by                           



                          either:Pathological                           



                          abnormalities or                           



                          Markers of kidney                           



                          damage (including                           



                          abnormalities in the                           



                          composition of the                           



                          blood or urine or                           



                          abnormalities in                           



                          imaging tests).                           

 

             Lab Interpretation Abnormal                               



             (test code = 62765-4)                                        



[facility] WITH [id number] 09:10:57





             Test Item    Value        Reference Range Interpretation Comments

 

             WBC (test code =              See_Comment                [Automated



             6890-2)                                             message] The sy

stem



                                                                 which generated



                                                                 this result



                                                                 transmitted



                                                                 reference range

:



                                                                 4.20 - 10.70



                                                                 10*3/?L. The



                                                                 reference range

 was



                                                                 not used to



                                                                 interpret this



                                                                 result as



                                                                 normal/abnormal

.

 

             RBC (test code =              See_Comment  L             [Automated



             819-8)                                              message] The sy

stem



                                                                 which generated



                                                                 this result



                                                                 transmitted



                                                                 reference range

:



                                                                 4.26 - 5.52



                                                                 10*6/?L. The



                                                                 reference range

 was



                                                                 not used to



                                                                 interpret this



                                                                 result as



                                                                 normal/abnormal

.

 

             HGB (test code = 10.8 g/dL    12.2-16.4    L            



             718-7)                                              

 

             HCT (test code = 31.8 %       38.4-49.3    L            



             4544-3)                                             

 

             MCV (test code = 85.0 fL      81.7-95.6                 



             787-2)                                              

 

             MCH (test code = 28.9 pg      26.1-32.7                 



             785-6)                                              

 

             MCHC (test code = 34.0 g/dL    31.2-35.0                 



             786-4)                                              

 

             RDW-SD (test code = 36.2 fL      38.5-51.6    L            



             01786-9)                                            

 

             RDW-CV (test code = 11.7 %       12.1-15.4    L            



             788-0)                                              

 

             PLT (test code =              See_Comment  H             [Automated



             777-3)                                              message] The sy

stem



                                                                 which generated



                                                                 this result



                                                                 transmitted



                                                                 reference range

:



                                                                 150 - 328 10*3/

?L.



                                                                 The reference r

pa



                                                                 was not used to



                                                                 interpret this



                                                                 result as



                                                                 normal/abnormal

.

 

             MPV (test code = 9.7 fL       9.8-13.0     L            



             15057-9)                                            

 

             NRBC/100 WBC (test              See_Comment                [Automat

ed



             code = 8659545784)                                        message] 

The system



                                                                 which generated



                                                                 this result



                                                                 transmitted



                                                                 reference range

:



                                                                 0.0 - 10.0 /100



                                                                 WBCs. The refer

ence



                                                                 range was not u

sed



                                                                 to interpret th

is



                                                                 result as



                                                                 normal/abnormal

.

 

             NRBC x10^3 (test code <0.01        See_Comment                [Auto

mated



             = [id number])                                        message] The s

ystem



                                                                 which generated



                                                                 this result



                                                                 transmitted



                                                                 reference range

:



                                                                 10*3/?L. The



                                                                 reference range

 was



                                                                 not used to



                                                                 interpret this



                                                                 result as



                                                                 normal/abnormal

.

 

             GRAN MAT (NEUT) % 68.8 %                                 



             (test code = 770-8)                                        

 

             IMM GRAN % (test code 0.50 %                                 



             = 1531525298)                                        

 

             LYMPH % (test code = 17.8 %                                 



             736-9)                                              

 

             MONO % (test code = 7.2 %                                  



             5905-5)                                             

 

             EOS % (test code = 5.0 %                                  



             713-8)                                              

 

             BASO % (test code = 0.7 %                                  



             706-2)                                              

 

             GRAN MAT x10^3(ANC) 7.09 10*3/uL 1.99-6.95    H            



             (test code =                                        



             3775307891)                                         

 

             IMM GRAN x10^3 (test 0.05 10*3/uL 0.00-0.06                 



             code = 1518367108)                                        

 

             LYMPH x10^3 (test code 1.83 10*3/uL 1.09-3.23                 



             = 731-0)                                            

 

             MONO x10^3 (test code 0.74 10*3/uL 0.36-1.02                 



             = 742-7)                                            

 

             EOS x10^3 (test code = 0.51 10*3/uL 0.06-0.53                 



             711-2)                                              

 

             BASO x10^3 (test code 0.07 10*3/uL 0.01-0.09                 



             = 704-7)                                            

 

             Lab Interpretation Abnormal                               



             (test code = 31182-2)                                        



[facility] GLUCOSE (AUTOMATED)2021 01:41:59





             Test Item    Value        Reference Range Interpretation Comments

 

             POCT GLU (test code = 4124949495) 101 mg/dL                  

      

 

             Lab Interpretation (test code = Normal                             

    



             89757-5)                                            



[facility] GLUCOSE (AUTOMATED)2021 21:10:22





             Test Item    Value        Reference Range Interpretation Comments

 

             POCT GLU (test code = 6992291601) 100 mg/dL                  

      

 

             Lab Interpretation (test code = Normal                             

    



             21286-9)                                            



[facility] GLUCOSE (AUTOMATED)2021 16:46:17





             Test Item    Value        Reference Range Interpretation Comments

 

             POCT GLU (test code = 3769428394) 164 mg/dL           H      

      

 

             Lab Interpretation (test code = Abnormal                           

    



             04804-4)                                            



[facility] GLUCOSE (AUTOMATED)2021 16:46:17





             Test Item    Value        Reference Range Interpretation Comments

 

             POCT GLU (test code = 6345625380) 164 mg/dL           H      

      

 

             Lab Interpretation (test code = Abnormal                           

    



             04072-5)                                            



[facility] 14:19:34





             Test Item    Value        Reference Range Interpretation Comments

 

             Aspirate or Abscess No aerobic/anaerobic                           



             Culture (test code = organisms isolated                           



             29077-7)                                            

 

             Gram stain (test code No PMNs or Mononuclear                       

    



             = 664-3)     cells observed                           



[facility]
2021 14:19:34





             Test Item    Value        Reference Range Interpretation Comments

 

             Aspirate or Abscess No aerobic/anaerobic                           



             Culture (test code = organisms isolated                           



             77353-3)                                            

 

             Gram stain (test code No PMNs or Mononuclear                       

    



             = 664-3)     cells observed                           



[facility] GLUCOSE (AUTOMATED)2021 12:59:29





             Test Item    Value        Reference Range Interpretation Comments

 

             POCT GLU (test code = 8377790218) 162 mg/dL           H      

      

 

             Lab Interpretation (test code = Abnormal                           

    



             07381-6)                                            



[facility] 12:59:29





             Test Item    Value        Reference Range Interpretation Comments

 

             POCT GLU (test code = 9101846090) 162 mg/dL           H      

      

 

             Lab Interpretation (test code = Abnormal                           

    



             15083-8)                                            



[id number][facility] 
09:57:17





             Test Item    Value        Reference Range Interpretation Comments

 

             NA (test code = 137 mmol/L   135-145                   



             5108081303)                                         

 

             K (test code = 3.9 mmol/L   3.5-5.0                   



             8075573453)                                         

 

             CL (test code = 109 mmol/L          H            



             2103030811)                                         

 

             CO2 TOTAL (test code = 24 mmol/L    23-31                     



             9227017125)                                         

 

             AGAP (test code =              2-16                      



             0757136611)                                         

 

             BUN (test code = 23 mg/dL     7-23                      



             4438345489)                                         

 

             GLUCOSE (test code = 162 mg/dL           H            



             1115508009)                                         

 

             CREATININE (test code = 1.98 mg/dL   0.60-1.25    H            



             1046534021)                                         

 

             TOTAL BILI (test code = 0.3 mg/dL    0.1-1.1                   



             8576597319)                                         

 

             CALCIUM (test code = 8.0 mg/dL    8.6-10.6     L            



             6204854574)                                         

 

             T PROTEIN (test code = 5.3 g/dL     6.3-8.2      L            



             4505192258)                                         

 

             ALBUMIN (test code = 2.4 g/dL     3.5-5.0      L            



             0475826330)                                         

 

             ALK PHOS (test code = 63 U/L                           



             0044656104)                                         

 

             ALTv (test code = 10 U/L       5-50                      



             1742-6)                                             

 

             AST(SGOT) (test code = 19 U/L       13-40                     



             7282732597)                                         

 

             eGFR (test code =              mL/min/1.73m2              



             2900998431)                                         

 

             ELIJAH (test code = ELIJAH) Association of                           



                          Glomerular Filtration                           



                          Rate (GFR) and Staging                           



                          of Kidney Disease*                           



                          +---------------------                           



                          --+-------------------                           



                          --+-------------------                           



                          ------+| GFR                           



                          (mL/min/1.73 m2) ?|                           



                          With Kidney Damage ?|                           



                          ?Without Kidney                           



                          Damage+---------------                           



                          --------+-------------                           



                          --------+-------------                           



                          ------------+| ?>90 ?                           



                          ? ? ? ? ? ? ? ?|                           



                          ?Stage one ? ? ? ? ?|                           



                          ? Normal ? ? ? ? ? ? ?                           



                          ?+--------------------                           



                          ---+------------------                           



                          ---+------------------                           



                          -------+| ?60-89 ? ? ?                           



                          ? ? ? ? ?| ?Stage two                           



                          ? ? ? ? ?| ? Decreased                           



                          GFR ? ? ? ?                            



                          +---------------------                           



                          --+-------------------                           



                          --+-------------------                           



                          ------+| ?30-59 ? ? ?                           



                          ? ? ? ? ?| ?Stage                           



                          three ? ? ? ?| ? Stage                           



                          three ? ? ? ? ?                           



                          +---------------------                           



                          --+-------------------                           



                          --+-------------------                           



                          ------+| ?15-29 ? ? ?                           



                          ? ? ? ? ?| ?Stage four                           



                          ? ? ? ? | ? Stage four                           



                          ? ? ? ? ?                              



                          ?+--------------------                           



                          ---+------------------                           



                          ---+------------------                           



                          -------+| ?<15 (or                           



                          dialysis) ? ?| ?Stage                           



                          five ? ? ? ? | ? Stage                           



                          five ? ? ? ? ?                           



                          ?+--------------------                           



                          ---+------------------                           



                          ---+------------------                           



                          -------+ *Each stage                           



                          assumes the associated                           



                          GFR level has been in                           



                          effect for at least                           



                          three months. ?Stages                           



                          1 to 5, with or                           



                          without kidney                           



                          disease, indicate                           



                          chronic kidney                           



                          disease. Notes:                           



                          Determination of                           



                          stages one and two                           



                          (with eGFR                             



                          >59mL/min/1.73 m2)                           



                          requires estimation of                           



                          kidney damage for at                           



                          least three months as                           



                          defined by structural                           



                          or functional                           



                          abnormalities of the                           



                          kidney, manifested by                           



                          either:Pathological                           



                          abnormalities or                           



                          Markers of kidney                           



                          damage (including                           



                          abnormalities in the                           



                          composition of the                           



                          blood or urine or                           



                          abnormalities in                           



                          imaging tests).                           

 

             Lab Interpretation Abnormal                               



             (test code = 90626-2)                                        



[facility]. METABOLIC PANEL ([id number])2021 
09:57:17





             Test Item    Value        Reference Range Interpretation Comments

 

             NA (test code = 137 mmol/L   135-145                   



             6462598534)                                         

 

             K (test code = 3.9 mmol/L   3.5-5.0                   



             8597566246)                                         

 

             CL (test code = 109 mmol/L          H            



             9406391063)                                         

 

             CO2 TOTAL (test code = 24 mmol/L    23-31                     



             8799127240)                                         

 

             AGAP (test code =              2-16                      



             2553396820)                                         

 

             BUN (test code = 23 mg/dL     7-23                      



             4100073211)                                         

 

             GLUCOSE (test code = 162 mg/dL           H            



             7536265899)                                         

 

             CREATININE (test code = 1.98 mg/dL   0.60-1.25    H            



             0760146469)                                         

 

             TOTAL BILI (test code = 0.3 mg/dL    0.1-1.1                   



             6105125369)                                         

 

             CALCIUM (test code = 8.0 mg/dL    8.6-10.6     L            



             5453225093)                                         

 

             T PROTEIN (test code = 5.3 g/dL     6.3-8.2      L            



             0786052500)                                         

 

             ALBUMIN (test code = 2.4 g/dL     3.5-5.0      L            



             5132483589)                                         

 

             ALK PHOS (test code = 63 U/L                           



             1203980255)                                         

 

             ALTv (test code = 10 U/L       5-50                      



             1742-6)                                             

 

             AST(SGOT) (test code = 19 U/L       13-40                     



             6789450816)                                         

 

             eGFR (test code =              mL/min/1.73m2              



             5158614276)                                         

 

             ELIJAH (test code = ELIJAH) Association of                           



                          Glomerular Filtration                           



                          Rate (GFR) and Staging                           



                          of Kidney Disease*                           



                          +---------------------                           



                          --+-------------------                           



                          --+-------------------                           



                          ------+| GFR                           



                          (mL/min/1.73 m2) ?|                           



                          With Kidney Damage ?|                           



                          ?Without Kidney                           



                          Damage+---------------                           



                          --------+-------------                           



                          --------+-------------                           



                          ------------+| ?>90 ?                           



                          ? ? ? ? ? ? ? ?|                           



                          ?Stage one ? ? ? ? ?|                           



                          ? Normal ? ? ? ? ? ? ?                           



                          ?+--------------------                           



                          ---+------------------                           



                          ---+------------------                           



                          -------+| ?60-89 ? ? ?                           



                          ? ? ? ? ?| ?Stage two                           



                          ? ? ? ? ?| ? Decreased                           



                          GFR ? ? ? ?                            



                          +---------------------                           



                          --+-------------------                           



                          --+-------------------                           



                          ------+| ?30-59 ? ? ?                           



                          ? ? ? ? ?| ?Stage                           



                          three ? ? ? ?| ? Stage                           



                          three ? ? ? ? ?                           



                          +---------------------                           



                          --+-------------------                           



                          --+-------------------                           



                          ------+| ?15-29 ? ? ?                           



                          ? ? ? ? ?| ?Stage four                           



                          ? ? ? ? | ? Stage four                           



                          ? ? ? ? ?                              



                          ?+--------------------                           



                          ---+------------------                           



                          ---+------------------                           



                          -------+| ?<15 (or                           



                          dialysis) ? ?| ?Stage                           



                          five ? ? ? ? | ? Stage                           



                          five ? ? ? ? ?                           



                          ?+--------------------                           



                          ---+------------------                           



                          ---+------------------                           



                          -------+ *Each stage                           



                          assumes the associated                           



                          GFR level has been in                           



                          effect for at least                           



                          three months. ?Stages                           



                          1 to 5, with or                           



                          without kidney                           



                          disease, indicate                           



                          chronic kidney                           



                          disease. Notes:                           



                          Determination of                           



                          stages one and two                           



                          (with eGFR                             



                          >59mL/min/1.73 m2)                           



                          requires estimation of                           



                          kidney damage for at                           



                          least three months as                           



                          defined by structural                           



                          or functional                           



                          abnormalities of the                           



                          kidney, manifested by                           



                          either:Pathological                           



                          abnormalities or                           



                          Markers of kidney                           



                          damage (including                           



                          abnormalities in the                           



                          composition of the                           



                          blood or urine or                           



                          abnormalities in                           



                          imaging tests).                           

 

             Lab Interpretation Abnormal                               



             (test code = 56400-9)                                        



[facility] WITH [id number] 09:20:14





             Test Item    Value        Reference Range Interpretation Comments

 

             WBC (test code =              See_Comment  H             [Automated



             6690-2)                                             message] The sy

stem



                                                                 which generated



                                                                 this result



                                                                 transmitted



                                                                 reference range

:



                                                                 4.20 - 10.70



                                                                 10*3/?L. The



                                                                 reference range

 was



                                                                 not used to



                                                                 interpret this



                                                                 result as



                                                                 normal/abnormal

.

 

             RBC (test code =              See_Comment  L             [Automated



             789-8)                                              message] The sy

stem



                                                                 which generated



                                                                 this result



                                                                 transmitted



                                                                 reference range

:



                                                                 4.26 - 5.52



                                                                 10*6/?L. The



                                                                 reference range

 was



                                                                 not used to



                                                                 interpret this



                                                                 result as



                                                                 normal/abnormal

.

 

             HGB (test code = 10.4 g/dL    12.2-16.4    L            



             718-7)                                              

 

             HCT (test code = 31.2 %       38.4-49.3    L            



             4544-3)                                             

 

             MCV (test code = 86.7 fL      81.7-95.6                 



             787-2)                                              

 

             MCH (test code = 28.9 pg      26.1-32.7                 



             785-6)                                              

 

             MCHC (test code = 33.3 g/dL    31.2-35.0                 



             786-4)                                              

 

             RDW-SD (test code = 37.5 fL      38.5-51.6    L            



             11560-2)                                            

 

             RDW-CV (test code = 11.9 %       12.1-15.4    L            



             788-0)                                              

 

             PLT (test code =              See_Comment  H             [Automated



             777-3)                                              message] The sy

stem



                                                                 which generated



                                                                 this result



                                                                 transmitted



                                                                 reference range

:



                                                                 150 - 328 10*3/

?L.



                                                                 The reference r

pa



                                                                 was not used to



                                                                 interpret this



                                                                 result as



                                                                 normal/abnormal

.

 

             MPV (test code = 9.8 fL       9.8-13.0                  



             64406-8)                                            

 

             NRBC/100 WBC (test              See_Comment                [Automat

ed



             code = 1674169702)                                        message] 

The system



                                                                 which generated



                                                                 this result



                                                                 transmitted



                                                                 reference range

:



                                                                 0.0 - 10.0 /100



                                                                 WBCs. The refer

ence



                                                                 range was not u

sed



                                                                 to interpret th

is



                                                                 result as



                                                                 normal/abnormal

.

 

             NRBC x10^3 (test code <0.01        See_Comment                [Auto

mated



             = 3427073529)                                        message] The s

ystem



                                                                 which generated



                                                                 this result



                                                                 transmitted



                                                                 reference range

:



                                                                 10*3/?L. The



                                                                 reference range

 was



                                                                 not used to



                                                                 interpret this



                                                                 result as



                                                                 normal/abnormal

.

 

             GRAN MAT (NEUT) % 69.2 %                                 



             (test code = 770-8)                                        

 

             IMM GRAN % (test code 0.20 %                                 



             = 7060241863)                                        

 

             LYMPH % (test code = 18.2 %                                 



             736-9)                                              

 

             MONO % (test code = 8.3 %                                  



             5905-5)                                             

 

             EOS % (test code = 3.4 %                                  



             713-8)                                              

 

             BASO % (test code = 0.7 %                                  



             706-2)                                              

 

             GRAN MAT x10^3(ANC) 7.42 10*3/uL 1.99-6.95    H            



             (test code =                                        



             6768514727)                                         

 

             IMM GRAN x10^3 (test <0.03        0.00-0.06                 



             code = 6327732302)                                        

 

             LYMPH x10^3 (test code 1.95 10*3/uL 1.09-3.23                 



             = 731-0)                                            

 

             MONO x10^3 (test code 0.89 10*3/uL 0.36-1.02                 



             = 742-7)                                            

 

             EOS x10^3 (test code = 0.36 10*3/uL 0.06-0.53                 



             711-2)                                              

 

             BASO x10^3 (test code 0.07 10*3/uL 0.01-0.09                 



             = 704-7)                                            

 

             Lab Interpretation Abnormal                               



             (test code = 61144-0)                                        



[facility] WITH [id number] 09:20:14





             Test Item    Value        Reference Range Interpretation Comments

 

             WBC (test code =              See_Comment  H             [Automated



             6690-2)                                             message] The sy

stem



                                                                 which generated



                                                                 this result



                                                                 transmitted



                                                                 reference range

:



                                                                 4.20 - 10.70



                                                                 10*3/?L. The



                                                                 reference range

 was



                                                                 not used to



                                                                 interpret this



                                                                 result as



                                                                 normal/abnormal

.

 

             RBC (test code =              See_Comment  L             [Automated



             789-8)                                              message] The sy

stem



                                                                 which generated



                                                                 this result



                                                                 transmitted



                                                                 reference range

:



                                                                 4.26 - 5.52



                                                                 10*6/?L. The



                                                                 reference range

 was



                                                                 not used to



                                                                 interpret this



                                                                 result as



                                                                 normal/abnormal

.

 

             HGB (test code = 10.4 g/dL    12.2-16.4    L            



             718-7)                                              

 

             HCT (test code = 31.2 %       38.4-49.3    L            



             4544-3)                                             

 

             MCV (test code = 86.7 fL      81.7-95.6                 



             787-2)                                              

 

             MCH (test code = 28.9 pg      26.1-32.7                 



             785-6)                                              

 

             MCHC (test code = 33.3 g/dL    31.2-35.0                 



             786-4)                                              

 

             RDW-SD (test code = 37.5 fL      38.5-51.6    L            



             12299-1)                                            

 

             RDW-CV (test code = 11.9 %       12.1-15.4    L            



             788-0)                                              

 

             PLT (test code =              See_Comment  H             [Automated



             777-3)                                              message] The sy

stem



                                                                 which generated



                                                                 this result



                                                                 transmitted



                                                                 reference range

:



                                                                 150 - 328 10*3/

?L.



                                                                 The reference r

pa



                                                                 was not used to



                                                                 interpret this



                                                                 result as



                                                                 normal/abnormal

.

 

             MPV (test code = 9.8 fL       9.8-13.0                  



             87894-8)                                            

 

             NRBC/100 WBC (test              See_Comment                [Automat

ed



             code = 6731617364)                                        message] 

The system



                                                                 which generated



                                                                 this result



                                                                 transmitted



                                                                 reference range

:



                                                                 0.0 - 10.0 /100



                                                                 WBCs. The refer

ence



                                                                 range was not u

sed



                                                                 to interpret th

is



                                                                 result as



                                                                 normal/abnormal

.

 

             NRBC x10^3 (test code <0.01        See_Comment                [Auto

mated



             = 7069387753)                                        message] The s

ystem



                                                                 which generated



                                                                 this result



                                                                 transmitted



                                                                 reference range

:



                                                                 10*3/?L. The



                                                                 reference range

 was



                                                                 not used to



                                                                 interpret this



                                                                 result as



                                                                 normal/abnormal

.

 

             GRAN MAT (NEUT) % 69.2 %                                 



             (test code = 770-8)                                        

 

             IMM GRAN % (test code 0.20 %                                 



             = 8455686835)                                        

 

             LYMPH % (test code = 18.2 %                                 



             736-9)                                              

 

             MONO % (test code = 8.3 %                                  



             5905-5)                                             

 

             EOS % (test code = 3.4 %                                  



             713-8)                                              

 

             BASO % (test code = 0.7 %                                  



             706-2)                                              

 

             GRAN MAT x10^3(ANC) 7.42 10*3/uL 1.99-6.95    H            



             (test code =                                        



             8381728250)                                         

 

             IMM GRAN x10^3 (test <0.03        0.00-0.06                 



             code = 2649994142)                                        

 

             LYMPH x10^3 (test code 1.95 10*3/uL 1.09-3.23                 



             = 731-0)                                            

 

             MONO x10^3 (test code 0.89 10*3/uL 0.36-1.02                 



             = 742-7)                                            

 

             EOS x10^3 (test code = 0.36 10*3/uL 0.06-0.53                 



             711-2)                                              

 

             BASO x10^3 (test code 0.07 10*3/uL 0.01-0.09                 



             = 704-7)                                            

 

             Lab Interpretation Abnormal                               



             (test code = 96668-5)                                        



[facility] GLUCOSE (AUTOMATED)2021 08:17:07





             Test Item    Value        Reference Range Interpretation Comments

 

             POCT GLU (test code = 2875281849) 187 mg/dL           H      

      

 

             Lab Interpretation (test code = Abnormal                           

    



             21016-9)                                            



[facility] GLUCOSE (AUTOMATED)2021 08:17:07





             Test Item    Value        Reference Range Interpretation Comments

 

             POCT GLU (test code = 1310281532) 187 mg/dL           H      

      

 

             Lab Interpretation (test code = Abnormal                           

    



             66456-2)                                            



[facility] Draw immediately 
prior to the 2100 dose, but, no more than 60 minutes [id number] 03:04:05





             Test Item    Value        Reference Range Interpretation Comments

 

             VANCO TROUGH (test code 8.8 ug/mL    10.0-20.0    L            



             = 5005059810)                                        

 

             ELIJAH (test code = ELIJAH) Toxic Range: ? ? ?                           



                          ?>20 ug/mL 15-20                           



                          ug/mL is recommended                           



                          for severe infection                           



                          or when Vancomycin                           



                          J LUIS is greater than                           



                          or equal to 2.                           

 

             Lab Interpretation (test Abnormal                               



             code = 67676-8)                                        



[facility] Draw immediately 
prior to the 2100 dose, but, no more than 60 minutes [id number] 03:04:05





             Test Item    Value        Reference Range Interpretation Comments

 

             VANCO TROUGH (test code 8.8 ug/mL    10.0-20.0    L            



             = 0255884614)                                        

 

             ELIJAH (test code = ELIJAH) Toxic Range: ? ? ?                           



                          ?>20 ug/mL 15-20                           



                          ug/mL is recommended                           



                          for severe infection                           



                          or when Vancomycin                           



                          J LUIS is greater than                           



                          or equal to 2.                           

 

             Lab Interpretation (test Abnormal                               



             code = 38213-5)                                        



[facility] GLUCOSE (AUTOMATED)2021 21:55:02





             Test Item    Value        Reference Range Interpretation Comments

 

             POCT GLU (test code = 4958877434) 218 mg/dL           H      

      

 

             Lab Interpretation (test code = Abnormal                           

    



             88239-1)                                            



[facility] GLUCOSE (AUTOMATED)2021 21:55:02





             Test Item    Value        Reference Range Interpretation Comments

 

             POCT GLU (test code = 8860047821) 218 mg/dL           H      

      

 

             Lab Interpretation (test code = Abnormal                           

    



             59894-4)                                            



[facility] [id number] 21:08:00





             Test Item    Value        Reference Range Interpretation Comments

 

             VIT D 25OH (test code = <13          25-80        L            



             84603-9)                                            

 

             ELIJAH (test code = ELIJAH) Deficiency: <20                           



                          ng/mLInsufficiency:                           



                          20-24 ng/mLOptimal:                           



                          25-80 ng/mL                            

 

             Lab Interpretation (test Abnormal                               



             code = 11082-2)                                        



[facility] [id number] 21:08:00





             Test Item    Value        Reference Range Interpretation Comments

 

             VIT D 25OH (test code = <13          25-80        L            



             33147-7)                                            

 

             ELIJAH (test code = ELIJAH) Deficiency: <20                           



                          ng/mLInsufficiency:                           



                          20-24 ng/mLOptimal:                           



                          25-80 ng/mL                            

 

             Lab Interpretation (test Abnormal                               



             code = 20432-8)                                        



[facility] GLUCOSE (AUTOMATED)2021 17:35:51





             Test Item    Value        Reference Range Interpretation Comments

 

             POCT GLU (test code = 0852442775) 161 mg/dL           H      

      

 

             Lab Interpretation (test code = Abnormal                           

    



             58089-1)                                            



[facility] GLUCOSE (AUTOMATED)2021 17:35:51





             Test Item    Value        Reference Range Interpretation Comments

 

             POCT GLU (test code = 2351598063) 161 mg/dL           H      

      

 

             Lab Interpretation (test code = Abnormal                           

    



             42544-2)                                            



[facility] GLUCOSE (AUTOMATED)2021 17:35:51





             Test Item    Value        Reference Range Interpretation Comments

 

             POCT GLU (test code = 4712343621) 161 mg/dL           H      

      

 

             Lab Interpretation (test code = Abnormal                           

    



             93047-3)                                            



[facility] GLUCOSE (AUTOMATED)2021 17:19:43





             Test Item    Value        Reference Range Interpretation Comments

 

             POCT GLU (test code = 4229338472) 174 mg/dL           H      

      

 

             Lab Interpretation (test code = Abnormal                           

    



             76746-5)                                            



[facility] GLUCOSE (AUTOMATED)2021 17:19:43





             Test Item    Value        Reference Range Interpretation Comments

 

             POCT GLU (test code = 8310768417) 174 mg/dL           H      

      

 

             Lab Interpretation (test code = Abnormal                           

    



             59098-1)                                            



[facility] 17:19:43





             Test Item    Value        Reference Range Interpretation Comments

 

             POCT GLU (test code = 1124847781) 174 mg/dL           H      

      

 

             Lab Interpretation (test code = Abnormal                           

    



             44930-3)                                            



[facility] [id number] 14:06:58
Unremarkable sonographic appearance of the kidneys. No hydronephrosis 
ornephrolithiasis seen. Hepatomegaly. Preliminary Report Dictated by Resident: 
Jorge Luis Elizabeth. GRANT, Eriberto Evans MD., have reviewed this study and 
agree with theabove report.EXAM: US RETROPERITONEAL COMPLETE HISTORY: 40 years-
old Male with ANY. TECHNIQUE: Survey ultrasound of the kidneys and bladder was 
performed withgrayscale and selected color Doppler imaging. Representative 
images wereobtained for the record. COMPARISON: Ultrasound kidneys 2019. 
FINDINGS: RIGHT KIDNEY:Size: The right kidney is normal in size, and measures 
11.2 x 5.9 x 5.6.Parenchyma: The renal parenchyma exhibits normal cortical 
echogenicity andthickness. No focal solid or cystic renal lesion is 
detected.Collecting System: No hydronephrosis isseen. LEFT KIDNEY:Size: The left
kidney is normal in size, and measures 1.1 x 6.0 x 5.0.Parenchyma: The renal 
parenchyma exhibits normal cortical echogenicity andthickness. No focal solid or
cystic renal lesion is detected.Collecting System: No hydronephrosis is seen. 
Bladder: The urinary bladder is unremarkable. OTHER: The liver is enlarged 
measuring 18.4 cm in length. [facility], Radiant Results Inft User - 2021 9:08 
AM CDTEXAM: US RETROPERITONEAL COMPLETEHISTORY: 40 years-old Male with 
ANY.TECHNIQUE: Survey ultrasound of the kidneys and bladder was performed 
withgrayscale and selected color Doppler imaging. Representative images 
wereobtained for the record.COMPARISON: Ultrasound kidneys 2019.FINDINGS: 
RIGHT KIDNEY:Size: The right kidney is normal in size, and measures 11.2 x 5.9 x
5.6.Parenchyma: The renal parenchyma exhibits normal cortical echogenicity 
andthickness. No focal solid or cystic renal lesion is detected.Collecting 
System: No hydronephrosis is seen.LEFT KIDNEY:Size: The left kidney is normal in
size, and measures 1.1 x 6.0 x 5.0.Parenchyma: The renal parenchyma exhibits nor
mal cortical echogenicity andthickness. No focal solid or cystic renal lesion is
detected.CollectingSystem: No hydronephrosis is seen.Bladder: The urinary 
bladder is unremarkable. OTHER: The liver is enlarged measuring 18.4 cm in 
length.IMPRESSIONUnremarkable sonographic appearance of the kidneys. No
hydronephrosis ornephrolithiasis seen.Hepatomegaly.Preliminary Report Dictated 
by Resident: Eriberto Cooper MD., have reviewed this 
study and agree with theabove report.[facility] 
RETROPERITONEAL [id number] 14:06:58 Unremarkable sonographic appearance 
of the kidneys. No hydronephrosis ornephrolithiasis seen. Hepatomegaly. 
Preliminary Report Dictated by Resident: Eriberto Contreras MD., have reviewed this study and agree with theabove report.EXAM:
US RETROPERITONEAL COMPLETE HISTORY: 40 years-old Male with ANY. TECHNIQUE: 
Survey ultrasound of the kidneys and bladder was performed withgrayscale and 
selected color Doppler imaging. Representative images wereobtained for the 
record. COMPARISON: Ultrasound kidneys 2019. FINDINGS: RIGHT KIDNEY:Size: 
The right kidney is normal in size, and measures 11.2 x 5.9 x 5.6.Parenchyma: 
The renal parenchyma exhibits normal cortical echogenicity andthickness. No 
focal solid or cystic renal lesion is detected.Collecting System: No 
hydronephrosis isseen. LEFT KIDNEY:Size: The left kidney is normal in size, and 
measures 1.1 x 6.0 x 5.0.Parenchyma: The renal parenchyma exhibits normal 
cortical echogenicity andthickness. No focal solid or cystic renal lesion is 
detected.Collecting System: No hydronephrosis is seen. Bladder: The urinary 
bladder is unremarkable. OTHER: The liver is enlarged measuring 18.4 cm in 
length. [facility], Radiant Results Inft User - 2021 9:08 AM CDTEXAM: US 
RETROPERITONEAL COMPLETEHISTORY: 40 years-old Male with ANY.TECHNIQUE: Survey 
ultrasound of the kidneys and bladder was performed withgrayscale and selected 
color Doppler imaging. Representative images wereobtained for the 
record.COMPARISON: Ultrasound kidneys 2019.FINDINGS: RIGHT KIDNEY:Size: The
right kidney is normal in size, and measures 11.2 x 5.9 x 5.6.Parenchyma: The 
renal parenchyma exhibits normal cortical echogenicity andthickness. No focal 
solid or cystic renal lesion is detected.Collecting System: No hydronephrosis is
seen.LEFT KIDNEY:Size: The left kidney is normal in size, and measures 1.1 x 6.0
x 5.0.Parenchyma: The renal parenchyma exhibits normal cortical echogenicity 
andthickness. No focal solid or cystic renal lesion is detected.Collecting
System: No hydronephrosis is seen.Bladder: The urinary bladder is unremarkable. 
OTHER: The liver is enlarged measuring 18.4 cm in length.IMPRESSIONUnremarkable 
sonographic appearance of the kidneys. Nohydronephrosis ornephrolithiasis 
seen.Hepatomegaly.Preliminary Report Dictated by Resident: Eriberto Cooper MD., have reviewed this study and agree with theabove 
report.[facility] RETROPERITONEAL [id number] 
14:06:58 Unremarkable sonographic appearance of the kidneys. No hydronephrosis 
ornephrolithiasis seen. Hepatomegaly. Preliminary Report Dictated by Resident: 
Jorge Luis Nunez I, Eriberto Evans MD., have reviewed this study and 
agree with theabove report.EXAM: US RETROPERITONEAL COMPLETE HISTORY: 40 years-
old Male with ANY. TECHNIQUE: Survey ultrasound of the kidneys and bladder was 
performed withgrayscale and selected color Doppler imaging. Representative 
images wereobtained for the record. COMPARISON: Ultrasound kidneys 2019. 
FINDINGS: RIGHT KIDNEY:Size: The right kidney is normal in size, and measures 
11.2 x 5.9 x 5.6.Parenchyma: The renal parenchyma exhibits normal cortical 
echogenicity andthickness. No focal solid or cystic renal lesion is 
detected.Collecting System: No hydronephrosis isseen. LEFT KIDNEY:Size: The left
kidney is normal in size, and measures 1.1 x 6.0 x 5.0.Parenchyma: The renal 
parenchyma exhibits normal cortical echogenicity andthickness. No focal solid or
cystic renal lesion is detected.Collecting System: No hydronephrosis is seen. 
Bladder: The urinary bladder is unremarkable. OTHER: The liver is enlarged 
measuring 18.4 cm in length. [facility], Radiant Results Inft User - 2021 9:08 
AM CDTEXAM: US RETROPERITONEAL COMPLETEHISTORY: 40 years-old Male with 
ANY.TECHNIQUE: Survey ultrasound of the kidneys and bladder was performed 
withgrayscale and selected color Doppler imaging. Representative images 
wereobtained for the record.COMPARISON: Ultrasound kidneys 2019.FINDINGS: 
RIGHT KIDNEY:Size: The right kidney is normal in size, and measures 11.2 x 5.9 x
5.6.Parenchyma: The renal parenchyma exhibits normal cortical echogenicity 
andthickness. No focal solid or cystic renal lesion is detected.Collecting 
System: No hydronephrosis is seen.LEFT KIDNEY:Size: The left kidney is normal in
size, and measures 1.1 x 6.0 x 5.0.Parenchyma: The renal parenchyma exhibits nor
mal cortical echogenicity andthickness. No focal solid or cystic renal lesion is
detected.CollectingSystem: No hydronephrosis is seen.Bladder: The urinary 
bladder is unremarkable. OTHER: The liver is enlarged measuring 18.4 cm in 
length.IMPRESSIONUnremarkable sonographic appearance of the kidneys. No
hydronephrosis ornephrolithiasis seen.Hepatomegaly.Preliminary Report Dictated 
by Resident: Jorge Luis Elizabeth.I, Eriberto Martinez MD., have reviewed this 
study and agree with theabove report.[facility] 
GLUCOSE (AUTOMATED)2021 11:07:45





             Test Item    Value        Reference Range Interpretation Comments

 

             POCT GLU (test code = 2527714853) 175 mg/dL           H      

      

 

             Lab Interpretation (test code = Abnormal                           

    



             57410-6)                                            



[facility] GLUCOSE (AUTOMATED)2021 11:07:45





             Test Item    Value        Reference Range Interpretation Comments

 

             POCT GLU (test code = 5474580340) 175 mg/dL           H      

      

 

             Lab Interpretation (test code = Abnormal                           

    



             19282-1)                                            



[facility] GLUCOSE (AUTOMATED)2021 11:07:45





             Test Item    Value        Reference Range Interpretation Comments

 

             POCT GLU (test code = 7064829467) 175 mg/dL           H      

      

 

             Lab Interpretation (test code = Abnormal                           

    



             33969-5)                                            



[facility] WITH [id number] 11:00:52





             Test Item    Value        Reference Range Interpretation Comments

 

             WBC (test code =              See_Comment                [Automated



             6690-2)                                             message] The sy

stem



                                                                 which generated



                                                                 this result



                                                                 transmitted



                                                                 reference range

:



                                                                 4.20 - 10.70



                                                                 10*3/?L. The



                                                                 reference range

 was



                                                                 not used to



                                                                 interpret this



                                                                 result as



                                                                 normal/abnormal

.

 

             RBC (test code =              See_Comment  L             [Automated



             789-8)                                              message] The sy

stem



                                                                 which generated



                                                                 this result



                                                                 transmitted



                                                                 reference range

:



                                                                 4.26 - 5.52



                                                                 10*6/?L. The



                                                                 reference range

 was



                                                                 not used to



                                                                 interpret this



                                                                 result as



                                                                 normal/abnormal

.

 

             HGB (test code = 10.8 g/dL    12.2-16.4    L            



             718-7)                                              

 

             HCT (test code = 32.9 %       38.4-49.3    L            



             4544-3)                                             

 

             MCV (test code = 86.8 fL      81.7-95.6                 



             787-2)                                              

 

             MCH (test code = 28.5 pg      26.1-32.7                 



             785-6)                                              

 

             MCHC (test code = 32.8 g/dL    31.2-35.0                 



             786-4)                                              

 

             RDW-SD (test code = 37.6 fL      38.5-51.6    L            



             49843-8)                                            

 

             RDW-CV (test code = 11.9 %       12.1-15.4    L            



             788-0)                                              

 

             PLT (test code =              See_Comment  H             [Automated



             777-3)                                              message] The sy

stem



                                                                 which generated



                                                                 this result



                                                                 transmitted



                                                                 reference range

:



                                                                 150 - 328 10*3/

?L.



                                                                 The reference r

pa



                                                                 was not used to



                                                                 interpret this



                                                                 result as



                                                                 normal/abnormal

.

 

             MPV (test code = 10.0 fL      9.8-13.0                  



             82730-3)                                            

 

             NRBC/100 WBC (test              See_Comment                [Automat

ed



             code = 3864602250)                                        message] 

The system



                                                                 which generated



                                                                 this result



                                                                 transmitted



                                                                 reference range

:



                                                                 0.0 - 10.0 /100



                                                                 WBCs. The refer

ence



                                                                 range was not u

sed



                                                                 to interpret th

is



                                                                 result as



                                                                 normal/abnormal

.

 

             NRBC x10^3 (test code <0.01        See_Comment                [Auto

mated



             = [id number])                                        message] The s

ystem



                                                                 which generated



                                                                 this result



                                                                 transmitted



                                                                 reference range

:



                                                                 10*3/?L. The



                                                                 reference range

 was



                                                                 not used to



                                                                 interpret this



                                                                 result as



                                                                 normal/abnormal

.

 

             GRAN MAT (NEUT) % 66.7 %                                 



             (test code = 770-8)                                        

 

             IMM GRAN % (test code 0.40 %                                 



             = 5872512531)                                        

 

             LYMPH % (test code = 21.0 %                                 



             736-9)                                              

 

             MONO % (test code = 7.7 %                                  



             5905-5)                                             

 

             EOS % (test code = 3.4 %                                  



             713-8)                                              

 

             BASO % (test code = 0.8 %                                  



             706-2)                                              

 

             GRAN MAT x10^3(ANC) 7.10 10*3/uL 1.99-6.95    H            



             (test code =                                        



             9794798908)                                         

 

             IMM GRAN x10^3 (test 0.04 10*3/uL 0.00-0.06                 



             code = 5211597255)                                        

 

             LYMPH x10^3 (test code 2.23 10*3/uL 1.09-3.23                 



             = 731-0)                                            

 

             MONO x10^3 (test code 0.82 10*3/uL 0.36-1.02                 



             = 742-7)                                            

 

             EOS x10^3 (test code = 0.36 10*3/uL 0.06-0.53                 



             711-2)                                              

 

             BASO x10^3 (test code 0.09 10*3/uL 0.01-0.09                 



             = 704-7)                                            

 

             Lab Interpretation Abnormal                               



             (test code = 74953-0)                                        



[facility] WITH [id number] 11:00:52





             Test Item    Value        Reference Range Interpretation Comments

 

             WBC (test code =              See_Comment                [Automated



             8390-2)                                             message] The sy

stem



                                                                 which generated



                                                                 this result



                                                                 transmitted



                                                                 reference range

:



                                                                 4.20 - 10.70



                                                                 10*3/?L. The



                                                                 reference range

 was



                                                                 not used to



                                                                 interpret this



                                                                 result as



                                                                 normal/abnormal

.

 

             RBC (test code =              See_Comment  L             [Automated



             069-8)                                              message] The sy

stem



                                                                 which generated



                                                                 this result



                                                                 transmitted



                                                                 reference range

:



                                                                 4.26 - 5.52



                                                                 10*6/?L. The



                                                                 reference range

 was



                                                                 not used to



                                                                 interpret this



                                                                 result as



                                                                 normal/abnormal

.

 

             HGB (test code = 10.8 g/dL    12.2-16.4    L            



             718-7)                                              

 

             HCT (test code = 32.9 %       38.4-49.3    L            



             4544-3)                                             

 

             MCV (test code = 86.8 fL      81.7-95.6                 



             787-2)                                              

 

             MCH (test code = 28.5 pg      26.1-32.7                 



             785-6)                                              

 

             MCHC (test code = 32.8 g/dL    31.2-35.0                 



             786-4)                                              

 

             RDW-SD (test code = 37.6 fL      38.5-51.6    L            



             96306-6)                                            

 

             RDW-CV (test code = 11.9 %       12.1-15.4    L            



             788-0)                                              

 

             PLT (test code =              See_Comment  H             [Automated



             777-3)                                              message] The sy

stem



                                                                 which generated



                                                                 this result



                                                                 transmitted



                                                                 reference range

:



                                                                 150 - 328 10*3/

?L.



                                                                 The reference r

pa



                                                                 was not used to



                                                                 interpret this



                                                                 result as



                                                                 normal/abnormal

.

 

             MPV (test code = 10.0 fL      9.8-13.0                  



             08089-2)                                            

 

             NRBC/100 WBC (test              See_Comment                [Automat

ed



             code = 7360946319)                                        message] 

The system



                                                                 which generated



                                                                 this result



                                                                 transmitted



                                                                 reference range

:



                                                                 0.0 - 10.0 /100



                                                                 WBCs. The refer

ence



                                                                 range was not u

sed



                                                                 to interpret th

is



                                                                 result as



                                                                 normal/abnormal

.

 

             NRBC x10^3 (test code <0.01        See_Comment                [Auto

mated



             = 9141276956)                                        message] The s

ystem



                                                                 which generated



                                                                 this result



                                                                 transmitted



                                                                 reference range

:



                                                                 10*3/?L. The



                                                                 reference range

 was



                                                                 not used to



                                                                 interpret this



                                                                 result as



                                                                 normal/abnormal

.

 

             GRAN MAT (NEUT) % 66.7 %                                 



             (test code = 770-8)                                        

 

             IMM GRAN % (test code 0.40 %                                 



             = [id number])                                        

 

             LYMPH % (test code = 21.0 %                                 



             736-9)                                              

 

             MONO % (test code = 7.7 %                                  



             5905-5)                                             

 

             EOS % (test code = 3.4 %                                  



             713-8)                                              

 

             BASO % (test code = 0.8 %                                  



             706-2)                                              

 

             GRAN MAT x10^3(ANC) 7.10 10*3/uL 1.99-6.95    H            



             (test code =                                        



             9650199462)                                         

 

             IMM GRAN x10^3 (test 0.04 10*3/uL 0.00-0.06                 



             code = 9511035279)                                        

 

             LYMPH x10^3 (test code 2.23 10*3/uL 1.09-3.23                 



             = 731-0)                                            

 

             MONO x10^3 (test code 0.82 10*3/uL 0.36-1.02                 



             = 742-7)                                            

 

             EOS x10^3 (test code = 0.36 10*3/uL 0.06-0.53                 



             711-2)                                              

 

             BASO x10^3 (test code 0.09 10*3/uL 0.01-0.09                 



             = 704-7)                                            

 

             Lab Interpretation Abnormal                               



             (test code = 36313-7)                                        



[facility] WITH [id number] 11:00:52





             Test Item    Value        Reference Range Interpretation Comments

 

             WBC (test code =              See_Comment                [Automated



             6690-2)                                             message] The sy

stem



                                                                 which generated



                                                                 this result



                                                                 transmitted



                                                                 reference range

:



                                                                 4.20 - 10.70



                                                                 10*3/?L. The



                                                                 reference range

 was



                                                                 not used to



                                                                 interpret this



                                                                 result as



                                                                 normal/abnormal

.

 

             RBC (test code =              See_Comment  L             [Automated



             789-8)                                              message] The sy

stem



                                                                 which generated



                                                                 this result



                                                                 transmitted



                                                                 reference range

:



                                                                 4.26 - 5.52



                                                                 10*6/?L. The



                                                                 reference range

 was



                                                                 not used to



                                                                 interpret this



                                                                 result as



                                                                 normal/abnormal

.

 

             HGB (test code = 10.8 g/dL    12.2-16.4    L            



             718-7)                                              

 

             HCT (test code = 32.9 %       38.4-49.3    L            



             4544-3)                                             

 

             MCV (test code = 86.8 fL      81.7-95.6                 



             787-2)                                              

 

             MCH (test code = 28.5 pg      26.1-32.7                 



             785-6)                                              

 

             MCHC (test code = 32.8 g/dL    31.2-35.0                 



             786-4)                                              

 

             RDW-SD (test code = 37.6 fL      38.5-51.6    L            



             13377-5)                                            

 

             RDW-CV (test code = 11.9 %       12.1-15.4    L            



             788-0)                                              

 

             PLT (test code =              See_Comment  H             [Automated



             777-3)                                              message] The sy

stem



                                                                 which generated



                                                                 this result



                                                                 transmitted



                                                                 reference range

:



                                                                 150 - 328 10*3/

?L.



                                                                 The reference r

pa



                                                                 was not used to



                                                                 interpret this



                                                                 result as



                                                                 normal/abnormal

.

 

             MPV (test code = 10.0 fL      9.8-13.0                  



             37664-4)                                            

 

             NRBC/100 WBC (test              See_Comment                [Automat

ed



             code = 9537396913)                                        message] 

The system



                                                                 which generated



                                                                 this result



                                                                 transmitted



                                                                 reference range

:



                                                                 0.0 - 10.0 /100



                                                                 WBCs. The refer

ence



                                                                 range was not u

sed



                                                                 to interpret th

is



                                                                 result as



                                                                 normal/abnormal

.

 

             NRBC x10^3 (test code <0.01        See_Comment                [Auto

mated



             = [id number])                                        message] The s

ystem



                                                                 which generated



                                                                 this result



                                                                 transmitted



                                                                 reference range

:



                                                                 10*3/?L. The



                                                                 reference range

 was



                                                                 not used to



                                                                 interpret this



                                                                 result as



                                                                 normal/abnormal

.

 

             GRAN MAT (NEUT) % 66.7 %                                 



             (test code = 770-8)                                        

 

             IMM GRAN % (test code 0.40 %                                 



             = 1503658445)                                        

 

             LYMPH % (test code = 21.0 %                                 



             736-9)                                              

 

             MONO % (test code = 7.7 %                                  



             5905-5)                                             

 

             EOS % (test code = 3.4 %                                  



             713-8)                                              

 

             BASO % (test code = 0.8 %                                  



             706-2)                                              

 

             GRAN MAT x10^3(ANC) 7.10 10*3/uL 1.99-6.95    H            



             (test code =                                        



             3320687091)                                         

 

             IMM GRAN x10^3 (test 0.04 10*3/uL 0.00-0.06                 



             code = 9121794579)                                        

 

             LYMPH x10^3 (test code 2.23 10*3/uL 1.09-3.23                 



             = 731-0)                                            

 

             MONO x10^3 (test code 0.82 10*3/uL 0.36-1.02                 



             = 742-7)                                            

 

             EOS x10^3 (test code = 0.36 10*3/uL 0.06-0.53                 



             711-2)                                              

 

             BASO x10^3 (test code 0.09 10*3/uL 0.01-0.09                 



             = 704-7)                                            

 

             Lab Interpretation Abnormal                               



             (test code = 74689-2)                                        



[id number] 
[facility]48:42





             Test Item    Value        Reference Range Interpretation Comments

 

             NA (test code = 137 mmol/L   135-145                   



             8010069268)                                         

 

             K (test code = 4.0 mmol/L   3.5-5.0                   



             9210287629)                                         

 

             CL (test code = 111 mmol/L          H            



             7572674685)                                         

 

             CO2 TOTAL (test code = 22 mmol/L    23-31        L            



             0882855022)                                         

 

             AGAP (test code =              2-16                      



             4493624795)                                         

 

             BUN (test code = 29 mg/dL     7-23         H            



             0131643758)                                         

 

             GLUCOSE (test code = 174 mg/dL           H            



             5650219847)                                         

 

             CREATININE (test code = 2.22 mg/dL   0.60-1.25    H            



             4119351171)                                         

 

             TOTAL BILI (test code = 0.4 mg/dL    0.1-1.1                   



             0417647997)                                         

 

             CALCIUM (test code = 8.0 mg/dL    8.6-10.6     L            



             5123515244)                                         

 

             T PROTEIN (test code = 5.3 g/dL     6.3-8.2      L            



             2883656822)                                         

 

             ALBUMIN (test code = 2.4 g/dL     3.5-5.0      L            



             9817251352)                                         

 

             ALK PHOS (test code = 59 U/L                           



             8604253962)                                         

 

             ALTv (test code = 11 U/L       5-50                      



             2-6)                                             

 

             AST(SGOT) (test code = 20 U/L       13-40                     



             5268184860)                                         

 

             eGFR (test code =              mL/min/1.73m2              



             0213671709)                                         

 

             ELIJAH (test code = ELIJAH) Association of                           



                          Glomerular Filtration                           



                          Rate (GFR) and Staging                           



                          of Kidney Disease*                           



                          +---------------------                           



                          --+-------------------                           



                          --+-------------------                           



                          ------+| GFR                           



                          (mL/min/1.73 m2) ?|                           



                          With Kidney Damage ?|                           



                          ?Without Kidney                           



                          Damage+---------------                           



                          --------+-------------                           



                          --------+-------------                           



                          ------------+| ?>90 ?                           



                          ? ? ? ? ? ? ? ?|                           



                          ?Stage one ? ? ? ? ?|                           



                          ? Normal ? ? ? ? ? ? ?                           



                          ?+--------------------                           



                          ---+------------------                           



                          ---+------------------                           



                          -------+| ?60-89 ? ? ?                           



                          ? ? ? ? ?| ?Stage two                           



                          ? ? ? ? ?| ? Decreased                           



                          GFR ? ? ? ?                            



                          +---------------------                           



                          --+-------------------                           



                          --+-------------------                           



                          ------+| ?30-59 ? ? ?                           



                          ? ? ? ? ?| ?Stage                           



                          three ? ? ? ?| ? Stage                           



                          three ? ? ? ? ?                           



                          +---------------------                           



                          --+-------------------                           



                          --+-------------------                           



                          ------+| ?15-29 ? ? ?                           



                          ? ? ? ? ?| ?Stage four                           



                          ? ? ? ? | ? Stage four                           



                          ? ? ? ? ?                              



                          ?+--------------------                           



                          ---+------------------                           



                          ---+------------------                           



                          -------+| ?<15 (or                           



                          dialysis) ? ?| ?Stage                           



                          five ? ? ? ? | ? Stage                           



                          five ? ? ? ? ?                           



                          ?+--------------------                           



                          ---+------------------                           



                          ---+------------------                           



                          -------+ *Each stage                           



                          assumes the associated                           



                          GFR level has been in                           



                          effect for at least                           



                          three months. ?Stages                           



                          1 to 5, with or                           



                          without kidney                           



                          disease, indicate                           



                          chronic kidney                           



                          disease. Notes:                           



                          Determination of                           



                          stages one and two                           



                          (with eGFR                             



                          >59mL/min/1.73 m2)                           



                          requires estimation of                           



                          kidney damage for at                           



                          least three months as                           



                          defined by structural                           



                          or functional                           



                          abnormalities of the                           



                          kidney, manifested by                           



                          either:Pathological                           



                          abnormalities or                           



                          Markers of kidney                           



                          damage (including                           



                          abnormalities in the                           



                          composition of the                           



                          blood or urine or                           



                          abnormalities in                           



                          imaging tests).                           

 

             Lab Interpretation Abnormal                               



             (test code = 39822-5)                                        



[facility]. METABOLIC PANEL ([id number])2021 
09:48:42





             Test Item    Value        Reference Range Interpretation Comments

 

             NA (test code = 137 mmol/L   135-145                   



             4748950244)                                         

 

             K (test code = 4.0 mmol/L   3.5-5.0                   



             3737966543)                                         

 

             CL (test code = 111 mmol/L          H            



             4875792652)                                         

 

             CO2 TOTAL (test code = 22 mmol/L    23-31        L            



             9464937593)                                         

 

             AGAP (test code =              2-16                      



             8606997974)                                         

 

             BUN (test code = 29 mg/dL     7-23         H            



             7040324296)                                         

 

             GLUCOSE (test code = 174 mg/dL           H            



             4029827408)                                         

 

             CREATININE (test code = 2.22 mg/dL   0.60-1.25    H            



             3657284542)                                         

 

             TOTAL BILI (test code = 0.4 mg/dL    0.1-1.1                   



             7991715153)                                         

 

             CALCIUM (test code = 8.0 mg/dL    8.6-10.6     L            



             1719390194)                                         

 

             T PROTEIN (test code = 5.3 g/dL     6.3-8.2      L            



             8722293464)                                         

 

             ALBUMIN (test code = 2.4 g/dL     3.5-5.0      L            



             0167845972)                                         

 

             ALK PHOS (test code = 59 U/L                           



             3201113378)                                         

 

             ALTv (test code = 11 U/L       5-50                      



             2-6)                                             

 

             AST(SGOT) (test code = 20 U/L       13-40                     



             6489017170)                                         

 

             eGFR (test code =              mL/min/1.73m2              



             0947775708)                                         

 

             ELIJAH (test code = ELIJAH) Association of                           



                          Glomerular Filtration                           



                          Rate (GFR) and Staging                           



                          of Kidney Disease*                           



                          +---------------------                           



                          --+-------------------                           



                          --+-------------------                           



                          ------+| GFR                           



                          (mL/min/1.73 m2) ?|                           



                          With Kidney Damage ?|                           



                          ?Without Kidney                           



                          Damage+---------------                           



                          --------+-------------                           



                          --------+-------------                           



                          ------------+| ?>90 ?                           



                          ? ? ? ? ? ? ? ?|                           



                          ?Stage one ? ? ? ? ?|                           



                          ? Normal ? ? ? ? ? ? ?                           



                          ?+--------------------                           



                          ---+------------------                           



                          ---+------------------                           



                          -------+| ?60-89 ? ? ?                           



                          ? ? ? ? ?| ?Stage two                           



                          ? ? ? ? ?| ? Decreased                           



                          GFR ? ? ? ?                            



                          +---------------------                           



                          --+-------------------                           



                          --+-------------------                           



                          ------+| ?30-59 ? ? ?                           



                          ? ? ? ? ?| ?Stage                           



                          three ? ? ? ?| ? Stage                           



                          three ? ? ? ? ?                           



                          +---------------------                           



                          --+-------------------                           



                          --+-------------------                           



                          ------+| ?15-29 ? ? ?                           



                          ? ? ? ? ?| ?Stage four                           



                          ? ? ? ? | ? Stage four                           



                          ? ? ? ? ?                              



                          ?+--------------------                           



                          ---+------------------                           



                          ---+------------------                           



                          -------+| ?<15 (or                           



                          dialysis) ? ?| ?Stage                           



                          five ? ? ? ? | ? Stage                           



                          five ? ? ? ? ?                           



                          ?+--------------------                           



                          ---+------------------                           



                          ---+------------------                           



                          -------+ *Each stage                           



                          assumes the associated                           



                          GFR level has been in                           



                          effect for at least                           



                          three months. ?Stages                           



                          1 to 5, with or                           



                          without kidney                           



                          disease, indicate                           



                          chronic kidney                           



                          disease. Notes:                           



                          Determination of                           



                          stages one and two                           



                          (with eGFR                             



                          >59mL/min/1.73 m2)                           



                          requires estimation of                           



                          kidney damage for at                           



                          least three months as                           



                          defined by structural                           



                          or functional                           



                          abnormalities of the                           



                          kidney, manifested by                           



                          either:Pathological                           



                          abnormalities or                           



                          Markers of kidney                           



                          damage (including                           



                          abnormalities in the                           



                          composition of the                           



                          blood or urine or                           



                          abnormalities in                           



                          imaging tests).                           

 

             Lab Interpretation Abnormal                               



             (test code = 04986-6)                                        



[facility]. METABOLIC PANEL (97820)2021 
09:48:42





             Test Item    Value        Reference Range Interpretation Comments

 

             NA (test code = 137 mmol/L   135-145                   



             4548808005)                                         

 

             K (test code = 4.0 mmol/L   3.5-5.0                   



             5437189354)                                         

 

             CL (test code = 111 mmol/L          H            



             3278544059)                                         

 

             CO2 TOTAL (test code = 22 mmol/L    23-31        L            



             4672848502)                                         

 

             AGAP (test code =              2-16                      



             1119421968)                                         

 

             BUN (test code = 29 mg/dL     7-23         H            



             0767700575)                                         

 

             GLUCOSE (test code = 174 mg/dL           H            



             9746685460)                                         

 

             CREATININE (test code = 2.22 mg/dL   0.60-1.25    H            



             8341701908)                                         

 

             TOTAL BILI (test code = 0.4 mg/dL    0.1-1.1                   



             4550344605)                                         

 

             CALCIUM (test code = 8.0 mg/dL    8.6-10.6     L            



             6067580935)                                         

 

             T PROTEIN (test code = 5.3 g/dL     6.3-8.2      L            



             5284439488)                                         

 

             ALBUMIN (test code = 2.4 g/dL     3.5-5.0      L            



             8599585325)                                         

 

             ALK PHOS (test code = 59 U/L                           



             0297737827)                                         

 

             ALTv (test code = 11 U/L       5-50                      



             1742-6)                                             

 

             AST(SGOT) (test code = 20 U/L       13-40                     



             6106917173)                                         

 

             eGFR (test code =              mL/min/1.73m2              



             1710440536)                                         

 

             ELIJAH (test code = ELIJAH) Association of                           



                          Glomerular Filtration                           



                          Rate (GFR) and Staging                           



                          of Kidney Disease*                           



                          +---------------------                           



                          --+-------------------                           



                          --+-------------------                           



                          ------+| GFR                           



                          (mL/min/1.73 m2) ?|                           



                          With Kidney Damage ?|                           



                          ?Without Kidney                           



                          Damage+---------------                           



                          --------+-------------                           



                          --------+-------------                           



                          ------------+| ?>90 ?                           



                          ? ? ? ? ? ? ? ?|                           



                          ?Stage one ? ? ? ? ?|                           



                          ? Normal ? ? ? ? ? ? ?                           



                          ?+--------------------                           



                          ---+------------------                           



                          ---+------------------                           



                          -------+| ?60-89 ? ? ?                           



                          ? ? ? ? ?| ?Stage two                           



                          ? ? ? ? ?| ? Decreased                           



                          GFR ? ? ? ?                            



                          +---------------------                           



                          --+-------------------                           



                          --+-------------------                           



                          ------+| ?30-59 ? ? ?                           



                          ? ? ? ? ?| ?Stage                           



                          three ? ? ? ?| ? Stage                           



                          three ? ? ? ? ?                           



                          +---------------------                           



                          --+-------------------                           



                          --+-------------------                           



                          ------+| ?15-29 ? ? ?                           



                          ? ? ? ? ?| ?Stage four                           



                          ? ? ? ? | ? Stage four                           



                          ? ? ? ? ?                              



                          ?+--------------------                           



                          ---+------------------                           



                          ---+------------------                           



                          -------+| ?<15 (or                           



                          dialysis) ? ?| ?Stage                           



                          five ? ? ? ? | ? Stage                           



                          five ? ? ? ? ?                           



                          ?+--------------------                           



                          ---+------------------                           



                          ---+------------------                           



                          -------+ *Each stage                           



                          assumes the associated                           



                          GFR level has been in                           



                          effect for at least                           



                          three months. ?Stages                           



                          1 to 5, with or                           



                          without kidney                           



                          disease, indicate                           



                          chronic kidney                           



                          disease. Notes:                           



                          Determination of                           



                          stages one and two                           



                          (with eGFR                             



                          >59mL/min/1.73 m2)                           



                          requires estimation of                           



                          kidney damage for at                           



                          least three months as                           



                          defined by structural                           



                          or functional                           



                          abnormalities of the                           



                          kidney, manifested by                           



                          either:Pathological                           



                          abnormalities or                           



                          Markers of kidney                           



                          damage (including                           



                          abnormalities in the                           



                          composition of the                           



                          blood or urine or                           



                          abnormalities in                           



                          imaging tests).                           

 

             Lab Interpretation Abnormal                               



             (test code = 09093-0)                                        



[facility] GLUCOSE (AUTOMATED)2021 21:54:23





             Test Item    Value        Reference Range Interpretation Comments

 

             POCT GLU (test code = 3675280731) 204 mg/dL           H      

      

 

             Lab Interpretation (test code = Abnormal                           

    



             85529-1)                                            



[facility] GLUCOSE (AUTOMATED)2021 21:54:23





             Test Item    Value        Reference Range Interpretation Comments

 

             POCT GLU (test code = 7678287400) 204 mg/dL           H      

      

 

             Lab Interpretation (test code = Abnormal                           

    



             05917-6)                                            



[facility] GLUCOSE (AUTOMATED)2021 21:54:23





             Test Item    Value        Reference Range Interpretation Comments

 

             POCT GLU (test code = 9889617867) 204 mg/dL           H      

      

 

             Lab Interpretation (test code = Abnormal                           

    



             29991-1)                                            



[facility] GLUCOSE (AUTOMATED)2021 17:00:50





             Test Item    Value        Reference Range Interpretation Comments

 

             POCT GLU (test code = 8569306472) 222 mg/dL           H      

      

 

             Lab Interpretation (test code = Abnormal                           

    



             04378-5)                                            



[facility] GLUCOSE (AUTOMATED)2021 17:00:50





             Test Item    Value        Reference Range Interpretation Comments

 

             POCT GLU (test code = 8901328179) 222 mg/dL           H      

      

 

             Lab Interpretation (test code = Abnormal                           

    



             31850-5)                                            



[facility] GLUCOSE (AUTOMATED)2021 17:00:50





             Test Item    Value        Reference Range Interpretation Comments

 

             POCT GLU (test code = 7154268442) 222 mg/dL           H      

      

 

             Lab Interpretation (test code = Abnormal                           

    



             14436-6)                                            



[facility] GLUCOSE (AUTOMATED)2021 12:56:35





             Test Item    Value        Reference Range Interpretation Comments

 

             POCT GLU (test code = 6118194621) 182 mg/dL           H      

      

 

             Lab Interpretation (test code = Abnormal                           

    



             00458-3)                                            



[facility] GLUCOSE (AUTOMATED)2021 12:56:35





             Test Item    Value        Reference Range Interpretation Comments

 

             POCT GLU (test code = 2765268622) 182 mg/dL           H      

      

 

             Lab Interpretation (test code = Abnormal                           

    



             40083-0)                                            



[facility] GLUCOSE (AUTOMATED)2021 12:56:35





             Test Item    Value        Reference Range Interpretation Comments

 

             POCT GLU (test code = 4503300777) 182 mg/dL           H      

      

 

             Lab Interpretation (test code = Abnormal                           

    



             15274-2)                                            



[id number]52





             Test Item    Value        Reference Range Interpretation Comments

 

             NT-proBNP (test code 495 pg/mL    See_Comment  H             [Autom

ated



             = 0483496635)                                        message] The



                                                                 system which



                                                                 generated this



                                                                 result



                                                                 transmitted



                                                                 reference range

:



                                                                 <=125. The



                                                                 reference range



                                                                 was not used to



                                                                 interpret this



                                                                 result as



                                                                 normal/abnormal

.

 

             ELIJAH (test code = ELIJAH) Biotin has been                           



                          reported to                            



                          cause a negative                           



                          bias, interpret                           



                          results relative                           



                          to patient's use                           



                          of biotin.                             

 

             Lab Interpretation Abnormal                               



             (test code = 44156-0)                                        



[id number] 09:53:52





             Test Item    Value        Reference Range Interpretation Comments

 

             NT-proBNP (test code 495 pg/mL    See_Comment  H             [Autom

ated



             = 5528997764)                                        message] The



                                                                 system which



                                                                 generated this



                                                                 result



                                                                 transmitted



                                                                 reference range

:



                                                                 <=125. The



                                                                 reference range



                                                                 was not used to



                                                                 interpret this



                                                                 result as



                                                                 normal/abnormal

.

 

             ELIJAH (test code = ELIJAH) Biotin has been                           



                          reported to                            



                          cause a negative                           



                          bias, interpret                           



                          results relative                           



                          to patient's use                           



                          of biotin.                             

 

             Lab Interpretation Abnormal                               



             (test code = 21462-2)                                        



[id number] 09:53:52





             Test Item    Value        Reference Range Interpretation Comments

 

             NT-proBNP (test code 495 pg/mL    See_Comment  H             [Autom

ated



             = 0663627315)                                        message] The



                                                                 system which



                                                                 generated this



                                                                 result



                                                                 transmitted



                                                                 reference range

:



                                                                 <=125. The



                                                                 reference range



                                                                 was not used to



                                                                 interpret this



                                                                 result as



                                                                 normal/abnormal

.

 

             ELIJAH (test code = ELIJAH) Biotin has been                           



                          reported to                            



                          cause a negative                           



                          bias, interpret                           



                          results relative                           



                          to patient's use                           



                          of biotin.                             

 

             Lab Interpretation Abnormal                               



             (test code = 02389-4)                                        



[facility]2021-04-11 09:48:32





             Test Item    Value        Reference Range Interpretation Comments

 

             MAGNESIUM (test code = 2265959917) 1.8 mg/dL    1.7-2.4            

       

 

             Lab Interpretation (test code = Normal                             

    



             05690-3)                                            



[facility]2021-04-11 09:48:32





             Test Item    Value        Reference Range Interpretation Comments

 

             MAGNESIUM (test code = 4589238822) 1.8 mg/dL    1.7-2.4            

       

 

             Lab Interpretation (test code = Normal                             

    



             08732-4)                                            



[facility]2021-04-11 09:48:32





             Test Item    Value        Reference Range Interpretation Comments

 

             MAGNESIUM (test code = 019786) 1.8 mg/dL    1.7-2.4            

       

 

             Lab Interpretation (test code = Normal                             

    



             77267-7)                                            



[facility]. METABOLIC PANEL (74495)2021 
09:48:31





             Test Item    Value        Reference Range Interpretation Comments

 

             NA (test code = 140 mmol/L   135-145                   



             0601573025)                                         

 

             K (test code = 4.1 mmol/L   3.5-5.0                   



             0248187453)                                         

 

             CL (test code = 113 mmol/L          H            



             4697522304)                                         

 

             CO2 TOTAL (test code = 24 mmol/L    23-31                     



             1634828855)                                         

 

             AGAP (test code =              2-16                      



             4389576628)                                         

 

             BUN (test code = 34 mg/dL     7-23         H            



             6145421003)                                         

 

             GLUCOSE (test code = 209 mg/dL           H            



             6336596696)                                         

 

             CREATININE (test code = 2.54 mg/dL   0.60-1.25    H            



             6864899344)                                         

 

             TOTAL BILI (test code = 0.3 mg/dL    0.1-1.1                   



             7894637684)                                         

 

             CALCIUM (test code = 8.0 mg/dL    8.6-10.6     L            



             8256615378)                                         

 

             T PROTEIN (test code = 6.1 g/dL     6.3-8.2      L            



             4639313328)                                         

 

             ALBUMIN (test code = 2.9 g/dL     3.5-5.0      L            



             3231292423)                                         

 

             ALK PHOS (test code = 67 U/L                           



             5963089275)                                         

 

             ALTv (test code = 14 U/L       5-50                      



             1742-6)                                             

 

             AST(SGOT) (test code = 22 U/L       13-40                     



             5133065755)                                         

 

             eGFR (test code =              mL/min/1.73m2              



             6475352810)                                         

 

             ELIJAH (test code = ELIJAH) Association of                           



                          Glomerular Filtration                           



                          Rate (GFR) and Staging                           



                          of Kidney Disease*                           



                          +---------------------                           



                          --+-------------------                           



                          --+-------------------                           



                          ------+| GFR                           



                          (mL/min/1.73 m2) ?|                           



                          With Kidney Damage ?|                           



                          ?Without Kidney                           



                          Damage+---------------                           



                          --------+-------------                           



                          --------+-------------                           



                          ------------+| ?>90 ?                           



                          ? ? ? ? ? ? ? ?|                           



                          ?Stage one ? ? ? ? ?|                           



                          ? Normal ? ? ? ? ? ? ?                           



                          ?+--------------------                           



                          ---+------------------                           



                          ---+------------------                           



                          -------+| ?60-89 ? ? ?                           



                          ? ? ? ? ?| ?Stage two                           



                          ? ? ? ? ?| ? Decreased                           



                          GFR ? ? ? ?                            



                          +---------------------                           



                          --+-------------------                           



                          --+-------------------                           



                          ------+| ?30-59 ? ? ?                           



                          ? ? ? ? ?| ?Stage                           



                          three ? ? ? ?| ? Stage                           



                          three ? ? ? ? ?                           



                          +---------------------                           



                          --+-------------------                           



                          --+-------------------                           



                          ------+| ?15-29 ? ? ?                           



                          ? ? ? ? ?| ?Stage four                           



                          ? ? ? ? | ? Stage four                           



                          ? ? ? ? ?                              



                          ?+--------------------                           



                          ---+------------------                           



                          ---+------------------                           



                          -------+| ?<15 (or                           



                          dialysis) ? ?| ?Stage                           



                          five ? ? ? ? | ? Stage                           



                          five ? ? ? ? ?                           



                          ?+--------------------                           



                          ---+------------------                           



                          ---+------------------                           



                          -------+ *Each stage                           



                          assumes the associated                           



                          GFR level has been in                           



                          effect for at least                           



                          three months. ?Stages                           



                          1 to 5, with or                           



                          without kidney                           



                          disease, indicate                           



                          chronic kidney                           



                          disease. Notes:                           



                          Determination of                           



                          stages one and two                           



                          (with eGFR                             



                          >59mL/min/1.73 m2)                           



                          requires estimation of                           



                          kidney damage for at                           



                          least three months as                           



                          defined by structural                           



                          or functional                           



                          abnormalities of the                           



                          kidney, manifested by                           



                          either:Pathological                           



                          abnormalities or                           



                          Markers of kidney                           



                          damage (including                           



                          abnormalities in the                           



                          composition of the                           



                          blood or urine or                           



                          abnormalities in                           



                          imaging tests).                           

 

             Lab Interpretation Abnormal                               



             (test code = 38579-7)                                        



[facility]. METABOLIC PANEL ([id number])2021 
09:48:31





             Test Item    Value        Reference Range Interpretation Comments

 

             NA (test code = 140 mmol/L   135-145                   



             [id number])                                         

 

             K (test code = 4.1 mmol/L   3.5-5.0                   



             9181570224)                                         

 

             CL (test code = 113 mmol/L          H            



             9398468171)                                         

 

             CO2 TOTAL (test code = 24 mmol/L    23-31                     



             0596183845)                                         

 

             AGAP (test code =              2-16                      



             2023749461)                                         

 

             BUN (test code = 34 mg/dL     7-23         H            



             2915989795)                                         

 

             GLUCOSE (test code = 209 mg/dL           H            



             9866155746)                                         

 

             CREATININE (test code = 2.54 mg/dL   0.60-1.25    H            



             3527039530)                                         

 

             TOTAL BILI (test code = 0.3 mg/dL    0.1-1.1                   



             2339646587)                                         

 

             CALCIUM (test code = 8.0 mg/dL    8.6-10.6     L            



             0154168716)                                         

 

             T PROTEIN (test code = 6.1 g/dL     6.3-8.2      L            



             8115993901)                                         

 

             ALBUMIN (test code = 2.9 g/dL     3.5-5.0      L            



             2355448730)                                         

 

             ALK PHOS (test code = 67 U/L                           



             6113336515)                                         

 

             ALTv (test code = 14 U/L       5-50                      



             1742-6)                                             

 

             AST(SGOT) (test code = 22 U/L       13-40                     



             1799449716)                                         

 

             eGFR (test code =              mL/min/1.73m2              



             3475784270)                                         

 

             ELIJAH (test code = ELIJAH) Association of                           



                          Glomerular Filtration                           



                          Rate (GFR) and Staging                           



                          of Kidney Disease*                           



                          +---------------------                           



                          --+-------------------                           



                          --+-------------------                           



                          ------+| GFR                           



                          (mL/min/1.73 m2) ?|                           



                          With Kidney Damage ?|                           



                          ?Without Kidney                           



                          Damage+---------------                           



                          --------+-------------                           



                          --------+-------------                           



                          ------------+| ?>90 ?                           



                          ? ? ? ? ? ? ? ?|                           



                          ?Stage one ? ? ? ? ?|                           



                          ? Normal ? ? ? ? ? ? ?                           



                          ?+--------------------                           



                          ---+------------------                           



                          ---+------------------                           



                          -------+| ?60-89 ? ? ?                           



                          ? ? ? ? ?| ?Stage two                           



                          ? ? ? ? ?| ? Decreased                           



                          GFR ? ? ? ?                            



                          +---------------------                           



                          --+-------------------                           



                          --+-------------------                           



                          ------+| ?30-59 ? ? ?                           



                          ? ? ? ? ?| ?Stage                           



                          three ? ? ? ?| ? Stage                           



                          three ? ? ? ? ?                           



                          +---------------------                           



                          --+-------------------                           



                          --+-------------------                           



                          ------+| ?15-29 ? ? ?                           



                          ? ? ? ? ?| ?Stage four                           



                          ? ? ? ? | ? Stage four                           



                          ? ? ? ? ?                              



                          ?+--------------------                           



                          ---+------------------                           



                          ---+------------------                           



                          -------+| ?<15 (or                           



                          dialysis) ? ?| ?Stage                           



                          five ? ? ? ? | ? Stage                           



                          five ? ? ? ? ?                           



                          ?+--------------------                           



                          ---+------------------                           



                          ---+------------------                           



                          -------+ *Each stage                           



                          assumes the associated                           



                          GFR level has been in                           



                          effect for at least                           



                          three months. ?Stages                           



                          1 to 5, with or                           



                          without kidney                           



                          disease, indicate                           



                          chronic kidney                           



                          disease. Notes:                           



                          Determination of                           



                          stages one and two                           



                          (with eGFR                             



                          >59mL/min/1.73 m2)                           



                          requires estimation of                           



                          kidney damage for at                           



                          least three months as                           



                          defined by structural                           



                          or functional                           



                          abnormalities of the                           



                          kidney, manifested by                           



                          either:Pathological                           



                          abnormalities or                           



                          Markers of kidney                           



                          damage (including                           



                          abnormalities in the                           



                          composition of the                           



                          blood or urine or                           



                          abnormalities in                           



                          imaging tests).                           

 

             Lab Interpretation Abnormal                               



             (test code = 40193-7)                                        



[facility]. METABOLIC PANEL ([id number])2021 
09:48:31





             Test Item    Value        Reference Range Interpretation Comments

 

             NA (test code = 140 mmol/L   135-145                   



             4863957319)                                         

 

             K (test code = 4.1 mmol/L   3.5-5.0                   



             9423161272)                                         

 

             CL (test code = 113 mmol/L          H            



             2049203563)                                         

 

             CO2 TOTAL (test code = 24 mmol/L    23-31                     



             9157571462)                                         

 

             AGAP (test code =              2-16                      



             0794262435)                                         

 

             BUN (test code = 34 mg/dL     7-23         H            



             4646050062)                                         

 

             GLUCOSE (test code = 209 mg/dL           H            



             7449007900)                                         

 

             CREATININE (test code = 2.54 mg/dL   0.60-1.25    H            



             5091083124)                                         

 

             TOTAL BILI (test code = 0.3 mg/dL    0.1-1.1                   



             8091374310)                                         

 

             CALCIUM (test code = 8.0 mg/dL    8.6-10.6     L            



             5289977835)                                         

 

             T PROTEIN (test code = 6.1 g/dL     6.3-8.2      L            



             7987703436)                                         

 

             ALBUMIN (test code = 2.9 g/dL     3.5-5.0      L            



             9751873723)                                         

 

             ALK PHOS (test code = 67 U/L                           



             1473255756)                                         

 

             ALTv (test code = 14 U/L       5-50                      



             2-6)                                             

 

             AST(SGOT) (test code = 22 U/L       13-40                     



             3349681737)                                         

 

             eGFR (test code =              mL/min/1.73m2              



             1352467296)                                         

 

             ELIJAH (test code = ELIJAH) Association of                           



                          Glomerular Filtration                           



                          Rate (GFR) and Staging                           



                          of Kidney Disease*                           



                          +---------------------                           



                          --+-------------------                           



                          --+-------------------                           



                          ------+| GFR                           



                          (mL/min/1.73 m2) ?|                           



                          With Kidney Damage ?|                           



                          ?Without Kidney                           



                          Damage+---------------                           



                          --------+-------------                           



                          --------+-------------                           



                          ------------+| ?>90 ?                           



                          ? ? ? ? ? ? ? ?|                           



                          ?Stage one ? ? ? ? ?|                           



                          ? Normal ? ? ? ? ? ? ?                           



                          ?+--------------------                           



                          ---+------------------                           



                          ---+------------------                           



                          -------+| ?60-89 ? ? ?                           



                          ? ? ? ? ?| ?Stage two                           



                          ? ? ? ? ?| ? Decreased                           



                          GFR ? ? ? ?                            



                          +---------------------                           



                          --+-------------------                           



                          --+-------------------                           



                          ------+| ?30-59 ? ? ?                           



                          ? ? ? ? ?| ?Stage                           



                          three ? ? ? ?| ? Stage                           



                          three ? ? ? ? ?                           



                          +---------------------                           



                          --+-------------------                           



                          --+-------------------                           



                          ------+| ?15-29 ? ? ?                           



                          ? ? ? ? ?| ?Stage four                           



                          ? ? ? ? | ? Stage four                           



                          ? ? ? ? ?                              



                          ?+--------------------                           



                          ---+------------------                           



                          ---+------------------                           



                          -------+| ?<15 (or                           



                          dialysis) ? ?| ?Stage                           



                          five ? ? ? ? | ? Stage                           



                          five ? ? ? ? ?                           



                          ?+--------------------                           



                          ---+------------------                           



                          ---+------------------                           



                          -------+ *Each stage                           



                          assumes the associated                           



                          GFR level has been in                           



                          effect for at least                           



                          three months. ?Stages                           



                          1 to 5, with or                           



                          without kidney                           



                          disease, indicate                           



                          chronic kidney                           



                          disease. Notes:                           



                          Determination of                           



                          stages one and two                           



                          (with eGFR                             



                          >59mL/min/1.73 m2)                           



                          requires estimation of                           



                          kidney damage for at                           



                          least three months as                           



                          defined by structural                           



                          or functional                           



                          abnormalities of the                           



                          kidney, manifested by                           



                          either:Pathological                           



                          abnormalities or                           



                          Markers of kidney                           



                          damage (including                           



                          abnormalities in the                           



                          composition of the                           



                          blood or urine or                           



                          abnormalities in                           



                          imaging tests).                           

 

             Lab Interpretation Abnormal                               



             (test code = 78056-2)                                        



[facility]





             Test Item    Value        Reference Range Interpretation Comments

 

             PHOSPHORUS (test code = 0899659704) 4.5 mg/dL    2.5-5.0           

        

 

             Lab Interpretation (test code = Normal                             

    



             24298-3)                                            



[facility] 09:[facility]:11





             Test Item    Value        Reference Range Interpretation Comments

 

             PHOSPHORUS (test code = 1825783407) 4.5 mg/dL    2.5-5.0           

        

 

             Lab Interpretation (test code = Normal                             

    



             91404-6)                                            



[facility] 09:[facility]:11





             Test Item    Value        Reference Range Interpretation Comments

 

             PHOSPHORUS (test code = 6526598655) 4.5 mg/dL    2.5-5.0           

        

 

             Lab Interpretation (test code = Normal                             

    



             56265-4)                                            



[id number] 09:47:51





             Test Item    Value        Reference Range Interpretation Comments

 

             URIC ACID (test code = 6637459202) 5.6 mg/dL    3.6-8.0            

       

 

             Lab Interpretation (test code = Normal                             

    



             13712-2)                                            



[id number] 09:47:51





             Test Item    Value        Reference Range Interpretation Comments

 

             URIC ACID (test code = 2550370197) 5.6 mg/dL    3.6-8.0            

       

 

             Lab Interpretation (test code = Normal                             

    



             51925-9)                                            



[id number] 09:47:51





             Test Item    Value        Reference Range Interpretation Comments

 

             URIC ACID (test code = 7655688060) 5.6 mg/dL    3.6-8.0            

       

 

             Lab Interpretation (test code = Normal                             

    



             71989-5)                                            



[id number]50





             Test Item    Value        Reference Range Interpretation Comments

 

             CK (test code = 0096057124) 121 U/L                          

 

             Lab Interpretation (test code = Normal                             

    



             26742-7)                                            



[facility] [id number] 09:47:50





             Test Item    Value        Reference Range Interpretation Comments

 

             CK (test code = 7201773372) 121 U/L                          

 

             Lab Interpretation (test code = Normal                             

    



             56695-5)                                            



[facility] [id number] 09:47:50





             Test Item    Value        Reference Range Interpretation Comments

 

             CK (test code = 1014609664) 121 U/L                          

 

             Lab Interpretation (test code = Normal                             

    



             79339-9)                                            



[id number] 09:30:30





             Test Item    Value        Reference Range Interpretation Comments

 

             WBC (test code =              See_Comment  H             [Automated



             6690-2)                                             message] The sy

stem



                                                                 which generated



                                                                 this result



                                                                 transmitted



                                                                 reference range

:



                                                                 4.20 - 10.70



                                                                 10*3/?L. The



                                                                 reference range

 was



                                                                 not used to



                                                                 interpret this



                                                                 result as



                                                                 normal/abnormal

.

 

             RBC (test code =              See_Comment  L             [Automated



             789-8)                                              message] The sy

stem



                                                                 which generated



                                                                 this result



                                                                 transmitted



                                                                 reference range

:



                                                                 4.26 - 5.52



                                                                 10*6/?L. The



                                                                 reference range

 was



                                                                 not used to



                                                                 interpret this



                                                                 result as



                                                                 normal/abnormal

.

 

             HGB (test code = 10.5 g/dL    12.2-16.4    L            



             718-7)                                              

 

             HCT (test code = 31.6 %       38.4-49.3    L            



             4544-3)                                             

 

             MCV (test code = 88.0 fL      81.7-95.6                 



             787-2)                                              

 

             MCH (test code = 29.2 pg      26.1-32.7                 



             785-6)                                              

 

             MCHC (test code = 33.2 g/dL    31.2-35.0                 



             786-4)                                              

 

             RDW-SD (test code = 38.5 fL      38.5-51.6                 



             37258-5)                                            

 

             RDW-CV (test code = 11.9 %       12.1-15.4    L            



             788-0)                                              

 

             PLT (test code =              See_Comment  H             [Automated



             777-3)                                              message] The sy

stem



                                                                 which generated



                                                                 this result



                                                                 transmitted



                                                                 reference range

:



                                                                 150 - 328 10*3/

?L.



                                                                 The reference r

pa



                                                                 was not used to



                                                                 interpret this



                                                                 result as



                                                                 normal/abnormal

.

 

             MPV (test code = 9.8 fL       9.8-13.0                  



             49900-1)                                            

 

             NRBC/100 WBC (test              See_Comment                [Automat

ed



             code = 8006677460)                                        message] 

The system



                                                                 which generated



                                                                 this result



                                                                 transmitted



                                                                 reference range

:



                                                                 0.0 - 10.0 /100



                                                                 WBCs. The refer

ence



                                                                 range was not u

sed



                                                                 to interpret th

is



                                                                 result as



                                                                 normal/abnormal

.

 

             NRBC x10^3 (test code <0.01        See_Comment                [Auto

mated



             = [id number])                                        message] The s

ystem



                                                                 which generated



                                                                 this result



                                                                 transmitted



                                                                 reference range

:



                                                                 10*3/?L. The



                                                                 reference range

 was



                                                                 not used to



                                                                 interpret this



                                                                 result as



                                                                 normal/abnormal

.

 

             GRAN MAT (NEUT) % 71.0 %                                 



             (test code = 770-8)                                        

 

             IMM GRAN % (test code 0.40 %                                 



             = [id number])                                        

 

             LYMPH % (test code = 17.4 %                                 



             736-9)                                              

 

             MONO % (test code = 7.9 %                                  



             5905-5)                                             

 

             EOS % (test code = 2.7 %                                  



             713-8)                                              

 

             BASO % (test code = 0.6 %                                  



             706-2)                                              

 

             GRAN MAT x10^3(ANC) 8.76 10*3/uL 1.99-6.95    H            



             (test code =                                        



             8790308707)                                         

 

             IMM GRAN x10^3 (test 0.05 10*3/uL 0.00-0.06                 



             code = 5643883592)                                        

 

             LYMPH x10^3 (test code 2.15 10*3/uL 1.09-3.23                 



             = 731-0)                                            

 

             MONO x10^3 (test code 0.97 10*3/uL 0.36-1.02                 



             = 742-7)                                            

 

             EOS x10^3 (test code = 0.33 10*3/uL 0.06-0.53                 



             711-2)                                              

 

             BASO x10^3 (test code 0.07 10*3/uL 0.01-0.09                 



             = 704-7)                                            

 

             Lab Interpretation Abnormal                               



             (test code = 68340-6)                                        



[facility] WITH [id number] 09:30:30





             Test Item    Value        Reference Range Interpretation Comments

 

             WBC (test code =              See_Comment  H             [Automated



             4090-2)                                             message] The sy

stem



                                                                 which generated



                                                                 this result



                                                                 transmitted



                                                                 reference range

:



                                                                 4.20 - 10.70



                                                                 10*3/?L. The



                                                                 reference range

 was



                                                                 not used to



                                                                 interpret this



                                                                 result as



                                                                 normal/abnormal

.

 

             RBC (test code =              See_Comment  L             [Automated



             959-8)                                              message] The sy

stem



                                                                 which generated



                                                                 this result



                                                                 transmitted



                                                                 reference range

:



                                                                 4.26 - 5.52



                                                                 10*6/?L. The



                                                                 reference range

 was



                                                                 not used to



                                                                 interpret this



                                                                 result as



                                                                 normal/abnormal

.

 

             HGB (test code = 10.5 g/dL    12.2-16.4    L            



             718-7)                                              

 

             HCT (test code = 31.6 %       38.4-49.3    L            



             4544-3)                                             

 

             MCV (test code = 88.0 fL      81.7-95.6                 



             787-2)                                              

 

             MCH (test code = 29.2 pg      26.1-32.7                 



             785-6)                                              

 

             MCHC (test code = 33.2 g/dL    31.2-35.0                 



             786-4)                                              

 

             RDW-SD (test code = 38.5 fL      38.5-51.6                 



             65524-0)                                            

 

             RDW-CV (test code = 11.9 %       12.1-15.4    L            



             788-0)                                              

 

             PLT (test code =              See_Comment  H             [Automated



             777-3)                                              message] The sy

stem



                                                                 which generated



                                                                 this result



                                                                 transmitted



                                                                 reference range

:



                                                                 150 - 328 10*3/

?L.



                                                                 The reference r

pa



                                                                 was not used to



                                                                 interpret this



                                                                 result as



                                                                 normal/abnormal

.

 

             MPV (test code = 9.8 fL       9.8-13.0                  



             56724-8)                                            

 

             NRBC/100 WBC (test              See_Comment                [Automat

ed



             code = 7459642064)                                        message] 

The system



                                                                 which generated



                                                                 this result



                                                                 transmitted



                                                                 reference range

:



                                                                 0.0 - 10.0 /100



                                                                 WBCs. The refer

ence



                                                                 range was not u

sed



                                                                 to interpret th

is



                                                                 result as



                                                                 normal/abnormal

.

 

             NRBC x10^3 (test code <0.01        See_Comment                [Auto

mated



             = [id number])                                        message] The s

ystem



                                                                 which generated



                                                                 this result



                                                                 transmitted



                                                                 reference range

:



                                                                 10*3/?L. The



                                                                 reference range

 was



                                                                 not used to



                                                                 interpret this



                                                                 result as



                                                                 normal/abnormal

.

 

             GRAN MAT (NEUT) % 71.0 %                                 



             (test code = 770-8)                                        

 

             IMM GRAN % (test code 0.40 %                                 



             = 6279827840)                                        

 

             LYMPH % (test code = 17.4 %                                 



             736-9)                                              

 

             MONO % (test code = 7.9 %                                  



             5905-5)                                             

 

             EOS % (test code = 2.7 %                                  



             713-8)                                              

 

             BASO % (test code = 0.6 %                                  



             706-2)                                              

 

             GRAN MAT x10^3(ANC) 8.76 10*3/uL 1.99-6.95    H            



             (test code =                                        



             3598228675)                                         

 

             IMM GRAN x10^3 (test 0.05 10*3/uL 0.00-0.06                 



             code = 5965662156)                                        

 

             LYMPH x10^3 (test code 2.15 10*3/uL 1.09-3.23                 



             = 731-0)                                            

 

             MONO x10^3 (test code 0.97 10*3/uL 0.36-1.02                 



             = 742-7)                                            

 

             EOS x10^3 (test code = 0.33 10*3/uL 0.06-0.53                 



             711-2)                                              

 

             BASO x10^3 (test code 0.07 10*3/uL 0.01-0.09                 



             = 704-7)                                            

 

             Lab Interpretation Abnormal                               



             (test code = 01788-4)                                        



[facility] WITH [id number] 09:30:30





             Test Item    Value        Reference Range Interpretation Comments

 

             WBC (test code =              See_Comment  H             [Automated



             8790-2)                                             message] The sy

stem



                                                                 which generated



                                                                 this result



                                                                 transmitted



                                                                 reference range

:



                                                                 4.20 - 10.70



                                                                 10*3/?L. The



                                                                 reference range

 was



                                                                 not used to



                                                                 interpret this



                                                                 result as



                                                                 normal/abnormal

.

 

             RBC (test code =              See_Comment  L             [Automated



             789-8)                                              message] The sy

stem



                                                                 which generated



                                                                 this result



                                                                 transmitted



                                                                 reference range

:



                                                                 4.26 - 5.52



                                                                 10*6/?L. The



                                                                 reference range

 was



                                                                 not used to



                                                                 interpret this



                                                                 result as



                                                                 normal/abnormal

.

 

             HGB (test code = 10.5 g/dL    12.2-16.4    L            



             718-7)                                              

 

             HCT (test code = 31.6 %       38.4-49.3    L            



             4544-3)                                             

 

             MCV (test code = 88.0 fL      81.7-95.6                 



             787-2)                                              

 

             MCH (test code = 29.2 pg      26.1-32.7                 



             785-6)                                              

 

             MCHC (test code = 33.2 g/dL    31.2-35.0                 



             786-4)                                              

 

             RDW-SD (test code = 38.5 fL      38.5-51.6                 



             85028-8)                                            

 

             RDW-CV (test code = 11.9 %       12.1-15.4    L            



             788-0)                                              

 

             PLT (test code =              See_Comment  H             [Automated



             777-3)                                              message] The sy

stem



                                                                 which generated



                                                                 this result



                                                                 transmitted



                                                                 reference range

:



                                                                 150 - 328 10*3/

?L.



                                                                 The reference r

pa



                                                                 was not used to



                                                                 interpret this



                                                                 result as



                                                                 normal/abnormal

.

 

             MPV (test code = 9.8 fL       9.8-13.0                  



             42682-4)                                            

 

             NRBC/100 WBC (test              See_Comment                [Automat

ed



             code = 4643934412)                                        message] 

The system



                                                                 which generated



                                                                 this result



                                                                 transmitted



                                                                 reference range

:



                                                                 0.0 - 10.0 /100



                                                                 WBCs. The refer

ence



                                                                 range was not u

sed



                                                                 to interpret th

is



                                                                 result as



                                                                 normal/abnormal

.

 

             NRBC x10^3 (test code <0.01        See_Comment                [Auto

mated



             = [id number])                                        message] The s

ystem



                                                                 which generated



                                                                 this result



                                                                 transmitted



                                                                 reference range

:



                                                                 10*3/?L. The



                                                                 reference range

 was



                                                                 not used to



                                                                 interpret this



                                                                 result as



                                                                 normal/abnormal

.

 

             GRAN MAT (NEUT) % 71.0 %                                 



             (test code = 770-8)                                        

 

             IMM GRAN % (test code 0.40 %                                 



             = 6685167146)                                        

 

             LYMPH % (test code = 17.4 %                                 



             736-9)                                              

 

             MONO % (test code = 7.9 %                                  



             5905-5)                                             

 

             EOS % (test code = 2.7 %                                  



             713-8)                                              

 

             BASO % (test code = 0.6 %                                  



             706-2)                                              

 

             GRAN MAT x10^3(ANC) 8.76 10*3/uL 1.99-6.95    H            



             (test code =                                        



             9615810116)                                         

 

             IMM GRAN x10^3 (test 0.05 10*3/uL 0.00-0.06                 



             code = 5573788696)                                        

 

             LYMPH x10^3 (test code 2.15 10*3/uL 1.09-3.23                 



             = 731-0)                                            

 

             MONO x10^3 (test code 0.97 10*3/uL 0.36-1.02                 



             = 742-7)                                            

 

             EOS x10^3 (test code = 0.33 10*3/uL 0.06-0.53                 



             711-2)                                              

 

             BASO x10^3 (test code 0.07 10*3/uL 0.01-0.09                 



             = 704-7)                                            

 

             Lab Interpretation Abnormal                               



             (test code = 69786-7)                                        



[facility]





             Test Item    Value        Reference Range Interpretation Comments

 

             POCT GLU (test code = 7067574547) 203 mg/dL           H      

      

 

             Lab Interpretation (test code = Abnormal                           

    



             34193-6)                                            



[facility] GLUCOSE (AUTOMATED)2021 08:58:39





             Test Item    Value        Reference Range Interpretation Comments

 

             POCT GLU (test code = 6660576817) 203 mg/dL           H      

      

 

             Lab Interpretation (test code = Abnormal                           

    



             94029-5)                                            



[facility] GLUCOSE (AUTOMATED)2021 08:58:39





             Test Item    Value        Reference Range Interpretation Comments

 

             POCT GLU (test code = 8178680496) 203 mg/dL           H      

      

 

             Lab Interpretation (test code = Abnormal                           

    



             89336-7)                                            



[facility] GLUCOSE (AUTOMATED)2021-04-10 21:41:59





             Test Item    Value        Reference Range Interpretation Comments

 

             POCT GLU (test code = 7536606714) 228 mg/dL           H      

      

 

             Lab Interpretation (test code = Abnormal                           

    



             82153-8)                                            



[facility] GLUCOSE (AUTOMATED)2021-04-10 21:41:59





             Test Item    Value        Reference Range Interpretation Comments

 

             POCT GLU (test code = 5968600203) 228 mg/dL           H      

      

 

             Lab Interpretation (test code = Abnormal                           

    



             45646-3)                                            



[facility] GLUCOSE (AUTOMATED)2021-04-10 21:41:59





             Test Item    Value        Reference Range Interpretation Comments

 

             POCT GLU (test code = 5156588584) 228 mg/dL           H      

      

 

             Lab Interpretation (test code = Abnormal                           

    



             44454-1)                                            



[facility]53:58





             Test Item    Value        Reference Range Interpretation Comments

 

             VIT B12 (test code = 686 pg/mL    240-930                   



             5790381316)                                         

 

             ELIJAH (test code = ELIJAH) Biotin has been                           



                          reported to cause a                           



                          positive bias,                           



                          interpret results                           



                          relative to                            



                          patient's use of                           



                          biotin.                                

 

             Lab Interpretation (test Normal                                 



             code = 38376-8)                                        



[facility] 19:53:58





             Test Item    Value        Reference Range Interpretation Comments

 

             VIT B12 (test code = 686 pg/mL    240-930                   



             5305055204)                                         

 

             ELIJAH (test code = ELIJAH) Biotin has been                           



                          reported to cause a                           



                          positive bias,                           



                          interpret results                           



                          relative to                            



                          patient's use of                           



                          biotin.                                

 

             Lab Interpretation (test Normal                                 



             code = 01930-6)                                        



[facility] 19:53:58





             Test Item    Value        Reference Range Interpretation Comments

 

             VIT B12 (test code = 686 pg/mL    240-930                   



             8933275754)                                         

 

             ELIJAH (test code = ELIJAH) Biotin has been                           



                          reported to cause a                           



                          positive bias,                           



                          interpret results                           



                          relative to                            



                          patient's use of                           



                          biotin.                                

 

             Lab Interpretation (test Normal                                 



             code = 99117-1)                                        



[facility] 18:41:36





             Test Item    Value        Reference Range Interpretation Comments

 

             CRP (test code = 7670818908) 7.6 mg/dL    <0.8         H           

 

 

             Lab Interpretation (test code = Abnormal                           

    



             04117-5)                                            



[facility]21-04-10 18:41:36





             Test Item    Value        Reference Range Interpretation Comments

 

             CRP (test code = 1051685110) 7.6 mg/dL    <0.8         H           

 

 

             Lab Interpretation (test code = Abnormal                           

    



             94448-4)                                            



[facility]2021-04-10 18:41:36





             Test Item    Value        Reference Range Interpretation Comments

 

             CRP (test code = 3573756035) 7.6 mg/dL    <0.8         H           

 

 

             Lab Interpretation (test code = Abnormal                           

    



             79608-0)                                            



[facility] GLUCOSE (AUTOMATED)2021-04-10 17:10:56





             Test Item    Value        Reference Range Interpretation Comments

 

             POCT GLU (test code = 2456335935) 164 mg/dL           H      

      

 

             Lab Interpretation (test code = Abnormal                           

    



             71369-0)                                            



[facility] GLUCOSE (AUTOMATED)2021-04-10 17:10:56





             Test Item    Value        Reference Range Interpretation Comments

 

             POCT GLU (test code = 9692183153) 164 mg/dL           H      

      

 

             Lab Interpretation (test code = Abnormal                           

    



             70259-0)                                            



[facility] GLUCOSE (AUTOMATED)2021-04-10 17:10:56





             Test Item    Value        Reference Range Interpretation Comments

 

             POCT GLU (test code = 9072106924) 164 mg/dL           H      

      

 

             Lab Interpretation (test code = Abnormal                           

    



             55772-4)                                            



[facility]34





             Test Item    Value        Reference Range Interpretation Comments

 

             Procalcitonin (test 0.09 ng/mL   <0.07        H            



             code = 6480091285)                                        

 

             ELIJAH (test code = ELIJAH) INTERPRETATION OF                           



                          PROCALCITONIN RESULTS IN                           



                          ADULTS >= 18 YEARS OF                           



                          AGE Initiation and                           



                          discontinuation of                           



                          antibiotics on patients                           



                          with suspected or                           



                          confirmed Lower                           



                          Respiratory Tract                           



                          Infection in Adults >=                           



                          18 years of age.                           



                          +--------------+--------                           



                          --------+---------------                           



                          +-----------------------                           



                          -----+|Procalcitonin                           



                          |Interpretation                           



                          ?|Antibiotic ? ?                           



                          |Considerations ? ? ? ?                           



                          ? ? ? |ng/mL ? ? ? ? | ?                           



                          ? ? ? ? ? ?                            



                          ?|recommendation | ? ? ?                           



                          ? ? ? ? ? ? ? ? ? ? ?                           



                          +--------------+--------                           



                          --------+---------------                           



                          +-----------------------                           



                          -----+| <0.1 ? ? ? ? |                           



                          Bacterial ? ? ?|                           



                          Strongly ? ? ?| ? ? ? ?                           



                          ? ? ? ? ? ? ? ? ? ? | ?                           



                          ? ? ? ? ? ?| infection                           



                          very | discouraged ? |                           



                          Overruling: ? ? ? ? ? ?                           



                          ? ? | ? ? ? ? ? ? ?|                           



                          unlikely ? ? ? | ? ? ? ?                           



                          ? ? ? | ? Clinically                           



                          unstable ? ? ?                           



                          +--------------+--------                           



                          --------+---------------                           



                          + ? High risk for                           



                          adverse ? ? | <0.25 ? ?                           



                          ? ?| Bacterial ? ? ?|                           



                          Discouraged ? | ?                           



                          outcome ? ? ? ? ? ? ? ?                           



                          ? | ? ? ? ? ? ? ?|                           



                          infection ? ? ?| ? ? ? ?                           



                          ? ? ? | ? SEE IMPORTANT                           



                          NOTE ? ? ? ?| ? ? ? ? ?                           



                          ? ?| unlikely ? ? ? | ?                           



                          ? ? ? ? ? ? | ? ? ? ? ?                           



                          ? ? ? ? ? ? ? ? ?                           



                          +--------------+--------                           



                          --------+---------------                           



                          +-----------------------                           



                          -----+| >=0.25 ? ? ? |                           



                          Bacterial ? ? ?|                           



                          Encouraged ? ?| ? ? ? ?                           



                          ? ? ? ? ? ? ? ? ? ? | ?                           



                          ? ? ? ? ? ?| infection ?                           



                          ? ?| ? ? ? ? ? ? ? | ? ?                           



                          ? ? ? ? ? ? ? ? ? ? ? ?                           



                          | ? ? ? ? ? ? ?| likely                           



                          ? ? ? ? | ? ? ? ? ? ? ?                           



                          | Consider treatment                           



                          failure                                



                          ?+--------------+-------                           



                          ---------+--------------                           



                          -+ if levels does not                           



                          decrease | >0.5 ? ? ? ?                           



                          | Bacterial ? ? ?|                           



                          Strongly ? ? ?|                           



                          appropriately ? ? ? ? ?                           



                          ? ? | ? ? ? ? ? ? ?|                           



                          infection very |                           



                          encouraged ? ?| ? ? ? ?                           



                          ? ? ? ? ? ? ? ? ? ? | ?                           



                          ? ? ? ? ? ?| likely ? ?                           



                          ? ? | ? ? ? ? ? ? ? | ?                           



                          ? ? ? ? ? ? ? ? ? ? ? ?                           



                          ?                                      



                          +--------------+--------                           



                          --------+---------------                           



                          +-----------------------                           



                          -----+ Discontinuation                           



                          of antibiotics in                           



                          high-acuity patients                           



                          with suspected or                           



                          confirmed sepsis in                           



                          Adults >= 18 years of                           



                          age.                                   



                          +--------------+--------                           



                          --------+---------------                           



                          +-----------------------                           



                          -----+|Procalcitonin                           



                          |Interpretation                           



                          ?|Antibiotic ? ?                           



                          |Considerations ? ? ? ?                           



                          ? ? ? |ng/mL ? ? ? ? | ?                           



                          ? ? ? ? ? ?                            



                          ?|recommendation | ? ? ?                           



                          ? ? ? ? ? ? ? ? ? ? ?                           



                          +--------------+--------                           



                          --------+---------------                           



                          +-----------------------                           



                          -----+| <0.25 ? ? ? ?|                           



                          Bacterial ? ? ?|                           



                          Strongly ? ? ?| ? ? ? ?                           



                          ? ? ? ? ? ? ? ? ? ? | ?                           



                          ? ? ? ? ? ?| infection                           



                          very | discouraged ? |                           



                          Overruling: ? ? ? ? ? ?                           



                          ? ? | ? ? ? ? ? ? ?|                           



                          unlikely ? ? ? | ? ? ? ?                           



                          ? ? ? | ? Clinically                           



                          unstable ? ? ?                           



                          +--------------+--------                           



                          --------+---------------                           



                          + ? High risk for                           



                          adverse ? ? | <0.5 or                           



                          drop | Bacterial ? ? ?|                           



                          Discouraged ? | ?                           



                          outcome ? ? ? ? ? ? ? ?                           



                          ? | >80% from ? ?|                           



                          infection ? ? ?| ? ? ? ?                           



                          ? ? ? | ? SEE IMPORTANT                           



                          NOTE ? ? ? ?| highest                           



                          PCT ?| unlikely ? ? ? |                           



                          ? ? ? ? ? ? ? | ? ? ? ?                           



                          ? ? ? ? ? ? ? ? ? ? |                           



                          level ? ? ? ?| ? ? ? ? ?                           



                          ? ? ?| ? ? ? ? ? ? ? | ?                           



                          ? ? ? ? ? ? ? ? ? ? ? ?                           



                          ?                                      



                          +--------------+--------                           



                          --------+---------------                           



                          +-----------------------                           



                          -----+| >=0.5 ? ? ? ?|                           



                          Bacterial ? ? ?|                           



                          Encouraged ? ?| ? ? ? ?                           



                          ? ? ? ? ? ? ? ? ? ? | ?                           



                          ? ? ? ? ? ?| infection ?                           



                          ? ?| ? ? ? ? ? ? ? | ? ?                           



                          ? ? ? ? ? ? ? ? ? ? ? ?                           



                          | ? ? ? ? ? ? ?| likely                           



                          ? ? ? ? | ? ? ? ? ? ? ?                           



                          | Consider treatment                           



                          failure                                



                          ?+--------------+-------                           



                          ---------+--------------                           



                          -+ if levels does not                           



                          decrease | >1.0 ? ? ? ?                           



                          | Bacterial ? ? ?|                           



                          Strongly ? ? ?|                           



                          appropriately ? ? ? ? ?                           



                          ? ? | ? ? ? ? ? ? ?|                           



                          infection very |                           



                          encouraged ? ?| ? ? ? ?                           



                          ? ? ? ? ? ? ? ? ? ? | ?                           



                          ? ? ? ? ? ?| likely ? ?                           



                          ? ? | ? ? ? ? ? ? ? | ?                           



                          ? ? ? ? ? ? ? ? ? ? ? ?                           



                          ?                                      



                          +--------------+--------                           



                          --------+---------------                           



                          +-----------------------                           



                          -----+ Percentage of                           



                          drop of Procalcitonin                           



                          calculation for                           



                          Discontinuation of                           



                          antibiotics in                           



                          high-acuity patients                           



                          with suspected or                           



                          confirmed sepsis in                           



                          Adults >= 18 years of                           



                          age. ? ? ? ? ? ? ? ? ? ?                           



                          ? Procalcitonin                           



                          highest{}-Procalcitonin                           



                          current{}Delta                           



                          Procalcitonin =                           



                          ________________________                           



                          _______________________                           



                          x100% ? ? ? ? ? ? ? ? ?                           



                          ? ? ? ? ? ? ?                           



                          Procalcitonin current {}                           



                          IMPORTANT NOTE:                           



                          Procalcitonin may be                           



                          elevated without                           



                          bacterial infection by                           



                          physiologic stress                           



                          related to trauma,                           



                          burns, chronic dialysis,                           



                          metastatic cancer,                           



                          surgery in the past                           



                          seven days, malaria,                           



                          some fungal infections,                           



                          and some forms of                           



                          vasculitis. The                           



                          interpretation algorithm                           



                          may not apply to                           



                          patients with                           



                          immunosuppression                           



                          (equivalent of >10 mg of                           



                          prednisone daily), HIV                           



                          with CD4 cell count <                           



                          350 cells/mm3, active                           



                          malignancy on systemic                           



                          chemotherapy, solid                           



                          organ transplant or                           



                          hematopoietic stem cell                           



                          transplantation, or                           



                          hospital acquired                           



                          pneumonia. Additionally,                           



                          some clinical trials of                           



                          procalcitonin have                           



                          excluded patients with                           



                          shock requiring                           



                          vasopressor use, acute                           



                          respiratory failure                           



                          requiring mechanical                           



                          ventilation, or those                           



                          with known lung                           



                          abscess/empyema. For                           



                          further information                           



                          please refer                           



                          to:[email or web address]                           



                          [email or web address]                           

 

             Lab Interpretation Abnormal                               



             (test code = 60219-2)                                        



[facility]





             Test Item    Value        Reference Range Interpretation Comments

 

             Procalcitonin (test 0.09 ng/mL   <0.07        H            



             code = 7536500008)                                        

 

             ELIJAH (test code = ELIJAH) INTERPRETATION OF                           



                          PROCALCITONIN RESULTS IN                           



                          ADULTS >= 18 YEARS OF                           



                          AGE Initiation and                           



                          discontinuation of                           



                          antibiotics on patients                           



                          with suspected or                           



                          confirmed Lower                           



                          Respiratory Tract                           



                          Infection in Adults >=                           



                          18 years of age.                           



                          +--------------+--------                           



                          --------+---------------                           



                          +-----------------------                           



                          -----+|Procalcitonin                           



                          |Interpretation                           



                          ?|Antibiotic ? ?                           



                          |Considerations ? ? ? ?                           



                          ? ? ? |ng/mL ? ? ? ? | ?                           



                          ? ? ? ? ? ?                            



                          ?|recommendation | ? ? ?                           



                          ? ? ? ? ? ? ? ? ? ? ?                           



                          +--------------+--------                           



                          --------+---------------                           



                          +-----------------------                           



                          -----+| <0.1 ? ? ? ? |                           



                          Bacterial ? ? ?|                           



                          Strongly ? ? ?| ? ? ? ?                           



                          ? ? ? ? ? ? ? ? ? ? | ?                           



                          ? ? ? ? ? ?| infection                           



                          very | discouraged ? |                           



                          Overruling: ? ? ? ? ? ?                           



                          ? ? | ? ? ? ? ? ? ?|                           



                          unlikely ? ? ? | ? ? ? ?                           



                          ? ? ? | ? Clinically                           



                          unstable ? ? ?                           



                          +--------------+--------                           



                          --------+---------------                           



                          + ? High risk for                           



                          adverse ? ? | <0.25 ? ?                           



                          ? ?| Bacterial ? ? ?|                           



                          Discouraged ? | ?                           



                          outcome ? ? ? ? ? ? ? ?                           



                          ? | ? ? ? ? ? ? ?|                           



                          infection ? ? ?| ? ? ? ?                           



                          ? ? ? | ? SEE IMPORTANT                           



                          NOTE ? ? ? ?| ? ? ? ? ?                           



                          ? ?| unlikely ? ? ? | ?                           



                          ? ? ? ? ? ? | ? ? ? ? ?                           



                          ? ? ? ? ? ? ? ? ?                           



                          +--------------+--------                           



                          --------+---------------                           



                          +-----------------------                           



                          -----+| >=0.25 ? ? ? |                           



                          Bacterial ? ? ?|                           



                          Encouraged ? ?| ? ? ? ?                           



                          ? ? ? ? ? ? ? ? ? ? | ?                           



                          ? ? ? ? ? ?| infection ?                           



                          ? ?| ? ? ? ? ? ? ? | ? ?                           



                          ? ? ? ? ? ? ? ? ? ? ? ?                           



                          | ? ? ? ? ? ? ?| likely                           



                          ? ? ? ? | ? ? ? ? ? ? ?                           



                          | Consider treatment                           



                          failure                                



                          ?+--------------+-------                           



                          ---------+--------------                           



                          -+ if levels does not                           



                          decrease | >0.5 ? ? ? ?                           



                          | Bacterial ? ? ?|                           



                          Strongly ? ? ?|                           



                          appropriately ? ? ? ? ?                           



                          ? ? | ? ? ? ? ? ? ?|                           



                          infection very |                           



                          encouraged ? ?| ? ? ? ?                           



                          ? ? ? ? ? ? ? ? ? ? | ?                           



                          ? ? ? ? ? ?| likely ? ?                           



                          ? ? | ? ? ? ? ? ? ? | ?                           



                          ? ? ? ? ? ? ? ? ? ? ? ?                           



                          ?                                      



                          +--------------+--------                           



                          --------+---------------                           



                          +-----------------------                           



                          -----+ Discontinuation                           



                          of antibiotics in                           



                          high-acuity patients                           



                          with suspected or                           



                          confirmed sepsis in                           



                          Adults >= 18 years of                           



                          age.                                   



                          +--------------+--------                           



                          --------+---------------                           



                          +-----------------------                           



                          -----+|Procalcitonin                           



                          |Interpretation                           



                          ?|Antibiotic ? ?                           



                          |Considerations ? ? ? ?                           



                          ? ? ? |ng/mL ? ? ? ? | ?                           



                          ? ? ? ? ? ?                            



                          ?|recommendation | ? ? ?                           



                          ? ? ? ? ? ? ? ? ? ? ?                           



                          +--------------+--------                           



                          --------+---------------                           



                          +-----------------------                           



                          -----+| <0.25 ? ? ? ?|                           



                          Bacterial ? ? ?|                           



                          Strongly ? ? ?| ? ? ? ?                           



                          ? ? ? ? ? ? ? ? ? ? | ?                           



                          ? ? ? ? ? ?| infection                           



                          very | discouraged ? |                           



                          Overruling: ? ? ? ? ? ?                           



                          ? ? | ? ? ? ? ? ? ?|                           



                          unlikely ? ? ? | ? ? ? ?                           



                          ? ? ? | ? Clinically                           



                          unstable ? ? ?                           



                          +--------------+--------                           



                          --------+---------------                           



                          + ? High risk for                           



                          adverse ? ? | <0.5 or                           



                          drop | Bacterial ? ? ?|                           



                          Discouraged ? | ?                           



                          outcome ? ? ? ? ? ? ? ?                           



                          ? | >80% from ? ?|                           



                          infection ? ? ?| ? ? ? ?                           



                          ? ? ? | ? SEE IMPORTANT                           



                          NOTE ? ? ? ?| highest                           



                          PCT ?| unlikely ? ? ? |                           



                          ? ? ? ? ? ? ? | ? ? ? ?                           



                          ? ? ? ? ? ? ? ? ? ? |                           



                          level ? ? ? ?| ? ? ? ? ?                           



                          ? ? ?| ? ? ? ? ? ? ? | ?                           



                          ? ? ? ? ? ? ? ? ? ? ? ?                           



                          ?                                      



                          +--------------+--------                           



                          --------+---------------                           



                          +-----------------------                           



                          -----+| >=0.5 ? ? ? ?|                           



                          Bacterial ? ? ?|                           



                          Encouraged ? ?| ? ? ? ?                           



                          ? ? ? ? ? ? ? ? ? ? | ?                           



                          ? ? ? ? ? ?| infection ?                           



                          ? ?| ? ? ? ? ? ? ? | ? ?                           



                          ? ? ? ? ? ? ? ? ? ? ? ?                           



                          | ? ? ? ? ? ? ?| likely                           



                          ? ? ? ? | ? ? ? ? ? ? ?                           



                          | Consider treatment                           



                          failure                                



                          ?+--------------+-------                           



                          ---------+--------------                           



                          -+ if levels does not                           



                          decrease | >1.0 ? ? ? ?                           



                          | Bacterial ? ? ?|                           



                          Strongly ? ? ?|                           



                          appropriately ? ? ? ? ?                           



                          ? ? | ? ? ? ? ? ? ?|                           



                          infection very |                           



                          encouraged ? ?| ? ? ? ?                           



                          ? ? ? ? ? ? ? ? ? ? | ?                           



                          ? ? ? ? ? ?| likely ? ?                           



                          ? ? | ? ? ? ? ? ? ? | ?                           



                          ? ? ? ? ? ? ? ? ? ? ? ?                           



                          ?                                      



                          +--------------+--------                           



                          --------+---------------                           



                          +-----------------------                           



                          -----+ Percentage of                           



                          drop of Procalcitonin                           



                          calculation for                           



                          Discontinuation of                           



                          antibiotics in                           



                          high-acuity patients                           



                          with suspected or                           



                          confirmed sepsis in                           



                          Adults >= 18 years of                           



                          age. ? ? ? ? ? ? ? ? ? ?                           



                          ? Procalcitonin                           



                          highest{}-Procalcitonin                           



                          current{}Delta                           



                          Procalcitonin =                           



                          ________________________                           



                          _______________________                           



                          x100% ? ? ? ? ? ? ? ? ?                           



                          ? ? ? ? ? ? ?                           



                          Procalcitonin current {}                           



                          IMPORTANT NOTE:                           



                          Procalcitonin may be                           



                          elevated without                           



                          bacterial infection by                           



                          physiologic stress                           



                          related to trauma,                           



                          burns, chronic dialysis,                           



                          metastatic cancer,                           



                          surgery in the past                           



                          seven days, malaria,                           



                          some fungal infections,                           



                          and some forms of                           



                          vasculitis. The                           



                          interpretation algorithm                           



                          may not apply to                           



                          patients with                           



                          immunosuppression                           



                          (equivalent of >10 mg of                           



                          prednisone daily), HIV                           



                          with CD4 cell count <                           



                          350 cells/mm3, active                           



                          malignancy on systemic                           



                          chemotherapy, solid                           



                          organ transplant or                           



                          hematopoietic stem cell                           



                          transplantation, or                           



                          hospital acquired                           



                          pneumonia. Additionally,                           



                          some clinical trials of                           



                          procalcitonin have                           



                          excluded patients with                           



                          shock requiring                           



                          vasopressor use, acute                           



                          respiratory failure                           



                          requiring mechanical                           



                          ventilation, or those                           



                          with known lung                           



                          abscess/empyema. For                           



                          further information                           



                          please refer                           



                          to:[email or web address]                           



                          [email or web address]                           

 

             Lab Interpretation Abnormal                               



             (test code = 07699-8)                                        



[facility]





             Test Item    Value        Reference Range Interpretation Comments

 

             Procalcitonin (test 0.09 ng/mL   <0.07        H            



             code = [id number])                                        

 

             ELIJAH (test code = ELIJAH) INTERPRETATION OF                           



                          PROCALCITONIN RESULTS IN                           



                          ADULTS >= 18 YEARS OF                           



                          AGE Initiation and                           



                          discontinuation of                           



                          antibiotics on patients                           



                          with suspected or                           



                          confirmed Lower                           



                          Respiratory Tract                           



                          Infection in Adults >=                           



                          18 years of age.                           



                          +--------------+--------                           



                          --------+---------------                           



                          +-----------------------                           



                          -----+|Procalcitonin                           



                          |Interpretation                           



                          ?|Antibiotic ? ?                           



                          |Considerations ? ? ? ?                           



                          ? ? ? |ng/mL ? ? ? ? | ?                           



                          ? ? ? ? ? ?                            



                          ?|recommendation | ? ? ?                           



                          ? ? ? ? ? ? ? ? ? ? ?                           



                          +--------------+--------                           



                          --------+---------------                           



                          +-----------------------                           



                          -----+| <0.1 ? ? ? ? |                           



                          Bacterial ? ? ?|                           



                          Strongly ? ? ?| ? ? ? ?                           



                          ? ? ? ? ? ? ? ? ? ? | ?                           



                          ? ? ? ? ? ?| infection                           



                          very | discouraged ? |                           



                          Overruling: ? ? ? ? ? ?                           



                          ? ? | ? ? ? ? ? ? ?|                           



                          unlikely ? ? ? | ? ? ? ?                           



                          ? ? ? | ? Clinically                           



                          unstable ? ? ?                           



                          +--------------+--------                           



                          --------+---------------                           



                          + ? High risk for                           



                          adverse ? ? | <0.25 ? ?                           



                          ? ?| Bacterial ? ? ?|                           



                          Discouraged ? | ?                           



                          outcome ? ? ? ? ? ? ? ?                           



                          ? | ? ? ? ? ? ? ?|                           



                          infection ? ? ?| ? ? ? ?                           



                          ? ? ? | ? SEE IMPORTANT                           



                          NOTE ? ? ? ?| ? ? ? ? ?                           



                          ? ?| unlikely ? ? ? | ?                           



                          ? ? ? ? ? ? | ? ? ? ? ?                           



                          ? ? ? ? ? ? ? ? ?                           



                          +--------------+--------                           



                          --------+---------------                           



                          +-----------------------                           



                          -----+| >=0.25 ? ? ? |                           



                          Bacterial ? ? ?|                           



                          Encouraged ? ?| ? ? ? ?                           



                          ? ? ? ? ? ? ? ? ? ? | ?                           



                          ? ? ? ? ? ?| infection ?                           



                          ? ?| ? ? ? ? ? ? ? | ? ?                           



                          ? ? ? ? ? ? ? ? ? ? ? ?                           



                          | ? ? ? ? ? ? ?| likely                           



                          ? ? ? ? | ? ? ? ? ? ? ?                           



                          | Consider treatment                           



                          failure                                



                          ?+--------------+-------                           



                          ---------+--------------                           



                          -+ if levels does not                           



                          decrease | >0.5 ? ? ? ?                           



                          | Bacterial ? ? ?|                           



                          Strongly ? ? ?|                           



                          appropriately ? ? ? ? ?                           



                          ? ? | ? ? ? ? ? ? ?|                           



                          infection very |                           



                          encouraged ? ?| ? ? ? ?                           



                          ? ? ? ? ? ? ? ? ? ? | ?                           



                          ? ? ? ? ? ?| likely ? ?                           



                          ? ? | ? ? ? ? ? ? ? | ?                           



                          ? ? ? ? ? ? ? ? ? ? ? ?                           



                          ?                                      



                          +--------------+--------                           



                          --------+---------------                           



                          +-----------------------                           



                          -----+ Discontinuation                           



                          of antibiotics in                           



                          high-acuity patients                           



                          with suspected or                           



                          confirmed sepsis in                           



                          Adults >= 18 years of                           



                          age.                                   



                          +--------------+--------                           



                          --------+---------------                           



                          +-----------------------                           



                          -----+|Procalcitonin                           



                          |Interpretation                           



                          ?|Antibiotic ? ?                           



                          |Considerations ? ? ? ?                           



                          ? ? ? |ng/mL ? ? ? ? | ?                           



                          ? ? ? ? ? ?                            



                          ?|recommendation | ? ? ?                           



                          ? ? ? ? ? ? ? ? ? ? ?                           



                          +--------------+--------                           



                          --------+---------------                           



                          +-----------------------                           



                          -----+| <0.25 ? ? ? ?|                           



                          Bacterial ? ? ?|                           



                          Strongly ? ? ?| ? ? ? ?                           



                          ? ? ? ? ? ? ? ? ? ? | ?                           



                          ? ? ? ? ? ?| infection                           



                          very | discouraged ? |                           



                          Overruling: ? ? ? ? ? ?                           



                          ? ? | ? ? ? ? ? ? ?|                           



                          unlikely ? ? ? | ? ? ? ?                           



                          ? ? ? | ? Clinically                           



                          unstable ? ? ?                           



                          +--------------+--------                           



                          --------+---------------                           



                          + ? High risk for                           



                          adverse ? ? | <0.5 or                           



                          drop | Bacterial ? ? ?|                           



                          Discouraged ? | ?                           



                          outcome ? ? ? ? ? ? ? ?                           



                          ? | >80% from ? ?|                           



                          infection ? ? ?| ? ? ? ?                           



                          ? ? ? | ? SEE IMPORTANT                           



                          NOTE ? ? ? ?| highest                           



                          PCT ?| unlikely ? ? ? |                           



                          ? ? ? ? ? ? ? | ? ? ? ?                           



                          ? ? ? ? ? ? ? ? ? ? |                           



                          level ? ? ? ?| ? ? ? ? ?                           



                          ? ? ?| ? ? ? ? ? ? ? | ?                           



                          ? ? ? ? ? ? ? ? ? ? ? ?                           



                          ?                                      



                          +--------------+--------                           



                          --------+---------------                           



                          +-----------------------                           



                          -----+| >=0.5 ? ? ? ?|                           



                          Bacterial ? ? ?|                           



                          Encouraged ? ?| ? ? ? ?                           



                          ? ? ? ? ? ? ? ? ? ? | ?                           



                          ? ? ? ? ? ?| infection ?                           



                          ? ?| ? ? ? ? ? ? ? | ? ?                           



                          ? ? ? ? ? ? ? ? ? ? ? ?                           



                          | ? ? ? ? ? ? ?| likely                           



                          ? ? ? ? | ? ? ? ? ? ? ?                           



                          | Consider treatment                           



                          failure                                



                          ?+--------------+-------                           



                          ---------+--------------                           



                          -+ if levels does not                           



                          decrease | >1.0 ? ? ? ?                           



                          | Bacterial ? ? ?|                           



                          Strongly ? ? ?|                           



                          appropriately ? ? ? ? ?                           



                          ? ? | ? ? ? ? ? ? ?|                           



                          infection very |                           



                          encouraged ? ?| ? ? ? ?                           



                          ? ? ? ? ? ? ? ? ? ? | ?                           



                          ? ? ? ? ? ?| likely ? ?                           



                          ? ? | ? ? ? ? ? ? ? | ?                           



                          ? ? ? ? ? ? ? ? ? ? ? ?                           



                          ?                                      



                          +--------------+--------                           



                          --------+---------------                           



                          +-----------------------                           



                          -----+ Percentage of                           



                          drop of Procalcitonin                           



                          calculation for                           



                          Discontinuation of                           



                          antibiotics in                           



                          high-acuity patients                           



                          with suspected or                           



                          confirmed sepsis in                           



                          Adults >= 18 years of                           



                          age. ? ? ? ? ? ? ? ? ? ?                           



                          ? Procalcitonin                           



                          highest{}-Procalcitonin                           



                          current{}Delta                           



                          Procalcitonin =                           



                          ________________________                           



                          _______________________                           



                          x100% ? ? ? ? ? ? ? ? ?                           



                          ? ? ? ? ? ? ?                           



                          Procalcitonin current {}                           



                          IMPORTANT NOTE:                           



                          Procalcitonin may be                           



                          elevated without                           



                          bacterial infection by                           



                          physiologic stress                           



                          related to trauma,                           



                          burns, chronic dialysis,                           



                          metastatic cancer,                           



                          surgery in the past                           



                          seven days, malaria,                           



                          some fungal infections,                           



                          and some forms of                           



                          vasculitis. The                           



                          interpretation algorithm                           



                          may not apply to                           



                          patients with                           



                          immunosuppression                           



                          (equivalent of >10 mg of                           



                          prednisone daily), HIV                           



                          with CD4 cell count <                           



                          350 cells/mm3, active                           



                          malignancy on systemic                           



                          chemotherapy, solid                           



                          organ transplant or                           



                          hematopoietic stem cell                           



                          transplantation, or                           



                          hospital acquired                           



                          pneumonia. Additionally,                           



                          some clinical trials of                           



                          procalcitonin have                           



                          excluded patients with                           



                          shock requiring                           



                          vasopressor use, acute                           



                          respiratory failure                           



                          requiring mechanical                           



                          ventilation, or those                           



                          with known lung                           



                          abscess/empyema. For                           



                          further information                           



                          please refer                           



                          to:[email or web address]                           



                          [email or web address]                           



                          [email or web address]                           

 

             Lab Interpretation Abnormal                               



             (test code = 97741-6)                                        



[facility] [id number] 
16:33:00





             Test Item    Value        Reference Range Interpretation Comments

 

             T. PROT U (test code = 2888-6) 793 mg/dL                           

   

 

             CREAT U (test code = 4006781481) 85.4 mg/dL                        

     

 

             Protein/Creatinine Ratio Urine              0.0-2.0      H         

   



             (test code = 1132328254)                                        

 

             Lab Interpretation (test code = Abnormal                           

    



             29691-5)                                            



[facility] [id number] 
16:33:00





             Test Item    Value        Reference Range Interpretation Comments

 

             T. PROT U (test code = 2888-6) 793 mg/dL                           

   

 

             CREAT U (test code = 3698764367) 85.4 mg/dL                        

     

 

             Protein/Creatinine Ratio Urine              0.0-2.0      H         

   



             (test code = 7863374668)                                        

 

             Lab Interpretation (test code = Abnormal                           

    



             36509-7)                                            



[facility] [id number] 
16:33:00





             Test Item    Value        Reference Range Interpretation Comments

 

             T. PROT U (test code = 2888-6) 793 mg/dL                           

   

 

             CREAT U (test code = 3482345825) 85.4 mg/dL                        

     

 

             Protein/Creatinine Ratio Urine              0.0-2.0      H         

   



             (test code = 4662428831)                                        

 

             Lab Interpretation (test code = Abnormal                           

    



             47373-1)                                            



[facility] 15[facility]39:39





             Test Item    Value        Reference Range Interpretation Comments

 

             APPEARANCE (test code = Clear        Clear                     



             9782727553)                                         

 

             COLOR (test code = Yellow       Yellow                    



             8485170563)                                         

 

             PH (test code =              4.8-8.0                   



             8535304718)                                         

 

             SP GRAVITY (test code =              1.003-1.030               



             2813525805)                                         

 

             GLU U QUAL (test code = 500 mg/dL    Normal       A            



             0329808260)                                         

 

             BLOOD (test code = Negative     Negative                  



             1408736248)                                         

 

             KETONES (test code = Negative     Negative                  



             1423021222)                                         

 

             PROTEIN (test code = 100 mg/dL    Negative     A            



             2887-8)                                             

 

             UROBILIN (test code = Normal       Normal                    



             3922475265)                                         

 

             BILIRUBIN (test code = Negative     Negative                  



             5381460056)                                         

 

             NITRITE (test code = Negative     Negative                  



             0801183279)                                         

 

             LEUK JUAN LUIS (test code = Negative     Negative                  



             4447283392)                                         

 

             RBC/HPF (test code =              See_Comment  H             [Autom

ated message]



             4683208824)                                         The system [employer]



                                                                 generated this



                                                                 result transmit

ronnie



                                                                 reference range

: 0 -



                                                                 3 HPF. The refe

rence



                                                                 range was not u

sed



                                                                 to interpret th

is



                                                                 result as



                                                                 normal/abnormal

.

 

             WBC/HPF (test code =              See_Comment                [Autom

ated message]



             7297830625)                                         The system [employer]



                                                                 generated this



                                                                 result transmit

ronnie



                                                                 reference range

: 0 -



                                                                 5 HPF. The refe

rence



                                                                 range was not u

sed



                                                                 to interpret th

is



                                                                 result as



                                                                 normal/abnormal

.

 

             BACTERIA (test code = Negative     Negative                  



             0126717010)                                         

 

             MUCOUS (test code = Slight       Negative LPF A            



             9904258215)                                         

 

             AMORPHOUS (test code = Rare         Rare HPF                  



             4290369950)                                         

 

             SQ EPITH (test code = <1           HPF                       



             2574586854)                                         

 

             Lab Interpretation (test Abnormal                               



             code = 76984-2)                                        



[facility] 15[facility]39:39





             Test Item    Value        Reference Range Interpretation Comments

 

             APPEARANCE (test code = Clear        Clear                     



             2972205409)                                         

 

             COLOR (test code = Yellow       Yellow                    



             7622016793)                                         

 

             PH (test code =              4.8-8.0                   



             3180839369)                                         

 

             SP GRAVITY (test code =              1.003-1.030               



             9674603813)                                         

 

             GLU U QUAL (test code = 500 mg/dL    Normal       A            



             4539978811)                                         

 

             BLOOD (test code = Negative     Negative                  



             2672701733)                                         

 

             KETONES (test code = Negative     Negative                  



             5682652411)                                         

 

             PROTEIN (test code = 100 mg/dL    Negative     A            



             2887-8)                                             

 

             UROBILIN (test code = Normal       Normal                    



             2901389509)                                         

 

             BILIRUBIN (test code = Negative     Negative                  



             7784411625)                                         

 

             NITRITE (test code = Negative     Negative                  



             4680460901)                                         

 

             LEUK JUAN LUIS (test code = Negative     Negative                  



             6274913472)                                         

 

             RBC/HPF (test code =              See_Comment  H             [Autom

ated message]



             5351897392)                                         The system [employer]



                                                                 generated this



                                                                 result transmit

ronnie



                                                                 reference range

: 0 -



                                                                 3 HPF. The refe

rence



                                                                 range was not u

sed



                                                                 to interpret th

is



                                                                 result as



                                                                 normal/abnormal

.

 

             WBC/HPF (test code =              See_Comment                [Autom

ated message]



             [id number])                                         The system [employer]



                                                                 generated this



                                                                 result transmit

ronnie



                                                                 reference range

: 0 -



                                                                 5 HPF. The refe

rence



                                                                 range was not u

sed



                                                                 to interpret th

is



                                                                 result as



                                                                 normal/abnormal

.

 

             BACTERIA (test code = Negative     Negative                  



             1448125128)                                         

 

             MUCOUS (test code = Slight       Negative LPF A            



             8207421389)                                         

 

             AMORPHOUS (test code = Rare         Rare HPF                  



             3728461679)                                         

 

             SQ EPITH (test code = <1           HPF                       



             4419068031)                                         

 

             Lab Interpretation (test Abnormal                               



             code = 70466-2)                                        



[facility]





             Test Item    Value        Reference Range Interpretation Comments

 

             APPEARANCE (test code = Clear        Clear                     



             1940516830)                                         

 

             COLOR (test code = Yellow       Yellow                    



             1784447346)                                         

 

             PH (test code =              4.8-8.0                   



             2740024580)                                         

 

             SP GRAVITY (test code =              1.003-1.030               



             6507531936)                                         

 

             GLU U QUAL (test code = 500 mg/dL    Normal       A            



             6542731155)                                         

 

             BLOOD (test code = Negative     Negative                  



             2670880607)                                         

 

             KETONES (test code = Negative     Negative                  



             4999867914)                                         

 

             PROTEIN (test code = 100 mg/dL    Negative     A            



             2887-8)                                             

 

             UROBILIN (test code = Normal       Normal                    



             0924983671)                                         

 

             BILIRUBIN (test code = Negative     Negative                  



             5535386071)                                         

 

             NITRITE (test code = Negative     Negative                  



             8493642443)                                         

 

             LEUK JUAN LUIS (test code = Negative     Negative                  



             5687331354)                                         

 

             RBC/HPF (test code =              See_Comment  H             [Autom

ated message]



             8412824340)                                         The system [employer]



                                                                 generated this



                                                                 result transmit

ronnie



                                                                 reference range

: 0 -



                                                                 3 HPF. The refe

rence



                                                                 range was not u

sed



                                                                 to interpret th

is



                                                                 result as



                                                                 normal/abnormal

.

 

             WBC/HPF (test code =              See_Comment                [Autom

ated message]



             5112608490)                                         The system [employer]



                                                                 generated this



                                                                 result transmit

ronnie



                                                                 reference range

: 0 -



                                                                 5 HPF. The refe

rence



                                                                 range was not u

sed



                                                                 to interpret th

is



                                                                 result as



                                                                 normal/abnormal

.

 

             BACTERIA (test code = Negative     Negative                  



             6251423261)                                         

 

             MUCOUS (test code = Slight       Negative LPF A            



             4268873677)                                         

 

             AMORPHOUS (test code = Rare         Rare HPF                  



             3132069637)                                         

 

             SQ EPITH (test code = <1           HPF                       



             1301007787)                                         

 

             Lab Interpretation (test Abnormal                               



             code = 56976-1)                                        



[facility], URINE [id number] 15:29:18





             Test Item    Value        Reference Range Interpretation Comments

 

             NA URINE (test code = 3195857745) 83 mmol/L                        

      



[facility], URINE [id number] 15:29:18





             Test Item    Value        Reference Range Interpretation Comments

 

             NA URINE (test code = 2751790406) 83 mmol/L                        

      



[facility], URINE [id number] 15:29:18





             Test Item    Value        Reference Range Interpretation Comments

 

             NA URINE (test code = 0767615070) 83 mmol/L                        

      



[facility] GLUCOSE (AUTOMATED)2021-04-10 13:14:12





             Test Item    Value        Reference Range Interpretation Comments

 

             POCT GLU (test code = 4984491831) 186 mg/dL           H      

      

 

             Lab Interpretation (test code = Abnormal                           

    



             95712-2)                                            



[facility] GLUCOSE (AUTOMATED)2021-04-10 13:14:12





             Test Item    Value        Reference Range Interpretation Comments

 

             POCT GLU (test code = 3416067973) 186 mg/dL           H      

      

 

             Lab Interpretation (test code = Abnormal                           

    



             54684-8)                                            



[facility] GLUCOSE (AUTOMATED)2021-04-10 13:14:12





             Test Item    Value        Reference Range Interpretation Comments

 

             POCT GLU (test code = 6715709731) 186 mg/dL           H      

      

 

             Lab Interpretation (test code = Abnormal                           

    



             99368-7)                                            



[facility] 11:15:35





             Test Item    Value        Reference Range Interpretation Comments

 

             FERRITIN (test code = 129.0 ng/mL  18.0-464.0                



             7173560377)                                         

 

             ELIJAH (test code = ELIJAH) Biotin has been                           



                          reported to cause a                           



                          negative bias,                           



                          interpret results                           



                          relative to                            



                          patient's use of                           



                          biotin.                                

 

             Lab Interpretation (test Normal                                 



             code = 76183-3)                                        



[facility]15:35





             Test Item    Value        Reference Range Interpretation Comments

 

             FERRITIN (test code = 129.0 ng/mL  18.0-464.0                



             0606456849)                                         

 

             ELIJAH (test code = ELIJAH) Biotin has been                           



                          reported to cause a                           



                          negative bias,                           



                          interpret results                           



                          relative to                            



                          patient's use of                           



                          biotin.                                

 

             Lab Interpretation (test Normal                                 



             code = 50529-9)                                        



[facility] 11:15:35





             Test Item    Value        Reference Range Interpretation Comments

 

             FERRITIN (test code = 129.0 ng/mL  18.0-464.0                



             4748404211)                                         

 

             ELIJAH (test code = ELIJAH) Biotin has been                           



                          reported to cause a                           



                          negative bias,                           



                          interpret results                           



                          relative to                            



                          patient's use of                           



                          biotin.                                

 

             Lab Interpretation (test Normal                                 



             code = 24862-1)                                        



[facility]2021-[facility]-10 11:15:00 Small 
toe distal phalanx osteomyelitis. Preliminary Report Dictated by Resident: Fabian Mccartney MD., have reviewed this study and agree with the 
abovereport.EXAM: XR FOOT 3+ VW RIGHT HISTORY: Right Fifth toe pain, erthema, 
swelling. R/O Osteo COMPARISON: None. FINDINGS: Radiographs of the right foot 
demonstrate cystic resorption of the smalltoe distal phalanx. Prominent talar 
beaking is noted. An os peroneum isnoted. Joint spaces are preserved. Alignment 
is within normal limits. Softtissue swelling over the lateral forefoot. 
Extensive diabetic type vascularcalcifications are visualized. [facility], Radiant 
Results Inft User - 04/10/2021 6:16 AM CDTEXAM: XR FOOT 3+ VW RIGHTHISTORY: 
Right Fifth toe pain, erthema, swelling. R/O Osteo COMPARISON: None.FINDINGS: 
Radiographs of the right footdemonstrate cystic resorption of the smalltoe 
distal phalanx. Prominent talar beaking is noted. An os peroneum isnoted. Joint 
spaces are preserved. Alignment is within normal limits. Softtissue swelling 
over the lateral forefoot. Extensive diabetic type vascularcalcifications are 
visualized.IMPRESSIONSmall toe distal phalanx osteomyelitis.Preliminary Report 
Dictated by Resident: Fabian Bernardo MD., have reviewed this study
and agree with the abovereport.[facility] 
RIGHT2021-04-10 11:15:00 Small toe distal phalanx osteomyelitis. Preliminary 
Report Dictated by Resident: Fabian Mccartney MD., have reviewed 
this study and agree with the abovereport.EXAM: XR FOOT 3+ VW RIGHT HISTORY: 
Right Fifth toe pain, erthema, swelling. R/O Osteo COMPARISON: None. FINDINGS: 
Radiographs of the right foot demonstrate cystic resorption of the smalltoe 
distal phalanx. Prominent talar beaking is noted. An os peroneum isnoted. Joint 
spaces are preserved. Alignment is within normal limits. Softtissue swelling 
over the lateral forefoot. Extensive diabetic type vascularcalcifications are 
visualized. [facility], Radiant Results Inft User - 04/10/2021 6:16 AM CDTEXAM: XR 
FOOT 3+ VW RIGHTHISTORY: Right Fifth toe pain, erthema, swelling. R/O Osteo 
COMPARISON: None.FINDINGS: Radiographs of the right footdemonstrate cystic 
resorption of the smalltoe distal phalanx. Prominent talar beaking is noted. An 
os peroneum isnoted. Joint spaces are preserved. Alignment is within normal 
limits. Softtissue swelling over the lateral forefoot. Extensive diabetic type 
vascularcalcifications are visualized.IMPRESSIONSmall toe distal phalanx 
osteomyelitis.Preliminary Report Dictated by Resident: Fabian Bernardo MD., have reviewed this study and agree with the abovereport.[facility]2021-04-10 11:15:00 Small toe distal 
phalanx osteomyelitis. Preliminary Report Dictated by Resident: Fabian Mccartney MD., have reviewed this study and agree with the 
abovereport.EXAM: XR FOOT 3+ VW RIGHT HISTORY: Right Fifth toe pain, erthema, 
swelling. R/O Osteo COMPARISON: None. FINDINGS: Radiographs of the right foot 
demonstrate cystic resorption of the smalltoe distal phalanx. Prominent talar 
beaking is noted. An os peroneum isnoted. Joint spaces are preserved. Alignment 
is within normal limits. Softtissue swelling over the lateral forefoot. 
Extensive diabetic type vascularcalcifications are visualized. [facility], Radiant 
Results Inft User - 04/10/2021 6:16 AM CDTEXAM: XR FOOT 3+ VW RIGHTHISTORY: 
Right Fifth toe pain, erthema, swelling. R/O Osteo COMPARISON: None.FINDINGS: 
Radiographs of the right footdemonstrate cystic resorption of the smalltoe 
distal phalanx. Prominent talar beaking is noted. An os peroneum isnoted. Joint 
spaces are preserved. Alignment is within normal limits. Softtissue swelling 
over the lateral forefoot. Extensive diabetic type vascularcalcifications are 
visualized.IMPRESSIONSmall toe distal phalanx osteomyelitis.Preliminary Report 
Dictated by Resident: Fabian Bernardo MD., have reviewed this study
and agree with the abovereport.[facility]21-04-10 11:11:36





             Test Item    Value        Reference Range Interpretation Comments

 

             TSH (test code =              See_Comment                [Automated

 message]



             5132382031)                                         The system [employer]



                                                                 generated this 

result



                                                                 transmitted ref

erence



                                                                 range: 0.45 - 4

.70



                                                                 mIU/L. The refe

rence



                                                                 range was not u

sed to



                                                                 interpret this 

result



                                                                 as normal/abnor

mal.

 

             Lab Interpretation (test Normal                                 



             code = 69246-4)                                        



[facility]21-04-10 11:11:36





             Test Item    Value        Reference Range Interpretation Comments

 

             TSH (test code =              See_Comment                [Automated

 message]



             9752968066)                                         The system [employer]



                                                                 generated this 

result



                                                                 transmitted ref

erence



                                                                 range: 0.45 - 4

.70



                                                                 mIU/L. The refe

rence



                                                                 range was not u

sed to



                                                                 interpret this 

result



                                                                 as normal/abnor

mal.

 

             Lab Interpretation (test Normal                                 



             code = 79961-0)                                        



[facility]21-04-10 11:11:36





             Test Item    Value        Reference Range Interpretation Comments

 

             TSH (test code =              See_Comment                [Automated

 message]



             4799156942)                                         The system [employer]



                                                                 generated this 

result



                                                                 transmitted ref

erence



                                                                 range: 0.45 - 4

.70



                                                                 mIU/L. The refe

rence



                                                                 range was not u

sed to



                                                                 interpret this 

result



                                                                 as normal/abnor

mal.

 

             Lab Interpretation (test Normal                                 



             code = 61740-7)                                        



[facility] [id number] 11:02:44





             Test Item    Value        Reference Range Interpretation Comments

 

             ESR (test code =              See_Comment                [Automated

 message]



             2988196486)                                         The system [employer]



                                                                 generated this 

result



                                                                 transmitted ref

erence



                                                                 range: 0 - 10 m

m/HR.



                                                                 The reference r

pa



                                                                 was not used to



                                                                 interpret this 

result



                                                                 as normal/abnor

mal.

 

             Lab Interpretation (test Normal                                 



             code = 10627-0)                                        



[facility] [id number] 11:02:44





             Test Item    Value        Reference Range Interpretation Comments

 

             ESR (test code =              See_Comment                [Automated

 message]



             5968889250)                                         The system [employer]



                                                                 generated this 

result



                                                                 transmitted ref

erence



                                                                 range: 0 - 10 m

m/HR.



                                                                 The reference r

pa



                                                                 was not used to



                                                                 interpret this 

result



                                                                 as normal/abnor

mal.

 

             Lab Interpretation (test Normal                                 



             code = 32587-4)                                        



[facility] [id number] 11:02:44





             Test Item    Value        Reference Range Interpretation Comments

 

             ESR (test code =              See_Comment                [Automated

 message]



             7480057630)                                         The system [employer]



                                                                 generated this 

result



                                                                 transmitted ref

erence



                                                                 range: 0 - 10 m

m/HR.



                                                                 The reference r

pa



                                                                 was not used to



                                                                 interpret this 

result



                                                                 as normal/abnor

mal.

 

             Lab Interpretation (test Normal                                 



             code = 05504-5)                                        



[facility] WITH DIFF2021-04-10 10:55:36





             Test Item    Value        Reference Range Interpretation Comments

 

             WBC (test code =              See_Comment  H             [Automated



             6690-2)                                             message] The



                                                                 system which



                                                                 generated this



                                                                 result transmit

ronnie



                                                                 reference range

:



                                                                 4.20 - 10.70



                                                                 10*3/?L. The



                                                                 reference range



                                                                 was not used to



                                                                 interpret this



                                                                 result as



                                                                 normal/abnormal

.

 

             RBC (test code =              See_Comment  L             [Automated



             789-8)                                              message] The



                                                                 system which



                                                                 generated this



                                                                 result transmit

ronnie



                                                                 reference range

:



                                                                 4.26 - 5.52



                                                                 10*6/?L. The



                                                                 reference range



                                                                 was not used to



                                                                 interpret this



                                                                 result as



                                                                 normal/abnormal

.

 

             HGB (test code = 11.6 g/dL    12.2-16.4    L            



             718-7)                                              

 

             HCT (test code = 35.1 %       38.4-49.3    L            



             4544-3)                                             

 

             MCV (test code = 86.5 fL      81.7-95.6                 



             787-2)                                              

 

             MCH (test code = 28.6 pg      26.1-32.7                 



             785-6)                                              

 

             MCHC (test code = 33.0 g/dL    31.2-35.0                 



             786-4)                                              

 

             RDW-SD (test code = 38.0 fL      38.5-51.6    L            



             92261-8)                                            

 

             RDW-CV (test code = 11.9 %       12.1-15.4    L            



             788-0)                                              

 

             PLT (test code =              See_Comment  H             [Automated



             777-3)                                              message] The



                                                                 system which



                                                                 generated this



                                                                 result transmit

ronnie



                                                                 reference range

:



                                                                 150 - 328 10*3/

?L.



                                                                 The reference



                                                                 range was not u

sed



                                                                 to interpret th

is



                                                                 result as



                                                                 normal/abnormal

.

 

             MPV (test code = 9.9 fL       9.8-13.0                  



             82186-0)                                            

 

             NRBC/100 WBC (test              See_Comment                [Automat

ed



             code = 1724914196)                                        message] 

The



                                                                 system which



                                                                 generated this



                                                                 result transmit

ronnie



                                                                 reference range

:



                                                                 0.0 - 10.0 /100



                                                                 WBCs. The



                                                                 reference range



                                                                 was not used to



                                                                 interpret this



                                                                 result as



                                                                 normal/abnormal

.

 

             NRBC x10^3 (test code <0.01        See_Comment                [Auto

mated



             = [id number])                                        message] The



                                                                 system which



                                                                 generated this



                                                                 result transmit

ronnie



                                                                 reference range

:



                                                                 10*3/?L. The



                                                                 reference range



                                                                 was not used to



                                                                 interpret this



                                                                 result as



                                                                 normal/abnormal

.

 

             GRAN MAT (NEUT) % 77.8 %                                 



             (test code = 770-8)                                        

 

             IMM GRAN % (test code 0.50 %                                 



             = 4372487460)                                        

 

             LYMPH % (test code = 11.4 %                                 



             736-9)                                              

 

             MONO % (test code = 6.7 %                                  



             5905-5)                                             

 

             EOS % (test code = 3.1 %                                  



             713-8)                                              

 

             BASO % (test code = 0.5 %                                  



             706-2)                                              

 

             GRAN MAT x10^3(ANC) 11.98 10*3/uL 1.99-6.95    H            



             (test code =                                        



             3468797806)                                         

 

             IMM GRAN x10^3 (test 0.08 10*3/uL 0.00-0.06    H            



             code = 8009110723)                                        

 

             LYMPH x10^3 (test code 1.76 10*3/uL 1.09-3.23                 



             = 731-0)                                            

 

             MONO x10^3 (test code 1.03 10*3/uL 0.36-1.02    H            



             = 742-7)                                            

 

             EOS x10^3 (test code = 0.47 10*3/uL 0.06-0.53                 



             711-2)                                              

 

             BASO x10^3 (test code 0.08 10*3/uL 0.01-0.09                 



             = 704-7)                                            

 

             Lab Interpretation Abnormal                               



             (test code = 14668-6)                                        



[facility] WITH [id number] 10:55:36





             Test Item    Value        Reference Range Interpretation Comments

 

             WBC (test code =              See_Comment  H             [Automated



             0190-2)                                             message] The



                                                                 system which



                                                                 generated this



                                                                 result transmit

ronnie



                                                                 reference range

:



                                                                 4.20 - 10.70



                                                                 10*3/?L. The



                                                                 reference range



                                                                 was not used to



                                                                 interpret this



                                                                 result as



                                                                 normal/abnormal

.

 

             RBC (test code =              See_Comment  L             [Automated



             359-8)                                              message] The



                                                                 system which



                                                                 generated this



                                                                 result transmit

ronnie



                                                                 reference range

:



                                                                 4.26 - 5.52



                                                                 10*6/?L. The



                                                                 reference range



                                                                 was not used to



                                                                 interpret this



                                                                 result as



                                                                 normal/abnormal

.

 

             HGB (test code = 11.6 g/dL    12.2-16.4    L            



             718-7)                                              

 

             HCT (test code = 35.1 %       38.4-49.3    L            



             4544-3)                                             

 

             MCV (test code = 86.5 fL      81.7-95.6                 



             787-2)                                              

 

             MCH (test code = 28.6 pg      26.1-32.7                 



             785-6)                                              

 

             MCHC (test code = 33.0 g/dL    31.2-35.0                 



             786-4)                                              

 

             RDW-SD (test code = 38.0 fL      38.5-51.6    L            



             91741-0)                                            

 

             RDW-CV (test code = 11.9 %       12.1-15.4    L            



             788-0)                                              

 

             PLT (test code =              See_Comment  H             [Automated



             777-3)                                              message] The



                                                                 system which



                                                                 generated this



                                                                 result transmit

ronnie



                                                                 reference range

:



                                                                 150 - 328 10*3/

?L.



                                                                 The reference



                                                                 range was not u

sed



                                                                 to interpret th

is



                                                                 result as



                                                                 normal/abnormal

.

 

             MPV (test code = 9.9 fL       9.8-13.0                  



             03648-0)                                            

 

             NRBC/100 WBC (test              See_Comment                [Automat

ed



             code = 5455899231)                                        message] 

The



                                                                 system which



                                                                 generated this



                                                                 result transmit

ronnie



                                                                 reference range

:



                                                                 0.0 - 10.0 /100



                                                                 WBCs. The



                                                                 reference range



                                                                 was not used to



                                                                 interpret this



                                                                 result as



                                                                 normal/abnormal

.

 

             NRBC x10^3 (test code <0.01        See_Comment                [Auto

mated



             = [id number])                                        message] The



                                                                 system which



                                                                 generated this



                                                                 result transmit

ronnie



                                                                 reference range

:



                                                                 10*3/?L. The



                                                                 reference range



                                                                 was not used to



                                                                 interpret this



                                                                 result as



                                                                 normal/abnormal

.

 

             GRAN MAT (NEUT) % 77.8 %                                 



             (test code = 770-8)                                        

 

             IMM GRAN % (test code 0.50 %                                 



             = 7969734622)                                        

 

             LYMPH % (test code = 11.4 %                                 



             736-9)                                              

 

             MONO % (test code = 6.7 %                                  



             5905-5)                                             

 

             EOS % (test code = 3.1 %                                  



             713-8)                                              

 

             BASO % (test code = 0.5 %                                  



             706-2)                                              

 

             GRAN MAT x10^3(ANC) 11.98 10*3/uL 1.99-6.95    H            



             (test code =                                        



             3669050790)                                         

 

             IMM GRAN x10^3 (test 0.08 10*3/uL 0.00-0.06    H            



             code = 8220533627)                                        

 

             LYMPH x10^3 (test code 1.76 10*3/uL 1.09-3.23                 



             = 731-0)                                            

 

             MONO x10^3 (test code 1.03 10*3/uL 0.36-1.02    H            



             = 742-7)                                            

 

             EOS x10^3 (test code = 0.47 10*3/uL 0.06-0.53                 



             711-2)                                              

 

             BASO x10^3 (test code 0.08 10*3/uL 0.01-0.09                 



             = 704-7)                                            

 

             Lab Interpretation Abnormal                               



             (test code = 19186-0)                                        



[facility] WITH [id number] 10:55:36





             Test Item    Value        Reference Range Interpretation Comments

 

             WBC (test code =              See_Comment  H             [Automated



             6690-2)                                             message] The



                                                                 system which



                                                                 generated this



                                                                 result transmit

ronnie



                                                                 reference range

:



                                                                 4.20 - 10.70



                                                                 10*3/?L. The



                                                                 reference range



                                                                 was not used to



                                                                 interpret this



                                                                 result as



                                                                 normal/abnormal

.

 

             RBC (test code =              See_Comment  L             [Automated



             789-8)                                              message] The



                                                                 system which



                                                                 generated this



                                                                 result transmit

ronnie



                                                                 reference range

:



                                                                 4.26 - 5.52



                                                                 10*6/?L. The



                                                                 reference range



                                                                 was not used to



                                                                 interpret this



                                                                 result as



                                                                 normal/abnormal

.

 

             HGB (test code = 11.6 g/dL    12.2-16.4    L            



             718-7)                                              

 

             HCT (test code = 35.1 %       38.4-49.3    L            



             4544-3)                                             

 

             MCV (test code = 86.5 fL      81.7-95.6                 



             787-2)                                              

 

             MCH (test code = 28.6 pg      26.1-32.7                 



             785-6)                                              

 

             MCHC (test code = 33.0 g/dL    31.2-35.0                 



             786-4)                                              

 

             RDW-SD (test code = 38.0 fL      38.5-51.6    L            



             70571-9)                                            

 

             RDW-CV (test code = 11.9 %       12.1-15.4    L            



             788-0)                                              

 

             PLT (test code =              See_Comment  H             [Automated



             777-3)                                              message] The



                                                                 system which



                                                                 generated this



                                                                 result transmit

ronnie



                                                                 reference range

:



                                                                 150 - 328 10*3/

?L.



                                                                 The reference



                                                                 range was not u

sed



                                                                 to interpret th

is



                                                                 result as



                                                                 normal/abnormal

.

 

             MPV (test code = 9.9 fL       9.8-13.0                  



             70766-4)                                            

 

             NRBC/100 WBC (test              See_Comment                [Automat

ed



             code = 1177387321)                                        message] 

The



                                                                 system which



                                                                 generated this



                                                                 result transmit

ronnie



                                                                 reference range

:



                                                                 0.0 - 10.0 /100



                                                                 WBCs. The



                                                                 reference range



                                                                 was not used to



                                                                 interpret this



                                                                 result as



                                                                 normal/abnormal

.

 

             NRBC x10^3 (test code <0.01        See_Comment                [Auto

mated



             = 8518105571)                                        message] The



                                                                 system which



                                                                 generated this



                                                                 result transmit

ronnie



                                                                 reference range

:



                                                                 10*3/?L. The



                                                                 reference range



                                                                 was not used to



                                                                 interpret this



                                                                 result as



                                                                 normal/abnormal

.

 

             GRAN MAT (NEUT) % 77.8 %                                 



             (test code = 770-8)                                        

 

             IMM GRAN % (test code 0.50 %                                 



             = 2679857273)                                        

 

             LYMPH % (test code = 11.4 %                                 



             736-9)                                              

 

             MONO % (test code = 6.7 %                                  



             5905-5)                                             

 

             EOS % (test code = 3.1 %                                  



             713-8)                                              

 

             BASO % (test code = 0.5 %                                  



             706-2)                                              

 

             GRAN MAT x10^3(ANC) 11.98 10*3/uL 1.99-6.95    H            



             (test code =                                        



             8635257548)                                         

 

             IMM GRAN x10^3 (test 0.08 10*3/uL 0.00-0.06    H            



             code = 8745996150)                                        

 

             LYMPH x10^3 (test code 1.76 10*3/uL 1.09-3.23                 



             = 731-0)                                            

 

             MONO x10^3 (test code 1.03 10*3/uL 0.36-1.02    H            



             = 742-7)                                            

 

             EOS x10^3 (test code = 0.47 10*3/uL 0.06-0.53                 



             711-2)                                              

 

             BASO x10^3 (test code 0.08 10*3/uL 0.01-0.09                 



             = 704-7)                                            

 

             Lab Interpretation Abnormal                               



             (test code = 04167-1)                                        



[facility] [id number] 10:51:55





             Test Item    Value        Reference Range Interpretation Comments

 

             IRON (test code = 8636643617) 29 ug/dL            L          

  

 

             TIBC (test code = 8297175473) 245 ug/dL    250-410      L          

  

 

             % FE SAT (test code = 2159951283) 12 %         20-50        L      

      

 

             Lab Interpretation (test code = Abnormal                           

    



             50722-1)                                            



[facility] [id number] 10:51:55





             Test Item    Value        Reference Range Interpretation Comments

 

             IRON (test code = 4239802959) 29 ug/dL            L          

  

 

             TIBC (test code = 1113159863) 245 ug/dL    250-410      L          

  

 

             % FE SAT (test code = 1654941139) 12 %         20-50        L      

      

 

             Lab Interpretation (test code = Abnormal                           

    



             88399-9)                                            



[facility] [id number] 10:51:55





             Test Item    Value        Reference Range Interpretation Comments

 

             IRON (test code = 9747197353) 29 ug/dL            L          

  

 

             TIBC (test code = 3978765949) 245 ug/dL    250-410      L          

  

 

             % FE SAT (test code = 3116459924) 12 %         20-50        L      

      

 

             Lab Interpretation (test code = Abnormal                           

    



             93977-6)                                            



[facility] [id number] 10:49:53





             Test Item    Value        Reference Range Interpretation Comments

 

             NT-proBNP (test code 439 pg/mL    See_Comment  H             [Autom

ated



             = 5151784297)                                        message] The



                                                                 system which



                                                                 generated this



                                                                 result



                                                                 transmitted



                                                                 reference range

:



                                                                 <=125. The



                                                                 reference range



                                                                 was not used to



                                                                 interpret this



                                                                 result as



                                                                 normal/abnormal

.

 

             ELIJAH (test code = ELIJAH) Biotin has been                           



                          reported to                            



                          cause a negative                           



                          bias, interpret                           



                          results relative                           



                          to patient's use                           



                          of biotin.                             

 

             Lab Interpretation Abnormal                               



             (test code = 63668-0)                                        



[facility] [id number] 10:49:53





             Test Item    Value        Reference Range Interpretation Comments

 

             NT-proBNP (test code 439 pg/mL    See_Comment  H             [Autom

ated



             = 1127737042)                                        message] The



                                                                 system which



                                                                 generated this



                                                                 result



                                                                 transmitted



                                                                 reference range

:



                                                                 <=125. The



                                                                 reference range



                                                                 was not used to



                                                                 interpret this



                                                                 result as



                                                                 normal/abnormal

.

 

             ELIJAH (test code = ELIJAH) Biotin has been                           



                          reported to                            



                          cause a negative                           



                          bias, interpret                           



                          results relative                           



                          to patient's use                           



                          of biotin.                             

 

             Lab Interpretation Abnormal                               



             (test code = 09405-0)                                        



[id number]





             Test Item    Value        Reference Range Interpretation Comments

 

             NT-proBNP (test code 439 pg/mL    See_Comment  H             [Autom

ated



             = 6662098448)                                        message] The



                                                                 system which



                                                                 generated this



                                                                 result



                                                                 transmitted



                                                                 reference range

:



                                                                 <=125. The



                                                                 reference range



                                                                 was not used to



                                                                 interpret this



                                                                 result as



                                                                 normal/abnormal

.

 

             ELIJAH (test code = ELIJAH) Biotin has been                           



                          reported to                            



                          cause a negative                           



                          bias, interpret                           



                          results relative                           



                          to patient's use                           



                          of biotin.                             

 

             Lab Interpretation Abnormal                               



             (test code = 09790-4)                                        



[facility]2021-04-10 10:48:12





             Test Item    Value        Reference Range Interpretation Comments

 

             MAGNESIUM (test code = 5172396931) 1.8 mg/dL    1.7-2.4            

       

 

             Lab Interpretation (test code = Normal                             

    



             03531-9)                                            



[facility]TOTAL CHOLESTEROL, 
TRIGLYCERIDES, HDL)2021-04-10 10:48:12





             Test Item    Value        Reference Range Interpretation Comments

 

             CHOL (test code = 199 mg/dL    120-200                   



             8474085585)                                         

 

             HDL (test code = 33 mg/dL     >40          L            



             2012877502)                                         

 

             HDLC RATIO (test code =              See_Comment  H             [Au

tomated message]



             9338052950)                                         The system [employer]



                                                                 generated this



                                                                 result transmit

ronnie



                                                                 reference range

:



                                                                 <=5.0. The refe

rence



                                                                 range was not u

sed



                                                                 to interpret th

is



                                                                 result as



                                                                 normal/abnormal

.

 

             TRIG (test code = 219 mg/dL           H            



             9936303246)                                         

 

             LDL CHOL (test code = 122 mg/dL    See_Comment                [Auto

mated message]



             20381-9)                                            The system [employer]



                                                                 generated this



                                                                 result transmit

ronnie



                                                                 reference range

:



                                                                 <=160. The refe

rence



                                                                 range was not u

sed



                                                                 to interpret th

is



                                                                 result as



                                                                 normal/abnormal

.

 

             VLDL (test code = 44 mg/dL     5-60                      



             5572291121)                                         

 

             Lab Interpretation (test Abnormal                               



             code = 24927-6)                                        



[facility]2021-04-10 10:48:12





             Test Item    Value        Reference Range Interpretation Comments

 

             MAGNESIUM (test code = 5187856622) 1.8 mg/dL    1.7-2.4            

       

 

             Lab Interpretation (test code = Normal                             

    



             18124-4)                                            



[facility]TOTAL CHOLESTEROL, 
TRIGLYCERIDES, HDL)2021-04-10 10:48:12





             Test Item    Value        Reference Range Interpretation Comments

 

             CHOL (test code = 199 mg/dL    120-200                   



             4677576258)                                         

 

             HDL (test code = 33 mg/dL     >40          L            



             1621980734)                                         

 

             HDLC RATIO (test code =              See_Comment  H             [Au

tomated message]



             5906588434)                                         The system [employer]



                                                                 generated this



                                                                 result transmit

ronnie



                                                                 reference range

:



                                                                 <=5.0. The refe

rence



                                                                 range was not u

sed



                                                                 to interpret th

is



                                                                 result as



                                                                 normal/abnormal

.

 

             TRIG (test code = 219 mg/dL           H            



             7825698389)                                         

 

             LDL CHOL (test code = 122 mg/dL    See_Comment                [Auto

mated message]



             18894-3)                                            The system [employer]



                                                                 generated this



                                                                 result transmit

ronnie



                                                                 reference range

:



                                                                 <=160. The refe

rence



                                                                 range was not u

sed



                                                                 to interpret th

is



                                                                 result as



                                                                 normal/abnormal

.

 

             VLDL (test code = 44 mg/dL     5-60                      



             1445487711)                                         

 

             Lab Interpretation (test Abnormal                               



             code = 63113-0)                                        



[facility]





             Test Item    Value        Reference Range Interpretation Comments

 

             MAGNESIUM (test code = 3975012965) 1.8 mg/dL    1.7-2.4            

       

 

             Lab Interpretation (test code = Normal                             

    



             44014-6)                                            



[facility]TOTAL CHOLESTEROL, 
TRIGLYCERIDES, HDL)2021-04-10 10:48:12





             Test Item    Value        Reference Range Interpretation Comments

 

             CHOL (test code = 199 mg/dL    120-200                   



             9996709075)                                         

 

             HDL (test code = 33 mg/dL     >40          L            



             2904841963)                                         

 

             HDLC RATIO (test code =              See_Comment  H             [Au

tomated message]



             8198399053)                                         The system [employer]



                                                                 generated this



                                                                 result transmit

ronnie



                                                                 reference range

:



                                                                 <=5.0. The refe

rence



                                                                 range was not u

sed



                                                                 to interpret th

is



                                                                 result as



                                                                 normal/abnormal

.

 

             TRIG (test code = 219 mg/dL           H            



             4693779390)                                         

 

             LDL CHOL (test code = 122 mg/dL    See_Comment                [Auto

mated message]



             31038-6)                                            The system [employer]



                                                                 generated this



                                                                 result transmit

ronnie



                                                                 reference range

:



                                                                 <=160. The refe

rence



                                                                 range was not u

sed



                                                                 to interpret th

is



                                                                 result as



                                                                 normal/abnormal

.

 

             VLDL (test code = 44 mg/dL     5-60                      



             1971809042)                                         

 

             Lab Interpretation (test Abnormal                               



             code = 48926-6)                                        



[facility] [id number] 10:47:51





             Test Item    Value        Reference Range Interpretation Comments

 

             URIC ACID (test code = 2719661213) 6.2 mg/dL    3.6-8.0            

       

 

             Lab Interpretation (test code = Normal                             

    



             77127-9)                                            



[facility] 10:47:51





             Test Item    Value        Reference Range Interpretation Comments

 

             PHOSPHORUS (test code = 9834831802) 5.3 mg/dL    2.5-5.0      H    

        

 

             Lab Interpretation (test code = Abnormal                           

    



             40567-1)                                            



[facility] [id number] 10:47:51





             Test Item    Value        Reference Range Interpretation Comments

 

             URIC ACID (test code = 8949592993) 6.2 mg/dL    3.6-8.0            

       

 

             Lab Interpretation (test code = Normal                             

    



             98065-9)                                            



[facility] 10:47:51





             Test Item    Value        Reference Range Interpretation Comments

 

             PHOSPHORUS (test code = 3917151174) 5.3 mg/dL    2.5-5.0      H    

        

 

             Lab Interpretation (test code = Abnormal                           

    



             90192-8)                                            



[facility] [id number] 10:47:51





             Test Item    Value        Reference Range Interpretation Comments

 

             URIC ACID (test code = 9745797905) 6.2 mg/dL    3.6-8.0            

       

 

             Lab Interpretation (test code = Normal                             

    



             53745-4)                                            



[facility] 10:47:51





             Test Item    Value        Reference Range Interpretation Comments

 

             PHOSPHORUS (test code = 1269793767) 5.3 mg/dL    2.5-5.0      H    

        

 

             Lab Interpretation (test code = Abnormal                           

    



             00322-9)                                            



[facility] [id number] 10:47:31





             Test Item    Value        Reference Range Interpretation Comments

 

             CK (test code = 8931853703) 368 U/L             H            

 

             Lab Interpretation (test code = Abnormal                           

    



             26289-1)                                            



[facility] [id number] 10:47:31





             Test Item    Value        Reference Range Interpretation Comments

 

             CK (test code = 6670723652) 368 U/L             H            

 

             Lab Interpretation (test code = Abnormal                           

    



             40857-4)                                            



[facility] [id number] 10:47:31





             Test Item    Value        Reference Range Interpretation Comments

 

             CK (test code = 9974905056) 368 U/L             H            

 

             Lab Interpretation (test code = Abnormal                           

    



             91583-7)                                            



[facility]. METABOLIC PANEL (97877)2021-04-10 
10:42:51





             Test Item    Value        Reference Range Interpretation Comments

 

             NA (test code = 138 mmol/L   135-145                   



             3139956841)                                         

 

             K (test code = 4.3 mmol/L   3.5-5.0                   



             2939023053)                                         

 

             CL (test code = 108 mmol/L                       



             3078991580)                                         

 

             CO2 TOTAL (test code = 22 mmol/L    23-31        L            



             5690275099)                                         

 

             AGAP (test code =              2-16                      



             4330426007)                                         

 

             BUN (test code = 37 mg/dL     7-23         H            



             2132869914)                                         

 

             GLUCOSE (test code = 256 mg/dL           H            



             7660981550)                                         

 

             CREATININE (test code = 2.96 mg/dL   0.60-1.25    H            



             9201324289)                                         

 

             TOTAL BILI (test code = 0.3 mg/dL    0.1-1.1                   



             8083202025)                                         

 

             CALCIUM (test code = 8.0 mg/dL    8.6-10.6     L            



             7546960931)                                         

 

             T PROTEIN (test code = 5.9 g/dL     6.3-8.2      L            



             0077417302)                                         

 

             ALBUMIN (test code = 2.8 g/dL     3.5-5.0      L            



             9723699529)                                         

 

             ALK PHOS (test code = 90 U/L                           



             4620132276)                                         

 

             ALTv (test code = 18 U/L       5-50                      



             1742-6)                                             

 

             AST(SGOT) (test code = 25 U/L       13-40                     



             0649320675)                                         

 

             eGFR (test code =              mL/min/1.73m2              



             9776949130)                                         

 

             ELIJAH (test code = ELIJAH) Association of                           



                          Glomerular Filtration                           



                          Rate (GFR) and Staging                           



                          of Kidney Disease*                           



                          +---------------------                           



                          --+-------------------                           



                          --+-------------------                           



                          ------+| GFR                           



                          (mL/min/1.73 m2) ?|                           



                          With Kidney Damage ?|                           



                          ?Without Kidney                           



                          Damage+---------------                           



                          --------+-------------                           



                          --------+-------------                           



                          ------------+| ?>90 ?                           



                          ? ? ? ? ? ? ? ?|                           



                          ?Stage one ? ? ? ? ?|                           



                          ? Normal ? ? ? ? ? ? ?                           



                          ?+--------------------                           



                          ---+------------------                           



                          ---+------------------                           



                          -------+| ?60-89 ? ? ?                           



                          ? ? ? ? ?| ?Stage two                           



                          ? ? ? ? ?| ? Decreased                           



                          GFR ? ? ? ?                            



                          +---------------------                           



                          --+-------------------                           



                          --+-------------------                           



                          ------+| ?30-59 ? ? ?                           



                          ? ? ? ? ?| ?Stage                           



                          three ? ? ? ?| ? Stage                           



                          three ? ? ? ? ?                           



                          +---------------------                           



                          --+-------------------                           



                          --+-------------------                           



                          ------+| ?15-29 ? ? ?                           



                          ? ? ? ? ?| ?Stage four                           



                          ? ? ? ? | ? Stage four                           



                          ? ? ? ? ?                              



                          ?+--------------------                           



                          ---+------------------                           



                          ---+------------------                           



                          -------+| ?<15 (or                           



                          dialysis) ? ?| ?Stage                           



                          five ? ? ? ? | ? Stage                           



                          five ? ? ? ? ?                           



                          ?+--------------------                           



                          ---+------------------                           



                          ---+------------------                           



                          -------+ *Each stage                           



                          assumes the associated                           



                          GFR level has been in                           



                          effect for at least                           



                          three months. ?Stages                           



                          1 to 5, with or                           



                          without kidney                           



                          disease, indicate                           



                          chronic kidney                           



                          disease. Notes:                           



                          Determination of                           



                          stages one and two                           



                          (with eGFR                             



                          >59mL/min/1.73 m2)                           



                          requires estimation of                           



                          kidney damage for at                           



                          least three months as                           



                          defined by structural                           



                          or functional                           



                          abnormalities of the                           



                          kidney, manifested by                           



                          either:Pathological                           



                          abnormalities or                           



                          Markers of kidney                           



                          damage (including                           



                          abnormalities in the                           



                          composition of the                           



                          blood or urine or                           



                          abnormalities in                           



                          imaging tests).                           

 

             Lab Interpretation Abnormal                               



             (test code = 68852-8)                                        



[facility]. METABOLIC PANEL ([id number])2021-04-10 
10:42:51





             Test Item    Value        Reference Range Interpretation Comments

 

             NA (test code = 138 mmol/L   135-145                   



             [id number])                                         

 

             K (test code = 4.3 mmol/L   3.5-5.0                   



             4002055723)                                         

 

             CL (test code = 108 mmol/L                       



             1298114773)                                         

 

             CO2 TOTAL (test code = 22 mmol/L    23-31        L            



             4829700191)                                         

 

             AGAP (test code =              2-16                      



             6230484344)                                         

 

             BUN (test code = 37 mg/dL     7-23         H            



             1850537121)                                         

 

             GLUCOSE (test code = 256 mg/dL           H            



             5056849221)                                         

 

             CREATININE (test code = 2.96 mg/dL   0.60-1.25    H            



             8499130606)                                         

 

             TOTAL BILI (test code = 0.3 mg/dL    0.1-1.1                   



             1276202970)                                         

 

             CALCIUM (test code = 8.0 mg/dL    8.6-10.6     L            



             6564025395)                                         

 

             T PROTEIN (test code = 5.9 g/dL     6.3-8.2      L            



             4059402525)                                         

 

             ALBUMIN (test code = 2.8 g/dL     3.5-5.0      L            



             3457266699)                                         

 

             ALK PHOS (test code = 90 U/L                           



             4187429353)                                         

 

             ALTv (test code = 18 U/L       5-50                      



             1742-6)                                             

 

             AST(SGOT) (test code = 25 U/L       13-40                     



             5102603023)                                         

 

             eGFR (test code =              mL/min/1.73m2              



             2831409724)                                         

 

             ELIJAH (test code = ELIJAH) Association of                           



                          Glomerular Filtration                           



                          Rate (GFR) and Staging                           



                          of Kidney Disease*                           



                          +---------------------                           



                          --+-------------------                           



                          --+-------------------                           



                          ------+| GFR                           



                          (mL/min/1.73 m2) ?|                           



                          With Kidney Damage ?|                           



                          ?Without Kidney                           



                          Damage+---------------                           



                          --------+-------------                           



                          --------+-------------                           



                          ------------+| ?>90 ?                           



                          ? ? ? ? ? ? ? ?|                           



                          ?Stage one ? ? ? ? ?|                           



                          ? Normal ? ? ? ? ? ? ?                           



                          ?+--------------------                           



                          ---+------------------                           



                          ---+------------------                           



                          -------+| ?60-89 ? ? ?                           



                          ? ? ? ? ?| ?Stage two                           



                          ? ? ? ? ?| ? Decreased                           



                          GFR ? ? ? ?                            



                          +---------------------                           



                          --+-------------------                           



                          --+-------------------                           



                          ------+| ?30-59 ? ? ?                           



                          ? ? ? ? ?| ?Stage                           



                          three ? ? ? ?| ? Stage                           



                          three ? ? ? ? ?                           



                          +---------------------                           



                          --+-------------------                           



                          --+-------------------                           



                          ------+| ?15-29 ? ? ?                           



                          ? ? ? ? ?| ?Stage four                           



                          ? ? ? ? | ? Stage four                           



                          ? ? ? ? ?                              



                          ?+--------------------                           



                          ---+------------------                           



                          ---+------------------                           



                          -------+| ?<15 (or                           



                          dialysis) ? ?| ?Stage                           



                          five ? ? ? ? | ? Stage                           



                          five ? ? ? ? ?                           



                          ?+--------------------                           



                          ---+------------------                           



                          ---+------------------                           



                          -------+ *Each stage                           



                          assumes the associated                           



                          GFR level has been in                           



                          effect for at least                           



                          three months. ?Stages                           



                          1 to 5, with or                           



                          without kidney                           



                          disease, indicate                           



                          chronic kidney                           



                          disease. Notes:                           



                          Determination of                           



                          stages one and two                           



                          (with eGFR                             



                          >59mL/min/1.73 m2)                           



                          requires estimation of                           



                          kidney damage for at                           



                          least three months as                           



                          defined by structural                           



                          or functional                           



                          abnormalities of the                           



                          kidney, manifested by                           



                          either:Pathological                           



                          abnormalities or                           



                          Markers of kidney                           



                          damage (including                           



                          abnormalities in the                           



                          composition of the                           



                          blood or urine or                           



                          abnormalities in                           



                          imaging tests).                           

 

             Lab Interpretation Abnormal                               



             (test code = 62487-8)                                        



[facility]. METABOLIC PANEL ([id number])2021-04-10 
10:42:51





             Test Item    Value        Reference Range Interpretation Comments

 

             NA (test code = 138 mmol/L   135-145                   



             6804281312)                                         

 

             K (test code = 4.3 mmol/L   3.5-5.0                   



             4048000782)                                         

 

             CL (test code = 108 mmol/L                       



             4031584100)                                         

 

             CO2 TOTAL (test code = 22 mmol/L    23-31        L            



             2827195987)                                         

 

             AGAP (test code =              2-16                      



             3600104081)                                         

 

             BUN (test code = 37 mg/dL     7-23         H            



             7573223060)                                         

 

             GLUCOSE (test code = 256 mg/dL           H            



             2760518076)                                         

 

             CREATININE (test code = 2.96 mg/dL   0.60-1.25    H            



             0039256570)                                         

 

             TOTAL BILI (test code = 0.3 mg/dL    0.1-1.1                   



             7561984364)                                         

 

             CALCIUM (test code = 8.0 mg/dL    8.6-10.6     L            



             2754439806)                                         

 

             T PROTEIN (test code = 5.9 g/dL     6.3-8.2      L            



             0166045426)                                         

 

             ALBUMIN (test code = 2.8 g/dL     3.5-5.0      L            



             4056633676)                                         

 

             ALK PHOS (test code = 90 U/L                           



             9406079235)                                         

 

             ALTv (test code = 18 U/L       5-50                      



             1742-6)                                             

 

             AST(SGOT) (test code = 25 U/L       13-40                     



             7822249930)                                         

 

             eGFR (test code =              mL/min/1.73m2              



             9807390841)                                         

 

             ELIJAH (test code = ELIJAH) Association of                           



                          Glomerular Filtration                           



                          Rate (GFR) and Staging                           



                          of Kidney Disease*                           



                          +---------------------                           



                          --+-------------------                           



                          --+-------------------                           



                          ------+| GFR                           



                          (mL/min/1.73 m2) ?|                           



                          With Kidney Damage ?|                           



                          ?Without Kidney                           



                          Damage+---------------                           



                          --------+-------------                           



                          --------+-------------                           



                          ------------+| ?>90 ?                           



                          ? ? ? ? ? ? ? ?|                           



                          ?Stage one ? ? ? ? ?|                           



                          ? Normal ? ? ? ? ? ? ?                           



                          ?+--------------------                           



                          ---+------------------                           



                          ---+------------------                           



                          -------+| ?60-89 ? ? ?                           



                          ? ? ? ? ?| ?Stage two                           



                          ? ? ? ? ?| ? Decreased                           



                          GFR ? ? ? ?                            



                          +---------------------                           



                          --+-------------------                           



                          --+-------------------                           



                          ------+| ?30-59 ? ? ?                           



                          ? ? ? ? ?| ?Stage                           



                          three ? ? ? ?| ? Stage                           



                          three ? ? ? ? ?                           



                          +---------------------                           



                          --+-------------------                           



                          --+-------------------                           



                          ------+| ?15-29 ? ? ?                           



                          ? ? ? ? ?| ?Stage four                           



                          ? ? ? ? | ? Stage four                           



                          ? ? ? ? ?                              



                          ?+--------------------                           



                          ---+------------------                           



                          ---+------------------                           



                          -------+| ?<15 (or                           



                          dialysis) ? ?| ?Stage                           



                          five ? ? ? ? | ? Stage                           



                          five ? ? ? ? ?                           



                          ?+--------------------                           



                          ---+------------------                           



                          ---+------------------                           



                          -------+ *Each stage                           



                          assumes the associated                           



                          GFR level has been in                           



                          effect for at least                           



                          three months. ?Stages                           



                          1 to 5, with or                           



                          without kidney                           



                          disease, indicate                           



                          chronic kidney                           



                          disease. Notes:                           



                          Determination of                           



                          stages one and two                           



                          (with eGFR                             



                          >59mL/min/1.73 m2)                           



                          requires estimation of                           



                          kidney damage for at                           



                          least three months as                           



                          defined by structural                           



                          or functional                           



                          abnormalities of the                           



                          kidney, manifested by                           



                          either:Pathological                           



                          abnormalities or                           



                          Markers of kidney                           



                          damage (including                           



                          abnormalities in the                           



                          composition of the                           



                          blood or urine or                           



                          abnormalities in                           



                          imaging tests).                           

 

             Lab Interpretation Abnormal                               



             (test code = 20156-2)                                        



[facility]48





             Test Item    Value        Reference Range Interpretation Comments

 

             PROTIME PATIENT (test              See_Comment                [Auto

mated message]



             code = 5964-2)                                        The system 

[employer]



                                                                 generated this 

result



                                                                 transmitted ref

erence



                                                                 range: 12.0 - 1

4.7



                                                                 Seconds. The re

ference



                                                                 range was not u

sed to



                                                                 interpret this 

result



                                                                 as normal/abnor

mal.

 

             INR (test code = 6301-6)                                        Nor

mal INR <1.1;



                                                                 Warfarin Therap

eutic



                                                                 range 2.0 to 3.

0 or



                                                                 2.5 to 3.5, dep

ending



                                                                 upon the indica

tions.

 

             Lab Interpretation (test Normal                                 



             code = 03014-2)                                        



[facility] 10:19:48





             Test Item    Value        Reference Range Interpretation Comments

 

             PROTIME PATIENT (test              See_Comment                [Auto

mated message]



             code = 5964-2)                                        The system 

[employer]



                                                                 generated this 

result



                                                                 transmitted ref

erence



                                                                 range: 12.0 - 1

4.7



                                                                 Seconds. The re

ference



                                                                 range was not u

sed to



                                                                 interpret this 

result



                                                                 as normal/abnor

mal.

 

             INR (test code = 6301-6)                                        Nor

mal INR <1.1;



                                                                 Warfarin Therap

eutic



                                                                 range 2.0 to 3.

0 or



                                                                 2.5 to 3.5, dep

ending



                                                                 upon the indica

tions.

 

             Lab Interpretation (test Normal                                 



             code = 01568-4)                                        



[facility] 10:19:48





             Test Item    Value        Reference Range Interpretation Comments

 

             PROTIME PATIENT (test              See_Comment                [Auto

mated message]



             code = 5964-2)                                        The system 

[employer]



                                                                 generated this 

result



                                                                 transmitted ref

erence



                                                                 range: 12.0 - 1

4.7



                                                                 Seconds. The re

ference



                                                                 range was not u

sed to



                                                                 interpret this 

result



                                                                 as normal/abnor

mal.

 

             INR (test code = 6301-6)                                        Nor

mal INR <1.1;



                                                                 Warfarin Therap

eutic



                                                                 range 2.0 to 3.

0 or



                                                                 2.5 to 3.5, dep

ending



                                                                 upon the indica

tions.

 

             Lab Interpretation (test Normal                                 



             code = 27850-1)                                        



[facility] 
09:34:14





             Test Item    Value        Reference Range Interpretation Comments

 

             HGB A1C (test code = 9.7 %        4.0-6.0      H            



             4548-4)                                             

 

             ELIJAH (test code = ELIJAH) %A1C (NGSP)                            



                          Interpretation                           



                          (ADA)4.8-5.6 ? ?                           



                          Normal or                              



                          (Non-Diabetic                           



                          Range)5.7-6.4 ? ?                           



                          Increased Risk                           



                          (Pre-Diabetic)>6.5 ? ?                           



                          ? ?Diabetes Indicated                           

 

             Lab Interpretation Abnormal                               



             (test code = 54144-3)                                        



[facility]10 
09:34:14





             Test Item    Value        Reference Range Interpretation Comments

 

             HGB A1C (test code = 9.7 %        4.0-6.0      H            



             4548-4)                                             

 

             ELIJAH (test code = ELIJAH) %A1C (NGSP)                            



                          Interpretation                           



                          (ADA)4.8-5.6 ? ?                           



                          Normal or                              



                          (Non-Diabetic                           



                          Range)5.7-6.4 ? ?                           



                          Increased Risk                           



                          (Pre-Diabetic)>6.5 ? ?                           



                          ? ?Diabetes Indicated                           

 

             Lab Interpretation Abnormal                               



             (test code = 14705-9)                                        



[facility]21-04-10 
09:34:14





             Test Item    Value        Reference Range Interpretation Comments

 

             HGB A1C (test code = 9.7 %        4.0-6.0      H            



             4548-4)                                             

 

             ELIJAH (test code = ELIJAH) %A1C (NGSP)                            



                          Interpretation                           



                          (ADA)4.8-5.6 ? ?                           



                          Normal or                              



                          (Non-Diabetic                           



                          Range)5.7-6.4 ? ?                           



                          Increased Risk                           



                          (Pre-Diabetic)>6.5 ? ?                           



                          ? ?Diabetes Indicated                           

 

             Lab Interpretation Abnormal                               



             (test code = 00351-4)                                        



[facility] 08:57:48





             Test Item    Value        Reference Range Interpretation Comments

 

             LACTIC ACID (test code = 0.71 mmol/L  0.50-2.20                 



             6318569696)                                         

 

             Lab Interpretation (test code = Normal                             

    



             32423-0)                                            



[facility]21[facility]10 08:57:48





             Test Item    Value        Reference Range Interpretation Comments

 

             LACTIC ACID (test code = 0.71 mmol/L  0.50-2.20                 



             3846072803)                                         

 

             Lab Interpretation (test code = Normal                             

    



             32028-0)                                            



[facility]2021-04-10 08:57:48





             Test Item    Value        Reference Range Interpretation Comments

 

             LACTIC ACID (test code = 0.71 mmol/L  0.50-2.20                 



             1767189738)                                         

 

             Lab Interpretation (test code = Normal                             

    



             55629-2)                                            



[facility]2021-04-10 07:41:31 
Destruction of the plantar aspect of the fifth distal phalanx, suggestiveof 
osteomyelitis. No soft tissue air is appreciated. RL: 460 AFC: [id number] 
Electronically signed by Juany Birmingham MD, PhD at 4/10/2021 2:41 AMOrdering
physician: HERIBERTO DACOSTA INDICATION: Right foot wound COMPARISON: Right foot 
radiographs dated 2021 TECHNIQUE: Axial images of the right foot were 
performed without theadministration of intravenous contrast. Images were 
reformatted in thecoronal and sagittal plane. CT scanwas performed according to 
ALARA (aslow as reasonably achievable) policy. FINDINGS: No acute fracture or 
dislocation of the right foot isappreciated. There is destruction of the plantar
aspect of the fifth distalphalanx (series 6, image 11). There is no soft tissue 
air. [facility], Radiant Results Inft User- 04/10/2021 2:42 AM CDTOrdering physician: 
HERIBERTO RESTREPONDICATION: Right foot woundCOMPARISON: Right foot radiographs 
dated 2021TECHNIQUE: Axial images of the right foot were performed without 
theadministration of intravenous contrast. Images were reformatted in thecoronal
and sagittal plane. CTscan was performed according to ALARA (aslow as reasonably
achievable) policy.FINDINGS: No acute fracture or dislocation of the right foot 
isappreciated. There is destruction of the plantar aspect of the fifth 
distalphalanx (series 6, image 11). There is no soft tissue 
air.IMPRESSIONDestruction of theplantar aspect of the fifth distal phalanx, 
suggestiveof osteomyelitis. No soft tissue air is appreciated.RL: 460AFC: 
[id number] signed by Juany Birmingham MD, PhD at 4/10/2021 2:41 AM
[facility]2021-04-10 07:41:31 
Destruction of the plantar aspect of the fifth distal phalanx, suggestiveof 
osteomyelitis. No soft tissue air is appreciated. RL: 460 AFC: [id number] 
Electronically signed by Juany Birmingham MD, PhD at 4/10/2021 2:41 AMOrdering
physician: HERIBERTO DACOSTA INDICATION: Right foot wound COMPARISON: Right foot 
radiographs dated 2021 TECHNIQUE: Axial images of the right foot were 
performed without theadministration of intravenous contrast. Images were 
reformatted in thecoronal and sagittal plane. CT scanwas performed according to 
ALARA (aslow as reasonably achievable) policy. FINDINGS: No acute fracture or 
dislocation of the right foot isappreciated. There is destruction of the plantar
aspect of the fifth distalphalanx (series 6, image 11). There is no soft tissue 
air. [facility], Radiant Results Inft User- 04/10/2021 2:42 AM CDTOrdering physician: 
HERIBERTO RESTREPONDICATION: Right foot woundCOMPARISON: Right foot radiographs 
dated 2021TECHNIQUE: Axial images of the right foot were performed without 
theadministration of intravenous contrast. Images were reformatted in thecoronal
and sagittal plane. CTscan was performed according to ALARA (aslow as reasonably
achievable) policy.FINDINGS: No acute fracture or dislocation of the right foot 
isappreciated. There is destruction of the plantar aspect of the fifth 
distalphalanx (series 6, image 11). There is no soft tissue 
air.IMPRESSIONDestruction of theplantar aspect of the fifth distal phalanx, 
suggestiveof osteomyelitis. No soft tissue air is appreciated.RL: 460AFC: 
[id number] signed by Juany Birmingham MD, PhD at 4/10/2021 2:41 AM
[facility]2021-04-10 07:41:31 
Destruction of the plantar aspect of the fifth distal phalanx, suggestiveof 
osteomyelitis. No soft tissue air is appreciated. RL: 460 AFC: [id number] 
Electronically signed by Juany Birmingham MD, PhD at 4/10/2021 2:41 AMOrdering
physician: HERIBERTO DACOSTA INDICATION: Right foot wound COMPARISON: Right foot 
radiographs dated 2021 TECHNIQUE: Axial images of the right foot were 
performed without theadministration of intravenous contrast. Images were 
reformatted in thecoronal and sagittal plane. CT scanwas performed according to 
ALARA (aslow as reasonably achievable) policy. FINDINGS: No acute fracture or 
dislocation of the right foot isappreciated. There is destruction of the plantar
aspect of the fifth distalphalanx (series 6, image 11). There is no soft tissue 
air. [facility], Radiant Results Inft User- 04/10/2021 2:42 AM CDTOrdering physician: 
HERIBERTO RESTREPONDICATION: Right foot woundCOMPARISON: Right foot radiographs 
dated 2021TECHNIQUE: Axial images of the right foot were performed without 
theadministration of intravenous contrast. Images were reformatted in thecoronal
and sagittal plane. CTscan was performed according to ALARA (aslow as reasonably
achievable) policy.FINDINGS: No acute fracture or dislocation of the right foot 
isappreciated. There is destruction of the plantar aspect of the fifth 
distalphalanx (series 6, image 11). There is no soft tissue 
air.IMPRESSIONDestruction of theplantar aspect of the fifth distal phalanx, 
suggestiveof osteomyelitis. No soft tissue air is appreciated.RL: 460AFC: 
[id number] signed by Juany Birmingham MD, PhD at 4/10/2021 2:41 AM
[facility] VW2021-04-10 07:34:361. ?No 
radiographic evidence of acute cardiopulmonary process. RL: [id number]: [id number] End 
of Report Electronically signed by Judah River MD at 4/10/2021 2:34 
AMEXAM: XR CHEST 1  ORDERING PHYSICIAN: Paul ROSARIO HISTORY: Preoperative
exam. COMPARISON: none TECHNICAL QUALITY: Adequate FINDINGS:Portable semiupright
frontal view of the chest. ?The lungs are clear. ?Theheart is normal in size. Me
diastinal and hilar contours are normal. Pulmonary vascularity is normal. 
Osseous structures are unremarkable. [facility], Radiant Results Inft User - 
04/10/2021 2:35 AM CDTEXAM: XR CHEST 1 VWORDERING PHYSICIAN: HERIBERTO DACOSTA 
HISTORY: Preoperative exam.COMPARISON: noneTECHNICAL QUALITY: 
AdequateFINDINGS:Portable semiupright frontal view of the chest. The lungs are 
clear. Theheart is normal in size. Mediastinal and hilar contours are normal. 
Pulmonary vascularity is normal. Osseous structures are unremarkab
le.IMPRESSION1. No radiographic evidence of acute cardiopulmonary process.RL: 
[id number]: [id number] of ReportElectronically signed by Judah River MD at 
4/10/2021 2:34 AMB[facility] ClubXR CHEST 1 VW2021-04-10 
07:34:361. ?No radiographic evidence of acute cardiopulmonary process. RL: 
[id number]: [id number] End of Report Electronically signed by Judah River MD at 
4/10/2021 2:34 AMEXAM: XR CHEST 1 VW ORDERING PHYSICIAN: Paul ROSARIO 
HISTORY: Preoperative exam. COMPARISON: none TECHNICAL QUALITY: Adequate 
FINDINGS:Portable semiupright frontal view of the chest. ?The lungs are clear. 
?Theheart is normal in size. Mediastinal and hilar contours are normal. 
Pulmonary vascularity is normal. Osseous structures are unremarkable. [facility], 
Radiant Results Inft User - 04/10/2021 2:35 AM CDTEXAM: XR CHEST 1 VWORDERING 
PHYSICIAN: HERIBERTO DACOSTA HISTORY: Preoperative exam.COMPARISON: noneTECHNICAL 
QUALITY: AdequateFINDINGS:Portable semiupright frontal view of the chest. The 
lungs are clear. Theheart is normal in size. Mediastinal and hilar contours are 
normal. Pulmonary vascularity is normal. Osseous structures are unremarkab
le.IMPRESSION1. No radiographic evidence of acute cardiopulmonary process.RL: 
[id number]: [id number] of ReportElectronically signed by Judah River MD at 
4/10/2021 2:34 AMB[facility] ClubXR CHEST 1 VW2021-04-10 
07:34:361. ?No radiographic evidence of acute cardiopulmonary process. RL: 
[id number]: [id number] End of Report Electronically signed by Judah River MD at 
4/10/2021 2:34 AMEXAM: XR CHEST 1 VW ORDERING PHYSICIAN: ? ADNAN ?SHARMAINE 
HISTORY: Preoperative exam. COMPARISON: none TECHNICAL QUALITY: Adequate 
FINDINGS:Portable semiupright frontal view of the chest. ?The lungs are clear. 
?Theheart is normal in size. Mediastinal and hilar contours are normal. 
Pulmonary vascularity is normal. Osseous structures are unremarkable. [facility], 
Radiant Results Inft User - 04/10/2021 2:35 AM CDTEXAM: XR CHEST 1 VWORDERING 
PHYSICIAN: HERIBERTO DACOSTA HISTORY: Preoperative exam.COMPARISON: noneTECHNICAL 
QUALITY: AdequateFINDINGS:Portable semiupright frontal view of the chest. The 
lungs are clear.  Theheart is normal in size. Mediastinal and hilar contours are
normal. Pulmonary vascularity is normal. Osseous structures are unremarka
ble.IMPRESSION1. No radiographic evidence of acute cardiopulmonary process.RL: 
[id number]: [id number] of ReportElectronically signed by Judah River MD at 
4/10/2021 2:34 AMHouston [facility]. [facility]OLIC [facility] (86686)
2021-04-10 02:53:14





             Test Item    Value        Reference Range Interpretation Comments

 

             NA (test code = 138 mmol/L   135-145                   



             4080244732)                                         

 

             K (test code = 4.2 mmol/L   3.5-5.0                   



             1721244344)                                         

 

             CL (test code = 105 mmol/L                       



             9795037659)                                         

 

             CO2 TOTAL (test code = 23 mmol/L    23-31                     



             6935727120)                                         

 

             AGAP (test code =              2-16                      



             0708151165)                                         

 

             BUN (test code = 39 mg/dL     7-23         H            



             8303998750)                                         

 

             GLUCOSE (test code = 284 mg/dL           H            



             3039123547)                                         

 

             CREATININE (test code = 3.38 mg/dL   0.60-1.25    H            



             3826778977)                                         

 

             TOTAL BILI (test code = 0.3 mg/dL    0.1-1.1                   



             3844717177)                                         

 

             CALCIUM (test code = 8.0 mg/dL    8.6-10.6     L            



             6255835058)                                         

 

             T PROTEIN (test code = 7.3 g/dL     6.3-8.2                   



             0209900384)                                         

 

             ALBUMIN (test code = 3.4 g/dL     3.5-5.0      L            



             6026389223)                                         

 

             ALK PHOS (test code = 105 U/L                          



             9975041442)                                         

 

             ALTv (test code = 21 U/L       5-50                      



             1742-6)                                             

 

             AST(SGOT) (test code = 33 U/L       13-40                     



             9828504648)                                         

 

             eGFR (test code =              mL/min/1.73m2              



             1931811732)                                         

 

             ELIJAH (test code = ELIJAH) Association of                           



                          Glomerular Filtration                           



                          Rate (GFR) and Staging                           



                          of Kidney Disease*                           



                          +---------------------                           



                          --+-------------------                           



                          --+-------------------                           



                          ------+| GFR                           



                          (mL/min/1.73 m2) ?|                           



                          With Kidney Damage ?|                           



                          ?Without Kidney                           



                          Damage+---------------                           



                          --------+-------------                           



                          --------+-------------                           



                          ------------+| ?>90 ?                           



                          ? ? ? ? ? ? ? ?|                           



                          ?Stage one ? ? ? ? ?|                           



                          ? Normal ? ? ? ? ? ? ?                           



                          ?+--------------------                           



                          ---+------------------                           



                          ---+------------------                           



                          -------+| ?60-89 ? ? ?                           



                          ? ? ? ? ?| ?Stage two                           



                          ? ? ? ? ?| ? Decreased                           



                          GFR ? ? ? ?                            



                          +---------------------                           



                          --+-------------------                           



                          --+-------------------                           



                          ------+| ?30-59 ? ? ?                           



                          ? ? ? ? ?| ?Stage                           



                          three ? ? ? ?| ? Stage                           



                          three ? ? ? ? ?                           



                          +---------------------                           



                          --+-------------------                           



                          --+-------------------                           



                          ------+| ?15-29 ? ? ?                           



                          ? ? ? ? ?| ?Stage four                           



                          ? ? ? ? | ? Stage four                           



                          ? ? ? ? ?                              



                          ?+--------------------                           



                          ---+------------------                           



                          ---+------------------                           



                          -------+| ?<15 (or                           



                          dialysis) ? ?| ?Stage                           



                          five ? ? ? ? | ? Stage                           



                          five ? ? ? ? ?                           



                          ?+--------------------                           



                          ---+------------------                           



                          ---+------------------                           



                          -------+ *Each stage                           



                          assumes the associated                           



                          GFR level has been in                           



                          effect for at least                           



                          three months. ?Stages                           



                          1 to 5, with or                           



                          without kidney                           



                          disease, indicate                           



                          chronic kidney                           



                          disease. Notes:                           



                          Determination of                           



                          stages one and two                           



                          (with eGFR                             



                          >59mL/min/1.73 m2)                           



                          requires estimation of                           



                          kidney damage for at                           



                          least three months as                           



                          defined by structural                           



                          or functional                           



                          abnormalities of the                           



                          kidney, manifested by                           



                          either:Pathological                           



                          abnormalities or                           



                          Markers of kidney                           



                          damage (including                           



                          abnormalities in the                           



                          composition of the                           



                          blood or urine or                           



                          abnormalities in                           



                          imaging tests).                           

 

             Lab Interpretation Abnormal                               



             (test code = 64556-4)                                        



[facility]. METABOLIC PANEL (05608)2021-04-10 
02:53:14





             Test Item    Value        Reference Range Interpretation Comments

 

             NA (test code = 138 mmol/L   135-145                   



             9195269517)                                         

 

             K (test code = 4.2 mmol/L   3.5-5.0                   



             1161777685)                                         

 

             CL (test code = 105 mmol/L                       



             8490095149)                                         

 

             CO2 TOTAL (test code = 23 mmol/L    23-31                     



             2523088737)                                         

 

             AGAP (test code =              2-16                      



             [id number])                                         

 

             BUN (test code = 39 mg/dL     7-23         H            



             3239975498)                                         

 

             GLUCOSE (test code = 284 mg/dL           H            



             9862572289)                                         

 

             CREATININE (test code = 3.38 mg/dL   0.60-1.25    H            



             4254351909)                                         

 

             TOTAL BILI (test code = 0.3 mg/dL    0.1-1.1                   



             9220558681)                                         

 

             CALCIUM (test code = 8.0 mg/dL    8.6-10.6     L            



             4403393909)                                         

 

             T PROTEIN (test code = 7.3 g/dL     6.3-8.2                   



             3162946852)                                         

 

             ALBUMIN (test code = 3.4 g/dL     3.5-5.0      L            



             8460058662)                                         

 

             ALK PHOS (test code = 105 U/L                          



             0168091181)                                         

 

             ALTv (test code = 21 U/L       5-50                      



             1742-6)                                             

 

             AST(SGOT) (test code = 33 U/L       13-40                     



             3034816758)                                         

 

             eGFR (test code =              mL/min/1.73m2              



             1687425766)                                         

 

             ELIJAH (test code = ELIJAH) Association of                           



                          Glomerular Filtration                           



                          Rate (GFR) and Staging                           



                          of Kidney Disease*                           



                          +---------------------                           



                          --+-------------------                           



                          --+-------------------                           



                          ------+| GFR                           



                          (mL/min/1.73 m2) ?|                           



                          With Kidney Damage ?|                           



                          ?Without Kidney                           



                          Damage+---------------                           



                          --------+-------------                           



                          --------+-------------                           



                          ------------+| ?>90 ?                           



                          ? ? ? ? ? ? ? ?|                           



                          ?Stage one ? ? ? ? ?|                           



                          ? Normal ? ? ? ? ? ? ?                           



                          ?+--------------------                           



                          ---+------------------                           



                          ---+------------------                           



                          -------+| ?60-89 ? ? ?                           



                          ? ? ? ? ?| ?Stage two                           



                          ? ? ? ? ?| ? Decreased                           



                          GFR ? ? ? ?                            



                          +---------------------                           



                          --+-------------------                           



                          --+-------------------                           



                          ------+| ?30-59 ? ? ?                           



                          ? ? ? ? ?| ?Stage                           



                          three ? ? ? ?| ? Stage                           



                          three ? ? ? ? ?                           



                          +---------------------                           



                          --+-------------------                           



                          --+-------------------                           



                          ------+| ?15-29 ? ? ?                           



                          ? ? ? ? ?| ?Stage four                           



                          ? ? ? ? | ? Stage four                           



                          ? ? ? ? ?                              



                          ?+--------------------                           



                          ---+------------------                           



                          ---+------------------                           



                          -------+| ?<15 (or                           



                          dialysis) ? ?| ?Stage                           



                          five ? ? ? ? | ? Stage                           



                          five ? ? ? ? ?                           



                          ?+--------------------                           



                          ---+------------------                           



                          ---+------------------                           



                          -------+ *Each stage                           



                          assumes the associated                           



                          GFR level has been in                           



                          effect for at least                           



                          three months. ?Stages                           



                          1 to 5, with or                           



                          without kidney                           



                          disease, indicate                           



                          chronic kidney                           



                          disease. Notes:                           



                          Determination of                           



                          stages one and two                           



                          (with eGFR                             



                          >59mL/min/1.73 m2)                           



                          requires estimation of                           



                          kidney damage for at                           



                          least three months as                           



                          defined by structural                           



                          or functional                           



                          abnormalities of the                           



                          kidney, manifested by                           



                          either:Pathological                           



                          abnormalities or                           



                          Markers of kidney                           



                          damage (including                           



                          abnormalities in the                           



                          composition of the                           



                          blood or urine or                           



                          abnormalities in                           



                          imaging tests).                           

 

             Lab Interpretation Abnormal                               



             (test code = 35744-2)                                        



[facility]. METABOLIC PANEL (48213)2021-04-10 
02:53:14





             Test Item    Value        Reference Range Interpretation Comments

 

             NA (test code = 138 mmol/L   135-145                   



             [id number])                                         

 

             K (test code = 4.2 mmol/L   3.5-5.0                   



             3025956615)                                         

 

             CL (test code = 105 mmol/L                       



             6582081077)                                         

 

             CO2 TOTAL (test code = 23 mmol/L    23-31                     



             0258348355)                                         

 

             AGAP (test code =              2-16                      



             2212299694)                                         

 

             BUN (test code = 39 mg/dL     7-23         H            



             6173661398)                                         

 

             GLUCOSE (test code = 284 mg/dL           H            



             3295993040)                                         

 

             CREATININE (test code = 3.38 mg/dL   0.60-1.25    H            



             4919877887)                                         

 

             TOTAL BILI (test code = 0.3 mg/dL    0.1-1.1                   



             3925054685)                                         

 

             CALCIUM (test code = 8.0 mg/dL    8.6-10.6     L            



             4073920434)                                         

 

             T PROTEIN (test code = 7.3 g/dL     6.3-8.2                   



             7647956125)                                         

 

             ALBUMIN (test code = 3.4 g/dL     3.5-5.0      L            



             2723834919)                                         

 

             ALK PHOS (test code = 105 U/L                          



             5981767875)                                         

 

             ALTv (test code = 21 U/L       5-50                      



             1742-6)                                             

 

             AST(SGOT) (test code = 33 U/L       13-40                     



             3493335824)                                         

 

             eGFR (test code =              mL/min/1.73m2              



             8619748277)                                         

 

             ELIJAH (test code = ELIJAH) Association of                           



                          Glomerular Filtration                           



                          Rate (GFR) and Staging                           



                          of Kidney Disease*                           



                          +---------------------                           



                          --+-------------------                           



                          --+-------------------                           



                          ------+| GFR                           



                          (mL/min/1.73 m2) ?|                           



                          With Kidney Damage ?|                           



                          ?Without Kidney                           



                          Damage+---------------                           



                          --------+-------------                           



                          --------+-------------                           



                          ------------+| ?>90 ?                           



                          ? ? ? ? ? ? ? ?|                           



                          ?Stage one ? ? ? ? ?|                           



                          ? Normal ? ? ? ? ? ? ?                           



                          ?+--------------------                           



                          ---+------------------                           



                          ---+------------------                           



                          -------+| ?60-89 ? ? ?                           



                          ? ? ? ? ?| ?Stage two                           



                          ? ? ? ? ?| ? Decreased                           



                          GFR ? ? ? ?                            



                          +---------------------                           



                          --+-------------------                           



                          --+-------------------                           



                          ------+| ?30-59 ? ? ?                           



                          ? ? ? ? ?| ?Stage                           



                          three ? ? ? ?| ? Stage                           



                          three ? ? ? ? ?                           



                          +---------------------                           



                          --+-------------------                           



                          --+-------------------                           



                          ------+| ?15-29 ? ? ?                           



                          ? ? ? ? ?| ?Stage four                           



                          ? ? ? ? | ? Stage four                           



                          ? ? ? ? ?                              



                          ?+--------------------                           



                          ---+------------------                           



                          ---+------------------                           



                          -------+| ?<15 (or                           



                          dialysis) ? ?| ?Stage                           



                          five ? ? ? ? | ? Stage                           



                          five ? ? ? ? ?                           



                          ?+--------------------                           



                          ---+------------------                           



                          ---+------------------                           



                          -------+ *Each stage                           



                          assumes the associated                           



                          GFR level has been in                           



                          effect for at least                           



                          three months. ?Stages                           



                          1 to 5, with or                           



                          without kidney                           



                          disease, indicate                           



                          chronic kidney                           



                          disease. Notes:                           



                          Determination of                           



                          stages one and two                           



                          (with eGFR                             



                          >59mL/min/1.73 m2)                           



                          requires estimation of                           



                          kidney damage for at                           



                          least three months as                           



                          defined by structural                           



                          or functional                           



                          abnormalities of the                           



                          kidney, manifested by                           



                          either:Pathological                           



                          abnormalities or                           



                          Markers of kidney                           



                          damage (including                           



                          abnormalities in the                           



                          composition of the                           



                          blood or urine or                           



                          abnormalities in                           



                          imaging tests).                           

 

             Lab Interpretation Abnormal                               



             (test code = 69765-4)                                        



[facility] 
[facility]03:46





             Test Item    Value        Reference Range Interpretation Comments

 

             SARS-CoV-2 Rapid ID NOW Not Detected Not Detected              



             (test code = 54628-0)                                        

 

             ELIJAH (test code = ELIJAH) ID NOW COVID-19 Assay                        

   



                          is an isothermal                           



                          nucleic acid                           



                          amplification test                           



                          intended for the                           



                          qualitative detection                           



                          of nucleic acid from                           



                          SARS-CoV-2 viral RNA                           



                          in nasopharyngeal (NP)                           



                          specimens. It is used                           



                          under Emergency Use                           



                          Authorization (EUA) by                           



                          FDA. The limit of                           



                          detection (LOD) of the                           



                          assay is 125 Genome                           



                          Equivalents/mL. A                           



                          positive result is                           



                          indicative of the                           



                          presence of SARS-CoV-2                           



                          RNA. ?Clinical                           



                          correlation with                           



                          patient history and                           



                          other diagnostic                           



                          information is                           



                          necessary to determine                           



                          patient infection                           



                          status. A negative                           



                          (Not Detected) result                           



                          does not preclude                           



                          SARS-CoV-2 infection.                           



                          In patients with                           



                          clinical symptoms and                           



                          other tests that are                           



                          consistent with                           



                          SARS-CoV-2 infection,                           



                          negative results                           



                          should be treated as                           



                          presumptive negative                           



                          and a new specimen                           



                          should be tested with                           



                          alternative PCR                           



                          molecular test.                           



                          Invalid: Please                           



                          collect a new specimen                           



                          for repeat patient                           



                          testing if clinically                           



                          indicated.                             

 

             Lab Interpretation Normal                                 



             (test code = 17001-6)                                        



[facility] 
02:03:46





             Test Item    Value        Reference Range Interpretation Comments

 

             SARS-CoV-2 Rapid ID NOW Not Detected Not Detected              



             (test code = 73343-3)                                        

 

             ELIJAH (test code = ELIJAH) ID NOW COVID-19 Assay                        

   



                          is an isothermal                           



                          nucleic acid                           



                          amplification test                           



                          intended for the                           



                          qualitative detection                           



                          of nucleic acid from                           



                          SARS-CoV-2 viral RNA                           



                          in nasopharyngeal (NP)                           



                          specimens. It is used                           



                          under Emergency Use                           



                          Authorization (EUA) by                           



                          FDA. The limit of                           



                          detection (LOD) of the                           



                          assay is 125 Genome                           



                          Equivalents/mL. A                           



                          positive result is                           



                          indicative of the                           



                          presence of SARS-CoV-2                           



                          RNA. ?Clinical                           



                          correlation with                           



                          patient history and                           



                          other diagnostic                           



                          information is                           



                          necessary to determine                           



                          patient infection                           



                          status. A negative                           



                          (Not Detected) result                           



                          does not preclude                           



                          SARS-CoV-2 infection.                           



                          In patients with                           



                          clinical symptoms and                           



                          other tests that are                           



                          consistent with                           



                          SARS-CoV-2 infection,                           



                          negative results                           



                          should be treated as                           



                          presumptive negative                           



                          and a new specimen                           



                          should be tested with                           



                          alternative PCR                           



                          molecular test.                           



                          Invalid: Please                           



                          collect a new specimen                           



                          for repeat patient                           



                          testing if clinically                           



                          indicated.                             

 

             Lab Interpretation Normal                                 



             (test code = 02202-2)                                        



[facility]10 
02:03:46





             Test Item    Value        Reference Range Interpretation Comments

 

             SARS-CoV-2 Rapid ID NOW Not Detected Not Detected              



             (test code = 01339-4)                                        

 

             ELIJAH (test code = ELIJAH) ID NOW COVID-19 Assay                        

   



                          is an isothermal                           



                          nucleic acid                           



                          amplification test                           



                          intended for the                           



                          qualitative detection                           



                          of nucleic acid from                           



                          SARS-CoV-2 viral RNA                           



                          in nasopharyngeal (NP)                           



                          specimens. It is used                           



                          under Emergency Use                           



                          Authorization (EUA) by                           



                          FDA. The limit of                           



                          detection (LOD) of the                           



                          assay is 125 Genome                           



                          Equivalents/mL. A                           



                          positive result is                           



                          indicative of the                           



                          presence of SARS-CoV-2                           



                          RNA. ?Clinical                           



                          correlation with                           



                          patient history and                           



                          other diagnostic                           



                          information is                           



                          necessary to determine                           



                          patient infection                           



                          status. A negative                           



                          (Not Detected) result                           



                          does not preclude                           



                          SARS-CoV-2 infection.                           



                          In patients with                           



                          clinical symptoms and                           



                          other tests that are                           



                          consistent with                           



                          SARS-CoV-2 infection,                           



                          negative results                           



                          should be treated as                           



                          presumptive negative                           



                          and a new specimen                           



                          should be tested with                           



                          alternative PCR                           



                          molecular test.                           



                          Invalid: Please                           



                          collect a new specimen                           



                          for repeat patient                           



                          testing if clinically                           



                          indicated.                             

 

             Lab Interpretation Normal                                 



             (test code = 76409-3)                                        



[facility] WITH DIFF2021-04-10 01:49:45





             Test Item    Value        Reference Range Interpretation Comments

 

             WBC (test code =              See_Comment  H             [Automated



             6690-2)                                             message] The



                                                                 system which



                                                                 generated this



                                                                 result transmit

ronnie



                                                                 reference range

:



                                                                 4.20 - 10.70



                                                                 10*3/?L. The



                                                                 reference range



                                                                 was not used to



                                                                 interpret this



                                                                 result as



                                                                 normal/abnormal

.

 

             RBC (test code =              See_Comment  L             [Automated



             789-8)                                              message] The



                                                                 system which



                                                                 generated this



                                                                 result transmit

ronnie



                                                                 reference range

:



                                                                 4.26 - 5.52



                                                                 10*6/?L. The



                                                                 reference range



                                                                 was not used to



                                                                 interpret this



                                                                 result as



                                                                 normal/abnormal

.

 

             HGB (test code = 12.1 g/dL    12.2-16.4    L            



             718-7)                                              

 

             HCT (test code = 35.9 %       38.4-49.3    L            



             4544-3)                                             

 

             MCV (test code = 85.5 fL      81.7-95.6                 



             787-2)                                              

 

             MCH (test code = 28.8 pg      26.1-32.7                 



             785-6)                                              

 

             MCHC (test code = 33.7 g/dL    31.2-35.0                 



             786-4)                                              

 

             RDW-SD (test code = 36.7 fL      38.5-51.6    L            



             54963-3)                                            

 

             RDW-CV (test code = 11.9 %       12.1-15.4    L            



             788-0)                                              

 

             PLT (test code =              See_Comment  H             [Automated



             777-3)                                              message] The



                                                                 system which



                                                                 generated this



                                                                 result transmit

ronnie



                                                                 reference range

:



                                                                 150 - 328 10*3/

?L.



                                                                 The reference



                                                                 range was not u

sed



                                                                 to interpret th

is



                                                                 result as



                                                                 normal/abnormal

.

 

             MPV (test code = 9.3 fL       9.8-13.0     L            



             27725-5)                                            

 

             NRBC/100 WBC (test              See_Comment                [Automat

ed



             code = 7049956339)                                        message] 

The



                                                                 system which



                                                                 generated this



                                                                 result transmit

ronnie



                                                                 reference range

:



                                                                 0.0 - 10.0 /100



                                                                 WBCs. The



                                                                 reference range



                                                                 was not used to



                                                                 interpret this



                                                                 result as



                                                                 normal/abnormal

.

 

             NRBC x10^3 (test code <0.01        See_Comment                [Auto

mated



             = [id number])                                        message] The



                                                                 system which



                                                                 generated this



                                                                 result transmit

ronnie



                                                                 reference range

:



                                                                 10*3/?L. The



                                                                 reference range



                                                                 was not used to



                                                                 interpret this



                                                                 result as



                                                                 normal/abnormal

.

 

             GRAN MAT (NEUT) % 73.8 %                                 



             (test code = 770-8)                                        

 

             IMM GRAN % (test code 0.60 %                                 



             = [id number])                                        

 

             LYMPH % (test code = 14.3 %                                 



             736-9)                                              

 

             MONO % (test code = 7.9 %                                  



             5905-5)                                             

 

             EOS % (test code = 2.8 %                                  



             713-8)                                              

 

             BASO % (test code = 0.6 %                                  



             706-2)                                              

 

             GRAN MAT x10^3(ANC) 11.28 10*3/uL 1.99-6.95    H            



             (test code =                                        



             1751012676)                                         

 

             IMM GRAN x10^3 (test 0.09 10*3/uL 0.00-0.06    H            



             code = [id number])                                        

 

             LYMPH x10^3 (test code 2.18 10*3/uL 1.09-3.23                 



             = 731-0)                                            

 

             MONO x10^3 (test code 1.21 10*3/uL 0.36-1.02    H            



             = 742-7)                                            

 

             EOS x10^3 (test code = 0.43 10*3/uL 0.06-0.53                 



             711-2)                                              

 

             BASO x10^3 (test code 0.09 10*3/uL 0.01-0.09                 



             = 704-7)                                            

 

             Lab Interpretation Abnormal                               



             (test code = 36530-6)                                        



[facility] WITH DIFF2021-04-10 01:49:45





             Test Item    Value        Reference Range Interpretation Comments

 

             WBC (test code =              See_Comment  H             [Automated



             6690-2)                                             message] The



                                                                 system which



                                                                 generated this



                                                                 result transmit

ronnie



                                                                 reference range

:



                                                                 4.20 - 10.70



                                                                 10*3/?L. The



                                                                 reference range



                                                                 was not used to



                                                                 interpret this



                                                                 result as



                                                                 normal/abnormal

.

 

             RBC (test code =              See_Comment  L             [Automated



             789-8)                                              message] The



                                                                 system which



                                                                 generated this



                                                                 result transmit

ronnie



                                                                 reference range

:



                                                                 4.26 - 5.52



                                                                 10*6/?L. The



                                                                 reference range



                                                                 was not used to



                                                                 interpret this



                                                                 result as



                                                                 normal/abnormal

.

 

             HGB (test code = 12.1 g/dL    12.2-16.4    L            



             718-7)                                              

 

             HCT (test code = 35.9 %       38.4-49.3    L            



             4544-3)                                             

 

             MCV (test code = 85.5 fL      81.7-95.6                 



             787-2)                                              

 

             MCH (test code = 28.8 pg      26.1-32.7                 



             785-6)                                              

 

             MCHC (test code = 33.7 g/dL    31.2-35.0                 



             786-4)                                              

 

             RDW-SD (test code = 36.7 fL      38.5-51.6    L            



             44144-7)                                            

 

             RDW-CV (test code = 11.9 %       12.1-15.4    L            



             788-0)                                              

 

             PLT (test code =              See_Comment  H             [Automated



             777-3)                                              message] The



                                                                 system which



                                                                 generated this



                                                                 result transmit

ronnie



                                                                 reference range

:



                                                                 150 - 328 10*3/

?L.



                                                                 The reference



                                                                 range was not u

sed



                                                                 to interpret th

is



                                                                 result as



                                                                 normal/abnormal

.

 

             MPV (test code = 9.3 fL       9.8-13.0     L            



             04956-4)                                            

 

             NRBC/100 WBC (test              See_Comment                [Automat

ed



             code = [id number])                                        message] 

The



                                                                 system which



                                                                 generated this



                                                                 result transmit

ronnie



                                                                 reference range

:



                                                                 0.0 - 10.0 /100



                                                                 WBCs. The



                                                                 reference range



                                                                 was not used to



                                                                 interpret this



                                                                 result as



                                                                 normal/abnormal

.

 

             NRBC x10^3 (test code <0.01        See_Comment                [Auto

mated



             = [id number])                                        message] The



                                                                 system which



                                                                 generated this



                                                                 result transmit

ronnie



                                                                 reference range

:



                                                                 10*3/?L. The



                                                                 reference range



                                                                 was not used to



                                                                 interpret this



                                                                 result as



                                                                 normal/abnormal

.

 

             GRAN MAT (NEUT) % 73.8 %                                 



             (test code = 770-8)                                        

 

             IMM GRAN % (test code 0.60 %                                 



             = [id number])                                        

 

             LYMPH % (test code = 14.3 %                                 



             736-9)                                              

 

             MONO % (test code = 7.9 %                                  



             5905-5)                                             

 

             EOS % (test code = 2.8 %                                  



             713-8)                                              

 

             BASO % (test code = 0.6 %                                  



             706-2)                                              

 

             GRAN MAT x10^3(ANC) 11.28 10*3/uL 1.99-6.95    H            



             (test code =                                        



             0875698468)                                         

 

             IMM GRAN x10^3 (test 0.09 10*3/uL 0.00-0.06    H            



             code = 0072192655)                                        

 

             LYMPH x10^3 (test code 2.18 10*3/uL 1.09-3.23                 



             = 731-0)                                            

 

             MONO x10^3 (test code 1.21 10*3/uL 0.36-1.02    H            



             = 742-7)                                            

 

             EOS x10^3 (test code = 0.43 10*3/uL 0.06-0.53                 



             711-2)                                              

 

             BASO x10^3 (test code 0.09 10*3/uL 0.01-0.09                 



             = 704-7)                                            

 

             Lab Interpretation Abnormal                               



             (test code = 46256-1)                                        



[facility] WITH DIFF2021-04-10 01:49:45





             Test Item    Value        Reference Range Interpretation Comments

 

             WBC (test code =              See_Comment  H             [Automated



             6690-2)                                             message] The



                                                                 system which



                                                                 generated this



                                                                 result transmit

ronnie



                                                                 reference range

:



                                                                 4.20 - 10.70



                                                                 10*3/?L. The



                                                                 reference range



                                                                 was not used to



                                                                 interpret this



                                                                 result as



                                                                 normal/abnormal

.

 

             RBC (test code =              See_Comment  L             [Automated



             789-8)                                              message] The



                                                                 system which



                                                                 generated this



                                                                 result transmit

ronnie



                                                                 reference range

:



                                                                 4.26 - 5.52



                                                                 10*6/?L. The



                                                                 reference range



                                                                 was not used to



                                                                 interpret this



                                                                 result as



                                                                 normal/abnormal

.

 

             HGB (test code = 12.1 g/dL    12.2-16.4    L            



             718-7)                                              

 

             HCT (test code = 35.9 %       38.4-49.3    L            



             4544-3)                                             

 

             MCV (test code = 85.5 fL      81.7-95.6                 



             787-2)                                              

 

             MCH (test code = 28.8 pg      26.1-32.7                 



             785-6)                                              

 

             MCHC (test code = 33.7 g/dL    31.2-35.0                 



             786-4)                                              

 

             RDW-SD (test code = 36.7 fL      38.5-51.6    L            



             16179-4)                                            

 

             RDW-CV (test code = 11.9 %       12.1-15.4    L            



             788-0)                                              

 

             PLT (test code =              See_Comment  H             [Automated



             777-3)                                              message] The



                                                                 system which



                                                                 generated this



                                                                 result transmit

ronnie



                                                                 reference range

:



                                                                 150 - 328 10*3/

?L.



                                                                 The reference



                                                                 range was not u

sed



                                                                 to interpret th

is



                                                                 result as



                                                                 normal/abnormal

.

 

             MPV (test code = 9.3 fL       9.8-13.0     L            



             03324-5)                                            

 

             NRBC/100 WBC (test              See_Comment                [Automat

ed



             code = [id number])                                        message] 

The



                                                                 system which



                                                                 generated this



                                                                 result transmit

ronnie



                                                                 reference range

:



                                                                 0.0 - 10.0 /100



                                                                 WBCs. The



                                                                 reference range



                                                                 was not used to



                                                                 interpret this



                                                                 result as



                                                                 normal/abnormal

.

 

             NRBC x10^3 (test code <0.01        See_Comment                [Auto

mated



             = [id number])                                        message] The



                                                                 system which



                                                                 generated this



                                                                 result transmit

ronnie



                                                                 reference range

:



                                                                 10*3/?L. The



                                                                 reference range



                                                                 was not used to



                                                                 interpret this



                                                                 result as



                                                                 normal/abnormal

.

 

             GRAN MAT (NEUT) % 73.8 %                                 



             (test code = 770-8)                                        

 

             IMM GRAN % (test code 0.60 %                                 



             = [id number])                                        

 

             LYMPH % (test code = 14.3 %                                 



             736-9)                                              

 

             MONO % (test code = 7.9 %                                  



             5905-5)                                             

 

             EOS % (test code = 2.8 %                                  



             713-8)                                              

 

             BASO % (test code = 0.6 %                                  



             706-2)                                              

 

             GRAN MAT x10^3(ANC) 11.28 10*3/uL 1.99-6.95    H            



             (test code =                                        



             [id number])                                         

 

             IMM GRAN x10^3 (test 0.09 10*3/uL 0.00-0.06    H            



             code = [id number])                                        

 

             LYMPH x10^3 (test code 2.18 10*3/uL 1.09-3.23                 



             = 731-0)                                            

 

             MONO x10^3 (test code 1.21 10*3/uL 0.36-1.02    H            



             = 742-7)                                            

 

             EOS x10^3 (test code = 0.43 10*3/uL 0.06-0.53                 



             711-2)                                              

 

             BASO x10^3 (test code 0.09 10*3/uL 0.01-0.09                 



             = 704-7)                                            

 

             Lab Interpretation Abnormal                               



             (test code = 60109-0)                                        



[facility] [id number] 
00:00:00





             Test Item    Value        Reference Range Interpretation Comments

 

             GLUCOSE (test code = 2217) 259 MG/DL                              

 

             BUN (test code = 2208) 35 MG/DL                               

 

             CREATININE (test code = 2214) 2.85 MG/DL                           

  

 

             eGFR  AMER. (test code 23 ML/MIN/1.73                       

    



             = 98595)                                            

 

             eGFR NON- AMER. (test 20 ML/MIN/1.73                        

   



             code = 60756)                                        

 

             CALC BUN/CREAT (test code = 12 RATIO                               



             2235)                                               

 

             SODIUM (test code = 2231) 138 MEQ/L                              

 

             POTASSIUM (test code = 2228) 5.0 MEQ/L                             

 

 

             CHLORIDE (test code = 2215) 105 MEQ/L                              

 

             CARBON DIOXIDE (test code = 23 MEQ/L                               



             220)                                               

 

             CALCIUM (test code = 2209) 9.0 MG/DL                              

 

             PROTEIN, TOTAL (test code = 6.7 G/DL                               



             )                                               

 

             ALBUMIN (test code = 2201) 2.9 G/DL                               

 

             CALC GLOBULIN (test code = 3.8 G/DL                               



             2240)                                               

 

             CALC A/G RATIO (test code = 0.8 RATIO                              



             2234)                                               

 

             BILIRUBIN, TOTAL (test code = <0.2 MG/DL                           

  



             )                                               

 

             ALKALINE PHOSPHATASE (test 92 U/L                                 



             code = 2204)                                        

 

             AST (test code = 2218) 27 U/L                                 

 

             ALT (test code = 2219) 26 U/L                                 



COMPREHENSIVE METABOLIC [id number] 00:00:00





             Test Item    Value        Reference Range Interpretation Comments

 

             GLUCOSE (test code = 2217) 259 MG/DL                              

 

             BUN (test code = 2208) 35 MG/DL                               

 

             CREATININE (test code = 2214) 2.85 MG/DL                           

  

 

             eGFR  AMER. (test code 23 ML/MIN/1.73                       

    



             = 69288)                                            

 

             eGFR NON- AMER. (test 20 ML/MIN/1.73                        

   



             code = 24073)                                        

 

             CALC BUN/CREAT (test code = 12 RATIO                               



             2235)                                               

 

             SODIUM (test code = 2231) 138 MEQ/L                              

 

             POTASSIUM (test code = 2228) 5.0 MEQ/L                             

 

 

             CHLORIDE (test code = 2215) 105 MEQ/L                              

 

             CARBON DIOXIDE (test code = 23 MEQ/L                               



             )                                               

 

             CALCIUM (test code = 2209) 9.0 MG/DL                              

 

             PROTEIN, TOTAL (test code = 6.7 G/DL                               



             )                                               

 

             ALBUMIN (test code = 2201) 2.9 G/DL                               

 

             CALC GLOBULIN (test code = 3.8 G/DL                               



             2240)                                               

 

             CALC A/G RATIO (test code = 0.8 RATIO                              



             2234)                                               

 

             BILIRUBIN, TOTAL (test code = <0.2 MG/DL                           

  



             )                                               

 

             ALKALINE PHOSPHATASE (test 92 U/L                                 



             code = 2204)                                        

 

             AST (test code = 2218) 27 U/L                                 

 

             ALT (test code = 2219) 26 U/L                                 



LIPID [id number] 00:00:00





             Test Item    Value        Reference Range Interpretation Comments

 

             CHOLESTEROL (test code = 2210) 211 MG/DL                           

   

 

             TRIGLYCERIDES (test code = 2232) 263 MG/DL                         

     

 

             HDL CHOLESTEROL (test code = 2220) 40 MG/DL                        

       

 

             CALC LDL CHOL (test code = 2237) 131 MG/DL                         

     

 

             RISK RATIO LDL/HDL (test code = 3.28 RATIO                         

    



             2238)                                               



LIPID [id number] 00:00:00





             Test Item    Value        Reference Range Interpretation Comments

 

             CHOLESTEROL (test code = 2210) 211 MG/DL                           

   

 

             TRIGLYCERIDES (test code = 2232) 263 MG/DL                         

     

 

             HDL CHOLESTEROL (test code = 2220) 40 MG/DL                        

       

 

             CALC LDL CHOL (test code = 2237) 131 MG/DL                         

     

 

             RISK RATIO LDL/HDL (test code = 3.28 RATIO                         

    



             2238)                                               



URIC [id number] 00:00:00





             Test Item    Value        Reference Range Interpretation Comments

 

             URIC ACID (test code = 2233) 5.3 MG/DL                             

 



URIC [id number] 00:00:00





             Test Item    Value        Reference Range Interpretation Comments

 

             URIC ACID (test code = 2233) 5.3 MG/DL                             

 



HEMOGLOBIN [id number] 00:00:00





             Test Item    Value        Reference Range Interpretation Comments

 

             HEMOGLOBIN A1c (test code = 90756) 9.3 %                           

       



HEMOGLOBIN [id number] 00:00:00





             Test Item    Value        Reference Range Interpretation Comments

 

             HEMOGLOBIN A1c (test code = 18250) 9.3 %                           

       



HEMOGLOBIN [id number] 00:00:00





             Test Item    Value        Reference Range Interpretation Comments

 

             HEMOGLOBIN A1c (test code = 47618) 9.3 %                           

       



COMPREHENSIVE METABOLIC [id number] 00:00:00





             Test Item    Value        Reference Range Interpretation Comments

 

             GLUCOSE (test code = 2217) 259 MG/DL                              

 

             BUN (test code = 2208) 35 MG/DL                               

 

             CREATININE (test code = 2214) 2.85 MG/DL                           

  

 

             eGFR  AMER. (test code 23 ML/MIN/1.73                       

    



             = 98240)                                            

 

             eGFR NON- AMER. (test 20 ML/MIN/1.73                        

   



             code = 37108)                                        

 

             CALC BUN/CREAT (test code = 12 RATIO                               



             2235)                                               

 

             SODIUM (test code = 2231) 138 MEQ/L                              

 

             POTASSIUM (test code = 2228) 5.0 MEQ/L                             

 

 

             CHLORIDE (test code = 2215) 105 MEQ/L                              

 

             CARBON DIOXIDE (test code = 23 MEQ/L                               



             )                                               

 

             CALCIUM (test code = 2209) 9.0 MG/DL                              

 

             PROTEIN, TOTAL (test code = 6.7 G/DL                               



             2229)                                               

 

             ALBUMIN (test code = 2201) 2.9 G/DL                               

 

             CALC GLOBULIN (test code = 3.8 G/DL                               



             2240)                                               

 

             CALC A/G RATIO (test code = 0.8 RATIO                              



             2234)                                               

 

             BILIRUBIN, TOTAL (test code = <0.2 MG/DL                           

  



             220)                                               

 

             ALKALINE PHOSPHATASE (test 92 U/L                                 



             code = 2204)                                        

 

             AST (test code = 2218) 27 U/L                                 

 

             ALT (test code = 2219) 26 U/L                                 



COMPREHENSIVE METABOLIC [id number] 00:00:00





             Test Item    Value        Reference Range Interpretation Comments

 

             GLUCOSE (test code = 2217) 259 MG/DL                              

 

             BUN (test code = 2208) 35 MG/DL                               

 

             CREATININE (test code = 2214) 2.85 MG/DL                           

  

 

             eGFR  AMER. (test code 23 ML/MIN/1.73                       

    



             = 47324)                                            

 

             eGFR NON- AMER. (test 20 ML/MIN/1.73                        

   



             code = 23237)                                        

 

             CALC BUN/CREAT (test code = 12 RATIO                               



             2235)                                               

 

             SODIUM (test code = 2231) 138 MEQ/L                              

 

             POTASSIUM (test code = 2228) 5.0 MEQ/L                             

 

 

             CHLORIDE (test code = 2215) 105 MEQ/L                              

 

             CARBON DIOXIDE (test code = 23 MEQ/L                               



             2206)                                               

 

             CALCIUM (test code = 2209) 9.0 MG/DL                              

 

             PROTEIN, TOTAL (test code = 6.7 G/DL                               



             )                                               

 

             ALBUMIN (test code = 2201) 2.9 G/DL                               

 

             CALC GLOBULIN (test code = 3.8 G/DL                               



             2240)                                               

 

             CALC A/G RATIO (test code = 0.8 RATIO                              



             2234)                                               

 

             BILIRUBIN, TOTAL (test code = <0.2 MG/DL                           

  



             )                                               

 

             ALKALINE PHOSPHATASE (test 92 U/L                                 



             code = 2204)                                        

 

             AST (test code = 2218) 27 U/L                                 

 

             ALT (test code = 2219) 26 U/L                                 



LIPID [id number] 00:00:00





             Test Item    Value        Reference Range Interpretation Comments

 

             CHOLESTEROL (test code = 2210) 211 MG/DL                           

   

 

             TRIGLYCERIDES (test code = 2232) 263 MG/DL                         

     

 

             HDL CHOLESTEROL (test code = 2220) 40 MG/DL                        

       

 

             CALC LDL CHOL (test code = 2237) 131 MG/DL                         

     

 

             RISK RATIO LDL/HDL (test code = 3.28 RATIO                         

    



             2238)                                               



LIPID [id number] 00:00:00





             Test Item    Value        Reference Range Interpretation Comments

 

             CHOLESTEROL (test code = 2210) 211 MG/DL                           

   

 

             TRIGLYCERIDES (test code = 2232) 263 MG/DL                         

     

 

             HDL CHOLESTEROL (test code = 2220) 40 MG/DL                        

       

 

             CALC LDL CHOL (test code = 2237) 131 MG/DL                         

     

 

             RISK RATIO LDL/HDL (test code = 3.28 RATIO                         

    



             2238)                                               



URIC [id number] 00:00:00





             Test Item    Value        Reference Range Interpretation Comments

 

             URIC ACID (test code = 2233) 5.3 MG/DL                             

 



URIC [id number] 00:00:00





             Test Item    Value        Reference Range Interpretation Comments

 

             URIC ACID (test code = 2233) 5.3 MG/DL                             

 



HEMOGLOBIN [id number] 00:00:00





             Test Item    Value        Reference Range Interpretation Comments

 

             HEMOGLOBIN A1c (test code = 50107) 9.3 %                           

       



HEMOGLOBIN [id number] 00:00:00





             Test Item    Value        Reference Range Interpretation Comments

 

             HEMOGLOBIN A1c (test code = 27207) 9.3 %                           

       



HEMOGLOBIN [id number] 00:00:00





             Test Item    Value        Reference Range Interpretation Comments

 

             HEMOGLOBIN A1c (test code = 88012) 9.3 %                           

       



COMPREHENSIVE METABOLIC [id number] 00:00:00





             Test Item    Value        Reference Range Interpretation Comments

 

             GLUCOSE (test code = 7) 259 MG/DL                              

 

             BUN (test code = 2208) 35 MG/DL                               

 

             CREATININE (test code = 2214) 2.85 MG/DL                           

  

 

             eGFR  AMER. (test code 23 ML/MIN/1.73                       

    



             = 69627)                                            

 

             eGFR NON- AMER. (test 20 ML/MIN/1.73                        

   



             code = 41367)                                        

 

             CALC BUN/CREAT (test code = 12 RATIO                               



             )                                               

 

             SODIUM (test code = 2231) 138 MEQ/L                              

 

             POTASSIUM (test code = 2228) 5.0 MEQ/L                             

 

 

             CHLORIDE (test code = 2215) 105 MEQ/L                              

 

             CARBON DIOXIDE (test code = 23 MEQ/L                               



             )                                               

 

             CALCIUM (test code = 2209) 9.0 MG/DL                              

 

             PROTEIN, TOTAL (test code = 6.7 G/DL                               



             )                                               

 

             ALBUMIN (test code = 220) 2.9 G/DL                               

 

             CALC GLOBULIN (test code = 3.8 G/DL                               



             )                                               

 

             CALC A/G RATIO (test code = 0.8 RATIO                              



             )                                               

 

             BILIRUBIN, TOTAL (test code = <0.2 MG/DL                           

  



             )                                               

 

             ALKALINE PHOSPHATASE (test 92 U/L                                 



             code = 2204)                                        

 

             AST (test code = 2218) 27 U/L                                 

 

             ALT (test code = 2219) 26 U/L                                 



COMPREHENSIVE METABOLIC [id number] 00:00:00





             Test Item    Value        Reference Range Interpretation Comments

 

             GLUCOSE (test code = 2217) 259 MG/DL                              

 

             BUN (test code = 2208) 35 MG/DL                               

 

             CREATININE (test code = 2214) 2.85 MG/DL                           

  

 

             eGFR  AMER. (test code 23 ML/MIN/1.73                       

    



             = 27396)                                            

 

             eGFR NON- AMER. (test 20 ML/MIN/1.73                        

   



             code = 06314)                                        

 

             CALC BUN/CREAT (test code = 12 RATIO                               



             2235)                                               

 

             SODIUM (test code = 2231) 138 MEQ/L                              

 

             POTASSIUM (test code = 2228) 5.0 MEQ/L                             

 

 

             CHLORIDE (test code = 2215) 105 MEQ/L                              

 

             CARBON DIOXIDE (test code = 23 MEQ/L                               



             220)                                               

 

             CALCIUM (test code = 2209) 9.0 MG/DL                              

 

             PROTEIN, TOTAL (test code = 6.7 G/DL                               



             )                                               

 

             ALBUMIN (test code = 2201) 2.9 G/DL                               

 

             CALC GLOBULIN (test code = 3.8 G/DL                               



             2240)                                               

 

             CALC A/G RATIO (test code = 0.8 RATIO                              



             2234)                                               

 

             BILIRUBIN, TOTAL (test code = <0.2 MG/DL                           

  



             )                                               

 

             ALKALINE PHOSPHATASE (test 92 U/L                                 



             code = 2204)                                        

 

             AST (test code = 2218) 27 U/L                                 

 

             ALT (test code = 2219) 26 U/L                                 



LIPID [id number] 00:00:00





             Test Item    Value        Reference Range Interpretation Comments

 

             CHOLESTEROL (test code = 2210) 211 MG/DL                           

   

 

             TRIGLYCERIDES (test code = 2232) 263 MG/DL                         

     

 

             HDL CHOLESTEROL (test code = 2220) 40 MG/DL                        

       

 

             CALC LDL CHOL (test code = 2237) 131 MG/DL                         

     

 

             RISK RATIO LDL/HDL (test code = 3.28 RATIO                         

    



             2238)                                               



LIPID [id number] 00:00:00





             Test Item    Value        Reference Range Interpretation Comments

 

             CHOLESTEROL (test code = 2210) 211 MG/DL                           

   

 

             TRIGLYCERIDES (test code = 2232) 263 MG/DL                         

     

 

             HDL CHOLESTEROL (test code = 2220) 40 MG/DL                        

       

 

             CALC LDL CHOL (test code = 2237) 131 MG/DL                         

     

 

             RISK RATIO LDL/HDL (test code = 3.28 RATIO                         

    



             2238)                                               



URIC [id number] 00:00:00





             Test Item    Value        Reference Range Interpretation Comments

 

             URIC ACID (test code = 2233) 5.3 MG/DL                             

 



URIC [id number] 00:00:00





             Test Item    Value        Reference Range Interpretation Comments

 

             URIC ACID (test code = 2233) 5.3 MG/DL                             

 



HEMOGLOBIN [id number] 00:00:00





             Test Item    Value        Reference Range Interpretation Comments

 

             HEMOGLOBIN A1c (test code = 54541) 9.3 %                           

       



HEMOGLOBIN [id number] 00:00:00





             Test Item    Value        Reference Range Interpretation Comments

 

             HEMOGLOBIN A1c (test code = 64879) 9.3 %                           

       



HEMOGLOBIN [id number] 00:00:00





             Test Item    Value        Reference Range Interpretation Comments

 

             HEMOGLOBIN A1c (test code = 72141) 9.3 %

## 2023-01-24 NOTE — EKG
Test Date:    2023-01-24               Test Time:    07:55:20

Technician:   JR SOOD                                   

                                                     

MEASUREMENT RESULTS:                                       

Intervals:                                           

Rate:         73                                     

TX:           128                                    

QRSD:         94                                     

QT:           376                                    

QTc:          414                                    

Axis:                                                

P:            70                                     

TX:           128                                    

QRS:          70                                     

T:            79                                     

                                                     

INTERPRETIVE STATEMENTS:                                       

                                                     

Normal sinus rhythm

Normal ECG

Compared to ECG 06/20/2022 12:29:12

No significant changes



Electronically Signed On 01-24-23 12:24:24 CST by Konstantin Mascorro